# Patient Record
Sex: MALE | Race: OTHER | HISPANIC OR LATINO | Employment: FULL TIME | URBAN - METROPOLITAN AREA
[De-identification: names, ages, dates, MRNs, and addresses within clinical notes are randomized per-mention and may not be internally consistent; named-entity substitution may affect disease eponyms.]

---

## 2017-04-12 ENCOUNTER — OFFICE VISIT (OUTPATIENT)
Dept: URGENT CARE | Age: 52
End: 2017-04-12
Payer: COMMERCIAL

## 2017-04-12 PROCEDURE — G0382 LEV 3 HOSP TYPE B ED VISIT: HCPCS | Performed by: FAMILY MEDICINE

## 2017-11-05 ENCOUNTER — OFFICE VISIT (OUTPATIENT)
Dept: URGENT CARE | Age: 52
End: 2017-11-05
Payer: COMMERCIAL

## 2017-11-05 PROCEDURE — 94640 AIRWAY INHALATION TREATMENT: CPT | Performed by: FAMILY MEDICINE

## 2017-11-05 PROCEDURE — G0383 LEV 4 HOSP TYPE B ED VISIT: HCPCS | Performed by: FAMILY MEDICINE

## 2017-11-07 NOTE — PROGRESS NOTES
Assessment  1  History of acute bronchitis (V12 69) (Z87 09)   2  Acute bronchitis (466 0) (J20 9)    Plan  Acute bronchitis    · Amoxicillin-Pot Clavulanate 875-125 MG Oral Tablet; TAKE 1 TABLET EVERY 12  HOURS DAILY   · PredniSONE 10 MG Oral Tablet; 3 tabs BID X 2 days, 2 Tabs BID X 2 days, 1 Tab  BID X 2 Days, then 1 Tab daily X 2 days   · Ventolin  (90 Base) MCG/ACT Inhalation Aerosol Solution; INHALE 1 TO 2  PUFFS EVERY 4 TO 6 HOURS AS NEEDED    Discussion/Summary  Discussion Summary:   Patient given DuoNeb today in the office  Symptoms improved after treatment  Start Augmentin and take as directed  Recommend taking prednisone taper as directed  Use Ventolin inhaler at least 3 times a day for the next 3-4 days  Can use every 4-6 hours as needed  If symptoms are not improving over the next 3-5 days, follow-up with PCP  Medication Side Effects Reviewed: Possible side effects of new medications were reviewed with the patient/guardian today  Understands and agrees with treatment plan: The treatment plan was reviewed with the patient/guardian  The patient/guardian understands and agrees with the treatment plan   Follow Up Instructions: Follow Up with your Primary Care Provider in 3-5 days  If your symptoms worsen, go to the nearest [facility] Emergency Department  Chief Complaint  1  Cold Symptoms  Chief Complaint Free Text Note Form: pt reports cold S/S with cough and congestion for 2 weeks   pt states history of previous bronchitis  Itzel Blandon is a smoker and states Ventolin inhaler has helped in the past      History of Present Illness  HPI: 79-year-old male here with cough and congestion for the last 2 weeks  Patient is a smoker and has a history of previous bronchitis  Cough is productive with thick sputum  Denies any fever or chills  Hospital Based Practices Required Assessment:   Pain Assessment   the patient states they do not have pain   (on a scale of 0 to 10, the patient rates the pain at 0 ) Abuse And Domestic Violence Screen    Yes, the patient is safe at home  -- The patient states no one is hurting them  Depression And Suicide Screen  No, the patient has not had thoughts of hurting themself  No, the patient has not felt depressed in the past 7 days  Prefered Language is  [place]  Primary Language is  English  Cold Symptoms: Thai Stone presents with complaints of cold symptoms  Associated symptoms include nasal congestion,-- runny nose,-- post nasal drainage-- and-- productive cough, but-- no sneezing,-- no scratchy throat,-- no sore throat,-- no hoarseness,-- no dry cough,-- no facial pressure,-- no facial pain,-- no headache,-- no plugged ear(s),-- no ear pain,-- no nausea,-- no vomiting,-- no diarrhea,-- no fever-- and-- no chills  Review of Systems  Focused-Male:   Constitutional: no fever or chills, feels well, no tiredness, no recent weight loss or weight gain  ENT: no complaints of earache, no loss of hearing, no nosebleeds or nasal discharge, no sore throat or hoarseness  Cardiovascular: no complaints of slow or fast heart rate, no chest pain, no palpitations, no leg claudication or lower extremity edema  Respiratory: shortness of breath,-- cough-- and-- wheezing, but-- as noted in HPI  Gastrointestinal: no complaints of abdominal pain, no constipation, no nausea or vomiting, no diarrhea or bloody stools  ROS Reviewed:   ROS reviewed  Active Problems  1  Abnormal Liver Function Test (790 6)   2  Acne (706 1) (L70 9)   3  Acute lymphadenitis (683) (L04 9)   4  Chest congestion (786 9) (R09 89)   5  Chest discomfort (786 59) (R07 89)   6  Cigarette smoker (305 1) (F17 210)   7  COPD (chronic obstructive pulmonary disease) (496) (J44 9)   8  Costochondritis (733 6) (M94 0)   9  Current Every Day Smoker (305 1)   10  Depression with anxiety (300 4) (F41 8)   11   Dermatitis (692 9) (L30 9)   12  Eczema (692 9) (L30 9)   13  Elevated ALT measurement (790 4) (R74 0)   14  Erectile dysfunction of non-organic origin (302 72) (F52 21)   15  Hyperlipidemia (272 4) (E78 5)   16  Hypertension (401 9) (I10)   17  Impaired fasting glucose (790 21) (R73 01)   18  Insomnia (780 52) (G47 00)   19  Irritable bowel syndrome (564 1) (K58 9)   20  Nicotine dependence (305 1) (F17 200)   21  Obesity (278 00) (E66 9)   22  Otitis media (382 9) (H66 90)   23  Psoriasis (696 1) (L40 9)   24  Recurrent otitis media of left ear (382 9) (H66 92)   25  Sinusitis (473 9) (J32 9)   26  Tooth infection (522 4) (K04 7)   27  Upper respiratory infection (465 9) (J06 9)    Past Medical History  1  History of Anxiety (300 00) (F41 9)   2  History of Bronchospasm (519 11) (J98 01)   3  History of Denial Of Any Significant Medical History   4  History of Grief reaction (309 0) (F43 20)   5  History of acute bronchitis (V12 69) (Z87 09)   6  History of acute bronchitis (V12 69) (Z87 09)   7  History of acute bronchitis (V12 69) (Z87 09)   8  History of acute sinusitis (V12 69) (Z87 09)   9  History of athlete's foot (V12 09) (Z86 19)   10  History of impetigo (V13 3) (Z87 2)   11  History of low back pain (V13 59) (Z87 39)   12  History of upper respiratory infection (V12 09) (Z87 09)  Active Problems And Past Medical History Reviewed: The active problems and past medical history were reviewed and updated today  Family History  Mother    1  Family history of Lung Cancer (V16 1)  Father    2  Family history of Diabetes Mellitus (V18 0)  Sister    3  Family history of Diabetes Mellitus (V18 0)  Family History Reviewed: The family history was reviewed and updated today  Social History   · Being A Social Drinker   · Cigarette smoker (305 1) (F17 210)   · Current Every Day Smoker (305 1)   · Denied: History of Drug Use  Social History Reviewed: The social history was reviewed and updated today  The social history was reviewed and is unchanged  Surgical History  1   History of Colonoscopy (Fiberoptic)   2  History of Knee Surgery  Surgical History Reviewed: The surgical history was reviewed and updated today  Current Meds   1  Atorvastatin Calcium 10 MG Oral Tablet; Take 1 tablet daily; Therapy: 19EJT3631 to (Evaluate:07Oct2017)  Requested for: 20Caw2951; Last   Rx:[id number] Ordered   2  Fenofibrate 160 MG Oral Tablet; Take 1 tablet daily; Therapy: 36TUI1823 to (Evaluate:07Oct2017)  Requested for: 29Jca3400; Last   Rx:[id number] Ordered   3  PredniSONE 10 MG Oral Tablet; day 1: take 6; day 2: take 5; day 3: take 4; day 4: take   3; day 5: take 2; day 6: take 1 with food; Therapy: 77Udv1786 to (Last Rx:19Leo9785)  Requested for: 12Apr2017 Ordered   4  ProAir  (90 Base) MCG/ACT Inhalation Aerosol Solution; INHALE 1 PUFF EVERY   4 HOURS AS NEEDED; Therapy: 19Mzv4914 to (Last Rx:14Bsp6356)  Requested for: 12Apr2017 Ordered  Medication List Reviewed: The medication list was reviewed and updated today  Allergies  1  No Known Drug Allergies    Physical Exam    Constitutional   General appearance: No acute distress, well appearing and well nourished  Ears, Nose, Mouth, and Throat   External inspection of ears and nose: Normal     Otoscopic examination: Tympanic membrance translucent with normal light reflex  Canals patent without erythema  Nasal mucosa, septum, and turbinates: Abnormal   There was a mucoid discharge from both nares  The bilateral nasal mucosa was edematous-- and-- red  Oropharynx: Abnormal   The posterior pharynx was erythematous, but-- did not have an exudate  Pulmonary   Respiratory effort: No increased work of breathing or signs of respiratory distress  Auscultation of lungs: Abnormal   Auscultation of the lungs revealed diffuse wheezing  Cardiovascular   Auscultation of heart: Normal rate and rhythm, normal S1 and S2, without murmurs         Signatures   Electronically signed by : Samara Garcia, [facility]; Nov 5 2017  9:20AM EST (Author)    Electronically signed by : GUS Boo ; Nov 6 2017 10:25AM EST                       (Co-author)

## 2017-11-18 ENCOUNTER — APPOINTMENT (EMERGENCY)
Dept: RADIOLOGY | Facility: HOSPITAL | Age: 52
End: 2017-11-18
Payer: COMMERCIAL

## 2017-11-18 ENCOUNTER — HOSPITAL ENCOUNTER (EMERGENCY)
Facility: HOSPITAL | Age: 52
Discharge: HOME | End: 2017-11-18
Attending: EMERGENCY MEDICINE
Payer: COMMERCIAL

## 2017-11-18 VITALS
BODY MASS INDEX: 32.51 KG/M2 | HEART RATE: 83 BPM | TEMPERATURE: 97.4 F | DIASTOLIC BLOOD PRESSURE: 74 MMHG | RESPIRATION RATE: 18 BRPM | OXYGEN SATURATION: 96 % | SYSTOLIC BLOOD PRESSURE: 135 MMHG | WEIGHT: 240 LBS | HEIGHT: 72 IN

## 2017-11-18 DIAGNOSIS — K76.0 FATTY LIVER DISEASE, NONALCOHOLIC: ICD-10-CM

## 2017-11-18 DIAGNOSIS — S29.012A MUSCLE STRAIN OF RIGHT UPPER BACK, INITIAL ENCOUNTER: Primary | ICD-10-CM

## 2017-11-18 PROCEDURE — 74176 CT ABD & PELVIS W/O CONTRAST: CPT

## 2017-11-18 PROCEDURE — 99283 EMERGENCY DEPT VISIT LOW MDM: CPT

## 2017-11-18 NOTE — ED PROVIDER NOTES
History  Chief Complaint   Patient presents with    Back Pain     low right sided back pain for a few weeks worse with movement  denies dysuria  46 y/o male in nad with no associated sx of cp, sob, son, n, v, diarrhea, constipation came to ed c/o worsening pain r mid back, that radiate to ruq region  He denies systemic sx and uti sx  Pain is worse with upperbody rom rotation or twisting motion  With physical exam pt appears tender over ruq with radiating pain to right mid back region  Ribs with no crepition or step off  No sign of trauma such as echymosis or swelling  Pt works in construction for years does not seem this may have come from that  He drinks 6packs 3-4x day  No sign of ascites or jaundice or scleraictrus or pedal edema  Abdomen appears distended with no guarding with gross palpation  None       Past Medical History:   Diagnosis Date    Hypertension        History reviewed  No pertinent surgical history  History reviewed  No pertinent family history  I have reviewed and agree with the history as documented  Social History   Substance Use Topics    Smoking status: Current Every Day Smoker    Smokeless tobacco: Never Used    Alcohol use Yes        Review of Systems   Constitutional: Negative  HENT: Negative  Eyes: Negative  Respiratory: Negative for cough, chest tightness, shortness of breath, wheezing and stridor  Gastrointestinal: Positive for abdominal pain  Negative for constipation, diarrhea, nausea and vomiting  Genitourinary: Negative for difficulty urinating, discharge, dysuria, enuresis, frequency, hematuria and penile pain  Musculoskeletal: Positive for back pain  Neurological: Negative          Physical Exam  ED Triage Vitals   Temperature Pulse Respirations Blood Pressure SpO2   11/18/17 0925 11/18/17 0925 11/18/17 0925 11/18/17 0925 11/18/17 0925   (!) 97 4 °F (36 3 °C) (!) 108 16 (!) 187/82 93 %      Temp Source Heart Rate Source Patient Position - Orthostatic VS BP Location FiO2 (%)   11/18/17 0925 11/18/17 0925 11/18/17 1335 11/18/17 1335 --   Tympanic Monitor Lying Right arm       Pain Score       11/18/17 0925       5           Orthostatic Vital Signs  Vitals:    11/18/17 0925 11/18/17 1335   BP: (!) 187/82 135/74   Pulse: (!) 108 83   Patient Position - Orthostatic VS:  Lying       Physical Exam   Constitutional: He is oriented to person, place, and time  No distress  HENT:   Head: Normocephalic  Neck: Normal range of motion  Pulmonary/Chest: Effort normal  No respiratory distress  He has no wheezes  Abdominal: Soft  He exhibits distension  There is guarding (with deep palpation of ruq)  Musculoskeletal: Normal range of motion  Neurological: He is alert and oriented to person, place, and time  Skin: He is not diaphoretic  ED Medications  Medications - No data to display    Diagnostic Studies  Results Reviewed     None                 CT abdomen pelvis wo contrast   Final Result by Pro Cee DO (11/18 1317)   1  Hepatomegaly with fatty infiltration  2   Hiatal hernia  3   Colonic diverticulosis  Workstation performed: [id number]                    Procedures  Procedures       Phone Contacts  ED Phone Contact    ED Course  ED Course                                MDM  Number of Diagnoses or Management Options  Fatty liver disease, nonalcoholic:   Muscle strain of right upper back, initial encounter:   Diagnosis management comments: Ct abd/pelvis - fatty liver disease, diverticulosis, and hernia  None of these does not seem to be the cause of his pain  Will refer to gi follow up for the above and urge otc pain control to recommend  Repeat vitals stable 135/75 taken by the nurse prior to dc          Amount and/or Complexity of Data Reviewed  Tests in the radiology section of CPT®: ordered and reviewed      CritCare Time    Disposition  Final diagnoses:   Muscle strain of right upper back, initial encounter Fatty liver disease, nonalcoholic     Time reflects when diagnosis was documented in both MDM as applicable and the Disposition within this note     Time User Action Codes Description Comment    11/18/2017  1:27 PM Herve Santoyo Add [S29 012A] Muscle strain of right upper back, initial encounter     11/18/2017  1:27 PM Herve Santoyo Add [K76 0] Fatty liver disease, nonalcoholic       ED Disposition     ED Disposition Condition Comment    Discharge  [address] discharge to home/self care  Condition at discharge: Stable        Follow-up Information     Follow up With Specialties Details Why Contact Info    Ashkan Washington MD Family Medicine In 1 week  [address] [facility] [address]      Isa Patel MD Gastroenterology  for abnomal ct result [address] [facility]  [address] [facility] [address] [phone]          There are no discharge medications for this patient  No discharge procedures on file      ED Provider  Electronically Signed by           Sia Clarke PA-C  11/18/17 9044

## 2017-11-18 NOTE — DISCHARGE INSTRUCTIONS
Muscle Strain   WHAT YOU NEED TO KNOW:   A muscle strain is a twist, pull, or tear of a muscle or tendon  A tendon is a strong elastic tissue that connects a muscle to a bone  Signs of a strained muscle include bruising and swelling over the area, pain with movement, and loss of strength  DISCHARGE INSTRUCTIONS:   Return to the emergency department if:   · You suddenly cannot feel or move your injured muscle  Contact your healthcare provider if:   · Your pain and swelling worsen or do not go away  · You have questions or concerns about your condition or care  Medicines:   · NSAIDs  help decrease swelling and pain or fever  This medicine is available with or without a doctor's order  NSAIDs can cause stomach bleeding or kidney problems in certain people  If you take blood thinner medicine, always ask your healthcare provider if NSAIDs are safe for you  Always read the medicine label and follow directions  · Muscle relaxers  help decrease pain and muscle spasms  · Take your medicine as directed  Contact your healthcare provider if you think your medicine is not helping or if you have side effects  Tell him of her if you are allergic to any medicine  Keep a list of the medicines, vitamins, and herbs you take  Include the amounts, and when and why you take them  Bring the list or the pill bottles to follow-up visits  Carry your medicine list with you in case of an emergency  Follow up with your healthcare provider as directed: Your healthcare provider may suggest that you have a follow-up visit before you go back to your usual activity  Write down your questions so you remember to ask them during your visits  Self-care:   · 3 to 7 days after the injury:  Use Rest, Ice, Compression, and Elevation (RICE) to help stop bruising and decrease pain and swelling  ¨ Rest:  Rest your muscle to allow your injury to heal  When the pain decreases, begin normal, slow movements   For mild and moderate muscle strains, you should rest your muscles for about 2 days  However, if you have a severe muscle strain, you should rest for 10 to 14 days  You may need to use crutches to walk if your muscle strain is in your legs or lower body  ¨ Ice:  Put an ice pack on the injured area  Put a towel between the ice pack and your skin  Do not put the ice pack directly on your skin  You can use a package of frozen peas instead of an ice pack  ¨ Compression:  You may need to wrap an elastic bandage around the area to decrease swelling  It should be tight enough for you to feel support  Do not wrap it too tightly  ¨ Elevation:  Keep the injured muscle raised above your heart if possible  For example if you have a strain of your lower leg muscle, lie down and prop your leg up on pillows  This helps decrease pain and swelling  · 3 to 21 days after the injury:  Start to slowly and regularly exercise your muscle  This will help it heal  If you feel pain, decrease how hard you are exercising  · 1 to 6 weeks after the injury:  Stretch the injured muscle  Hold the stretch for about 30 seconds  Do this 4 times a day  You may stretch the muscle until you feel a slight pull  Stop stretching if you feel pain  · 2 weeks to 6 months after the injury:  The goal of this phase is to return to the activity you were doing before the injury happened, without hurting the muscle again  · 3 weeks to 6 months after the injury:  Keep stretching and strengthening your muscles to avoid injury  Slowly increase the time and distance that you exercise  You may have signs and symptoms of muscle strain 6 months after the injury, even if you do things to help it heal  In this case, you may need surgery on the muscle  © 2017 [employer] Information is for End User's use only and may not be sold, redistributed or otherwise used for commercial purposes   All illustrations and images included in CareNotes® are the copyrighted property of [employer]  or Alexis Perez  The above information is an  only  It is not intended as medical advice for individual conditions or treatments  Talk to your doctor, nurse or pharmacist before following any medical regimen to see if it is safe and effective for you  Non-Alcoholic Fatty Liver Disease   WHAT YOU NEED TO KNOW:   Non-alcoholic fatty liver disease (NAFLD) is a buildup of fat in your liver from a condition other than alcoholism  DISCHARGE INSTRUCTIONS:   Medicines:   · Medicines  may be given to manage blood sugar or cholesterol levels  · Take your medicine as directed  Contact your healthcare provider if you think your medicine is not helping or if you have side effects  Tell him or her if you are allergic to any medicine  Keep a list of the medicines, vitamins, and herbs you take  Include the amounts, and when and why you take them  Bring the list or the pill bottles to follow-up visits  Carry your medicine list with you in case of an emergency  Follow up with your healthcare provider as directed: You may need to return for more tests  You may also be referred to a specialist  Write down your questions so you remember to ask them during your visits  Manage NAFLD:   · Maintain a healthy weight  Ask your healthcare provider how much you should weigh  Ask him to help you create a weight loss plan if you are overweight  · Exercise  Aerobic exercise 3 times a week for 20 to 45 minutes can help decrease fat buildup in your liver  Examples are cycling, brisk walking, and jogging  Ask your healthcare provider about the best exercise plan for you  · Eat healthy foods  Examples are vegetables, fruit, whole-grain breads, low-fat dairy products, beans, lean meats, and fish  Foods low in simple carbohydrates, high fructose corn syrup, and trans fat may help decrease fat buildup in your liver  · Do not drink alcohol    Alcohol may make NAFLD worse and harm your liver  Contact your healthcare provider if:   · You have increased pain or swelling in your abdomen  · You feel more tired than usual     · You bruise or bleed easily  · Your skin or the whites of your eyes look yellow  · You have questions or concerns about your condition or care  Return to the emergency department if:   · You have shortness of breath  · You have trouble thinking clearly or are confused  · You feel lightheaded or faint  · You have shaking, chills, and a fever  © 2017 [employer]  Information is for End User's use only and may not be sold, redistributed or otherwise used for commercial purposes  All illustrations and images included in CareNotes® are the copyrighted property of A D A M [employer]  or Alexis Perez  The above information is an  only  It is not intended as medical advice for individual conditions or treatments  Talk to your doctor, nurse or pharmacist before following any medical regimen to see if it is safe and effective for you

## 2017-12-06 ENCOUNTER — GENERIC CONVERSION - ENCOUNTER (OUTPATIENT)
Dept: FAMILY MEDICINE CLINIC | Facility: CLINIC | Age: 52
End: 2017-12-06

## 2018-01-15 NOTE — RESULT NOTES
Message   triglycerides 953 very high   Please ask pt to make an appt to discuss results ASAP! Pt needs an appt to discuss results ASAP  Verified Results  (Q) LIPID PANEL WITH REFLEX TO DIRECT LDL [id number] 12:00AM Tristan Cowden     Test Name Result Flag Reference   CHOLESTEROL, TOTAL 296 mg/dL H 125-200   HDL CHOLESTEROL 37 mg/dL L > OR = 40   TRIGLICERIDES 167 mg/dL H <150   LDL-CHOLESTEROL  mg/dL (calc)  <130   LDL cholesterol not calculated  Triglyceride levels  greater than 400 mg/dL invalidate calculated LDL results  Desirable range <100 mg/dL for patients with CHD or  diabetes and <70 mg/dL for diabetic patients with  known heart disease  CHOL/HDLC RATIO 8 0 (calc) H < OR = 5 0   NON HDL CHOLESTEROL 259 mg/dL (calc) H    Target for non-HDL cholesterol is 30 mg/dL higher than   LDL cholesterol target  DIRECT  mg/dL  <130   Desirable range <100 mg/dL for patients with CHD or  diabetes and <70 mg/dL for diabetic patients with  known heart disease       (Q) CBC (INCLUDES DIFF/PLT) (REFL) [id number] 12:00AM Tristan Cowden     Test Name Result Flag Reference   WHITE BLOOD CELL COUNT 9 8 Thousand/uL  3 8-10 8   RED BLOOD CELL COUNT 5 12 Million/uL  4 20-5 80   HEMOGLOBIN 16 0 g/dL  13 2-17 1   HEMATOCRIT 47 8 %  38 5-50 0   MCV 93 3 fL  80 0-100 0   MCH 31 2 pg  27 0-33 0   EOSINOPHILS 5 2 %     BASOPHILS 0 1 %     ABSOLUTE MONOCYTES 637 cells/uL  200-950   ABSOLUTE EOSINOPHILS 510 cells/uL H    ABSOLUTE BASOPHILS 10 cells/uL  0-200   NEUTROPHILS 60 9 %     LYMPHOCYTES 27 3 %     MONOCYTES 6 5 %     MCHC 33 4 g/dL  32 0-36 0   RDW 13 5 %  11 0-15 0   PLATELET COUNT 346 Thousand/uL  140-400   MPV 8 8 fL  7 5-11 5   ABSOLUTE NEUTROPHILS 5968 cells/uL  6098-2233   ABSOLUTE LYMPHOCYTES 2675 cells/uL  850-3900     (Q) COMPREHENSIVE METABOLIC PNL W/ADJUSTED CALCIUM [id number] 12:00AM Tristan Cowden     Test Name Result Flag Reference   GLUCOSE 110 mg/dL H 65-99   Fasting reference interval   UREA NITROGEN (BUN) 18 mg/dL  7-25   CREATININE 0 84 mg/dL  0 70-1 33   For patients >52years of age, the reference limit  for Creatinine is approximately 13% higher for people  identified as -American  eGFR NON-AFR  AMERICAN 101 mL/min/1 73m2  > OR = 60   eGFR AFRICAN AMERICAN 117 mL/min/1 73m2  > OR = 60   BUN/CREATININE RATIO   5-67   NOT APPLICABLE (calc)   AST 31 U/L  10-35   ALT 62 U/L H 9-46   PROTEIN, TOTAL 6 7 g/dL  6 1-8 1   ALBUMIN 4 5 g/dL  3 6-5 1   GLOBULIN 2 2 g/dL (calc)  1 9-3 7   ALBUMIN/GLOBULIN RATIO 2 0 (calc)  1 0-2 5   BILIRUBIN, TOTAL 0 3 mg/dL  0 2-1 2   ALKALINE PHOSPHATASE 78 U/L     SODIUM 137 mmol/L  135-146   POTASSIUM 4 7 mmol/L  3 5-5 3   CHLORIDE 101 mmol/L     CARBON DIOXIDE 26 mmol/L  20-31   CALCIUM 9 8 mg/dL  8 6-10 3   CALCIUM (ADJUSTED FOR$ALBUMIN) 9 7 mg/dL (calc)  8 6-10 2     (Q) HEMOGLOBIN A1c WITH eAG [id number] 12:00AM Monogram   REPORT COMMENT:  FASTING:YES     Test Name Result Flag Reference   HEMOGLOBIN A1c 6 3 % of total Hgb H <5 7   According to ADA guidelines, hemoglobin A1c <7 0%  represents optimal control in non-pregnant diabetic  patients  Different metrics may apply to specific  patient populations  Standards of Medical Care in    Diabetes Care  2013;36:s11-s66     For the purpose of screening for the presence of  diabetes  <5 7%       Consistent with the absence of diabetes  5 7-6 4%    Consistent with increased risk for diabetes              (prediabetes)  >or=6 5%    Consistent with diabetes     This assay result is consistent with a higher risk  of diabetes  Currently, no consensus exists for use of hemoglobin  A1c for diagnosis of diabetes for children     eAG (mg/dL) 134 (calc)     eAG (mmol/L) 7 4 (calc)       (Q) TSH, 3RD GENERATION W/REFLEX TO FT4 [id number] 12:00AM Monogram     Test Name Result Flag Reference   TSH W/REFLEX TO FT4 0 97 mIU/L  0 40-4 50

## 2018-01-30 ENCOUNTER — OFFICE VISIT (OUTPATIENT)
Dept: URGENT CARE | Age: 53
End: 2018-01-30
Payer: COMMERCIAL

## 2018-01-30 VITALS
BODY MASS INDEX: 35 KG/M2 | HEIGHT: 71 IN | HEART RATE: 95 BPM | WEIGHT: 250 LBS | TEMPERATURE: 98.2 F | SYSTOLIC BLOOD PRESSURE: 160 MMHG | RESPIRATION RATE: 20 BRPM | DIASTOLIC BLOOD PRESSURE: 89 MMHG

## 2018-01-30 DIAGNOSIS — R06.2 WHEEZING: ICD-10-CM

## 2018-01-30 DIAGNOSIS — S60.211A CONTUSION OF RIGHT WRIST, INITIAL ENCOUNTER: Primary | ICD-10-CM

## 2018-01-30 DIAGNOSIS — J20.9 ACUTE BRONCHITIS, UNSPECIFIED ORGANISM: ICD-10-CM

## 2018-01-30 PROCEDURE — G0383 LEV 4 HOSP TYPE B ED VISIT: HCPCS | Performed by: FAMILY MEDICINE

## 2018-01-30 PROCEDURE — 73110 X-RAY EXAM OF WRIST: CPT

## 2018-01-30 RX ORDER — AZITHROMYCIN 250 MG/1
TABLET, FILM COATED ORAL
Qty: 6 TABLET | Refills: 0 | Status: SHIPPED | OUTPATIENT
Start: 2018-01-30 | End: 2018-02-04

## 2018-01-30 RX ORDER — ALBUTEROL SULFATE 2.5 MG/3ML
2.5 SOLUTION RESPIRATORY (INHALATION) ONCE
Status: COMPLETED | OUTPATIENT
Start: 2018-01-30 | End: 2018-01-30

## 2018-01-30 RX ORDER — ALBUTEROL SULFATE 90 UG/1
2 AEROSOL, METERED RESPIRATORY (INHALATION) EVERY 6 HOURS PRN
Qty: 1 INHALER | Refills: 0 | Status: SHIPPED | OUTPATIENT
Start: 2018-01-30 | End: 2018-11-25 | Stop reason: ALTCHOICE

## 2018-01-30 RX ADMIN — ALBUTEROL SULFATE 2.5 MG: 2.5 SOLUTION RESPIRATORY (INHALATION) at 18:10

## 2018-01-30 NOTE — PATIENT INSTRUCTIONS
Use a humidifier bedtime    Take probiotics as directed    Continue over-the-counter medications as needed for symptomatic care    Follow-up with the primary care physician in 3-4 days if symptoms persist    If you have continued pain in the right wrist follow-up with orthopedics      May use soft wrist brace as needed

## 2018-01-30 NOTE — PROGRESS NOTES
Assessment/Plan:      There are no diagnoses linked to this encounter  Subjective:     Patient ID: Tor Fothergill is a 47 y o  male  Patient is here for evaluation of cough and chest congestion and wheezing for the past week  Patient's wife has bronchitis now  The patient is coughing up thick yellow sputum and has been getting intermittent wheezing  Patient also fell in the shower about 2 weeks ago landing on his right arm and wrist   Patient has persistent pain in the right ulnar wrist   He denies any significant swelling, numbness, tingling, weakness        Review of Systems   Constitutional: Negative  HENT: Positive for congestion and postnasal drip  Negative for ear discharge, ear pain, sinus pressure and sore throat  Eyes: Negative  Respiratory: Positive for cough and wheezing  Negative for shortness of breath and stridor  Cardiovascular: Negative  Musculoskeletal:        Pain in tenderness of the right ulnar wrist         Objective:     Physical Exam   Constitutional: He appears well-developed and well-nourished  HENT:   Head: Normocephalic and atraumatic  Right Ear: No tenderness  No mastoid tenderness  Tympanic membrane is not injected, not perforated, not erythematous, not retracted and not bulging  A middle ear effusion is present  Left Ear: No mastoid tenderness  Tympanic membrane is retracted  Tympanic membrane is not injected, not perforated, not erythematous and not bulging  A middle ear effusion is present  Nose: Mucosal edema and rhinorrhea present  No sinus tenderness or nasal deformity  No foreign bodies  Right sinus exhibits no maxillary sinus tenderness and no frontal sinus tenderness  Left sinus exhibits no maxillary sinus tenderness and no frontal sinus tenderness  Mouth/Throat: Uvula is midline and mucous membranes are normal  Posterior oropharyngeal erythema present  No oropharyngeal exudate, posterior oropharyngeal edema or tonsillar abscesses  Musculoskeletal:        Right wrist: He exhibits tenderness and swelling  He exhibits no effusion, no crepitus and no deformity  Arms:  Nursing note and vitals reviewed  Right wrist x-ray -no acute findings  Await official radiology report      Procedures

## 2018-03-09 VITALS
WEIGHT: 260 LBS | HEART RATE: 74 BPM | DIASTOLIC BLOOD PRESSURE: 74 MMHG | BODY MASS INDEX: 35.21 KG/M2 | SYSTOLIC BLOOD PRESSURE: 122 MMHG | HEIGHT: 72 IN

## 2018-03-09 DIAGNOSIS — M77.8 TENDONITIS OF WRIST, RIGHT: Primary | ICD-10-CM

## 2018-03-09 PROCEDURE — 99204 OFFICE O/P NEW MOD 45 MIN: CPT | Performed by: ORTHOPAEDIC SURGERY

## 2018-03-09 NOTE — PROGRESS NOTES
Assessment:  1  Tendonitis of wrist, right  diclofenac sodium (VOLTAREN) 1 %     Patient Active Problem List   Diagnosis    Contusion of right wrist    Wheezing    Acute bronchitis    Tendonitis of wrist, right           Plan        Topical anti-inflammatory cream universal wrist splint follow up with us in 4 weeks at that point I hope to have him at least 50% better if not more  If he is not we may need to look at steroids  Unfortunately he cannot take oral anti-inflammatories for his gastroenterologist tells him not to do so because of his diverticulitis  Hopefully his insurance understands this and that is why we are ordering the topical cream             Subjective:     Patient ID:    Chief Complaint:Callum Arredondo 47 y o  male      HPI    Patient comes in today with regards to Right wrist   The patient reports that the pain is in the  Ulnar side of the right wrist and has been going on for  Month and a half  The pain is rated at5 at its best and7 at its worst   The pain is described as  stabbing  It sometimes pops  It is worsened with   Supination and pronation wrist flexion, and is  Uncertain if anything has helped the pain  Occasionally has tried ibuprofen but is not able to take a because he has diverticulitis     The patient has taken   A brace from the pharmacy but no improvement for treatment  No previous injuries to this wrist       The following portions of the patient's history were reviewed and updated as appropriate: allergies, current medications, past family history, past social history, past surgical history and problem list         Social History     Social History    Marital status: Single     Spouse name: N/A    Number of children: N/A    Years of education: N/A     Occupational History    Not on file       Social History Main Topics    Smoking status: Current Every Day Smoker    Smokeless tobacco: Never Used    Alcohol use Yes    Drug use: No    Sexual activity: Not on file Other Topics Concern    Not on file     Social History Narrative    No narrative on file     Past Medical History:   Diagnosis Date    Diverticulitis 2016    Hypertension      History reviewed  No pertinent surgical history  No Known Allergies  Current Outpatient Prescriptions on File Prior to Visit   Medication Sig Dispense Refill    albuterol (PROVENTIL HFA,VENTOLIN HFA) 90 mcg/act inhaler Inhale 2 puffs every 6 (six) hours as needed for wheezing 1 Inhaler 0     No current facility-administered medications on file prior to visit  Objective:    Review of Systems   Constitutional: Negative  HENT: Negative  Eyes: Negative  Respiratory: Negative  Cardiovascular: Negative  Gastrointestinal: Negative  Negative for vomiting  Genitourinary: Negative  Musculoskeletal:        Please refer to ortho exam   Skin: Negative  Neurological: Negative  Hematological: Negative  Psychiatric/Behavioral: Negative  All other systems reviewed and are negative  Right Hand Exam   Right hand exam is normal     Range of Motion   The patient has normal right wrist ROM  Muscle Strength   The patient has normal right wrist strength  Other   Sensation: normal  Pulse: present    Comments:    Pinpoint tender at the flexor carpi ulnaris insertion  No tenderness of the ECU no tenderness over the TFCC  Left Hand Exam   Left hand exam is normal     Range of Motion   The patient has normal left wrist ROM  Muscle Strength   The patient has normal left wrist strength  Other   Sensation: normal  Pulse: present            Physical Exam   Constitutional: He is oriented to person, place, and time  He appears well-developed  HENT:   Head: Normocephalic  Eyes: Pupils are equal, round, and reactive to light  Neck: Neck supple  Cardiovascular: Intact distal pulses  Pulmonary/Chest: Effort normal    Abdominal: He exhibits no distension     Neurological: He is alert and oriented to person, place, and time  Skin: Skin is warm  Psychiatric: He has a normal mood and affect  Nursing note and vitals reviewed  Procedures  No Procedures performed today    I have personally reviewed pertinent films in PACS and my interpretation is No osseous deformity  Portions of the record may have been created with voice recognition software   Occasional wrong word or "sound a like" substitutions may have occurred due to the inherent limitations of voice recognition software   Read the chart carefully and recognize, using context, where substitutions have occurred

## 2018-05-22 ENCOUNTER — OFFICE VISIT (OUTPATIENT)
Dept: URGENT CARE | Age: 53
End: 2018-05-22
Payer: COMMERCIAL

## 2018-05-22 VITALS
OXYGEN SATURATION: 96 % | DIASTOLIC BLOOD PRESSURE: 84 MMHG | WEIGHT: 246.8 LBS | TEMPERATURE: 98 F | HEIGHT: 71 IN | RESPIRATION RATE: 14 BRPM | HEART RATE: 114 BPM | BODY MASS INDEX: 34.55 KG/M2 | SYSTOLIC BLOOD PRESSURE: 143 MMHG

## 2018-05-22 DIAGNOSIS — J06.9 ACUTE UPPER RESPIRATORY INFECTION: Primary | ICD-10-CM

## 2018-05-22 DIAGNOSIS — J20.9 ACUTE BRONCHITIS, UNSPECIFIED ORGANISM: ICD-10-CM

## 2018-05-22 DIAGNOSIS — J01.90 ACUTE NON-RECURRENT SINUSITIS, UNSPECIFIED LOCATION: ICD-10-CM

## 2018-05-22 PROCEDURE — G0382 LEV 3 HOSP TYPE B ED VISIT: HCPCS | Performed by: FAMILY MEDICINE

## 2018-05-22 RX ORDER — AMOXICILLIN AND CLAVULANATE POTASSIUM 875; 125 MG/1; MG/1
1 TABLET, FILM COATED ORAL EVERY 12 HOURS SCHEDULED
Qty: 20 TABLET | Refills: 0 | Status: SHIPPED | OUTPATIENT
Start: 2018-05-22 | End: 2018-06-01

## 2018-05-22 RX ORDER — ALBUTEROL SULFATE 90 UG/1
2 AEROSOL, METERED RESPIRATORY (INHALATION) EVERY 4 HOURS PRN
Qty: 1 INHALER | Refills: 1 | Status: SHIPPED | OUTPATIENT
Start: 2018-05-22 | End: 2018-11-25 | Stop reason: ALTCHOICE

## 2018-05-22 RX ORDER — ALBUTEROL SULFATE 2.5 MG/3ML
2.5 SOLUTION RESPIRATORY (INHALATION) ONCE
Status: COMPLETED | OUTPATIENT
Start: 2018-05-22 | End: 2018-05-22

## 2018-05-22 RX ORDER — METHYLPREDNISOLONE 4 MG/1
TABLET ORAL
Qty: 21 TABLET | Refills: 0 | Status: SHIPPED | OUTPATIENT
Start: 2018-05-22 | End: 2018-11-25 | Stop reason: ALTCHOICE

## 2018-05-22 RX ADMIN — ALBUTEROL SULFATE 2.5 MG: 2.5 SOLUTION RESPIRATORY (INHALATION) at 08:17

## 2018-05-22 RX ADMIN — Medication 0.5 MG: at 08:17

## 2018-05-22 NOTE — PROGRESS NOTES
[facility]        NAME: Madison Sin is a 46 y o  male  : 1965    MRN: [id number]  DATE: May 22, 2018  TIME: 8:11 AM    Assessment and Plan   Acute upper respiratory infection [J06 9]  1  Acute upper respiratory infection     2  Acute non-recurrent sinusitis, unspecified location  amoxicillin-clavulanate (AUGMENTIN) 875-125 mg per tablet    Methylprednisolone 4 MG TBPK   3  Acute bronchitis, unspecified organism  Methylprednisolone 4 MG TBPK    albuterol (PROVENTIL HFA,VENTOLIN HFA) 90 mcg/act inhaler    Mini neb    albuterol inhalation solution 2 5 mg    ipratropium (ATROVENT) 0 02 % inhalation solution 0 5 mg         Patient Instructions     Patient Instructions   Rest, limit activity  Augmentin twice a day until finished (please take probiotics)  Medrol Dosepak as directed (please take with food)  Two puffs albuterol inhaler every 4 hours as directed as needed  Cold/cough/sinus medication as needed  Recheck/follow-up with family physician  Please go to the hospital emergency department if needed  Follow up with PCP in 2-3 days  Proceed to  ER if symptoms worsen  Chief Complaint     Chief Complaint   Patient presents with    Nasal Congestion     productive cough- full sinuses x 1 week         History of Present Illness       Patient with congestion, sinus discomfort, cough, slight shortness of breath; patient is a smoker        Review of Systems   Review of Systems   Constitutional: Positive for fatigue  HENT: Positive for congestion and sinus pressure  Respiratory: Positive for cough and shortness of breath  Cardiovascular: Negative  Musculoskeletal: Negative  Skin: Negative  Neurological: Negative            Current Medications       Current Outpatient Prescriptions:     albuterol (PROVENTIL HFA,VENTOLIN HFA) 90 mcg/act inhaler, Inhale 2 puffs every 6 (six) hours as needed for wheezing, Disp: 1 Inhaler, Rfl: 0    albuterol (PROVENTIL HFA,VENTOLIN HFA) 90 mcg/act inhaler, Inhale 2 puffs every 4 (four) hours as needed for wheezing, Disp: 1 Inhaler, Rfl: 1    amoxicillin-clavulanate (AUGMENTIN) 875-125 mg per tablet, Take 1 tablet by mouth every 12 (twelve) hours for 20 doses, Disp: 20 tablet, Rfl: 0    diclofenac sodium (VOLTAREN) 1 %, Apply 2 g topically 4 (four) times a day, Disp: 1 Tube, Rfl: 0    Methylprednisolone 4 MG TBPK, Use as directed on package, Disp: 21 tablet, Rfl: 0    Current Facility-Administered Medications:     albuterol inhalation solution 2 5 mg, 2 5 mg, Nebulization, Once, [employer], DO    ipratropium (ATROVENT) 0 02 % inhalation solution 0 5 mg, 0 5 mg, Nebulization, Once, [employer], DO    Current Allergies     Allergies as of 05/22/2018    (No Known Allergies)            The following portions of the patient's history were reviewed and updated as appropriate: allergies, current medications, past family history, past medical history, past social history, past surgical history and problem list      Past Medical History:   Diagnosis Date    Diverticulitis 2016    Hypertension        History reviewed  No pertinent surgical history  Family History   Problem Relation Age of Onset    Cancer Mother     Cancer Father     Diabetes Father          Medications have been verified  Objective   /84   Pulse (!) 114   Temp 98 °F (36 7 °C) (Temporal)   Resp 14   Ht 5' 11" (1 803 m)   Wt 112 kg (246 lb 12 8 oz)   SpO2 96%   BMI 34 42 kg/m²        Physical Exam     Physical Exam   Constitutional: He is oriented to person, place, and time  He appears well-developed and well-nourished  No distress  HENT:   Right Ear: External ear normal    Left Ear: External ear normal    Nasal congestion; slight injection of the oropharynx   Neck: Normal range of motion  Neck supple  Cardiovascular: Normal rate, regular rhythm and normal heart sounds  Pulmonary/Chest: Effort normal  No respiratory distress  He has no wheezes  Coarse breath sounds with scattered rhonchi   Neurological: He is alert and oriented to person, place, and time  No nuchal rigidity   Skin: Skin is warm  Good color and turgor   Psychiatric: He has a normal mood and affect  His behavior is normal    Nursing note and vitals reviewed

## 2018-05-22 NOTE — PATIENT INSTRUCTIONS
Rest, limit activity  Augmentin twice a day until finished (please take probiotics)  Medrol Dosepak as directed (please take with food)  Two puffs albuterol inhaler every 4 hours as directed as needed  Cold/cough/sinus medication as needed  Recheck/follow-up with family physician  Please go to the hospital emergency department if needed

## 2018-08-16 ENCOUNTER — TELEPHONE (OUTPATIENT)
Dept: FAMILY MEDICINE CLINIC | Facility: CLINIC | Age: 53
End: 2018-08-16

## 2018-08-16 DIAGNOSIS — L30.9 DERMATITIS: ICD-10-CM

## 2018-08-16 DIAGNOSIS — L30.9 DERMATITIS: Primary | ICD-10-CM

## 2018-08-16 RX ORDER — PERMETHRIN 50 MG/G
CREAM TOPICAL ONCE
Qty: 60 G | Refills: 0 | Status: SHIPPED | OUTPATIENT
Start: 2018-08-16 | End: 2018-08-16 | Stop reason: SDUPTHER

## 2018-08-16 RX ORDER — PERMETHRIN 50 MG/G
CREAM TOPICAL ONCE
Qty: 120 G | Refills: 0 | Status: SHIPPED | OUTPATIENT
Start: 2018-08-16 | End: 2018-08-16

## 2018-08-16 NOTE — TELEPHONE ENCOUNTER
I called patient to let him know he doesn't have to come in tonight to be seen for scabies, Dr Raghav Meléndez ordered cream for him  He thanks us for saving him the trip  Karoline Wilks is curious if he will need 2 tubes, or more cream, as he is 6ft and 250lbs  I read the sig and it is to be used in 1 application, so please advise and I can call him back?

## 2018-08-16 NOTE — PROGRESS NOTES
Pt's son was seen here on Monday for scabies, got cream  Pt would like to get same cream also  Sent to pharmacy

## 2018-08-16 NOTE — TELEPHONE ENCOUNTER
Called pharmacy and patient to confirm, he did end up getting the 120g and will call if there is no improvement in symptoms

## 2018-11-25 ENCOUNTER — OFFICE VISIT (OUTPATIENT)
Dept: URGENT CARE | Age: 53
End: 2018-11-25
Payer: COMMERCIAL

## 2018-11-25 VITALS
OXYGEN SATURATION: 95 % | BODY MASS INDEX: 35 KG/M2 | HEIGHT: 71 IN | SYSTOLIC BLOOD PRESSURE: 142 MMHG | TEMPERATURE: 98.2 F | WEIGHT: 250 LBS | HEART RATE: 98 BPM | RESPIRATION RATE: 20 BRPM | DIASTOLIC BLOOD PRESSURE: 92 MMHG

## 2018-11-25 DIAGNOSIS — J20.8 ACUTE BRONCHITIS DUE TO OTHER SPECIFIED ORGANISMS: Primary | ICD-10-CM

## 2018-11-25 PROCEDURE — G0381 LEV 2 HOSP TYPE B ED VISIT: HCPCS | Performed by: FAMILY MEDICINE

## 2018-11-25 RX ORDER — ALBUTEROL SULFATE 90 UG/1
1-2 AEROSOL, METERED RESPIRATORY (INHALATION) EVERY 6 HOURS PRN
Qty: 18 G | Refills: 0 | Status: SHIPPED | OUTPATIENT
Start: 2018-11-25 | End: 2019-02-27 | Stop reason: SDUPTHER

## 2018-11-25 RX ORDER — ALBUTEROL SULFATE 2.5 MG/3ML
2.5 SOLUTION RESPIRATORY (INHALATION) ONCE
Status: COMPLETED | OUTPATIENT
Start: 2018-11-25 | End: 2018-11-25

## 2018-11-25 RX ORDER — AZITHROMYCIN 250 MG/1
TABLET, FILM COATED ORAL
Qty: 6 TABLET | Refills: 0 | Status: SHIPPED | OUTPATIENT
Start: 2018-11-25 | End: 2018-11-29

## 2018-11-25 RX ORDER — PREDNISONE 10 MG/1
TABLET ORAL
Qty: 1 EACH | Refills: 0 | Status: SHIPPED | OUTPATIENT
Start: 2018-11-25 | End: 2019-02-27 | Stop reason: ALTCHOICE

## 2018-11-25 RX ADMIN — ALBUTEROL SULFATE 2.5 MG: 2.5 SOLUTION RESPIRATORY (INHALATION) at 09:04

## 2018-11-25 RX ADMIN — Medication 0.5 MG: at 09:04

## 2018-11-25 NOTE — PROGRESS NOTES
[facility]        NAME: Sylvie Young is a 46 y o  male  : 1965    MRN: [id number]  DATE: 2018  TIME: 9:15 AM    Assessment and Plan   Acute bronchitis due to other specified organisms [J20 8]  1  Acute bronchitis due to other specified organisms  ipratropium (ATROVENT) 0 02 % inhalation solution 0 5 mg    albuterol inhalation solution 2 5 mg    albuterol (VENTOLIN HFA) 90 mcg/act inhaler    azithromycin (ZITHROMAX) 250 mg tablet    PredniSONE 10 MG (21) TBPK     Patient feeling much better after the DuoNeb  No further wheezes  Stable for outpatient follow-up with PCP tomorrow  Patient verbalized good understanding on when to go to the emergency department    Patient Instructions     Take antibiotics as prescribed  Use Ventolin if needed    Stay hydrated with lots of water/fluids  Follow-up with PCP in the next few days for reexamination and to ensure resolution of symptoms  Go to the ED if any intractable fevers, unable to stay hydrated, abdominal pain, chest pain, shortness of breath, new or worsening symptoms or other concerning symptoms  Chief Complaint     Chief Complaint   Patient presents with    Cough     Been sick for the last 1-2 weeks with a cold and cough- not getting better    Cold Like Symptoms         History of Present Illness       Cough   This is a new problem  The current episode started 1 to 4 weeks ago  The problem has been unchanged  The problem occurs hourly  The cough is productive of sputum  Associated symptoms include nasal congestion and rhinorrhea  Pertinent negatives include no chest pain, chills, ear pain, fever, headaches, heartburn, hemoptysis, rash, sore throat, shortness of breath or wheezing  Nothing aggravates the symptoms  Risk factors for lung disease include smoking/tobacco exposure (Denies any history of COPD but states this feels like his prior bronchitis where he needs a Ventolin and antibiotics    Patient states he is going to quit smoking today)  He has tried OTC cough suppressant (States he ran out of his Ventolin inhaler and will need a refill) for the symptoms  The treatment provided no relief  His past medical history is significant for bronchitis  There is no history of asthma or COPD  Patient states it feels like the last time he had bronchitis  He denies any history of high blood pressure or heart problems  Discussed blood pressure here in the office today, patient denies any chest pain, shortness of breath, headaches or vision changes  States he got worked up because they were going to accept his insurance but states the got figured out and he is much calmer now  Repeat blood pressure is 142/92  Patient does agree to call his PCP tomorrow for BP re-evaluation  Review of Systems   Review of Systems   Constitutional: Negative for chills, fatigue and fever  HENT: Positive for congestion and rhinorrhea  Negative for ear pain, sore throat and trouble swallowing  Eyes: Negative for itching and visual disturbance  Respiratory: Positive for cough  Negative for hemoptysis, shortness of breath and wheezing  Cardiovascular: Negative for chest pain and leg swelling  Gastrointestinal: Negative for abdominal pain, diarrhea, heartburn, nausea and vomiting  Musculoskeletal: Negative for back pain, joint swelling, neck pain and neck stiffness  Skin: Negative for rash  Neurological: Negative for dizziness, seizures, weakness, numbness and headaches  All other systems reviewed and are negative          Current Medications       Current Outpatient Prescriptions:     albuterol (VENTOLIN HFA) 90 mcg/act inhaler, Inhale 1-2 puffs every 6 (six) hours as needed for wheezing, Disp: 18 g, Rfl: 0    azithromycin (ZITHROMAX) 250 mg tablet, Take 2 tablets today then 1 tablet daily x 4 days, Disp: 6 tablet, Rfl: 0    PredniSONE 10 MG (21) TBPK, Take 6tabs(60mg) on Day 1, 5tabs(50mg) on Day 2, 4tabs(40mg) on day 3, 3tabs(30mg) on Day 4, 2tabs(20mg) on Day 5, 1tab(10mg) on Day 6, Disp: 1 each, Rfl: 0  No current facility-administered medications for this visit  Current Allergies     Allergies as of 11/25/2018    (No Known Allergies)            The following portions of the patient's history were reviewed and updated as appropriate: allergies, current medications, past family history, past medical history, past social history, past surgical history and problem list      Past Medical History:   Diagnosis Date    Diverticulitis 2016    Hypertension        No past surgical history on file  Family History   Problem Relation Age of Onset    Cancer Mother     Cancer Father     Diabetes Father          Medications have been verified  Objective   /92   Pulse 98   Temp 98 2 °F (36 8 °C) (Temporal)   Resp 20   Ht 5' 11" (1 803 m)   Wt 113 kg (250 lb)   SpO2 95%   BMI 34 87 kg/m²        Physical Exam     Physical Exam   Constitutional: He is oriented to person, place, and time  He appears well-developed and well-nourished  No distress  HENT:   Head: Normocephalic and atraumatic  Right Ear: Hearing, tympanic membrane, external ear and ear canal normal    Left Ear: Hearing, tympanic membrane, external ear and ear canal normal    Nose: Rhinorrhea present  Right sinus exhibits no maxillary sinus tenderness and no frontal sinus tenderness  Left sinus exhibits no maxillary sinus tenderness and no frontal sinus tenderness  Mouth/Throat: Uvula is midline and mucous membranes are normal  Posterior oropharyngeal erythema present  No oropharyngeal exudate, posterior oropharyngeal edema or tonsillar abscesses  Eyes: Pupils are equal, round, and reactive to light  Conjunctivae and EOM are normal    Neck: Normal range of motion  Neck supple  Cardiovascular: Normal rate, regular rhythm and normal heart sounds  Pulmonary/Chest: Effort normal  No respiratory distress   He has wheezes (Mild diffuse expiratory wheezes)  He exhibits no tenderness  Musculoskeletal: Normal range of motion  He exhibits no tenderness  Neurological: He is alert and oriented to person, place, and time  Skin: Skin is warm and dry  No rash noted  Psychiatric: He has a normal mood and affect  His behavior is normal    Nursing note and vitals reviewed

## 2018-11-25 NOTE — PATIENT INSTRUCTIONS
Take antibiotics as prescribed  Use Ventolin if needed    Stay hydrated with lots of water/fluids  Follow-up with PCP in the next few days for reexamination and to ensure resolution of symptoms  Go to the ED if any intractable fevers, unable to stay hydrated, abdominal pain, chest pain, shortness of breath, new or worsening symptoms or other concerning symptoms

## 2019-02-27 ENCOUNTER — OFFICE VISIT (OUTPATIENT)
Dept: FAMILY MEDICINE CLINIC | Facility: CLINIC | Age: 54
End: 2019-02-27
Payer: COMMERCIAL

## 2019-02-27 VITALS
HEIGHT: 71 IN | TEMPERATURE: 98.1 F | BODY MASS INDEX: 36.01 KG/M2 | DIASTOLIC BLOOD PRESSURE: 84 MMHG | SYSTOLIC BLOOD PRESSURE: 130 MMHG | WEIGHT: 257.2 LBS | HEART RATE: 100 BPM | RESPIRATION RATE: 16 BRPM | OXYGEN SATURATION: 95 %

## 2019-02-27 DIAGNOSIS — E78.5 HYPERLIPIDEMIA, UNSPECIFIED HYPERLIPIDEMIA TYPE: ICD-10-CM

## 2019-02-27 DIAGNOSIS — E66.9 OBESITY (BMI 30-39.9): ICD-10-CM

## 2019-02-27 DIAGNOSIS — L05.91 PILONIDAL CYST: Primary | ICD-10-CM

## 2019-02-27 DIAGNOSIS — I10 HYPERTENSION, UNSPECIFIED TYPE: ICD-10-CM

## 2019-02-27 DIAGNOSIS — F17.219 CIGARETTE NICOTINE DEPENDENCE WITH NICOTINE-INDUCED DISORDER: ICD-10-CM

## 2019-02-27 DIAGNOSIS — J44.9 CHRONIC OBSTRUCTIVE PULMONARY DISEASE, UNSPECIFIED COPD TYPE (HCC): ICD-10-CM

## 2019-02-27 PROBLEM — J20.9 ACUTE BRONCHITIS: Status: RESOLVED | Noted: 2018-01-30 | Resolved: 2019-02-27

## 2019-02-27 PROCEDURE — 3075F SYST BP GE 130 - 139MM HG: CPT | Performed by: FAMILY MEDICINE

## 2019-02-27 PROCEDURE — 99214 OFFICE O/P EST MOD 30 MIN: CPT | Performed by: FAMILY MEDICINE

## 2019-02-27 PROCEDURE — 3079F DIAST BP 80-89 MM HG: CPT | Performed by: FAMILY MEDICINE

## 2019-02-27 PROCEDURE — 3008F BODY MASS INDEX DOCD: CPT | Performed by: FAMILY MEDICINE

## 2019-02-27 RX ORDER — ALBUTEROL SULFATE 90 UG/1
1-2 AEROSOL, METERED RESPIRATORY (INHALATION) EVERY 6 HOURS PRN
Qty: 18 G | Refills: 0 | Status: SHIPPED | OUTPATIENT
Start: 2019-02-27 | End: 2019-03-27 | Stop reason: SDUPTHER

## 2019-02-27 RX ORDER — LISINOPRIL 5 MG/1
5 TABLET ORAL DAILY
Qty: 30 TABLET | Refills: 1 | Status: SHIPPED | OUTPATIENT
Start: 2019-02-27 | End: 2019-08-26 | Stop reason: SDUPTHER

## 2019-02-27 RX ORDER — CEPHALEXIN 500 MG/1
500 CAPSULE ORAL EVERY 8 HOURS SCHEDULED
Qty: 30 CAPSULE | Refills: 0 | Status: SHIPPED | OUTPATIENT
Start: 2019-02-27 | End: 2019-03-09

## 2019-02-27 RX ORDER — ALBUTEROL SULFATE 90 UG/1
1-2 AEROSOL, METERED RESPIRATORY (INHALATION) EVERY 6 HOURS PRN
Qty: 18 G | Refills: 0 | Status: CANCELLED | OUTPATIENT
Start: 2019-02-27

## 2019-02-27 NOTE — PROGRESS NOTES
Chief Complaint   Patient presents with    Cyst     Cyst on left buttock for 1 week  Assessment/Plan:    Pilonidal cyst---give keflex  If no improving, follow up with surgeon  Give referral today  COPD---give refill of albuterol  Advised pt to do spirometry  Smoker---strongly advised pt to quit smoking! Pt states he will try  Obesity---lose weight  Advised pt to eat smaller amount of food  Advised pt to exercise regularly 150min/week  HTN---DASH diet  Start with lisinopril 5mg QD  SE educated pt  Check BP 2/day at home  Call office if BP always >140/90 or <100/60  Hyperlipidemia---low fat diet  Will check labs  RTO in 1 month with labs  Diagnoses and all orders for this visit:    Pilonidal cyst  -     cephalexin (KEFLEX) 500 mg capsule; Take 1 capsule (500 mg total) by mouth every 8 (eight) hours for 10 days  -     Ambulatory referral to General Surgery; Future    Chronic obstructive pulmonary disease, unspecified COPD type (HCC)  -     albuterol (VENTOLIN HFA) 90 mcg/act inhaler; Inhale 1-2 puffs every 6 (six) hours as needed for wheezing    Cigarette nicotine dependence with nicotine-induced disorder    Obesity (BMI 30-39 9)  -     CBC and differential; Future  -     Comprehensive metabolic panel; Future  -     TSH, 3rd generation with Free T4 reflex; Future    Hypertension, unspecified type  -     lisinopril (ZESTRIL) 5 mg tablet; Take 1 tablet (5 mg total) by mouth daily  -     Comprehensive metabolic panel; Future    Hyperlipidemia, unspecified hyperlipidemia type  -     Lipid Panel with Direct LDL reflex; Future    Other orders  -     Cancel: Ambulatory referral to Gastroenterology; Future  -     Cancel: TDAP VACCINE GREATER THAN OR EQUAL TO 8YO IM; Future  -     Cancel: PREFERRED: influenza vaccine, [id number], quadrivalent, recombinant, PF, 0 5 mL, for patients 18 yr+ (FLUBLOK); Future  -     Cancel: albuterol (VENTOLIN HFA) 90 mcg/act inhaler;  Inhale 1-2 puffs every 6 (six) hours as needed for wheezing          Subjective:      Patient ID: Abraham Andrew is a 46 y o  male  HPI    Pt is here by himself  C/o cyst in buttock for 1 week  Painful No discharge  Denies fever, Sob, cP, n/v/abd pain  Hx of pilonidal cyst and had surgery when he was young  COPD---use albuterol 3/week  Need refills  Smoking for years  Will try to quit per pt  Obesity---BMI 35 87 today  No exercise  Pt states he works in [employer] and he had no time to exercise  HTN---Pt states he was on lisinopril 10mg QD before  After he lose weight, his BP was better, so he was off lisinopril  Recently he gained weight and his BP elevated  Denies headache, SOB or chest pain  Hyperlipidemia---Tried to eat healthy  No recent labs  The following portions of the patient's history were reviewed and updated as appropriate: allergies, current medications, past family history, past medical history, past social history, past surgical history and problem list     Review of Systems   Constitutional: Negative for appetite change, chills and fever  HENT: Negative for congestion, ear pain, sinus pain and sore throat  Eyes: Negative for discharge and itching  Respiratory: Negative for apnea, cough, chest tightness, shortness of breath and wheezing  Cardiovascular: Negative for chest pain, palpitations and leg swelling  Gastrointestinal: Negative for abdominal pain, anal bleeding, constipation, diarrhea, nausea and vomiting  Endocrine: Negative for cold intolerance, heat intolerance and polyuria  Genitourinary: Negative for difficulty urinating and dysuria  Musculoskeletal: Negative for arthralgias, back pain and myalgias  Skin: Negative for rash  Neurological: Negative for dizziness and headaches  Psychiatric/Behavioral: Negative for agitation           Objective:      /84 (BP Location: Left arm, Patient Position: Sitting, Cuff Size: Standard)   Pulse 100   Temp 98 1 °F (36 7 °C) (Tympanic)   Resp 16   Ht 5' 11" (1 803 m)   Wt 117 kg (257 lb 3 2 oz)   SpO2 95%   BMI 35 87 kg/m²          Physical Exam   Constitutional: He appears well-developed  HENT:   Head: Normocephalic and atraumatic  Eyes: Pupils are equal, round, and reactive to light  Conjunctivae are normal    Neck: Normal range of motion  Neck supple  Cardiovascular: Normal rate, regular rhythm, normal heart sounds and intact distal pulses  Pulmonary/Chest: Effort normal and breath sounds normal    Abdominal: Soft  Bowel sounds are normal    Musculoskeletal: Normal range of motion  Neurological: He is alert  BMI Counseling: Body mass index is 35 87 kg/m²  Discussed the patient's BMI with him  The BMI is above average  BMI counseling and education was provided to the patient  Nutrition recommendations include reducing portion sizes, decreasing overall calorie intake and 3-5 servings of fruits/vegetables daily  Exercise recommendations include moderate aerobic physical activity for 150 minutes/week

## 2019-03-04 ENCOUNTER — TELEPHONE (OUTPATIENT)
Dept: SURGERY | Facility: CLINIC | Age: 54
End: 2019-03-04

## 2019-03-04 NOTE — TELEPHONE ENCOUNTER
Pt called; has an appt  Tomorrow with Dr Cee Edwards for pilondial cyst consult  Called bc his pain is worse and stated he has fever, but is on antibiotic  Temp is 99 6  Asked if he could be seen today, but he is in [place] and needs some time to drive up to us  Per Dr Porfirio Teresa, appt tomorrow should be fine; if in too much pain, go to local ER  Advised pt; he will keep appt   With Dr Brian Soriano     mb

## 2019-03-05 ENCOUNTER — CONSULT (OUTPATIENT)
Dept: SURGERY | Facility: CLINIC | Age: 54
End: 2019-03-05
Payer: COMMERCIAL

## 2019-03-05 VITALS
BODY MASS INDEX: 35.73 KG/M2 | SYSTOLIC BLOOD PRESSURE: 152 MMHG | DIASTOLIC BLOOD PRESSURE: 90 MMHG | TEMPERATURE: 97.6 F | HEIGHT: 71 IN | WEIGHT: 255.2 LBS

## 2019-03-05 DIAGNOSIS — L05.91 PILONIDAL CYST: ICD-10-CM

## 2019-03-05 DIAGNOSIS — K61.0 PERIANAL ABSCESS: Primary | ICD-10-CM

## 2019-03-05 PROCEDURE — 10060 I&D ABSCESS SIMPLE/SINGLE: CPT | Performed by: SURGERY

## 2019-03-05 PROCEDURE — 99202 OFFICE O/P NEW SF 15 MIN: CPT | Performed by: SURGERY

## 2019-03-05 NOTE — ASSESSMENT & PLAN NOTE
I think based on the location this may be a perianal abscess  It was drained without difficulty in the office  He will follow up with us as needed  He was given instructions on care and activity

## 2019-03-05 NOTE — LETTER
March 5, 2019     [place], [address]    Patient: Marizol Dejeuss   YOB: 1965   Date of Visit: 3/5/2019       Dear Dr Leanna Fuchs: Thank you for referring Zahraa Berkowitz to me for evaluation  Below are my notes for this consultation  If you have questions, please do not hesitate to call me  I look forward to following your patient along with you  Sincerely,        Tunde Rivera MD        CC: No Recipients  Tunde Rivera MD  3/5/2019 10:02 AM  Sign at close encounter  Office Visit - General Surgery  Marizol Dejesus MRN: [id number]  Encounter: [id number]    Assessment and Plan    Problem List Items Addressed This Visit        Other    Perianal abscess - Primary     I think based on the location this may be a perianal abscess  It was drained without difficulty in the office  He will follow up with us as needed  He was given instructions on care and activity  Chief Complaint:  Marizol Dejesus is a 46 y o  male who presents for Abscess (Consult pilonidal cyst )    Subjective  50-year-old male a previous history of a pilonidal when he was a child now has pain and swelling towards the lower end of this    Fever to 99 6, no chills, no other symptoms    Past Medical History  Past Medical History:   Diagnosis Date    Anxiety     Last assessed 12/20/2013    COPD (chronic obstructive pulmonary disease) ([facility] [address] ) 8/16/2016    Diverticulitis 2016    Eczema 2/24/2016    Hypertension     Obesity 9/25/2012       Past Surgical History  Past Surgical History:   Procedure Laterality Date    COLONOSCOPY      KNEE SURGERY         Family History  Family History   Problem Relation Age of Onset    Cancer Mother         Lung Cancer    Cancer Father     Diabetes Father     Diabetes Sister        Social History  Social History     Socioeconomic History    Marital status: Single     Spouse name: None    Number of children: None    Years of education: None  Highest education level: None   Occupational History    None   Social Needs    Financial resource strain: None    Food insecurity:     Worry: None     Inability: None    Transportation needs:     Medical: None     Non-medical: None   Tobacco Use    Smoking status: Current Every Day Smoker    Smokeless tobacco: Never Used   Substance and Sexual Activity    Alcohol use: Yes     Alcohol/week: 1 2 - 1 8 oz     Types: 2 - 3 Cans of beer per week     Frequency: 4 or more times a week    Drug use: No    Sexual activity: None   Lifestyle    Physical activity:     Days per week: None     Minutes per session: None    Stress: None   Relationships    Social connections:     Talks on phone: None     Gets together: None     Attends Holiness service: None     Active member of club or organization: None     Attends meetings of clubs or organizations: None     Relationship status: None    Intimate partner violence:     Fear of current or ex partner: None     Emotionally abused: None     Physically abused: None     Forced sexual activity: None   Other Topics Concern    None   Social History Narrative    None        Medications  Current Outpatient Medications on File Prior to Visit   Medication Sig Dispense Refill    albuterol (VENTOLIN HFA) 90 mcg/act inhaler Inhale 1-2 puffs every 6 (six) hours as needed for wheezing 18 g 0    cephalexin (KEFLEX) 500 mg capsule Take 1 capsule (500 mg total) by mouth every 8 (eight) hours for 10 days 30 capsule 0    lisinopril (ZESTRIL) 5 mg tablet Take 1 tablet (5 mg total) by mouth daily 30 tablet 1     No current facility-administered medications on file prior to visit          Allergies  No Known Allergies    Review of Systems    Objective  Vitals:    03/05/19 0905   BP: 152/90   Temp: 97 6 °F (36 4 °C)       Physical Exam   Back:  Intergluteal cleft has the old well-healed scar and just below this and to the left of midline above the anus is of red tender fluctuant area    Incision and Drainage  Date/Time: 3/5/2019 9:58 AM  Performed by: Baldomero Vines MD  Authorized by: Baldomero Vines MD        After permission, the area on his left buttock and intergluteal cleft and perianal region was prepped and draped in usual fashion  Time-out taken  Local anesthesia 1% lidocaine with epi was infiltrated into the skin and subcutaneous tissue  Total of 4 mL was used  Knife was used incision made in large amount of purulent material was extruded  A piece of fat pad half-inch packing was placed  Gauze dressing applied  Tolerated procedure well

## 2019-03-05 NOTE — PROGRESS NOTES
Office Visit - General Surgery  Jen Love MRN: [id number]  Encounter: [id number]    Assessment and Plan    Problem List Items Addressed This Visit        Other    Perianal abscess - Primary     I think based on the location this may be a perianal abscess  It was drained without difficulty in the office  He will follow up with us as needed  He was given instructions on care and activity  Chief Complaint:  Jen Love is a 46 y o  male who presents for Abscess (Consult pilonidal cyst )    Subjective  71-year-old male a previous history of a pilonidal when he was a child now has pain and swelling towards the lower end of this  Fever to 99 6, no chills, no other symptoms    Past Medical History  Past Medical History:   Diagnosis Date    Anxiety     Last assessed 12/20/2013    COPD (chronic obstructive pulmonary disease) ([facility] ) 8/16/2016    Diverticulitis 2016    Eczema 2/24/2016    Hypertension     Obesity 9/25/2012       Past Surgical History  Past Surgical History:   Procedure Laterality Date    COLONOSCOPY      KNEE SURGERY         Family History  Family History   Problem Relation Age of Onset    Cancer Mother         Lung Cancer    Cancer Father     Diabetes Father     Diabetes Sister        Social History  Social History     Socioeconomic History    Marital status: Single     Spouse name: None    Number of children: None    Years of education: None    Highest education level: None   Occupational History    None   Social Needs    Financial resource strain: None    Food insecurity:     Worry: None     Inability: None    Transportation needs:     Medical: None     Non-medical: None   Tobacco Use    Smoking status: Current Every Day Smoker    Smokeless tobacco: Never Used   Substance and Sexual Activity    Alcohol use:  Yes     Alcohol/week: 1 2 - 1 8 oz     Types: 2 - 3 Cans of beer per week     Frequency: 4 or more times a week    Drug use: No    Sexual activity: None   Lifestyle    Physical activity:     Days per week: None     Minutes per session: None    Stress: None   Relationships    Social connections:     Talks on phone: None     Gets together: None     Attends Buddhist service: None     Active member of club or organization: None     Attends meetings of clubs or organizations: None     Relationship status: None    Intimate partner violence:     Fear of current or ex partner: None     Emotionally abused: None     Physically abused: None     Forced sexual activity: None   Other Topics Concern    None   Social History Narrative    None        Medications  Current Outpatient Medications on File Prior to Visit   Medication Sig Dispense Refill    albuterol (VENTOLIN HFA) 90 mcg/act inhaler Inhale 1-2 puffs every 6 (six) hours as needed for wheezing 18 g 0    cephalexin (KEFLEX) 500 mg capsule Take 1 capsule (500 mg total) by mouth every 8 (eight) hours for 10 days 30 capsule 0    lisinopril (ZESTRIL) 5 mg tablet Take 1 tablet (5 mg total) by mouth daily 30 tablet 1     No current facility-administered medications on file prior to visit  Allergies  No Known Allergies    Review of Systems    Objective  Vitals:    03/05/19 0905   BP: 152/90   Temp: 97 6 °F (36 4 °C)       Physical Exam   Back:  Intergluteal cleft has the old well-healed scar and just below this and to the left of midline above the anus is of red tender fluctuant area    Incision and Drainage  Date/Time: 3/5/2019 9:58 AM  Performed by: Lon Bates MD  Authorized by: Lon Bates MD        After permission, the area on his left buttock and intergluteal cleft and perianal region was prepped and draped in usual fashion  Time-out taken  Local anesthesia 1% lidocaine with epi was infiltrated into the skin and subcutaneous tissue  Total of 4 mL was used  Knife was used incision made in large amount of purulent material was extruded    A piece of fat pad half-inch packing was placed  Gauze dressing applied  Tolerated procedure well

## 2019-03-27 DIAGNOSIS — J44.9 CHRONIC OBSTRUCTIVE PULMONARY DISEASE, UNSPECIFIED COPD TYPE (HCC): ICD-10-CM

## 2019-03-27 RX ORDER — ALBUTEROL SULFATE 90 UG/1
1-2 AEROSOL, METERED RESPIRATORY (INHALATION) EVERY 6 HOURS PRN
Qty: 18 G | Refills: 3 | Status: SHIPPED | OUTPATIENT
Start: 2019-03-27 | End: 2019-07-31

## 2019-07-31 ENCOUNTER — OFFICE VISIT (OUTPATIENT)
Dept: URGENT CARE | Age: 54
End: 2019-07-31
Payer: COMMERCIAL

## 2019-07-31 VITALS
HEIGHT: 71 IN | TEMPERATURE: 97.5 F | DIASTOLIC BLOOD PRESSURE: 87 MMHG | OXYGEN SATURATION: 95 % | HEART RATE: 94 BPM | BODY MASS INDEX: 35.28 KG/M2 | RESPIRATION RATE: 16 BRPM | SYSTOLIC BLOOD PRESSURE: 140 MMHG | WEIGHT: 252 LBS

## 2019-07-31 DIAGNOSIS — R06.2 WHEEZING: ICD-10-CM

## 2019-07-31 DIAGNOSIS — J01.10 ACUTE FRONTAL SINUSITIS, RECURRENCE NOT SPECIFIED: Primary | ICD-10-CM

## 2019-07-31 PROCEDURE — G0382 LEV 3 HOSP TYPE B ED VISIT: HCPCS | Performed by: FAMILY MEDICINE

## 2019-07-31 PROCEDURE — 94640 AIRWAY INHALATION TREATMENT: CPT | Performed by: FAMILY MEDICINE

## 2019-07-31 RX ORDER — IBUPROFEN 200 MG
800 TABLET ORAL EVERY 6 HOURS PRN
COMMUNITY
End: 2020-05-05

## 2019-07-31 RX ORDER — AMOXICILLIN AND CLAVULANATE POTASSIUM 875; 125 MG/1; MG/1
1 TABLET, FILM COATED ORAL EVERY 12 HOURS SCHEDULED
Qty: 14 TABLET | Refills: 0 | Status: SHIPPED | OUTPATIENT
Start: 2019-07-31 | End: 2019-08-07

## 2019-07-31 RX ORDER — PREDNISONE 20 MG/1
20 TABLET ORAL 2 TIMES DAILY WITH MEALS
Qty: 10 TABLET | Refills: 0 | Status: SHIPPED | OUTPATIENT
Start: 2019-07-31 | End: 2019-08-05

## 2019-07-31 RX ORDER — ALBUTEROL SULFATE 2.5 MG/3ML
2.5 SOLUTION RESPIRATORY (INHALATION) ONCE
Status: COMPLETED | OUTPATIENT
Start: 2019-07-31 | End: 2019-07-31

## 2019-07-31 RX ADMIN — ALBUTEROL SULFATE 2.5 MG: 2.5 SOLUTION RESPIRATORY (INHALATION) at 17:48

## 2019-07-31 NOTE — PATIENT INSTRUCTIONS
Rhinosinusitis   WHAT YOU NEED TO KNOW:   Rhinosinusitis (RS) is inflammation of your nose and sinuses  It commonly begins as a virus, often as a common cold  Viruses usually last 7 to 10 days and do not need treatment  When the virus does not get better on its own, you may have bacterial RS  This means that bacteria have begun to grow inside your sinuses  Acute RS lasts less than 4 weeks  Chronic RS lasts 12 weeks or more  Recurrent RS is when you have 4 or more episodes of RS in one year  DISCHARGE INSTRUCTIONS:   Return to the emergency department if:   · Your eye and eyelid are red, swollen, and painful  · You cannot open your eye  · You have double vision or you cannot see  · Your eyeball bulges out or you cannot move your eye  · You are more sleepy than normal or you notice changes in your ability to think, move, or talk  · You have a stiff neck, a fever, or a bad headache  · You have swelling of your forehead or scalp  Contact your healthcare provider if:   · Your symptoms are worse or do not improve after 3 to 5 days of treatment  · You have questions or concerns about your condition or care  Medicines: You may need any of the following:  · Acetaminophen  decreases pain and fever  It is available without a doctor's order  Ask how much to take and how often to take it  Follow directions  Acetaminophen can cause liver damage if not taken correctly  · NSAIDs , such as ibuprofen, help decrease swelling, pain, and fever  This medicine is available with or without a doctor's order  NSAIDs can cause stomach bleeding or kidney problems in certain people  If you take blood thinner medicine, always ask your healthcare provider if NSAIDs are safe for you  Always read the medicine label and follow directions  · Nasal steroid sprays  decrease inflammation in your nose and sinuses  · Decongestants  reduce swelling and drain mucus in the nose and sinuses   They may help you breathe easier  · Antihistamines  dry mucus in the nose and relieve sneezing  · Antibiotics  treat a bacterial infection and may be needed if your symptoms do not improve or they get worse  · Take your medicine as directed  Contact your healthcare provider if you think your medicine is not helping or if you have side effects  Tell him or her if you are allergic to any medicine  Keep a list of the medicines, vitamins, and herbs you take  Include the amounts, and when and why you take them  Bring the list or the pill bottles to follow-up visits  Carry your medicine list with you in case of an emergency  Self-care:   · Rinse your sinuses  Use a sinus rinse device to rinse your nasal passages with a saline (salt water) solution  This will help thin the mucus in your nose and rinse away pollen and dirt  It will also help reduce swelling so you can breathe normally  Ask your healthcare provider how often to do this  · Breathe in steam   Heat a bowl of water until you see steam  Lean over the bowl and make a tent over your head with a large towel  Breathe deeply for about 20 minutes  Be careful not to get too close to the steam or burn yourself  Do this 3 times a day  You can also breathe deeply when you take a hot shower  · Sleep with your head elevated  Place an extra pillow under your head before you go to sleep to help your sinuses drain  · Drink liquids as directed  Ask your healthcare provider how much liquid to drink each day and which liquids are best for you  Liquids will thin the mucus in your nose and help it drain  Avoid drinks that contain alcohol or caffeine  · Do not smoke, and avoid secondhand smoke  Nicotine and other chemicals in cigarettes and cigars can make your symptoms worse  Ask your healthcare provider for information if you currently smoke and need help to quit  E-cigarettes or smokeless tobacco still contain nicotine   Talk to your healthcare provider before you use these products  Follow up with your healthcare provider as directed: Follow up if your symptoms are worse or not better after 3 to 5 days of treatment  Write down your questions so you remember to ask them during your visits  © 2017 [address] [employer] Information is for End User's use only and may not be sold, redistributed or otherwise used for commercial purposes  All illustrations and images included in CareNotes® are the copyrighted property of [employer]  or [employer]  The above information is an  only  It is not intended as medical advice for individual conditions or treatments  Talk to your doctor, nurse or pharmacist before following any medical regimen to see if it is safe and effective for you

## 2019-07-31 NOTE — PROGRESS NOTES
[facility]        NAME: Deepali Pickard is a 53 y o  male  : 1965    MRN: [id number]  DATE: 2019  TIME: 5:46 PM    Assessment and Plan   Acute frontal sinusitis, recurrence not specified [J01 10]  1  Acute frontal sinusitis, recurrence not specified  amoxicillin-clavulanate (AUGMENTIN) 875-125 mg per tablet    predniSONE 20 mg tablet   2  Wheezing  albuterol inhalation solution 2 5 mg         Patient Instructions     Frontal sinusitis and wheezing  Take meds as directed  Follow up with PCP in 5-7 days  Proceed to  ER if symptoms worsen  Chief Complaint     Chief Complaint   Patient presents with    Headache    Cough     chest congestion    Nasal Congestion         History of Present Illness       HPI   Patient reports symptoms have been ongoing for more than a week  Includes congestion in his forehead, pain on the forehead when bending over, cough with congestion, headaches, and nasal congestion  Chronic smoker of more than 25 years  Intermittent shortness of breath  Denies history of asthma  States he has never been tested for COPD    Review of Systems   Review of Systems   Constitutional: Negative for chills and fever  HENT: Positive for congestion, rhinorrhea and sinus pressure  Negative for ear pain, hearing loss, sore throat and trouble swallowing  Respiratory: Positive for cough, shortness of breath and wheezing  Gastrointestinal: Negative for nausea and vomiting  Neurological: Positive for headaches (Generalized, achy  No sensitivity to light or noise)  Negative for dizziness           Current Medications       Current Outpatient Medications:     ibuprofen (MOTRIN) 200 mg tablet, Take 800 mg by mouth every 6 (six) hours as needed for mild pain, Disp: , Rfl:     amoxicillin-clavulanate (AUGMENTIN) 875-125 mg per tablet, Take 1 tablet by mouth every 12 (twelve) hours for 7 days, Disp: 14 tablet, Rfl: 0    lisinopril (ZESTRIL) 5 mg tablet, Take 1 tablet (5 mg total) by mouth daily (Patient not taking: Reported on 7/31/2019), Disp: 30 tablet, Rfl: 1    predniSONE 20 mg tablet, Take 1 tablet (20 mg total) by mouth 2 (two) times a day with meals for 5 days, Disp: 10 tablet, Rfl: 0    Current Facility-Administered Medications:     albuterol inhalation solution 2 5 mg, 2 5 mg, Nebulization, Once, LARA Howell    Current Allergies     Allergies as of 07/31/2019    (No Known Allergies)            The following portions of the patient's history were reviewed and updated as appropriate: allergies, current medications, past family history, past medical history, past social history, past surgical history and problem list      Past Medical History:   Diagnosis Date    Anxiety     Last assessed 12/20/2013    [facility] 8[facility]16/2016    Diverticulitis 2016    Eczema 2/24/2016    Hypertension     Obesity 9/25/2012       Past Surgical History:   Procedure Laterality Date    COLONOSCOPY      KNEE SURGERY         Family History   Problem Relation Age of Onset    Cancer Mother         Lung Cancer    Cancer Father     Diabetes Father     Diabetes Sister          Medications have been verified  Objective   /87   Pulse 94   Temp 97 5 °F (36 4 °C) (Temporal)   Resp 16   Ht 5' 11" (1 803 m)   Wt 114 kg (252 lb)   SpO2 95%   BMI 35 15 kg/m²        Physical Exam     Physical Exam   HENT:   Right Ear: External ear normal    Left Ear: External ear normal    Nasal discharge  Turbinates 1+  Cardiovascular: Normal rate and regular rhythm  Pulmonary/Chest: Effort normal  No respiratory distress  He has wheezes  Wheezing on the right side of the lung fields  Smoker's cough  Lymphadenopathy:     He has no cervical adenopathy

## 2019-08-26 DIAGNOSIS — I10 HYPERTENSION, UNSPECIFIED TYPE: ICD-10-CM

## 2019-08-26 RX ORDER — LISINOPRIL 5 MG/1
TABLET ORAL
Qty: 30 TABLET | Refills: 0 | Status: SHIPPED | OUTPATIENT
Start: 2019-08-26 | End: 2020-03-26 | Stop reason: SDUPTHER

## 2019-08-26 NOTE — TELEPHONE ENCOUNTER
Patient's voicemail full - mailed postcard to patient to schedule a follow-up appointment prior to next medication refill

## 2020-03-25 ENCOUNTER — OFFICE VISIT (OUTPATIENT)
Dept: URGENT CARE | Facility: CLINIC | Age: 55
End: 2020-03-25
Payer: COMMERCIAL

## 2020-03-25 VITALS
DIASTOLIC BLOOD PRESSURE: 79 MMHG | WEIGHT: 255 LBS | TEMPERATURE: 97.1 F | OXYGEN SATURATION: 93 % | HEART RATE: 97 BPM | RESPIRATION RATE: 16 BRPM | HEIGHT: 71 IN | SYSTOLIC BLOOD PRESSURE: 162 MMHG | BODY MASS INDEX: 35.7 KG/M2

## 2020-03-25 DIAGNOSIS — J20.9 ACUTE BRONCHITIS, UNSPECIFIED ORGANISM: Primary | ICD-10-CM

## 2020-03-25 PROCEDURE — G0382 LEV 3 HOSP TYPE B ED VISIT: HCPCS | Performed by: PHYSICIAN ASSISTANT

## 2020-03-25 RX ORDER — ASPIRIN 325 MG
325 TABLET ORAL DAILY
COMMUNITY
End: 2020-12-07 | Stop reason: ALTCHOICE

## 2020-03-25 RX ORDER — AZITHROMYCIN 250 MG/1
TABLET, FILM COATED ORAL
Qty: 6 TABLET | Refills: 0 | Status: SHIPPED | OUTPATIENT
Start: 2020-03-25 | End: 2020-03-29

## 2020-03-25 RX ORDER — ALBUTEROL SULFATE 90 UG/1
2 AEROSOL, METERED RESPIRATORY (INHALATION) EVERY 4 HOURS PRN
Qty: 18 G | Refills: 0 | Status: SHIPPED | OUTPATIENT
Start: 2020-03-25 | End: 2021-07-06 | Stop reason: SDUPTHER

## 2020-03-25 NOTE — PROGRESS NOTES
[facility]        NAME: Aubrey Velarde is a 53 y o  male  : 1965    MRN: [id number]  DATE: 2020  TIME: 4:54 PM    Assessment and Plan   Acute bronchitis, unspecified organism [J20 [id number]]  1  Acute bronchitis, unspecified organism  albuterol (Ventolin HFA) 90 mcg/act inhaler    azithromycin (ZITHROMAX) 250 mg tablet     Scattered wheezing  No distress  Do not suspect COVID-19 but will not be treated with steroids due to recent recommendations  Pt should respond well to albuterol MDI  Will cover for possible bacterial causes given smoking history  Pt states he bought Nicorette and is quitting today  Patient Instructions   Patient Instructions     Acute Bronchitis   WHAT YOU NEED TO KNOW:   Acute bronchitis is swelling and irritation in the air passages of your lungs  This irritation may cause you to cough or have other breathing problems  Acute bronchitis often starts because of another illness, such as a cold or the flu  The illness spreads from your nose and throat to your windpipe and airways  Bronchitis is often called a chest cold  Acute bronchitis lasts about 3 to 6 weeks and is usually not a serious illness  Your cough can last for several weeks  DISCHARGE INSTRUCTIONS:   Return to the emergency department if:   · You cough up blood  · Your lips or fingernails turn blue  · You feel like you are not getting enough air when you breathe  Contact your healthcare provider if:   · You have a fever  · Your breathing problems do not go away or get worse  · Your cough does not get better within 4 weeks  · You have questions or concerns about your condition or care  Self-care:   · Get more rest   Rest helps your body to heal  Slowly start to do more each day  Rest when you feel it is needed  · Avoid irritants in the air  Avoid chemicals, fumes, and dust  Wear a face mask if you must work around dust or fumes   Stay inside on days when air pollution levels are high  If you have allergies, stay inside when pollen counts are high  Do not use aerosol products, such as spray-on deodorant, bug spray, and hair spray  · Do not smoke or be around others who smoke  Nicotine and other chemicals in cigarettes and cigars damages the cilia that move mucus out of your lungs  Ask your healthcare provider for information if you currently smoke and need help to quit  E-cigarettes or smokeless tobacco still contain nicotine  Talk to your healthcare provider before you use these products  · Drink liquids as directed  Liquids help keep your air passages moist and help you cough up mucus  You may need to drink more liquids when you have acute bronchitis  Ask how much liquid to drink each day and which liquids are best for you  · Use a humidifier or vaporizer  Use a cool mist humidifier or a vaporizer to increase air moisture in your home  This may make it easier for you to breathe and help decrease your cough  Decrease risk for acute bronchitis:   · Get the vaccinations you need  Ask your healthcare provider if you should get vaccinated against the flu or pneumonia  · Prevent the spread of germs  You can decrease your risk of acute bronchitis and other illnesses by doing the following:     [facility] your hands often with soap and water  Carry germ-killing hand lotion or gel with you  You can use the lotion or gel to clean your hands when soap and water are not available  ¨ Do not touch your eyes, nose, or mouth unless you have washed your hands first     ¨ Always cover your mouth when you cough to prevent the spread of germs  It is best to cough into a tissue or your shirt sleeve instead of into your hand  Ask those around you cover their mouths when they cough  ¨ Try to avoid people who have a cold or the flu  If you are sick, stay away from others as much as possible  Medicines:   Your healthcare provider may  give you any of the following:  · Ibuprofen or acetaminophen  are medicines that help lower your fever  They are available without a doctor's order  Ask your healthcare provider which medicine is right for you  Ask how much to take and how often to take it  Follow directions  These medicines can cause stomach bleeding if not taken correctly  Ibuprofen can cause kidney damage  Do not take ibuprofen if you have kidney disease, an ulcer, or allergies to aspirin  Acetaminophen can cause liver damage  Do not take more than 4,000 milligrams in 24 hours  · Decongestants  help loosen mucus in your lungs and make it easier to cough up  This can help you breathe easier  · Cough suppressants  decrease your urge to cough  If your cough produces mucus, do not take a cough suppressant unless your healthcare provider tells you to  Your healthcare provider may suggest that you take a cough suppressant at night so you can rest     · Inhalers  may be given  Your healthcare provider may give you one or more inhalers to help you breathe easier and cough less  An inhaler gives your medicine to open your airways  Ask your healthcare provider to show you how to use your inhaler correctly  · Take your medicine as directed  Contact your healthcare provider if you think your medicine is not helping or if you have side effects  Tell him of her if you are allergic to any medicine  Keep a list of the medicines, vitamins, and herbs you take  Include the amounts, and when and why you take them  Bring the list or the pill bottles to follow-up visits  Carry your medicine list with you in case of an emergency  Follow up with your healthcare provider as directed:  Write down questions you have so you will remember to ask them during your follow-up visits  © 2017 [address] Information is for End User's use only and may not be sold, redistributed or otherwise used for commercial purposes   All illustrations and images included in CareNotes® are the copyrighted property of [employer]  or Alexis Perez  The above information is an  only  It is not intended as medical advice for individual conditions or treatments  Talk to your doctor, nurse or pharmacist before following any medical regimen to see if it is safe and effective for you  Proceed to  ER if symptoms worsen  Chief Complaint     Chief Complaint   Patient presents with    Nasal Congestion    Cough     Symptoms began 2 weeks ago - Pt does not have history of asthma, but states he does get bronchitis every year  History of Present Illness       Pt with hx of cigarette smoking, possible COPD, presents for evaluation of productive cough for the past 2 weeks  He reports history of bronchitis in the past and feels that is what he has now  He denies any sob or chest tightness but admits to wheezing  He has not had fever  No exposure to anyone positive for COVID-19  Review of Systems   Review of Systems   Constitutional: Negative for chills and fever  HENT: Positive for congestion  Eyes: Negative  Respiratory: Positive for cough and wheezing  Negative for chest tightness and shortness of breath  Cardiovascular: Negative  Musculoskeletal: Negative  Skin: Negative  Neurological: Negative            Current Medications       Current Outpatient Medications:     aspirin 325 mg tablet, Take 325 mg by mouth daily, Disp: , Rfl:     ibuprofen (MOTRIN) 200 mg tablet, Take 800 mg by mouth every 6 (six) hours as needed for mild pain, Disp: , Rfl:     albuterol (Ventolin HFA) 90 mcg/act inhaler, Inhale 2 puffs every 4 (four) hours as needed for wheezing, Disp: 18 g, Rfl: 0    azithromycin (ZITHROMAX) 250 mg tablet, Take 2 tablets today then 1 tablet daily x 4 days, Disp: 6 tablet, Rfl: 0    lisinopril (ZESTRIL) 5 mg tablet, TAKE 1 TABLET BY MOUTH EVERY DAY (Patient not taking: Reported on 3/25/2020), Disp: 30 tablet, Rfl: 0    Current Allergies Allergies as of 03/25/2020    (No Known Allergies)            The following portions of the patient's history were reviewed and updated as appropriate: allergies, current medications, past family history, past medical history, past social history, past surgical history and problem list      Past Medical History:   Diagnosis Date    Anxiety     Last assessed 12/20/2013    COPD (chronic obstructive pulmonary disease) ([facility] [address] ) 8/16/2016    Diverticulitis 2016    Eczema 2/24/2016    Hypertension     Obesity 9/25/2012       Past Surgical History:   Procedure Laterality Date    COLONOSCOPY      KNEE SURGERY         Family History   Problem Relation Age of Onset    Cancer Mother         Lung Cancer    Cancer Father     Diabetes Father     Diabetes Sister          Medications have been verified  Objective   /79   Pulse 97   Temp (!) 97 1 °F (36 2 °C) (Tympanic)   Resp 16   Ht 5' 11" (1 803 m)   Wt 116 kg (255 lb)   SpO2 93%   BMI 35 57 kg/m²        Physical Exam     Physical Exam   Constitutional: He is oriented to person, place, and time  He appears well-developed  No distress  HENT:   Mouth/Throat: Oropharynx is clear and moist    Cardiovascular: Normal rate and regular rhythm  Pulmonary/Chest: Effort normal  No respiratory distress  He has wheezes  Neurological: He is alert and oriented to person, place, and time  Vitals reviewed

## 2020-03-25 NOTE — PATIENT INSTRUCTIONS
Acute Bronchitis   WHAT YOU NEED TO KNOW:   Acute bronchitis is swelling and irritation in the air passages of your lungs  This irritation may cause you to cough or have other breathing problems  Acute bronchitis often starts because of another illness, such as a cold or the flu  The illness spreads from your nose and throat to your windpipe and airways  Bronchitis is often called a chest cold  Acute bronchitis lasts about 3 to 6 weeks and is usually not a serious illness  Your cough can last for several weeks  DISCHARGE INSTRUCTIONS:   Return to the emergency department if:   · You cough up blood  · Your lips or fingernails turn blue  · You feel like you are not getting enough air when you breathe  Contact your healthcare provider if:   · You have a fever  · Your breathing problems do not go away or get worse  · Your cough does not get better within 4 weeks  · You have questions or concerns about your condition or care  Self-care:   · Get more rest   Rest helps your body to heal  Slowly start to do more each day  Rest when you feel it is needed  · Avoid irritants in the air  Avoid chemicals, fumes, and dust  Wear a face mask if you must work around dust or fumes  Stay inside on days when air pollution levels are high  If you have allergies, stay inside when pollen counts are high  Do not use aerosol products, such as spray-on deodorant, bug spray, and hair spray  · Do not smoke or be around others who smoke  Nicotine and other chemicals in cigarettes and cigars damages the cilia that move mucus out of your lungs  Ask your healthcare provider for information if you currently smoke and need help to quit  E-cigarettes or smokeless tobacco still contain nicotine  Talk to your healthcare provider before you use these products  · Drink liquids as directed  Liquids help keep your air passages moist and help you cough up mucus   You may need to drink more liquids when you have acute bronchitis  Ask how much liquid to drink each day and which liquids are best for you  · Use a humidifier or vaporizer  Use a cool mist humidifier or a vaporizer to increase air moisture in your home  This may make it easier for you to breathe and help decrease your cough  Decrease risk for acute bronchitis:   · Get the vaccinations you need  Ask your healthcare provider if you should get vaccinated against the flu or pneumonia  · Prevent the spread of germs  You can decrease your risk of acute bronchitis and other illnesses by doing the following:     [facility] your hands often with soap and water  Carry germ-killing hand lotion or gel with you  You can use the lotion or gel to clean your hands when soap and water are not available  ¨ Do not touch your eyes, nose, or mouth unless you have washed your hands first     ¨ Always cover your mouth when you cough to prevent the spread of germs  It is best to cough into a tissue or your shirt sleeve instead of into your hand  Ask those around you cover their mouths when they cough  ¨ Try to avoid people who have a cold or the flu  If you are sick, stay away from others as much as possible  Medicines: Your healthcare provider may  give you any of the following:  · Ibuprofen or acetaminophen  are medicines that help lower your fever  They are available without a doctor's order  Ask your healthcare provider which medicine is right for you  Ask how much to take and how often to take it  Follow directions  These medicines can cause stomach bleeding if not taken correctly  Ibuprofen can cause kidney damage  Do not take ibuprofen if you have kidney disease, an ulcer, or allergies to aspirin  Acetaminophen can cause liver damage  Do not take more than 4,000 milligrams in 24 hours  · Decongestants  help loosen mucus in your lungs and make it easier to cough up  This can help you breathe easier  · Cough suppressants  decrease your urge to cough   If your cough produces mucus, do not take a cough suppressant unless your healthcare provider tells you to  Your healthcare provider may suggest that you take a cough suppressant at night so you can rest     · Inhalers  may be given  Your healthcare provider may give you one or more inhalers to help you breathe easier and cough less  An inhaler gives your medicine to open your airways  Ask your healthcare provider to show you how to use your inhaler correctly  · Take your medicine as directed  Contact your healthcare provider if you think your medicine is not helping or if you have side effects  Tell him of her if you are allergic to any medicine  Keep a list of the medicines, vitamins, and herbs you take  Include the amounts, and when and why you take them  Bring the list or the pill bottles to follow-up visits  Carry your medicine list with you in case of an emergency  Follow up with your healthcare provider as directed:  Write down questions you have so you will remember to ask them during your follow-up visits  © 2017 [address] Information is for End User's use only and may not be sold, redistributed or otherwise used for commercial purposes  All illustrations and images included in CareNotes® are the copyrighted property of [employer]  or Alexis Perez  The above information is an  only  It is not intended as medical advice for individual conditions or treatments  Talk to your doctor, nurse or pharmacist before following any medical regimen to see if it is safe and effective for you

## 2020-03-26 ENCOUNTER — TELEPHONE (OUTPATIENT)
Dept: FAMILY MEDICINE CLINIC | Facility: CLINIC | Age: 55
End: 2020-03-26

## 2020-03-26 DIAGNOSIS — I10 HYPERTENSION, UNSPECIFIED TYPE: ICD-10-CM

## 2020-03-26 RX ORDER — LISINOPRIL 5 MG/1
5 TABLET ORAL DAILY
Qty: 30 TABLET | Refills: 5 | Status: SHIPPED | OUTPATIENT
Start: 2020-03-26 | End: 2020-11-07

## 2020-05-05 ENCOUNTER — TELEMEDICINE (OUTPATIENT)
Dept: FAMILY MEDICINE CLINIC | Facility: CLINIC | Age: 55
End: 2020-05-05
Payer: COMMERCIAL

## 2020-05-05 VITALS — BODY MASS INDEX: 34.3 KG/M2 | HEIGHT: 71 IN | TEMPERATURE: 97.8 F | WEIGHT: 245 LBS

## 2020-05-05 DIAGNOSIS — M25.551 RIGHT HIP PAIN: Primary | ICD-10-CM

## 2020-05-05 PROBLEM — K61.0 PERIANAL ABSCESS: Status: RESOLVED | Noted: 2019-03-05 | Resolved: 2020-05-05

## 2020-05-05 PROBLEM — S60.211A CONTUSION OF RIGHT WRIST: Status: RESOLVED | Noted: 2018-01-30 | Resolved: 2020-05-05

## 2020-05-05 PROCEDURE — 99214 OFFICE O/P EST MOD 30 MIN: CPT | Performed by: FAMILY MEDICINE

## 2020-05-05 RX ORDER — IBUPROFEN 800 MG/1
800 TABLET ORAL EVERY 12 HOURS PRN
Qty: 30 TABLET | Refills: 0 | Status: SHIPPED | OUTPATIENT
Start: 2020-05-05 | End: 2020-11-07 | Stop reason: ALTCHOICE

## 2020-05-20 ENCOUNTER — TRANSCRIBE ORDERS (OUTPATIENT)
Dept: RADIOLOGY | Facility: HOSPITAL | Age: 55
End: 2020-05-20

## 2020-05-20 ENCOUNTER — HOSPITAL ENCOUNTER (OUTPATIENT)
Dept: RADIOLOGY | Facility: HOSPITAL | Age: 55
Discharge: HOME | End: 2020-05-20
Payer: COMMERCIAL

## 2020-05-20 DIAGNOSIS — M25.551 RIGHT HIP PAIN: ICD-10-CM

## 2020-05-20 PROCEDURE — 73502 X-RAY EXAM HIP UNI 2-3 VIEWS: CPT

## 2020-05-27 ENCOUNTER — TELEMEDICINE (OUTPATIENT)
Dept: FAMILY MEDICINE CLINIC | Facility: CLINIC | Age: 55
End: 2020-05-27
Payer: COMMERCIAL

## 2020-05-27 VITALS — HEIGHT: 71 IN | BODY MASS INDEX: 34.3 KG/M2 | WEIGHT: 245 LBS

## 2020-05-27 DIAGNOSIS — M25.552 LEFT HIP PAIN: Primary | ICD-10-CM

## 2020-05-27 PROCEDURE — 99214 OFFICE O/P EST MOD 30 MIN: CPT | Performed by: FAMILY MEDICINE

## 2020-06-04 ENCOUNTER — HOSPITAL ENCOUNTER (OUTPATIENT)
Dept: RADIOLOGY | Facility: HOSPITAL | Age: 55
Discharge: HOME | End: 2020-06-04
Payer: COMMERCIAL

## 2020-06-04 DIAGNOSIS — M25.552 LEFT HIP PAIN: ICD-10-CM

## 2020-06-04 PROCEDURE — 73502 X-RAY EXAM HIP UNI 2-3 VIEWS: CPT

## 2020-06-09 ENCOUNTER — OFFICE VISIT (OUTPATIENT)
Dept: OBGYN CLINIC | Facility: HOSPITAL | Age: 55
End: 2020-06-09
Payer: COMMERCIAL

## 2020-06-09 VITALS
DIASTOLIC BLOOD PRESSURE: 92 MMHG | SYSTOLIC BLOOD PRESSURE: 155 MMHG | WEIGHT: 255 LBS | HEART RATE: 103 BPM | HEIGHT: 71 IN | BODY MASS INDEX: 35.7 KG/M2

## 2020-06-09 DIAGNOSIS — M25.551 RIGHT HIP PAIN: ICD-10-CM

## 2020-06-09 PROCEDURE — 3008F BODY MASS INDEX DOCD: CPT | Performed by: ORTHOPAEDIC SURGERY

## 2020-06-09 PROCEDURE — 3080F DIAST BP >= 90 MM HG: CPT | Performed by: ORTHOPAEDIC SURGERY

## 2020-06-09 PROCEDURE — 99213 OFFICE O/P EST LOW 20 MIN: CPT | Performed by: ORTHOPAEDIC SURGERY

## 2020-06-09 PROCEDURE — 3077F SYST BP >= 140 MM HG: CPT | Performed by: ORTHOPAEDIC SURGERY

## 2020-11-07 ENCOUNTER — OFFICE VISIT (OUTPATIENT)
Dept: FAMILY MEDICINE CLINIC | Facility: CLINIC | Age: 55
End: 2020-11-07
Payer: COMMERCIAL

## 2020-11-07 VITALS
OXYGEN SATURATION: 94 % | RESPIRATION RATE: 14 BRPM | BODY MASS INDEX: 36.26 KG/M2 | SYSTOLIC BLOOD PRESSURE: 144 MMHG | HEIGHT: 71 IN | HEART RATE: 100 BPM | TEMPERATURE: 98.4 F | WEIGHT: 259 LBS | DIASTOLIC BLOOD PRESSURE: 86 MMHG

## 2020-11-07 DIAGNOSIS — L02.01 ABSCESS OF FACE: Primary | ICD-10-CM

## 2020-11-07 DIAGNOSIS — I10 ESSENTIAL HYPERTENSION: ICD-10-CM

## 2020-11-07 PROCEDURE — 99214 OFFICE O/P EST MOD 30 MIN: CPT | Performed by: NURSE PRACTITIONER

## 2020-11-07 PROCEDURE — 3079F DIAST BP 80-89 MM HG: CPT | Performed by: NURSE PRACTITIONER

## 2020-11-07 PROCEDURE — 3077F SYST BP >= 140 MM HG: CPT | Performed by: NURSE PRACTITIONER

## 2020-11-07 RX ORDER — DIPHENOXYLATE HYDROCHLORIDE AND ATROPINE SULFATE 2.5; .025 MG/1; MG/1
1 TABLET ORAL DAILY
COMMUNITY

## 2020-11-07 RX ORDER — LOSARTAN POTASSIUM 25 MG/1
25 TABLET ORAL DAILY
Qty: 30 TABLET | Refills: 1 | Status: SHIPPED | OUTPATIENT
Start: 2020-11-07 | End: 2020-12-07 | Stop reason: SDUPTHER

## 2020-11-07 RX ORDER — CEPHALEXIN 500 MG/1
500 CAPSULE ORAL EVERY 6 HOURS SCHEDULED
Qty: 40 CAPSULE | Refills: 0 | Status: SHIPPED | OUTPATIENT
Start: 2020-11-07 | End: 2020-11-17

## 2020-11-09 ENCOUNTER — TELEPHONE (OUTPATIENT)
Dept: FAMILY MEDICINE CLINIC | Facility: CLINIC | Age: 55
End: 2020-11-09

## 2020-11-09 ENCOUNTER — OFFICE VISIT (OUTPATIENT)
Dept: PODIATRY | Facility: CLINIC | Age: 55
End: 2020-11-09
Payer: COMMERCIAL

## 2020-11-09 VITALS — WEIGHT: 253 LBS | TEMPERATURE: 97.8 F | BODY MASS INDEX: 35.42 KG/M2 | HEIGHT: 71 IN

## 2020-11-09 DIAGNOSIS — M72.2 PLANTAR FASCIITIS: Primary | ICD-10-CM

## 2020-11-09 DIAGNOSIS — M79.671 RIGHT FOOT PAIN: ICD-10-CM

## 2020-11-09 DIAGNOSIS — L02.01 ABSCESS OF FACE: Primary | ICD-10-CM

## 2020-11-09 PROCEDURE — 20550 NJX 1 TENDON SHEATH/LIGAMENT: CPT | Performed by: PODIATRIST

## 2020-11-09 PROCEDURE — 99214 OFFICE O/P EST MOD 30 MIN: CPT | Performed by: PODIATRIST

## 2020-11-09 PROCEDURE — 3008F BODY MASS INDEX DOCD: CPT | Performed by: PODIATRIST

## 2020-11-09 RX ORDER — BUPIVACAINE HYDROCHLORIDE 5 MG/ML
1 INJECTION, SOLUTION EPIDURAL; INTRACAUDAL ONCE
Status: COMPLETED | OUTPATIENT
Start: 2020-11-09 | End: 2020-11-09

## 2020-11-09 RX ORDER — TRIAMCINOLONE ACETONIDE 40 MG/ML
20 INJECTION, SUSPENSION INTRA-ARTICULAR; INTRAMUSCULAR ONCE
Status: COMPLETED | OUTPATIENT
Start: 2020-11-09 | End: 2020-11-09

## 2020-11-09 RX ORDER — MELOXICAM 15 MG/1
15 TABLET ORAL DAILY
Qty: 30 TABLET | Refills: 0 | Status: SHIPPED | OUTPATIENT
Start: 2020-11-09 | End: 2021-03-26

## 2020-11-09 RX ORDER — LIDOCAINE HYDROCHLORIDE 10 MG/ML
1 INJECTION, SOLUTION EPIDURAL; INFILTRATION; INTRACAUDAL; PERINEURAL ONCE
Status: COMPLETED | OUTPATIENT
Start: 2020-11-09 | End: 2020-11-09

## 2020-11-09 RX ADMIN — BUPIVACAINE HYDROCHLORIDE 1 ML: 5 INJECTION, SOLUTION EPIDURAL; INTRACAUDAL at 13:28

## 2020-11-09 RX ADMIN — TRIAMCINOLONE ACETONIDE 20 MG: 40 INJECTION, SUSPENSION INTRA-ARTICULAR; INTRAMUSCULAR at 13:28

## 2020-11-09 RX ADMIN — LIDOCAINE HYDROCHLORIDE 1 ML: 10 INJECTION, SOLUTION EPIDURAL; INFILTRATION; INTRACAUDAL; PERINEURAL at 13:28

## 2020-12-01 ENCOUNTER — OFFICE VISIT (OUTPATIENT)
Dept: PODIATRY | Facility: CLINIC | Age: 55
End: 2020-12-01
Payer: COMMERCIAL

## 2020-12-01 VITALS
DIASTOLIC BLOOD PRESSURE: 82 MMHG | WEIGHT: 261 LBS | HEIGHT: 71 IN | BODY MASS INDEX: 36.54 KG/M2 | HEART RATE: 96 BPM | SYSTOLIC BLOOD PRESSURE: 136 MMHG

## 2020-12-01 DIAGNOSIS — M79.675 PAIN IN TOE OF LEFT FOOT: ICD-10-CM

## 2020-12-01 DIAGNOSIS — L60.0 INGROWN TOENAIL: Primary | ICD-10-CM

## 2020-12-01 DIAGNOSIS — M72.2 PLANTAR FASCIITIS: ICD-10-CM

## 2020-12-01 PROCEDURE — 3079F DIAST BP 80-89 MM HG: CPT | Performed by: PODIATRIST

## 2020-12-01 PROCEDURE — 11730 AVULSION NAIL PLATE SIMPLE 1: CPT | Performed by: PODIATRIST

## 2020-12-01 PROCEDURE — 99212 OFFICE O/P EST SF 10 MIN: CPT | Performed by: PODIATRIST

## 2020-12-01 PROCEDURE — 3075F SYST BP GE 130 - 139MM HG: CPT | Performed by: PODIATRIST

## 2020-12-01 RX ORDER — LIDOCAINE HYDROCHLORIDE AND EPINEPHRINE 10; 10 MG/ML; UG/ML
1 INJECTION, SOLUTION INFILTRATION; PERINEURAL ONCE
Status: COMPLETED | OUTPATIENT
Start: 2020-12-01 | End: 2020-12-01

## 2020-12-01 RX ORDER — LIDOCAINE HYDROCHLORIDE 10 MG/ML
1 INJECTION, SOLUTION EPIDURAL; INFILTRATION; INTRACAUDAL; PERINEURAL ONCE
Status: COMPLETED | OUTPATIENT
Start: 2020-12-01 | End: 2020-12-01

## 2020-12-01 RX ADMIN — LIDOCAINE HYDROCHLORIDE AND EPINEPHRINE 1 ML: 10; 10 INJECTION, SOLUTION INFILTRATION; PERINEURAL at 16:05

## 2020-12-01 RX ADMIN — LIDOCAINE HYDROCHLORIDE 1 ML: 10 INJECTION, SOLUTION EPIDURAL; INFILTRATION; INTRACAUDAL; PERINEURAL at 16:05

## 2020-12-07 ENCOUNTER — OFFICE VISIT (OUTPATIENT)
Dept: FAMILY MEDICINE CLINIC | Facility: CLINIC | Age: 55
End: 2020-12-07
Payer: COMMERCIAL

## 2020-12-07 VITALS
BODY MASS INDEX: 35.9 KG/M2 | OXYGEN SATURATION: 93 % | HEART RATE: 100 BPM | RESPIRATION RATE: 16 BRPM | DIASTOLIC BLOOD PRESSURE: 90 MMHG | SYSTOLIC BLOOD PRESSURE: 154 MMHG | TEMPERATURE: 99.2 F | WEIGHT: 256.4 LBS | HEIGHT: 71 IN

## 2020-12-07 DIAGNOSIS — E78.5 HYPERLIPIDEMIA, UNSPECIFIED HYPERLIPIDEMIA TYPE: ICD-10-CM

## 2020-12-07 DIAGNOSIS — E66.01 SEVERE OBESITY (BMI 35.0-39.9) WITH COMORBIDITY (HCC): ICD-10-CM

## 2020-12-07 DIAGNOSIS — Z23 NEED FOR INFLUENZA VACCINATION: ICD-10-CM

## 2020-12-07 DIAGNOSIS — I10 ESSENTIAL HYPERTENSION: Primary | ICD-10-CM

## 2020-12-07 DIAGNOSIS — R73.01 IMPAIRED FASTING GLUCOSE: ICD-10-CM

## 2020-12-07 DIAGNOSIS — Z12.11 SCREEN FOR COLON CANCER: ICD-10-CM

## 2020-12-07 DIAGNOSIS — Z11.59 NEED FOR HEPATITIS C SCREENING TEST: ICD-10-CM

## 2020-12-07 DIAGNOSIS — Z72.89 ALCOHOL USE: ICD-10-CM

## 2020-12-07 DIAGNOSIS — J44.9 CHRONIC OBSTRUCTIVE PULMONARY DISEASE, UNSPECIFIED COPD TYPE (HCC): ICD-10-CM

## 2020-12-07 DIAGNOSIS — Z23 ENCOUNTER FOR IMMUNIZATION: ICD-10-CM

## 2020-12-07 DIAGNOSIS — Z11.4 SCREENING FOR HIV (HUMAN IMMUNODEFICIENCY VIRUS): ICD-10-CM

## 2020-12-07 DIAGNOSIS — F17.219 CIGARETTE NICOTINE DEPENDENCE WITH NICOTINE-INDUCED DISORDER: ICD-10-CM

## 2020-12-07 DIAGNOSIS — Z12.5 SCREENING FOR PROSTATE CANCER: ICD-10-CM

## 2020-12-07 PROBLEM — F10.90 ALCOHOL USE: Status: ACTIVE | Noted: 2020-12-07

## 2020-12-07 PROBLEM — Z78.9 ALCOHOL USE: Status: ACTIVE | Noted: 2020-12-07

## 2020-12-07 PROCEDURE — 90732 PPSV23 VACC 2 YRS+ SUBQ/IM: CPT

## 2020-12-07 PROCEDURE — 90472 IMMUNIZATION ADMIN EACH ADD: CPT

## 2020-12-07 PROCEDURE — 3725F SCREEN DEPRESSION PERFORMED: CPT | Performed by: FAMILY MEDICINE

## 2020-12-07 PROCEDURE — 99214 OFFICE O/P EST MOD 30 MIN: CPT | Performed by: FAMILY MEDICINE

## 2020-12-07 PROCEDURE — 3008F BODY MASS INDEX DOCD: CPT | Performed by: FAMILY MEDICINE

## 2020-12-07 PROCEDURE — 90471 IMMUNIZATION ADMIN: CPT

## 2020-12-07 PROCEDURE — 90682 RIV4 VACC RECOMBINANT DNA IM: CPT

## 2020-12-07 PROCEDURE — 4004F PT TOBACCO SCREEN RCVD TLK: CPT | Performed by: FAMILY MEDICINE

## 2020-12-07 RX ORDER — LOSARTAN POTASSIUM 50 MG/1
50 TABLET ORAL DAILY
Qty: 30 TABLET | Refills: 5 | Status: SHIPPED | OUTPATIENT
Start: 2020-12-07 | End: 2021-07-06 | Stop reason: SDUPTHER

## 2020-12-08 DIAGNOSIS — M72.2 PLANTAR FASCIITIS: ICD-10-CM

## 2020-12-22 DIAGNOSIS — M72.2 PLANTAR FASCIITIS: Primary | ICD-10-CM

## 2020-12-22 RX ORDER — MELOXICAM 15 MG/1
15 TABLET ORAL DAILY
Qty: 90 TABLET | Refills: 0 | Status: SHIPPED | OUTPATIENT
Start: 2020-12-22 | End: 2021-03-26

## 2021-01-07 ENCOUNTER — OFFICE VISIT (OUTPATIENT)
Dept: PODIATRY | Facility: CLINIC | Age: 56
End: 2021-01-07
Payer: COMMERCIAL

## 2021-01-07 VITALS — BODY MASS INDEX: 35.84 KG/M2 | HEIGHT: 71 IN | WEIGHT: 256 LBS

## 2021-01-07 DIAGNOSIS — M79.671 RIGHT FOOT PAIN: ICD-10-CM

## 2021-01-07 DIAGNOSIS — M72.2 PLANTAR FASCIITIS: Primary | ICD-10-CM

## 2021-01-07 PROCEDURE — 20550 NJX 1 TENDON SHEATH/LIGAMENT: CPT | Performed by: PODIATRIST

## 2021-01-07 PROCEDURE — 3008F BODY MASS INDEX DOCD: CPT | Performed by: FAMILY MEDICINE

## 2021-01-07 RX ORDER — TRIAMCINOLONE ACETONIDE 40 MG/ML
20 INJECTION, SUSPENSION INTRA-ARTICULAR; INTRAMUSCULAR ONCE
Status: COMPLETED | OUTPATIENT
Start: 2021-01-07 | End: 2021-01-07

## 2021-01-07 RX ORDER — LIDOCAINE HYDROCHLORIDE 10 MG/ML
1 INJECTION, SOLUTION EPIDURAL; INFILTRATION; INTRACAUDAL; PERINEURAL ONCE
Status: COMPLETED | OUTPATIENT
Start: 2021-01-07 | End: 2021-01-07

## 2021-01-07 RX ORDER — BUPIVACAINE HYDROCHLORIDE 5 MG/ML
1 INJECTION, SOLUTION EPIDURAL; INTRACAUDAL ONCE
Status: COMPLETED | OUTPATIENT
Start: 2021-01-07 | End: 2021-01-07

## 2021-01-07 RX ORDER — MELOXICAM 15 MG/1
TABLET ORAL
Qty: 30 TABLET | Refills: 0 | OUTPATIENT
Start: 2021-01-07

## 2021-01-07 RX ADMIN — TRIAMCINOLONE ACETONIDE 20 MG: 40 INJECTION, SUSPENSION INTRA-ARTICULAR; INTRAMUSCULAR at 16:55

## 2021-01-07 RX ADMIN — LIDOCAINE HYDROCHLORIDE 1 ML: 10 INJECTION, SOLUTION EPIDURAL; INFILTRATION; INTRACAUDAL; PERINEURAL at 16:56

## 2021-01-07 RX ADMIN — BUPIVACAINE HYDROCHLORIDE 1 ML: 5 INJECTION, SOLUTION EPIDURAL; INTRACAUDAL at 16:54

## 2021-01-07 NOTE — PROGRESS NOTES
Patient presents with a flare-up of plantar fasciitis of the right arch  Patient has no heel pain only arch pain  Patient had partial avulsion performed left great toe at last visit and this has healed uneventfully  On exam, pain with palpation central aspect right arch slightly distal to the fascial band insertion  Treatment:  Injected right foot with 0 5 cc Kenalog 40 along with 1 cc 1% xylocaine and 1 cc 0 5% Marcaine  Patient advised to discontinue the meloxicam as it has not been helping  Foot injection     Date/Time 1/7/2021 4:59 PM     Performed by  Jeromy Serna DPM     Authorized by Jeromy Serna DPM      [place] Protocol   Consent: Verbal consent obtained  Consent given by: patient  Patient identity confirmed: verbally with patient        Local anesthesia used: yes     Anesthesia   Local anesthesia used: yes  Local Anesthetic: lidocaine 1% without epinephrine and bupivacaine 0 5% without epinephrine     Procedure Details   Procedure Notes: Injected right foot with 0 5 cc Kenalog 40 along with 1 cc 1% xylocaine and 1 cc 0 5% Marcaine

## 2021-02-16 ENCOUNTER — HOSPITAL ENCOUNTER (EMERGENCY)
Facility: HOSPITAL | Age: 56
Discharge: HOME | End: 2021-02-16
Attending: EMERGENCY MEDICINE
Payer: COMMERCIAL

## 2021-02-16 ENCOUNTER — APPOINTMENT (EMERGENCY)
Dept: RADIOLOGY | Facility: HOSPITAL | Age: 56
End: 2021-02-16
Payer: COMMERCIAL

## 2021-02-16 VITALS
HEIGHT: 71 IN | BODY MASS INDEX: 35 KG/M2 | RESPIRATION RATE: 18 BRPM | HEART RATE: 97 BPM | OXYGEN SATURATION: 94 % | DIASTOLIC BLOOD PRESSURE: 71 MMHG | WEIGHT: 250 LBS | SYSTOLIC BLOOD PRESSURE: 150 MMHG | TEMPERATURE: 98.4 F

## 2021-02-16 DIAGNOSIS — R10.9 ABDOMINAL PAIN: Primary | ICD-10-CM

## 2021-02-16 DIAGNOSIS — I10 ESSENTIAL HYPERTENSION: ICD-10-CM

## 2021-02-16 LAB
ALBUMIN SERPL BCP-MCNC: 3.9 G/DL (ref 3.5–5)
ALP SERPL-CCNC: 85 U/L (ref 46–116)
ALT SERPL W P-5'-P-CCNC: 49 U/L (ref 12–78)
ANION GAP SERPL CALCULATED.3IONS-SCNC: 4 MMOL/L (ref 4–13)
AST SERPL W P-5'-P-CCNC: 22 U/L (ref 5–45)
BASOPHILS # BLD AUTO: 0.09 THOUSANDS/ΜL (ref 0–0.1)
BASOPHILS NFR BLD AUTO: 1 % (ref 0–1)
BILIRUB SERPL-MCNC: 0.79 MG/DL (ref 0.2–1)
BILIRUB UR QL STRIP: NEGATIVE
BUN SERPL-MCNC: 15 MG/DL (ref 5–25)
CALCIUM SERPL-MCNC: 9.8 MG/DL (ref 8.3–10.1)
CHLORIDE SERPL-SCNC: 103 MMOL/L (ref 100–108)
CLARITY UR: CLEAR
CO2 SERPL-SCNC: 29 MMOL/L (ref 21–32)
COLOR UR: YELLOW
CREAT SERPL-MCNC: 0.92 MG/DL (ref 0.6–1.3)
EOSINOPHIL # BLD AUTO: 0.21 THOUSAND/ΜL (ref 0–0.61)
EOSINOPHIL NFR BLD AUTO: 2 % (ref 0–6)
ERYTHROCYTE [DISTWIDTH] IN BLOOD BY AUTOMATED COUNT: 11.9 % (ref 11.6–15.1)
GFR SERPL CREATININE-BSD FRML MDRD: 93 ML/MIN/1.73SQ M
GLUCOSE SERPL-MCNC: 195 MG/DL (ref 65–140)
GLUCOSE UR STRIP-MCNC: NEGATIVE MG/DL
HCT VFR BLD AUTO: 48.9 % (ref 36.5–49.3)
HGB BLD-MCNC: 16.5 G/DL (ref 12–17)
HGB UR QL STRIP.AUTO: NEGATIVE
IMM GRANULOCYTES # BLD AUTO: 0.06 THOUSAND/UL (ref 0–0.2)
IMM GRANULOCYTES NFR BLD AUTO: 1 % (ref 0–2)
KETONES UR STRIP-MCNC: NEGATIVE MG/DL
LEUKOCYTE ESTERASE UR QL STRIP: NEGATIVE
LYMPHOCYTES # BLD AUTO: 2.63 THOUSANDS/ΜL (ref 0.6–4.47)
LYMPHOCYTES NFR BLD AUTO: 22 % (ref 14–44)
MCH RBC QN AUTO: 31.4 PG (ref 26.8–34.3)
MCHC RBC AUTO-ENTMCNC: 33.7 G/DL (ref 31.4–37.4)
MCV RBC AUTO: 93 FL (ref 82–98)
MONOCYTES # BLD AUTO: 1 THOUSAND/ΜL (ref 0.17–1.22)
MONOCYTES NFR BLD AUTO: 8 % (ref 4–12)
NEUTROPHILS # BLD AUTO: 7.9 THOUSANDS/ΜL (ref 1.85–7.62)
NEUTS SEG NFR BLD AUTO: 66 % (ref 43–75)
NITRITE UR QL STRIP: NEGATIVE
NRBC BLD AUTO-RTO: 0 /100 WBCS
PH UR STRIP.AUTO: 6.5 [PH]
PLATELET # BLD AUTO: 301 THOUSANDS/UL (ref 149–390)
PMV BLD AUTO: 9.5 FL (ref 8.9–12.7)
POTASSIUM SERPL-SCNC: 4.2 MMOL/L (ref 3.5–5.3)
PROT SERPL-MCNC: 7.5 G/DL (ref 6.4–8.2)
PROT UR STRIP-MCNC: NEGATIVE MG/DL
RBC # BLD AUTO: 5.25 MILLION/UL (ref 3.88–5.62)
SODIUM SERPL-SCNC: 136 MMOL/L (ref 136–145)
SP GR UR STRIP.AUTO: 1.02 (ref 1–1.03)
UROBILINOGEN UR QL STRIP.AUTO: 0.2 E.U./DL
WBC # BLD AUTO: 11.89 THOUSAND/UL (ref 4.31–10.16)

## 2021-02-16 PROCEDURE — 85025 COMPLETE CBC W/AUTO DIFF WBC: CPT | Performed by: EMERGENCY MEDICINE

## 2021-02-16 PROCEDURE — 96374 THER/PROPH/DIAG INJ IV PUSH: CPT

## 2021-02-16 PROCEDURE — 81003 URINALYSIS AUTO W/O SCOPE: CPT | Performed by: EMERGENCY MEDICINE

## 2021-02-16 PROCEDURE — 74177 CT ABD & PELVIS W/CONTRAST: CPT

## 2021-02-16 PROCEDURE — 80053 COMPREHEN METABOLIC PANEL: CPT | Performed by: EMERGENCY MEDICINE

## 2021-02-16 PROCEDURE — 99284 EMERGENCY DEPT VISIT MOD MDM: CPT

## 2021-02-16 PROCEDURE — G1004 CDSM NDSC: HCPCS

## 2021-02-16 PROCEDURE — 99285 EMERGENCY DEPT VISIT HI MDM: CPT | Performed by: EMERGENCY MEDICINE

## 2021-02-16 PROCEDURE — 36415 COLL VENOUS BLD VENIPUNCTURE: CPT | Performed by: EMERGENCY MEDICINE

## 2021-02-16 RX ORDER — LOSARTAN POTASSIUM 50 MG/1
50 TABLET ORAL DAILY
Qty: 30 TABLET | Refills: 5 | OUTPATIENT
Start: 2021-02-16

## 2021-02-16 RX ORDER — DICYCLOMINE HCL 20 MG
20 TABLET ORAL EVERY 6 HOURS
Qty: 12 TABLET | Refills: 0 | Status: SHIPPED | OUTPATIENT
Start: 2021-02-16 | End: 2021-03-26 | Stop reason: ALTCHOICE

## 2021-02-16 RX ADMIN — MORPHINE SULFATE 2 MG: 2 INJECTION, SOLUTION INTRAMUSCULAR; INTRAVENOUS at 10:22

## 2021-02-16 RX ADMIN — IOHEXOL 100 ML: 350 INJECTION, SOLUTION INTRAVENOUS at 11:07

## 2021-02-16 NOTE — ED PROVIDER NOTES
History  Chief Complaint   Patient presents with    Abdominal Pain     Patient reports lower abdominal pain that starting around midnight; more on the left side; denies n/v/d     41-year-old male presents with left lower quadrant abdominal pain  Patient says the pain woke him up around midnight  It is sharp in nature  The intensity comes and goes  The pain does not radiate anywhere else such as into the testicles  He is not having groin pain  Patient reports no heavy lifting or straining yesterday  He has no fever, chills, nausea, vomiting, or diarrhea  No changes in bowels in the past few days  No blood in his stool  Patient last had a colonoscopy 5 years ago and is due for 1 this year  He had a CT scan 3 years ago that showed diverticulosis  He has never had diverticulitis  No sick contacts  Prior to Admission Medications   Prescriptions Last Dose Informant Patient Reported? Taking?    albuterol (Ventolin HFA) 90 mcg/act inhaler  Self No No   Sig: Inhale 2 puffs every 4 (four) hours as needed for wheezing   Patient not taking: Reported on 11/7/2020   losartan (COZAAR) 50 mg tablet   No No   Sig: Take 1 tablet (50 mg total) by mouth daily   meloxicam (MOBIC) 15 mg tablet  Self No No   Sig: Take 1 tablet (15 mg total) by mouth daily   meloxicam (MOBIC) 15 mg tablet   No No   Sig: Take 1 tablet (15 mg total) by mouth daily   multivitamin (THERAGRAN) TABS  Self Yes No   Sig: Take 1 tablet by mouth daily      Facility-Administered Medications: None       Past Medical History:   Diagnosis Date    Anxiety     Last assessed 12/20/2013    Contusion of right wrist 1/30/2018    COPD (chronic obstructive pulmonary disease) ([facility] [address] ) 8/16/2016    Depression with anxiety     Diverticulitis 2016    Eczema 2/24/2016    Hypertension     Obesity 9/25/2012    Perianal abscess 3/5/2019       Past Surgical History:   Procedure Laterality Date    COLONOSCOPY      KNEE SURGERY         Family History Problem Relation Age of Onset    Cancer Mother         Lung Cancer    Cancer Father     Diabetes Father     Diabetes Sister      I have reviewed and agree with the history as documented  E-Cigarette/Vaping    E-Cigarette Use Never User      E-Cigarette/Vaping Substances    Nicotine No     THC No     CBD No     Flavoring No     Other No     Unknown No      Social History     Tobacco Use    Smoking status: Current Every Day Smoker     Packs/day: 1 00    Smokeless tobacco: Never Used   Substance Use Topics    Alcohol use: Yes     Alcohol/week: 2 0 - 3 0 standard drinks     Types: 2 - 3 Cans of beer per week     Frequency: 4 or more times a week    Drug use: No        Review of Systems   Constitutional: Negative for appetite change, chills, fatigue and fever  HENT: Negative  Eyes: Negative  Respiratory: Negative for cough, chest tightness and shortness of breath  Cardiovascular: Negative for chest pain and palpitations  Gastrointestinal: Positive for abdominal pain  Negative for blood in stool, constipation, diarrhea, nausea and vomiting  Endocrine: Negative  Genitourinary: Negative for difficulty urinating, hematuria and testicular pain  Musculoskeletal: Negative for arthralgias, back pain, gait problem and myalgias  Skin: Negative for pallor and rash  Allergic/Immunologic: Negative  Neurological: Negative for dizziness, weakness, light-headedness and headaches  Hematological: Negative          Physical Exam  ED Triage Vitals [02/16/21 1003]   Temperature Pulse Respirations Blood Pressure SpO2   98 4 °F (36 9 °C) (!) 110 18 (!) 171/83 95 %      Temp Source Heart Rate Source Patient Position - Orthostatic VS BP Location FiO2 (%)   Oral Monitor Lying Right arm --      Pain Score       3             Orthostatic Vital Signs  Vitals:    02/16/21 1003 02/16/21 1030 02/16/21 1143   BP: (!) 171/83 121/58 150/71   Pulse: (!) 110 104 97   Patient Position - Orthostatic VS: Lying Sitting Sitting       Physical Exam  Vitals signs and nursing note reviewed  Constitutional:       General: He is not in acute distress  Appearance: Normal appearance  He is well-developed  He is not ill-appearing  HENT:      Head: Normocephalic and atraumatic  Eyes:      Conjunctiva/sclera: Conjunctivae normal    Neck:      Musculoskeletal: Normal range of motion and neck supple  Cardiovascular:      Rate and Rhythm: Normal rate and regular rhythm  Heart sounds: No murmur  Pulmonary:      Effort: Pulmonary effort is normal  No respiratory distress  Breath sounds: Normal breath sounds  No wheezing, rhonchi or rales  Abdominal:      General: Abdomen is flat  Palpations: Abdomen is soft  Tenderness: There is no abdominal tenderness  There is no guarding or rebound  Negative signs include Rovsing's sign and psoas sign  Musculoskeletal: Normal range of motion  Skin:     General: Skin is warm and dry  Neurological:      General: No focal deficit present  Mental Status: He is alert and oriented to person, place, and time           ED Medications  Medications   morphine injection 2 mg (2 mg Intravenous Given 2/16/21 1022)   iohexol (OMNIPAQUE) 350 MG/ML injection (MULTI-DOSE) 100 mL (100 mL Intravenous Given 2/16/21 1107)       Diagnostic Studies  Results Reviewed     Procedure Component Value Units Date/Time    UA w Reflex to Microscopic w Reflex to Culture [[id number]] Collected: 02/16/21 1057    Lab Status: Final result Specimen: Urine, Other Updated: 02/16/21 1115     Color, UA Yellow     Clarity, UA Clear     Specific Gravity, UA 1 017     pH, UA 6 5     Leukocytes, UA Negative     Nitrite, UA Negative     Protein, UA Negative mg/dl      Glucose, UA Negative mg/dl      Ketones, UA Negative mg/dl      Urobilinogen, UA 0 2 E U /dl      Bilirubin, UA Negative     Blood, UA Negative    Comprehensive metabolic panel [[id number]]  (Abnormal) Collected: 02/16/21 1021    Lab Status: Final result Specimen: Blood from Arm, Right Updated: 02/16/21 1049     Sodium 136 mmol/L      Potassium 4 2 mmol/L      Chloride 103 mmol/L      CO2 29 mmol/L      ANION GAP 4 mmol/L      BUN 15 mg/dL      Creatinine 0 92 mg/dL      Glucose 195 mg/dL      Calcium 9 8 mg/dL      AST 22 U/L      ALT 49 U/L      Alkaline Phosphatase 85 U/L      Total Protein 7 5 g/dL      Albumin 3 9 g/dL      Total Bilirubin 0 79 mg/dL      eGFR 93 ml/min/1 73sq m     Narrative:      National Kidney Disease Foundation guidelines for Chronic Kidney Disease (CKD):     Stage 1 with normal or high GFR (GFR > 90 mL/min/1 73 square meters)    Stage 2 Mild CKD (GFR = 60-89 mL/min/1 73 square meters)    Stage 3A Moderate CKD (GFR = 45-59 mL/min/1 73 square meters)    Stage 3B Moderate CKD (GFR = 30-44 mL/min/1 73 square meters)    Stage 4 Severe CKD (GFR = 15-29 mL/min/1 73 square meters)    Stage 5 End Stage CKD (GFR <15 mL/min/1 73 square meters)  Note: GFR calculation is accurate only with a steady state creatinine    CBC and differential [[id number]]  (Abnormal) Collected: 02/16/21 1021    Lab Status: Final result Specimen: Blood from Arm, Right Updated: 02/16/21 1031     WBC 11 89 Thousand/uL      RBC 5 25 Million/uL      Hemoglobin 16 5 g/dL      Hematocrit 48 9 %      MCV 93 fL      MCH 31 4 pg      MCHC 33 7 g/dL      RDW 11 9 %      MPV 9 5 fL      Platelets 179 Thousands/uL      nRBC 0 /100 WBCs      Neutrophils Relative 66 %      Immat GRANS % 1 %      Lymphocytes Relative 22 %      Monocytes Relative 8 %      Eosinophils Relative 2 %      Basophils Relative 1 %      Neutrophils Absolute 7 90 Thousands/µL      Immature Grans Absolute 0 06 Thousand/uL      Lymphocytes Absolute 2 63 Thousands/µL      Monocytes Absolute 1 00 Thousand/µL      Eosinophils Absolute 0 21 Thousand/µL      Basophils Absolute 0 09 Thousands/µL                  CT abdomen pelvis with contrast   Final Result by Jeremi De Leon MD (02/16 1122)      1  Colonic diverticulosis but no evidence of acute diverticulitis or bowel obstruction   2  Hepatic steatosis                  Workstation performed: [id number]               Procedures  Procedures      ED Course                             SBIRT 22yo+      Most Recent Value   SBIRT (22 yo +)   In order to provide better care to our patients, we are screening all of our patients for alcohol and drug use  Would it be okay to ask you these screening questions? No Filed at: 02/16/2021 1008                MDM  Number of Diagnoses or Management Options  Abdominal pain: established and improving  Diagnosis management comments: 58-year-old male with a history of diverticulosis presents with left lower quadrant abdominal pain  Will give morphine for pain relief  Will get labs, urine, and CT abdomen  Amount and/or Complexity of Data Reviewed  Clinical lab tests: ordered and reviewed  Tests in the radiology section of CPT®: ordered and reviewed  Review and summarize past medical records: yes  Discuss the patient with other providers: yes    Risk of Complications, Morbidity, and/or Mortality  Presenting problems: moderate  Diagnostic procedures: moderate  Management options: moderate    Patient Progress  Patient progress: improved    Patient's workup here showed no acute pathology  Explained results to patient  Patient was given Bentyl for the next few days for his abdominal pain  Patient was given return precautions and told to come back if his symptoms change  I told patient to stay away from fatty, greasy, and spicy foods for the next few days  I explained that he could still have some early inflammation that was not picked up from scan  Told patient to follow-up with his primary care physician      Disposition  Final diagnoses:   Abdominal pain     Time reflects when diagnosis was documented in both MDM as applicable and the Disposition within this note     Time User Action Codes Description Comment 2/16/2021 11:44 AM Jethro Blackwell Add [R10 9] Abdominal pain       ED Disposition     ED Disposition Condition Date/Time Comment    Discharge Good Tue Feb 16, 2021 11:44 AM Julisa Gustafson discharge to home/self care  Follow-up Information     Follow up With Specialties Details Why Miguel Fields MD Family Medicine  If symptoms worsen [address] [facility]  [phone]            Patient's Medications   Discharge Prescriptions    DICYCLOMINE (BENTYL) 20 MG TABLET    Take 1 tablet (20 mg total) by mouth every 6 (six) hours for 3 days       Start Date: 2/16/2021 End Date: 2/19/2021       Order Dose: 20 mg       Quantity: 12 tablet    Refills: 0     No discharge procedures on file  PDMP Review     None           ED Provider  Attending physically available and evaluated Julisa Gustafson I managed the patient along with the ED Attending      Electronically Signed by         [facility], DO  02/16/21 1148

## 2021-02-16 NOTE — ED ATTENDING ATTESTATION
2/16/2021  IMelina DO, saw and evaluated the patient  I have discussed the patient with the resident/non-physician practitioner and agree with the resident's/non-physician practitioner's findings, Plan of Care, and MDM as documented in the resident's/non-physician practitioner's note, except where noted  All available labs and Radiology studies were reviewed  I was present for key portions of any procedure(s) performed by the resident/non-physician practitioner and I was immediately available to provide assistance  At this point I agree with the current assessment done in the Emergency Department  I have conducted an independent evaluation of this patient a history and physical is as follows:    ED Course       62 y/o male with c/o llq abd pain since last night  Patient has history of diverticulosis but has not had any history of diverticulitis  No history of kidney stones  No change in stool habits  No diarrhea constipation  No urinary complaints  Patient did not start his back but did arise in his left lower quadrant began last night  Pain is worsened throughout the morning and localized and described as aching and cramping at times in the left lower quadrant  Afebrile  No zoster rash  Mild tenderness but no rebound rigidity or guarding and tenderness looking the left lower quadrant  Worse with deep palpation  Will check labs, administer analgesia, CT scan abdomen pelvis and reassess  Previous records reviewed  Had a previous colonoscopy approximately 5 years ago which she states did reveal diverticulosis but no other acute findings  CT labs reviewed  Recommend bowel rest, Bentyl, follow up with PCP, return if worsens      Critical Care Time  Procedures

## 2021-02-16 NOTE — DISCHARGE INSTRUCTIONS
You have been seen for abdominal pain  You should return to the ED if you develop fever, vomiting, diarrhea, blood in your stool, worsening pain, or other worsening symptoms  Follow up with your primary care physician  Take Bentyl as needed for abdominal pain  Stay away from fatty, greasy, and spicy food for the next few days

## 2021-03-05 NOTE — TELEPHONE ENCOUNTER
I sent lisinopril 5mg to his pharmacy again  Please ask pt to follow low salt diet and check BP at home twice per day  Call office if BP always >140/90 
Left message for patient to contact office 
Patient seen at Urgent Care yesterday for bronchitis  States BP was elevated at 160/80  Has no headache, no lightheadedness  States he was prescribed Lisinopril 5  Mg previously, but never had med filled 
Please advise 
Spoke with patient notified and given instructions 
ace bandage applied   rest, ice, otc pain meds and f/u w orthopedic in 1 week

## 2021-03-26 ENCOUNTER — ANESTHESIA EVENT (OUTPATIENT)
Dept: GASTROENTEROLOGY | Facility: HOSPITAL | Age: 56
End: 2021-03-26

## 2021-03-26 ENCOUNTER — TELEPHONE (OUTPATIENT)
Dept: OBGYN CLINIC | Facility: HOSPITAL | Age: 56
End: 2021-03-26

## 2021-03-26 ENCOUNTER — ANESTHESIA (OUTPATIENT)
Dept: GASTROENTEROLOGY | Facility: HOSPITAL | Age: 56
End: 2021-03-26

## 2021-03-26 ENCOUNTER — HOSPITAL ENCOUNTER (OUTPATIENT)
Dept: GASTROENTEROLOGY | Facility: HOSPITAL | Age: 56
Setting detail: OUTPATIENT SURGERY
Discharge: HOME | End: 2021-03-26
Attending: COLON & RECTAL SURGERY | Admitting: COLON & RECTAL SURGERY
Payer: COMMERCIAL

## 2021-03-26 VITALS
SYSTOLIC BLOOD PRESSURE: 116 MMHG | OXYGEN SATURATION: 96 % | HEIGHT: 71 IN | BODY MASS INDEX: 35 KG/M2 | TEMPERATURE: 98.7 F | DIASTOLIC BLOOD PRESSURE: 69 MMHG | RESPIRATION RATE: 18 BRPM | WEIGHT: 250 LBS | HEART RATE: 98 BPM

## 2021-03-26 DIAGNOSIS — Z12.11 SCREEN FOR COLON CANCER: ICD-10-CM

## 2021-03-26 PROCEDURE — 88305 TISSUE EXAM BY PATHOLOGIST: CPT | Performed by: PATHOLOGY

## 2021-03-26 PROCEDURE — 99213 OFFICE O/P EST LOW 20 MIN: CPT | Performed by: COLON & RECTAL SURGERY

## 2021-03-26 PROCEDURE — 45380 COLONOSCOPY AND BIOPSY: CPT | Performed by: COLON & RECTAL SURGERY

## 2021-03-26 RX ORDER — METOCLOPRAMIDE HYDROCHLORIDE 5 MG/ML
10 INJECTION INTRAMUSCULAR; INTRAVENOUS ONCE AS NEEDED
Status: CANCELLED | OUTPATIENT
Start: 2021-03-26

## 2021-03-26 RX ORDER — DIPHENHYDRAMINE HYDROCHLORIDE 50 MG/ML
12.5 INJECTION INTRAMUSCULAR; INTRAVENOUS ONCE AS NEEDED
Status: CANCELLED | OUTPATIENT
Start: 2021-03-26

## 2021-03-26 RX ORDER — PROPOFOL 10 MG/ML
INJECTION, EMULSION INTRAVENOUS AS NEEDED
Status: DISCONTINUED | OUTPATIENT
Start: 2021-03-26 | End: 2021-06-29 | Stop reason: ALTCHOICE

## 2021-03-26 RX ORDER — SODIUM CHLORIDE 9 MG/ML
INJECTION, SOLUTION INTRAVENOUS CONTINUOUS PRN
Status: DISCONTINUED | OUTPATIENT
Start: 2021-03-26 | End: 2021-06-29 | Stop reason: ALTCHOICE

## 2021-03-26 RX ORDER — SODIUM CHLORIDE, SODIUM LACTATE, POTASSIUM CHLORIDE, CALCIUM CHLORIDE 600; 310; 30; 20 MG/100ML; MG/100ML; MG/100ML; MG/100ML
125 INJECTION, SOLUTION INTRAVENOUS CONTINUOUS
Status: CANCELLED | OUTPATIENT
Start: 2021-03-26

## 2021-03-26 RX ORDER — PROPOFOL 10 MG/ML
INJECTION, EMULSION INTRAVENOUS CONTINUOUS PRN
Status: DISCONTINUED | OUTPATIENT
Start: 2021-03-26 | End: 2021-06-29 | Stop reason: ALTCHOICE

## 2021-03-26 RX ORDER — LIDOCAINE HYDROCHLORIDE 10 MG/ML
0.5 INJECTION, SOLUTION EPIDURAL; INFILTRATION; INTRACAUDAL; PERINEURAL ONCE AS NEEDED
Status: CANCELLED | OUTPATIENT
Start: 2021-03-26

## 2021-03-26 RX ORDER — ONDANSETRON 2 MG/ML
4 INJECTION INTRAMUSCULAR; INTRAVENOUS ONCE AS NEEDED
Status: CANCELLED | OUTPATIENT
Start: 2021-03-26

## 2021-03-26 RX ADMIN — PROPOFOL 70 MCG/KG/MIN: 10 INJECTION, EMULSION INTRAVENOUS at 11:18

## 2021-03-26 RX ADMIN — PROPOFOL 50 MG: 10 INJECTION, EMULSION INTRAVENOUS at 11:22

## 2021-03-26 RX ADMIN — SODIUM CHLORIDE: 0.9 INJECTION, SOLUTION INTRAVENOUS at 11:08

## 2021-03-26 RX ADMIN — PROPOFOL 100 MG: 10 INJECTION, EMULSION INTRAVENOUS at 11:18

## 2021-03-26 NOTE — ANESTHESIA PREPROCEDURE EVALUATION
Procedure:  COLONOSCOPY    Relevant Problems   ANESTHESIA (within normal limits)      CARDIO   (+) Hyperlipidemia   (+) Hypertension      ENDO (within normal limits)      GI/HEPATIC (within normal limits)      /RENAL (within normal limits)      HEMATOLOGY (within normal limits)      MUSCULOSKELETAL (within normal limits)      NEURO/PSYCH   (+) Depression with anxiety      PULMONARY   (+) COPD (chronic obstructive pulmonary disease) (HCC)      Other   (+) Irritable bowel syndrome   (+) Nicotine dependence   (+) Screen for colon cancer   (+) Severe obesity (BMI 35.0-39.9) with comorbidity (HCC)        Physical Exam    Airway    Mallampati score: II  TM Distance: >3 FB  Neck ROM: full     Dental   No notable dental hx     Cardiovascular  Rhythm: regular, Rate: normal, Cardiovascular exam normal    Pulmonary  Pulmonary exam normal Breath sounds clear to auscultation,     Other Findings        Anesthesia Plan  ASA Score- 2     Anesthesia Type- IV sedation with anesthesia with ASA Monitors.         Additional Monitors:   Airway Plan:           Plan Factors-    Chart reviewed. Existing labs reviewed. Patient summary reviewed.            Induction- intravenous.    Postoperative Plan-     Informed Consent- Anesthetic plan and risks discussed with patient.  I personally reviewed this patient with the CRNA. Discussed and agreed on the Anesthesia Plan with the CRNA..          Recent labs personally reviewed:  Lab Results   Component Value Date    WBC 11.89 (H) 02/16/2021    HGB 16.5 02/16/2021     02/16/2021     Lab Results   Component Value Date     08/12/2016    K 4.2 02/16/2021    BUN 15 02/16/2021    CREATININE 0.92 02/16/2021     Lab Results   Component Value Date    HGBA1C 6.3 (H) 08/12/2016       I, Cassius Sher MD, have personally seen and evaluated the patient prior to anesthetic care.  I have reviewed the pre-anesthetic record, and other medical records if appropriate to the anesthetic care.  If  a CRNA is involved in the case, I have reviewed the CRNA assessment, if present, and agree. Risks/benefits and alternatives discussed with patient including possible PONV, sore throat, and possibility of rare anesthetic and surgical emergencies.

## 2021-03-26 NOTE — TELEPHONE ENCOUNTER
Patient called to schedule an appointment with Dr Batsheva Chatman  Sent e-mail to [facility] and manager since he wants to be seen today or sometime next week

## 2021-03-26 NOTE — H&P
History and Physical   Colon and Rectal Surgery   Brisa Verde 63 y o  male MRN: [id number]  Unit/Bed#:  Encounter: [id number]  03/26/21   11:13 AM      No chief complaint on file  ASSESSMENT:  Brisa Verde is a 63 y o  male who presents for screening colonoscopy, due from 2014 for history of polyps  Screening colonoscopy,discussed in a face-to-face, personal, informed consent process, the benefits, alternatives, risks including not limited to bleeding, missed lesion, perforation requiring emergent surgery discussed/understood  PLAN:  Colonoscopy    History of Present Illness   HPI:  Brisa Verde is a 63 y o  male who presents for screening colonoscopy, personal history of polyps  Denies nausea/vomiting/abdominal pain, change in bowel habits, rectal bleeding, or other constitutional symptoms       Historical Information   Past Medical History:   Diagnosis Date    Anxiety     Last assessed 12/20/2013    Contusion of right wrist 1/30/2018    COPD (chronic obstructive pulmonary disease) ([facility] [address] ) 8/16/2016    Depression with anxiety     Diverticulitis 2016    Eczema 2/24/2016    Hypertension     Obesity 9/25/2012    Perianal abscess 3/5/2019     Past Surgical History:   Procedure Laterality Date    COLONOSCOPY      KNEE SURGERY         Meds/Allergies     (Not in a hospital admission)        Current Outpatient Medications:     losartan (COZAAR) 50 mg tablet, Take 1 tablet (50 mg total) by mouth daily, Disp: 30 tablet, Rfl: 5    multivitamin (THERAGRAN) TABS, Take 1 tablet by mouth daily, Disp: , Rfl:     albuterol (Ventolin HFA) 90 mcg/act inhaler, Inhale 2 puffs every 4 (four) hours as needed for wheezing (Patient not taking: Reported on 11/7/2020), Disp: 18 g, Rfl: 0    Allergies   Allergen Reactions    Lisinopril Cough         Social History   Social History     Substance and Sexual Activity   Alcohol Use Yes    Alcohol/week: 2 0 - 3 0 standard drinks    Types: 2 - 3 Cans of beer per week    Frequency: 4 or more times a week     Social History     Substance and Sexual Activity   Drug Use No     Social History     Tobacco Use   Smoking Status Current Every Day Smoker    Packs/day: 1 00   Smokeless Tobacco Never Used         Family History:   Family History   Problem Relation Age of Onset    Cancer Mother         Lung Cancer    Cancer Father     Diabetes Father     Diabetes Sister          Objective     Current Vitals:   Blood Pressure: 146/86 (03/26/21 1019)  Pulse: 100 (03/26/21 1019)  Temperature: 99 2 °F (37 3 °C) (03/26/21 1019)  Temp Source: Tympanic (03/26/21 1019)  Respirations: 16 (03/26/21 1019)  Height: 5' 11" (180 3 cm) (03/26/21 1019)  Weight - Scale: 113 kg (250 lb) (03/26/21 1019)  SpO2: 95 % (03/26/21 1019)  No intake or output data in the 24 hours ending 03/26/21 1113    Physical Exam:  General:no distress  Eyes:perrla/eomi  ENT:moist mucus membranes  Neck:supple  Pulm:no increased work of breathing  CV:sinus  Abdomen:soft,nontender

## 2021-03-26 NOTE — ANESTHESIA POSTPROCEDURE EVALUATION
Post-Op Assessment Note    CV Status:  Stable  Pain Score: 0    Pain management: adequate     Mental Status:  Alert and awake   Hydration Status:  Euvolemic   PONV Controlled:  Controlled   Airway Patency:  Patent      Post Op Vitals Reviewed: Yes      Staff: CRNA         No complications documented.    BP   105/62   Temp      Pulse  75   Resp      SpO2   95 ROOM AIR

## 2021-04-12 ENCOUNTER — RA CDI HCC (OUTPATIENT)
Dept: OTHER | Facility: HOSPITAL | Age: 56
End: 2021-04-12

## 2021-04-12 NOTE — PROGRESS NOTES
[facility]             Chart reviewed, (number of) suggestions sent to provider: 1           Patients insurance company: [employer] (Medicare Advantage and Commercial)     Visit status: Patient canceled the appointment     Provider never responded to [facility] [facility]  coding request     [facility]  coding oppertunities             Chart reviewed, (number of) suggestions sent to provider: 1                 [address] 9 COPD ([place] [id number] )     If this is correct, please document and assess at your next visit 4/19/21

## 2021-04-15 RX ORDER — LOSARTAN POTASSIUM 25 MG/1
50 TABLET ORAL DAILY
COMMUNITY
Start: 2021-01-07 | End: 2021-06-27 | Stop reason: CLARIF

## 2021-06-27 ENCOUNTER — APPOINTMENT (EMERGENCY)
Dept: RADIOLOGY | Facility: HOSPITAL | Age: 56
DRG: 309 | End: 2021-06-27
Payer: COMMERCIAL

## 2021-06-27 ENCOUNTER — OFFICE VISIT (OUTPATIENT)
Dept: URGENT CARE | Age: 56
End: 2021-06-27
Payer: COMMERCIAL

## 2021-06-27 ENCOUNTER — HOSPITAL ENCOUNTER (INPATIENT)
Facility: HOSPITAL | Age: 56
LOS: 2 days | Discharge: HOME | DRG: 309 | End: 2021-06-29
Attending: EMERGENCY MEDICINE | Admitting: INTERNAL MEDICINE
Payer: COMMERCIAL

## 2021-06-27 VITALS
HEART RATE: 161 BPM | WEIGHT: 245 LBS | RESPIRATION RATE: 16 BRPM | OXYGEN SATURATION: 91 % | TEMPERATURE: 97.2 F | BODY MASS INDEX: 34.17 KG/M2

## 2021-06-27 DIAGNOSIS — R05.9 COUGH: ICD-10-CM

## 2021-06-27 DIAGNOSIS — I47.1 NARROW COMPLEX TACHYCARDIA (HCC): ICD-10-CM

## 2021-06-27 DIAGNOSIS — J06.9 URTI (ACUTE UPPER RESPIRATORY INFECTION): ICD-10-CM

## 2021-06-27 DIAGNOSIS — I48.92 ATRIAL FLUTTER WITH RAPID VENTRICULAR RESPONSE (HCC): Primary | ICD-10-CM

## 2021-06-27 DIAGNOSIS — R06.02 SOB (SHORTNESS OF BREATH): ICD-10-CM

## 2021-06-27 DIAGNOSIS — R00.0 TACHYCARDIA: Primary | ICD-10-CM

## 2021-06-27 PROBLEM — J20.9 ACUTE BRONCHITIS: Status: ACTIVE | Noted: 2021-06-27

## 2021-06-27 PROBLEM — Z72.0 TOBACCO USE: Status: ACTIVE | Noted: 2021-06-27

## 2021-06-27 PROBLEM — Z78.9 ALCOHOL CONSUMPTION HEAVY: Status: ACTIVE | Noted: 2021-06-27

## 2021-06-27 LAB
ANION GAP SERPL CALCULATED.3IONS-SCNC: 7 MMOL/L (ref 4–13)
APTT PPP: 41 SECONDS (ref 23–37)
BASOPHILS # BLD AUTO: 0.08 THOUSANDS/ΜL (ref 0–0.1)
BASOPHILS NFR BLD AUTO: 1 % (ref 0–1)
BUN SERPL-MCNC: 13 MG/DL (ref 5–25)
CALCIUM SERPL-MCNC: 9 MG/DL (ref 8.3–10.1)
CHLORIDE SERPL-SCNC: 107 MMOL/L (ref 100–108)
CO2 SERPL-SCNC: 22 MMOL/L (ref 21–32)
CREAT SERPL-MCNC: 0.83 MG/DL (ref 0.6–1.3)
EOSINOPHIL # BLD AUTO: 0.31 THOUSAND/ΜL (ref 0–0.61)
EOSINOPHIL NFR BLD AUTO: 3 % (ref 0–6)
ERYTHROCYTE [DISTWIDTH] IN BLOOD BY AUTOMATED COUNT: 12.2 % (ref 11.6–15.1)
GFR SERPL CREATININE-BSD FRML MDRD: 98 ML/MIN/1.73SQ M
GLUCOSE SERPL-MCNC: 145 MG/DL (ref 65–140)
HCT VFR BLD AUTO: 46.1 % (ref 36.5–49.3)
HGB BLD-MCNC: 15.7 G/DL (ref 12–17)
IMM GRANULOCYTES # BLD AUTO: 0.04 THOUSAND/UL (ref 0–0.2)
IMM GRANULOCYTES NFR BLD AUTO: 0 % (ref 0–2)
LYMPHOCYTES # BLD AUTO: 2.93 THOUSANDS/ΜL (ref 0.6–4.47)
LYMPHOCYTES NFR BLD AUTO: 25 % (ref 14–44)
MAGNESIUM SERPL-MCNC: 2.4 MG/DL (ref 1.6–2.6)
MCH RBC QN AUTO: 31.7 PG (ref 26.8–34.3)
MCHC RBC AUTO-ENTMCNC: 34.1 G/DL (ref 31.4–37.4)
MCV RBC AUTO: 93 FL (ref 82–98)
MONOCYTES # BLD AUTO: 1.12 THOUSAND/ΜL (ref 0.17–1.22)
MONOCYTES NFR BLD AUTO: 9 % (ref 4–12)
NEUTROPHILS # BLD AUTO: 7.4 THOUSANDS/ΜL (ref 1.85–7.62)
NEUTS SEG NFR BLD AUTO: 62 % (ref 43–75)
NRBC BLD AUTO-RTO: 0 /100 WBCS
PLATELET # BLD AUTO: 281 THOUSANDS/UL (ref 149–390)
PMV BLD AUTO: 9.4 FL (ref 8.9–12.7)
POTASSIUM SERPL-SCNC: 4.3 MMOL/L (ref 3.5–5.3)
RBC # BLD AUTO: 4.96 MILLION/UL (ref 3.88–5.62)
SODIUM SERPL-SCNC: 136 MMOL/L (ref 136–145)
TROPONIN I SERPL-MCNC: <0.02 NG/ML
TSH SERPL DL<=0.05 MIU/L-ACNC: 0.5 UIU/ML (ref 0.36–3.74)
WBC # BLD AUTO: 11.88 THOUSAND/UL (ref 4.31–10.16)

## 2021-06-27 PROCEDURE — G0382 LEV 3 HOSP TYPE B ED VISIT: HCPCS | Performed by: PHYSICIAN ASSISTANT

## 2021-06-27 PROCEDURE — 96372 THER/PROPH/DIAG INJ SC/IM: CPT

## 2021-06-27 PROCEDURE — 93005 ELECTROCARDIOGRAM TRACING: CPT

## 2021-06-27 PROCEDURE — 85025 COMPLETE CBC W/AUTO DIFF WBC: CPT | Performed by: EMERGENCY MEDICINE

## 2021-06-27 PROCEDURE — 96374 THER/PROPH/DIAG INJ IV PUSH: CPT

## 2021-06-27 PROCEDURE — 85730 THROMBOPLASTIN TIME PARTIAL: CPT | Performed by: INTERNAL MEDICINE

## 2021-06-27 PROCEDURE — 99254 IP/OBS CNSLTJ NEW/EST MOD 60: CPT | Performed by: INTERNAL MEDICINE

## 2021-06-27 PROCEDURE — 84484 ASSAY OF TROPONIN QUANT: CPT | Performed by: EMERGENCY MEDICINE

## 2021-06-27 PROCEDURE — 96365 THER/PROPH/DIAG IV INF INIT: CPT

## 2021-06-27 PROCEDURE — 84443 ASSAY THYROID STIM HORMONE: CPT | Performed by: INTERNAL MEDICINE

## 2021-06-27 PROCEDURE — 94640 AIRWAY INHALATION TREATMENT: CPT

## 2021-06-27 PROCEDURE — 71045 X-RAY EXAM CHEST 1 VIEW: CPT

## 2021-06-27 PROCEDURE — 96375 TX/PRO/DX INJ NEW DRUG ADDON: CPT

## 2021-06-27 PROCEDURE — 36415 COLL VENOUS BLD VENIPUNCTURE: CPT | Performed by: EMERGENCY MEDICINE

## 2021-06-27 PROCEDURE — 94760 N-INVAS EAR/PLS OXIMETRY 1: CPT

## 2021-06-27 PROCEDURE — 99222 1ST HOSP IP/OBS MODERATE 55: CPT | Performed by: INTERNAL MEDICINE

## 2021-06-27 PROCEDURE — 80048 BASIC METABOLIC PNL TOTAL CA: CPT | Performed by: EMERGENCY MEDICINE

## 2021-06-27 PROCEDURE — 83735 ASSAY OF MAGNESIUM: CPT | Performed by: EMERGENCY MEDICINE

## 2021-06-27 PROCEDURE — 99285 EMERGENCY DEPT VISIT HI MDM: CPT | Performed by: EMERGENCY MEDICINE

## 2021-06-27 PROCEDURE — 99291 CRITICAL CARE FIRST HOUR: CPT

## 2021-06-27 RX ORDER — MAGNESIUM SULFATE HEPTAHYDRATE 40 MG/ML
2 INJECTION, SOLUTION INTRAVENOUS ONCE
Status: COMPLETED | OUTPATIENT
Start: 2021-06-27 | End: 2021-06-27

## 2021-06-27 RX ORDER — HEPARIN SODIUM 1000 [USP'U]/ML
4000 INJECTION, SOLUTION INTRAVENOUS; SUBCUTANEOUS ONCE
Status: COMPLETED | OUTPATIENT
Start: 2021-06-27 | End: 2021-06-27

## 2021-06-27 RX ORDER — DIGOXIN 0.25 MG/ML
500 INJECTION INTRAMUSCULAR; INTRAVENOUS EVERY 12 HOURS
Status: COMPLETED | OUTPATIENT
Start: 2021-06-27 | End: 2021-06-28

## 2021-06-27 RX ORDER — ONDANSETRON 2 MG/ML
4 INJECTION INTRAMUSCULAR; INTRAVENOUS EVERY 6 HOURS PRN
Status: DISCONTINUED | OUTPATIENT
Start: 2021-06-27 | End: 2021-06-29 | Stop reason: HOSPADM

## 2021-06-27 RX ORDER — HEPARIN SODIUM 10000 [USP'U]/100ML
3-20 INJECTION, SOLUTION INTRAVENOUS
Status: DISCONTINUED | OUTPATIENT
Start: 2021-06-27 | End: 2021-06-29 | Stop reason: ALTCHOICE

## 2021-06-27 RX ORDER — ADENOSINE 3 MG/ML
INJECTION, SOLUTION INTRAVENOUS
Status: COMPLETED
Start: 2021-06-27 | End: 2021-06-27

## 2021-06-27 RX ORDER — HEPARIN SODIUM 1000 [USP'U]/ML
2000 INJECTION, SOLUTION INTRAVENOUS; SUBCUTANEOUS
Status: DISCONTINUED | OUTPATIENT
Start: 2021-06-27 | End: 2021-06-29 | Stop reason: ALTCHOICE

## 2021-06-27 RX ORDER — ACETAMINOPHEN 325 MG/1
650 TABLET ORAL EVERY 6 HOURS PRN
Status: DISCONTINUED | OUTPATIENT
Start: 2021-06-27 | End: 2021-06-29 | Stop reason: HOSPADM

## 2021-06-27 RX ORDER — METOPROLOL TARTRATE 5 MG/5ML
5 INJECTION INTRAVENOUS ONCE
Status: COMPLETED | OUTPATIENT
Start: 2021-06-27 | End: 2021-06-27

## 2021-06-27 RX ORDER — ADENOSINE 3 MG/ML
6 INJECTION INTRAVENOUS ONCE
Status: COMPLETED | OUTPATIENT
Start: 2021-06-27 | End: 2021-06-27

## 2021-06-27 RX ORDER — DIGOXIN 0.25 MG/ML
500 INJECTION INTRAMUSCULAR; INTRAVENOUS
Status: DISCONTINUED | OUTPATIENT
Start: 2021-06-27 | End: 2021-06-27

## 2021-06-27 RX ORDER — DIGOXIN 0.25 MG/ML
500 INJECTION INTRAMUSCULAR; INTRAVENOUS EVERY 12 HOURS
Status: DISCONTINUED | OUTPATIENT
Start: 2021-06-27 | End: 2021-06-27

## 2021-06-27 RX ORDER — PREDNISONE 20 MG/1
40 TABLET ORAL DAILY
Status: COMPLETED | OUTPATIENT
Start: 2021-06-27 | End: 2021-06-28

## 2021-06-27 RX ORDER — HEPARIN SODIUM 1000 [USP'U]/ML
4000 INJECTION, SOLUTION INTRAVENOUS; SUBCUTANEOUS
Status: DISCONTINUED | OUTPATIENT
Start: 2021-06-27 | End: 2021-06-29 | Stop reason: ALTCHOICE

## 2021-06-27 RX ORDER — NICOTINE 21 MG/24HR
1 PATCH, TRANSDERMAL 24 HOURS TRANSDERMAL DAILY
Status: DISCONTINUED | OUTPATIENT
Start: 2021-06-27 | End: 2021-06-29 | Stop reason: HOSPADM

## 2021-06-27 RX ORDER — DILTIAZEM HYDROCHLORIDE 5 MG/ML
20 INJECTION INTRAVENOUS ONCE
Status: DISCONTINUED | OUTPATIENT
Start: 2021-06-27 | End: 2021-06-28

## 2021-06-27 RX ORDER — LEVALBUTEROL INHALATION SOLUTION 0.63 MG/3ML
0.63 SOLUTION RESPIRATORY (INHALATION)
Status: DISCONTINUED | OUTPATIENT
Start: 2021-06-27 | End: 2021-06-29 | Stop reason: HOSPADM

## 2021-06-27 RX ORDER — METOPROLOL TARTRATE 5 MG/5ML
5 INJECTION INTRAVENOUS EVERY 6 HOURS PRN
Status: DISCONTINUED | OUTPATIENT
Start: 2021-06-27 | End: 2021-06-29 | Stop reason: HOSPADM

## 2021-06-27 RX ORDER — DIGOXIN 0.25 MG/ML
125 INJECTION INTRAMUSCULAR; INTRAVENOUS DAILY
Status: DISCONTINUED | OUTPATIENT
Start: 2021-06-28 | End: 2021-06-27

## 2021-06-27 RX ORDER — LANOLIN ALCOHOL/MO/W.PET/CERES
3 CREAM (GRAM) TOPICAL
Status: DISCONTINUED | OUTPATIENT
Start: 2021-06-27 | End: 2021-06-29 | Stop reason: HOSPADM

## 2021-06-27 RX ORDER — ALPRAZOLAM 0.5 MG/1
0.5 TABLET ORAL
Status: COMPLETED | OUTPATIENT
Start: 2021-06-27 | End: 2021-06-27

## 2021-06-27 RX ORDER — ADENOSINE 3 MG/ML
12 INJECTION INTRAVENOUS ONCE
Status: COMPLETED | OUTPATIENT
Start: 2021-06-27 | End: 2021-06-27

## 2021-06-27 RX ORDER — DIGOXIN 0.25 MG/ML
125 INJECTION INTRAMUSCULAR; INTRAVENOUS DAILY
Status: DISCONTINUED | OUTPATIENT
Start: 2021-06-28 | End: 2021-06-28

## 2021-06-27 RX ADMIN — METOROPROLOL TARTRATE 5 MG: 5 INJECTION, SOLUTION INTRAVENOUS at 21:49

## 2021-06-27 RX ADMIN — HEPARIN SODIUM 4000 UNITS: 1000 INJECTION INTRAVENOUS; SUBCUTANEOUS at 23:15

## 2021-06-27 RX ADMIN — METOROPROLOL TARTRATE 5 MG: 5 INJECTION, SOLUTION INTRAVENOUS at 10:22

## 2021-06-27 RX ADMIN — LEVALBUTEROL HYDROCHLORIDE 0.63 MG: 0.63 SOLUTION RESPIRATORY (INHALATION) at 19:22

## 2021-06-27 RX ADMIN — METOPROLOL TARTRATE 25 MG: 25 TABLET, FILM COATED ORAL at 20:57

## 2021-06-27 RX ADMIN — ALPRAZOLAM 0.5 MG: 0.5 TABLET ORAL at 23:49

## 2021-06-27 RX ADMIN — IPRATROPIUM BROMIDE 0.5 MG: 0.5 SOLUTION RESPIRATORY (INHALATION) at 19:22

## 2021-06-27 RX ADMIN — ADENOSINE 6 MG: 3 INJECTION, SOLUTION INTRAVENOUS at 09:15

## 2021-06-27 RX ADMIN — HEPARIN SODIUM 11.1 UNITS/KG/HR: 10000 INJECTION, SOLUTION INTRAVENOUS at 14:18

## 2021-06-27 RX ADMIN — LEVALBUTEROL HYDROCHLORIDE 0.63 MG: 0.63 SOLUTION RESPIRATORY (INHALATION) at 13:53

## 2021-06-27 RX ADMIN — DILTIAZEM HYDROCHLORIDE 2.5 MG/HR: 5 INJECTION INTRAVENOUS at 13:08

## 2021-06-27 RX ADMIN — DIGOXIN 500 MCG: 0.25 INJECTION INTRAMUSCULAR; INTRAVENOUS at 19:09

## 2021-06-27 RX ADMIN — Medication 1 PATCH: at 13:46

## 2021-06-27 RX ADMIN — ADENOSINE 12 MG: 3 INJECTION, SOLUTION INTRAVENOUS at 09:20

## 2021-06-27 RX ADMIN — METOPROLOL TARTRATE 25 MG: 25 TABLET, FILM COATED ORAL at 13:45

## 2021-06-27 RX ADMIN — PREDNISONE 40 MG: 20 TABLET ORAL at 13:45

## 2021-06-27 RX ADMIN — MAGNESIUM SULFATE HEPTAHYDRATE 2 G: 40 INJECTION, SOLUTION INTRAVENOUS at 09:31

## 2021-06-27 RX ADMIN — Medication 3 MG: at 23:49

## 2021-06-27 RX ADMIN — ENOXAPARIN SODIUM 105 MG: 120 INJECTION SUBCUTANEOUS at 10:39

## 2021-06-27 RX ADMIN — METOPROLOL TARTRATE 25 MG: 25 TABLET, FILM COATED ORAL at 11:55

## 2021-06-27 RX ADMIN — ADENOSINE 6 MG: 3 INJECTION INTRAVENOUS at 09:15

## 2021-06-27 RX ADMIN — SODIUM CHLORIDE 1000 ML: 0.9 INJECTION, SOLUTION INTRAVENOUS at 10:26

## 2021-06-27 RX ADMIN — IPRATROPIUM BROMIDE 0.5 MG: 0.5 SOLUTION RESPIRATORY (INHALATION) at 13:46

## 2021-06-27 RX ADMIN — METOROPROLOL TARTRATE 5 MG: 5 INJECTION, SOLUTION INTRAVENOUS at 09:31

## 2021-06-27 RX ADMIN — HEPARIN SODIUM 4000 UNITS: 1000 INJECTION INTRAVENOUS; SUBCUTANEOUS at 14:17

## 2021-06-27 NOTE — ASSESSMENT & PLAN NOTE
New onset   Initially presenting with narrow complex tachy s/p adenosine x 2 in atrial flutter  Appreciate cardiology input  Planned for EP ablation tomorrow, NPO after midnight  Cont on cardizem gtt; d/w cardio fellow may be slow initially to respond given atypical aflutter  A/c with heparin gtt  Serial trop  Check echo, TSH  Cont telemetry monitoring

## 2021-06-27 NOTE — PROGRESS NOTES
[address] Now        NAME: Marguerite Bond is a 61 y o  male  : 1965    MRN: [id number]  DATE: 2021  TIME: 8:37 AM    Assessment and Plan   Tachycardia [R00 0]  1  Tachycardia  Transfer to other facility   2  SOB (shortness of breath)  Transfer to other facility         Patient Instructions       Patient is O2 sat was ranging between 91 to 95 in the clinic today  His heart rate was staying around 165 even at rest   Patient had some mild labor breathing and wheezes throughout his lungs  Due to his tachycardia I recommend he go to the emergency room for further evaluation  Patient was offered transport but declined  He would like to go by private vehicle  He will go to [facility]  If anything changes or worsens on his way he will pull over and called 911  Follow up with PCP in 3-5 days  Proceed to  ER if symptoms worsen  Chief Complaint     Chief Complaint   Patient presents with    Cold Like Symptoms     ptpresents with cough, chest congestion,          History of Present Illness         Patient presents with cold-like symptoms and shortness of breath  He does have COPD  He is out of his albuterol inhaler and has not been using it  He is fully vaccinated for COVID  He does feel like his heart is been racing the past day  He denies any chest pain or lightheadedness or dizziness  Review of Systems   Review of Systems   Constitutional: Negative  HENT: Negative  Respiratory: Positive for shortness of breath and wheezing  Cardiovascular: Negative  Gastrointestinal: Negative  Musculoskeletal: Negative  Neurological: Negative  Psychiatric/Behavioral: Negative            Current Medications       Current Outpatient Medications:     losartan (COZAAR) 50 mg tablet, Take 1 tablet (50 mg total) by mouth daily, Disp: 30 tablet, Rfl: 5    multivitamin (THERAGRAN) TABS, Take 1 tablet by mouth daily, Disp: , Rfl:     albuterol (Ventolin HFA) 90 mcg/act inhaler, Inhale 2 puffs every 4 (four) hours as needed for wheezing (Patient not taking: Reported on 11/7/2020), Disp: 18 g, Rfl: 0    losartan (COZAAR) 25 mg tablet, Take 25 mg by mouth daily (Patient not taking: Reported on 6/27/2021), Disp: , Rfl:   No current facility-administered medications for this visit  Facility-Administered Medications Ordered in Other Visits:     propofol (DIPRIVAN) 1000 mg in 100 mL infusion (premix), , Intravenous, Continuous PRN, Sendy Sheehans CRNA, Stopped at 03/26/21 1137    propofol (DIPRIVAN) 200 MG/20ML bolus injection, , Intravenous, PRN, Sendy Sheehans, CRNA, 50 mg at 03/26/21 1122    sodium chloride 0 9 % infusion, , , Continuous PRN, Nan Stalls, CRNA, New Bag at 03/26/21 1108    Current Allergies     Allergies as of 06/27/2021 - Reviewed 03/26/2021   Allergen Reaction Noted    Lisinopril Cough 12/07/2020            The following portions of the patient's history were reviewed and updated as appropriate: allergies, current medications, past family history, past medical history, past social history, past surgical history and problem list      Past Medical History:   Diagnosis Date    Anxiety     Last assessed 12/20/2013    Contusion of right wrist 1/30/2018    COPD (chronic obstructive pulmonary disease) ([facility] [address] ) 8/16/2016    Depression with anxiety     Diverticulitis 2016    Eczema 2/24/2016    Hypertension     Obesity 9/25/2012    Perianal abscess 3/5/2019       Past Surgical History:   Procedure Laterality Date    COLONOSCOPY      KNEE SURGERY         Family History   Problem Relation Age of Onset    Cancer Mother         Lung Cancer    Cancer Father     Diabetes Father     Diabetes Sister          Medications have been verified  Objective   Pulse (!) 161   Temp (!) 97 2 °F (36 2 °C)   Resp 16   Wt 111 kg (245 lb)   SpO2 91%   BMI 34 17 kg/m²        Physical Exam     Physical Exam  Vitals and nursing note reviewed     Constitutional: General: He is in acute distress  Appearance: Normal appearance  He is not ill-appearing, toxic-appearing or diaphoretic  Comments: Slight labored breathing   HENT:      Head: Normocephalic and atraumatic  Cardiovascular:      Rate and Rhythm: Tachycardia present  Pulses: Normal pulses  Heart sounds: Normal heart sounds  Pulmonary:      Breath sounds: Wheezing present  Comments:   Slight labored breathing with wheezes throughout  Skin:     General: Skin is warm and dry  Capillary Refill: Capillary refill takes less than 2 seconds  Neurological:      General: No focal deficit present  Mental Status: He is alert and oriented to person, place, and time     Psychiatric:         Mood and Affect: Mood normal          Behavior: Behavior normal

## 2021-06-27 NOTE — ASSESSMENT & PLAN NOTE
Mild exacerbation  Pulm toilet with xopenex (instead of albuterol given aflutter w/ rvr)/ipratropium  Steroid taper  Educated on smoking cessation

## 2021-06-27 NOTE — ED PROVIDER NOTES
History  Chief Complaint   Patient presents with    Rapid Heart Rate     Pt seen at [place] [address] this AM for a cough and night sweats that had been going on for a couple of weeks  Staff noticed pt's HR around 165 and told him to be come for further evaluation  Pt denies CP     Cough     24-year-old male past history of smoking approximately 1 pack today with URI symptoms for the past several days presenting with concern for SVT versus a flutter  Patient presented to outpatient office today and was found to have a heart rate in the 160s  Concern for sinus dysrhythmia  Stable with ambulation and normal blood pressure  Sent to ED for further evaluation  Patient reports feeling some occasional fluttering in his chest but unsure when it began  No prior history of known dysrhythmia  Also complaining of some mild shortness of breath and cough productive of clear sputum  Also complaining of some myalgias  Reports receiving both COVID vaccinations  Denies any known sick contacts  Denies any other complaints at this time  Denies any headache, vision changes, chest pain, abdominal pain, nausea/vomiting, fever/chills, any bladder or bowel changes  Prior to Admission Medications   Prescriptions Last Dose Informant Patient Reported? Taking?    albuterol (Ventolin HFA) 90 mcg/act inhaler  Self No No   Sig: Inhale 2 puffs every 4 (four) hours as needed for wheezing   Patient not taking: Reported on 11/7/2020   losartan (COZAAR) 50 mg tablet 6/27/2021 at Unknown time  No Yes   Sig: Take 1 tablet (50 mg total) by mouth daily   multivitamin (THERAGRAN) TABS 6/27/2021 at Unknown time Self Yes Yes   Sig: Take 1 tablet by mouth daily      Facility-Administered Medications: None       Past Medical History:   Diagnosis Date    Anxiety     Last assessed 12/20/2013    Contusion of right wrist 1/30/2018    COPD (chronic obstructive pulmonary disease) ([facility] [address] ) 8/16/2016    Depression with anxiety     Diverticulitis 2016    Eczema 2/24/2016    Hypertension     Obesity 9/25/2012    Perianal abscess 3/5/2019       Past Surgical History:   Procedure Laterality Date    COLONOSCOPY      KNEE SURGERY         Family History   Problem Relation Age of Onset    Cancer Mother         Lung Cancer    Cancer Father     Diabetes Father     Diabetes Sister      I have reviewed and agree with the history as documented  E-Cigarette/Vaping    E-Cigarette Use Never User      E-Cigarette/Vaping Substances    Nicotine No     THC No     CBD No     Flavoring No     Other No     Unknown No      Social History     Tobacco Use    Smoking status: Current Every Day Smoker     Packs/day: 1 00    Smokeless tobacco: Never Used   Vaping Use    Vaping Use: Never used   Substance Use Topics    Alcohol use: Yes     Alcohol/week: 2 0 - 3 0 standard drinks     Types: 2 - 3 Cans of beer per week    Drug use: No        Review of Systems   Constitutional: Negative for appetite change, chills, diaphoresis, fever and unexpected weight change  HENT: Negative for congestion and rhinorrhea  Eyes: Negative for photophobia and visual disturbance  Respiratory: Positive for cough and shortness of breath  Negative for chest tightness  Cardiovascular: Positive for palpitations  Negative for chest pain and leg swelling  Gastrointestinal: Negative for abdominal distention, abdominal pain, blood in stool, constipation, diarrhea, nausea and vomiting  Genitourinary: Negative for dysuria and hematuria  Musculoskeletal: Positive for myalgias  Negative for back pain, joint swelling, neck pain and neck stiffness  Skin: Negative for color change, pallor, rash and wound  Neurological: Negative for dizziness, syncope, weakness, light-headedness and headaches  Psychiatric/Behavioral: Negative for agitation  All other systems reviewed and are negative        Physical Exam  ED Triage Vitals   Temperature Pulse Respirations Blood Pressure SpO2 06/27/21 0950 06/27/21 0901 06/27/21 0901 06/27/21 0915 06/27/21 0947   97 8 °F (36 6 °C) (!) 160 18 160/90 95 %      Temp Source Heart Rate Source Patient Position - Orthostatic VS BP Location FiO2 (%)   06/27/21 0950 06/27/21 0901 06/27/21 0901 06/27/21 0901 --   Oral Monitor Lying Right arm       Pain Score       --                    Orthostatic Vital Signs  Vitals:    06/27/21 1457 06/27/21 1530 06/27/21 1545 06/27/21 1600   BP: 109/55 109/68 110/76 119/68   Pulse: (!) 120 (!) 142 93 (!) 127   Patient Position - Orthostatic VS:  Lying Lying Lying       Physical Exam  Vitals and nursing note reviewed  Constitutional:       General: He is not in acute distress  Appearance: Normal appearance  He is well-developed  He is not ill-appearing, toxic-appearing or diaphoretic  HENT:      Head: Normocephalic and atraumatic  Nose: Nose normal  No congestion or rhinorrhea  Mouth/Throat:      Mouth: Mucous membranes are moist       Pharynx: Oropharynx is clear  No oropharyngeal exudate or posterior oropharyngeal erythema  Eyes:      General: No scleral icterus  Right eye: No discharge  Left eye: No discharge  Extraocular Movements: Extraocular movements intact  Conjunctiva/sclera: Conjunctivae normal       Pupils: Pupils are equal, round, and reactive to light  Neck:      Vascular: No JVD  Trachea: No tracheal deviation  Cardiovascular:      Rate and Rhythm: Regular rhythm  Tachycardia present  Heart sounds: Normal heart sounds  No murmur heard  No friction rub  No gallop  Comments: Tachy to 160s in SVT verses other sinus arrhythmia  Pulmonary:      Effort: Pulmonary effort is normal  No respiratory distress  Breath sounds: No stridor  Wheezing present  No rales  Comments: Diffuse inspiratory-expiratory wheezing  No rales or rhonchi  Chest:      Chest wall: No tenderness  Abdominal:      General: Bowel sounds are normal  There is no distension  Palpations: Abdomen is soft  Tenderness: There is no abdominal tenderness  There is no right CVA tenderness, left CVA tenderness, guarding or rebound  Comments: Soft, nontender, nondistended  Normal bowel sounds throughout   Musculoskeletal:         General: No swelling, tenderness, deformity or signs of injury  Normal range of motion  Cervical back: Normal range of motion and neck supple  No rigidity  No muscular tenderness  Right lower leg: No edema  Left lower leg: No edema  Lymphadenopathy:      Cervical: No cervical adenopathy  Skin:     General: Skin is warm and dry  Coloration: Skin is not pale  Findings: No erythema or rash  Neurological:      General: No focal deficit present  Mental Status: He is alert and oriented to person, place, and time  Mental status is at baseline  Cranial Nerves: No cranial nerve deficit  Sensory: No sensory deficit  Motor: No weakness or abnormal muscle tone  Coordination: Coordination normal       Gait: Gait normal       Comments: A&Ox3 to person, place, and time  CN 2-12 intact  Strength 5/5 throughout  Sensation grossly intact  Cerebellar exam including gait intact  Psychiatric:         Behavior: Behavior normal          Thought Content:  Thought content normal          ED Medications  Medications   diltiazem (CARDIZEM) 125 mg in sodium chloride 0 9 % 125 mL infusion (0 mg/hr Intravenous Not Given 6/27/21 1309)   metoprolol (LOPRESSOR) injection 5 mg (has no administration in time range)   diltiazem (CARDIZEM) 125 mg in sodium chloride 0 9 % 125 mL infusion (12 5 mg/hr Intravenous Rate/Dose Change 6/27/21 1440)   acetaminophen (TYLENOL) tablet 650 mg (has no administration in time range)   ondansetron (ZOFRAN) injection 4 mg (has no administration in time range)   nicotine (NICODERM CQ) 14 mg/24hr TD 24 hr patch 1 patch (1 patch Transdermal Medication Applied 6/27/21 1346)   levalbuterol (Nika Garden) inhalation solution 0 63 mg (0 63 mg Nebulization Given 6/27/21 1353)   ipratropium (ATROVENT) 0 02 % inhalation solution 0 5 mg (0 5 mg Nebulization Given 6/27/21 1346)   predniSONE tablet 40 mg (40 mg Oral Given 6/27/21 1345)   predniSONE tablet 30 mg (has no administration in time range)   heparin (porcine) 25,000 units in 0 45% NaCl 250 mL infusion (premix) (11 1 Units/kg/hr × 90 kg (Order-Specific) Intravenous New Bag 6/27/21 1418)   heparin (porcine) injection 4,000 Units (4,000 Units Intravenous Not Given 6/27/21 1430)   heparin (porcine) injection 2,000 Units (has no administration in time range)   metoprolol tartrate (LOPRESSOR) tablet 25 mg (has no administration in time range)   diltiazem (CARDIZEM) injection 20 mg (0 mg Intravenous Hold 6/27/21 1550)   digoxin (LANOXIN) injection 125 mcg (has no administration in time range)   digoxin (LANOXIN) injection 500 mcg (0 mcg Intravenous Hold 6/27/21 1551)   adenosine (ADENOCARD) injection 6 mg (6 mg Intravenous Given 6/27/21 0915)   adenosine (ADENOCARD) injection 12 mg (12 mg Intravenous Given 6/27/21 0920)   enoxaparin (LOVENOX) subcutaneous injection 105 mg (105 mg Subcutaneous Given 6/27/21 1039)   metoprolol (LOPRESSOR) injection 5 mg (5 mg Intravenous Given 6/27/21 0931)   magnesium sulfate 2 g/50 mL IVPB (premix) 2 g (0 g Intravenous Stopped 6/27/21 0950)   metoprolol (LOPRESSOR) injection 5 mg (5 mg Intravenous Given 6/27/21 1022)   sodium chloride 0 9 % bolus 1,000 mL (0 mL Intravenous Stopped 6/27/21 1026)   metoprolol tartrate (LOPRESSOR) tablet 25 mg (25 mg Oral Given 6/27/21 1155)   heparin (porcine) injection 4,000 Units (4,000 Units Intravenous Given 6/27/21 1417)       Diagnostic Studies  Results Reviewed     Procedure Component Value Units Date/Time    TSH, 3rd generation with Free T4 reflex [[id number]]  (Normal) Collected: 06/27/21 0931    Lab Status: Final result Specimen: Blood from Arm, Right Updated: 06/27/21 1549     TSH 3RD Clarita Card 0 496 uIU/mL     Narrative:      Patients undergoing fluorescein dye angiography may retain small amounts of fluorescein in the body for 48-72 hours post procedure  Samples containing fluorescein can produce falsely depressed TSH values  If the patient had this procedure,a specimen should be resubmitted post fluorescein clearance        APTT six (6) hours after Heparin bolus/drip initiation or dosing change [[id number]]     Lab Status: No result Specimen: Blood     Basic metabolic panel [[id number]]  (Abnormal) Collected: 06/27/21 0931    Lab Status: Final result Specimen: Blood from Arm, Right Updated: 06/27/21 1142     Sodium 136 mmol/L      Potassium 4 3 mmol/L      Chloride 107 mmol/L      CO2 22 mmol/L      ANION GAP 7 mmol/L      BUN 13 mg/dL      Creatinine 0 83 mg/dL      Glucose 145 mg/dL      Calcium 9 0 mg/dL      eGFR 98 ml/min/1 73sq m     Narrative:      [place] guidelines for Chronic Kidney Disease (CKD):     Stage 1 with normal or high GFR (GFR > 90 mL/min/1 73 square meters)    Stage 2 Mild CKD (GFR = 60-89 mL/min/1 73 square meters)    Stage 3A Moderate CKD (GFR = 45-59 mL/min/1 73 square meters)    Stage 3B Moderate CKD (GFR = 30-44 mL/min/1 73 square meters)    Stage 4 Severe CKD (GFR = 15-29 mL/min/1 73 square meters)    Stage 5 End Stage CKD (GFR <15 mL/min/1 73 square meters)  Note: GFR calculation is accurate only with a steady state creatinine    Magnesium [[id number]]  (Normal) Collected: 06/27/21 0931    Lab Status: Final result Specimen: Blood from Arm, Right Updated: 06/27/21 1142     Magnesium 2 4 mg/dL     Troponin I [[id number]] Collected: 06/27/21 0931    Lab Status: Final result Specimen: Blood from Arm, Right Updated: 06/27/21 1003     Troponin I <0 02 ng/mL     CBC and differential [[id number]]  (Abnormal) Collected: 06/27/21 0931    Lab Status: Final result Specimen: Blood from Arm, Right Updated: 06/27/21 0947     WBC 11 88 Thousand/uL      RBC 4 96 Million/uL      Hemoglobin 15 7 g/dL      Hematocrit 46 1 %      MCV 93 fL      MCH 31 7 pg      MCHC 34 1 g/dL      RDW 12 2 %      MPV 9 4 fL      Platelets 940 Thousands/uL      nRBC 0 /100 WBCs      Neutrophils Relative 62 %      Immat GRANS % 0 %      Lymphocytes Relative 25 %      Monocytes Relative 9 %      Eosinophils Relative 3 %      Basophils Relative 1 %      Neutrophils Absolute 7 40 Thousands/µL      Immature Grans Absolute 0 04 Thousand/uL      Lymphocytes Absolute 2 93 Thousands/µL      Monocytes Absolute 1 12 Thousand/µL      Eosinophils Absolute 0 31 Thousand/µL      Basophils Absolute 0 08 Thousands/µL                  XR chest portable   Final Result by Dyllan Michelle MD (06/27 1413)      No acute cardiopulmonary disease  Workstation performed: [id number]               Procedures  ECG 12 Lead Documentation Only    Date/Time: 6/27/2021 9:05 AM  Performed by: Kayla Sharma MD  Authorized by: Kayla Sharma MD     Indications / Diagnosis:  Tachyarrhythmia  ECG reviewed by me, the ED Provider: yes    Patient location:  ED  Previous ECG:     Previous ECG:  Compared to current    Comparison ECG info:   Now with tachyarrhythmia    Similarity:  Changes noted    Comparison to cardiac monitor: Yes    Interpretation:     Interpretation: abnormal    Rate:     ECG rate:  163    ECG rate assessment: tachycardic    Rhythm:     Rhythm: atrial flutter    Ectopy:     Ectopy: none    QRS:     QRS axis:  Normal    QRS intervals:  Normal  Conduction:     Conduction: normal    ST segments:     ST segments:  Non-specific  T waves:     T waves: non-specific            ED Course                                       MDM  Number of Diagnoses or Management Options  Atrial flutter with rapid ventricular response (HCC)  Cough  Narrow complex tachycardia (HCC)  SOB (shortness of breath)  URTI (acute upper respiratory infection)  Diagnosis management comments: 79-year-old male past history of smoking approximately 1 pack today with URI symptoms for the past several days presenting with concern for SVT versus a flutter  Unclear when with sinus arrhythmia started as URI symptoms have been ongoing for several days  Not on anticoagulation  Attached to cardiac monitor and code heart  Normal blood pressure normal mentation  Currently stable sinus arrhythmia  Attempted vagal maneuver without improvement  No affect with 6 mg adenosine  12 mg adenosine briefly slow down the heart rate to reveal likely a flutter  Plan to manage heart rate with IV beta blocker with IV magnesium  Will anticoagulate  Will consider urgent cardioversion if patient becomes unstable  Frequent reassessment  Chest x-ray no acute process  Rate improved to 150s after multiple rounds of IV beta blockers  Blood pressure systolics 795U  Given the unknown time of onset with concern for possible atrial thrombus if prolonged tachy dysrhythmia and possible need for additional medications, plan for Cardiology consult  Cardiology recommended oral metoprolol and IV diltiazem infusion  Admitted step-down 2          Amount and/or Complexity of Data Reviewed  Clinical lab tests: reviewed and ordered  Tests in the radiology section of CPT®: ordered and reviewed  Tests in the medicine section of CPT®: reviewed and ordered  Review and summarize past medical records: yes  Discuss the patient with other providers: yes  Independent visualization of images, tracings, or specimens: yes    Risk of Complications, Morbidity, and/or Mortality  Presenting problems: high  Diagnostic procedures: high  Management options: high    Patient Progress  Patient progress: improved      Disposition  Final diagnoses:   Narrow complex tachycardia (HCC)   Atrial flutter with rapid ventricular response (HCC)   URTI (acute upper respiratory infection)   Cough   SOB (shortness of breath)     Time reflects when diagnosis was documented in both MDM as applicable and the Disposition within this note     Time User Action Codes Description Comment    6/27/2021 10:11 AM Anise Brittany Add [I47 1] Supraventricular tachycardia ([address] )     6/27/2021 10:12 AM Dipti Longs, Neymar Remove [I47 1] Supraventricular tachycardia ([address] )     6/27/2021 10:12 AM Dipti Longs, Neymar Add [I47 1] Narrow complex tachycardia ([address] )     6/27/2021 10:12 AM Anise Brittany Add [I48 92] Atrial flutter with rapid ventricular response ([address] )     6/27/2021 10:12 AM Dipti Longs, Neymar Modify [I47 1] Narrow complex tachycardia ([address] )     6/27/2021 10:12 AM Anise Brittany Modify [I48 92] Atrial flutter with rapid ventricular response ([address] )     6/27/2021 10:12 AM Dipti Longs, Neymar Add [J06 9] URTI (acute upper respiratory infection)     6/27/2021 12:49 PM Fernanda [place] Add [R05] Cough     6/27/2021 12:50 PM Fernanda [place] Add [R06 02] SOB (shortness of breath)       ED Disposition     ED Disposition Condition Date/Time Comment    Admit Stable Sun Jun 27, 2021 12:49 PM Case was discussed with [facility] and the patient's admission status was agreed to be Admission Status: inpatient status to the service of Dr Tonja Gross   Follow-up Information    None         Patient's Medications   Discharge Prescriptions    No medications on file     No discharge procedures on file  PDMP Review     None           ED Provider  Attending physically available and evaluated Kelly Mcconnell I managed the patient along with the ED Attending      Electronically Signed by         Lia Hartmann MD  06/27/21 0662

## 2021-06-27 NOTE — RESPIRATORY THERAPY NOTE
RT Protocol Note  Bijan Valenzuela 61 y o  male MRN: [id number]  Unit/Bed#: [facility] 528-01 Encounter: [id number]    Assessment    Principal Problem:    Atrial flutter with rapid ventricular response ([place] [address] )  Active Problems:    COPD (chronic obstructive pulmonary disease) ([place] [address] )    Essential hypertension    Morbid obesity ([place] [address] )    Tobacco use    Alcohol consumption heavy      Home Pulmonary Medications:  2 puffs albuterol MDI prn  Past Medical History:   Diagnosis Date    Anxiety     Last assessed 12/20/2013    Contusion of right wrist 1/30/2018    COPD (chronic obstructive pulmonary disease) ([place] [address] ) 8/16/2016    Depression with anxiety     Diverticulitis 2016    Eczema 2/24/2016    Hypertension     Obesity 9/25/2012    Perianal abscess 3/5/2019     Social History     Socioeconomic History    Marital status: /Civil Union     Spouse name: None    Number of children: None    Years of education: None    Highest education level: None   Occupational History    None   Tobacco Use    Smoking status: Current Every Day Smoker     Packs/day: 1 00    Smokeless tobacco: Never Used   Vaping Use    Vaping Use: Never used   Substance and Sexual Activity    Alcohol use: Yes     Alcohol/week: 2 0 - 3 0 standard drinks     Types: 2 - 3 Cans of beer per week    Drug use: No    Sexual activity: None   Other Topics Concern    None   Social History Narrative    None     Social Determinants of Health     Financial Resource Strain:     Difficulty of Paying Living Expenses:    Food Insecurity:     Worried About Running Out of Food in the Last Year:     Ran Out of Food in the Last Year:    Transportation Needs:     Lack of Transportation (Medical):      Lack of Transportation (Non-Medical):    Physical Activity:     Days of Exercise per Week:     Minutes of Exercise per Session:    Stress:     Feeling of Stress :    Social Connections:     Frequency of Communication with Friends and Family:     Frequency of Social Gatherings with Friends and Family:     Attends Hindu Services:     Active Member of Clubs or Organizations:     Attends Club or Organization Meetings:     Marital Status:    Intimate Partner Violence:     Fear of Current or Ex-Partner:     Emotionally Abused:     Physically Abused:     Sexually Abused:        Subjective         Objective    Physical Exam:   Assessment Type: During-treatment  General Appearance: Alert, Awake  Respiratory Pattern: Normal  Chest Assessment: Chest expansion symmetrical  Bilateral Breath Sounds: Inspiratory wheezes, Expiratory wheezes    Vitals:  Blood pressure 115/77, pulse 89, temperature 97 9 °F (36 6 °C), temperature source Oral, resp  rate 18, height 5' 11" (1 803 m), weight 114 kg (251 lb 5 2 oz), SpO2 95 %  Imaging and other studies: I have personally reviewed pertinent reports  Plan    Respiratory Plan: Mild Distress pathway        Resp Comments: (P) Pt admitted today with SVT vs flutter  Pt started on UDN therapy in ED for I/E wheezing  Pt has a hx of COPD and is a current smoker  He has an albuterol MDI listed on his MR, however he states he has not needed to use one in years  Pt will cont  on 0 63mg xopenex due to is HR  Will change to Q6 due to cont  wheezing

## 2021-06-27 NOTE — H&P
[address] [facility]  H&P- Leida Zimmerman 1965, 61 y o  male MRN: [id number]  Unit/Bed#: ED 18 Encounter: [id number]  Primary Care Provider: Noemi Tijerina MD   Date and time admitted to hospital: 6/27/2021  8:58 AM    * Atrial flutter with rapid ventricular response [facility])  Assessment & Plan  New onset  Initially presenting with narrow complex tachy s/p adenosine x 2 in atrial flutter  Appreciate cardiology input  Planned for EP ablation tomorrow, NPO after midnight  Cont on cardizem gtt; d/w cardio fellow may be slow initially to respond given atypical aflutter  A/c with heparin gtt  Serial trop  Check echo, TSH  Cont telemetry monitoring        COPD (chronic obstructive pulmonary disease) ([facility] )  Assessment & Plan  Mild exacerbation  Pulm toilet with xopenex (instead of albuterol given aflutter w/ rvr)/ipratropium  Steroid taper  Educated on smoking cessation    Tobacco use  Assessment & Plan  Smoking cessation  Nicotine patch    Morbid obesity ([facility] )  Assessment & Plan  Lifestyle/dietary modification    Essential hypertension  Assessment & Plan  Stable, hold losartan while on ccb gtt    VTE Prophylaxis: Enoxaparin (Lovenox)  / sequential compression device   Code Status: Full code  POLST: There is no POLST form on file for this patient (pre-hospital)  Discussion with family: Plan of care discussed with patient     Anticipated Length of Stay:  Patient will be admitted on an Inpatient basis with an anticipated length of stay of  > 2 midnights  Justification for Hospital Stay: Dominique Rangel with rvr    Total Time for Visit, including Counseling / Coordination of Care: 60 minutes  Greater than 50% of this total time spent on direct patient counseling and coordination of care  Chief Complaint:   tachycardia    History of Present Illness:    Leida Zimmerman is a 61 y o  male medical hx of significant for copd, htn was advised to present here from urgent care center due to tachycardia    Pt reports of a few days hx of mild URI including cough, rhinorrhea, he denies fever, chills, sob  He notes that he felt at time diaphoretic  He denies any covid exposure  Upon presentation to the urgent care center was found to be tachycardia HR in the 150's  He was advised then to present here  Upon presentation to the ER, was found w/ narrow complex tachycardia, was given multiple doses of adenosine subsequently thereafter revealing aflutter  Cardiology has been consulted  Pt is now on cardizem gtt  He denies cp, dyspnea, palpitation, dizziness  Review of Systems:    Review of Systems   Constitutional: Negative  HENT: Positive for rhinorrhea  Eyes: Negative  Respiratory: Positive for cough  Cardiovascular: Negative  Gastrointestinal: Negative  Endocrine: Negative  Genitourinary: Negative  Musculoskeletal: Negative  Allergic/Immunologic: Negative  Neurological: Negative  Hematological: Negative  Psychiatric/Behavioral: Negative  Past Medical and Surgical History:     Past Medical History:   Diagnosis Date    Anxiety     Last assessed 12/20/2013    Contusion of right wrist 1/30/2018    [facility] ) 8/16/2016    Depression with anxiety     Diverticulitis 2016    Eczema 2/24/2016    Hypertension     Obesity 9/25/2012    Perianal abscess 3/5/2019       Past Surgical History:   Procedure Laterality Date    COLONOSCOPY      KNEE SURGERY         Meds/Allergies:    Prior to Admission medications    Medication Sig Start Date End Date Taking?  Authorizing Provider   losartan (COZAAR) 50 mg tablet Take 1 tablet (50 mg total) by mouth daily 12/7/20  Yes Rickey Home, MD   multivitamin [facility]) TABS Take 1 tablet by mouth daily   Yes Historical Provider, MD   albuterol (Ventolin HFA) 90 mcg/act inhaler Inhale 2 puffs every 4 (four) hours as needed for wheezing  Patient not taking: Reported on 11/7/2020 3/25/20   Piero Graham PA-C losartan (COZAAR) 25 mg tablet Take 50 mg by mouth daily  1/7/21 6/27/21  Historical Provider, MD     I have reviewed home medications with patient personally  Allergies: Allergies   Allergen Reactions    Lisinopril Cough       Social History:     Marital Status: /Civil Union   Occupation: [employer]  Patient Pre-hospital Living Situation: Resides at home  Patient Pre-hospital Level of Mobility: Independent  Patient Pre-hospital Diet Restrictions: none  Substance Use History:   Social History     Substance and Sexual Activity   Alcohol Use Yes    Alcohol/week: 2 0 - 3 0 standard drinks    Types: 2 - 3 Cans of beer per week     Social History     Tobacco Use   Smoking Status Current Every Day Smoker    Packs/day: 1 00   Smokeless Tobacco Never Used     Social History     Substance and Sexual Activity   Drug Use No       Family History:    Family History   Problem Relation Age of Onset    Cancer Mother         Lung Cancer    Cancer Father     Diabetes Father     Diabetes Sister        Physical Exam:     Vitals:   Blood Pressure: 115/74 (06/27/21 1330)  Pulse: (!) 156 (06/27/21 1330)  Temperature: 97 8 °F (36 6 °C) (06/27/21 0950)  Temp Source: Oral (06/27/21 0950)  Respirations: 18 (06/27/21 1018)  Weight - Scale: 111 kg (245 lb) (06/27/21 0901)  SpO2: 95 % (06/27/21 1330)    Physical Exam  Constitutional:       Appearance: He is obese  Cardiovascular:      Rate and Rhythm: Tachycardia present  Rhythm irregular  Pulses: Normal pulses  Heart sounds: Normal heart sounds  No murmur heard  Pulmonary:      Effort: Pulmonary effort is normal  No respiratory distress  Breath sounds: Wheezing present  No rales  Comments: Moderate air entry  Exp wheezing b/l  Abdominal:      General: Abdomen is flat  Bowel sounds are normal  There is no distension  Palpations: Abdomen is soft  Tenderness: There is no abdominal tenderness  There is no guarding     Musculoskeletal: General: Normal range of motion  Cervical back: Normal range of motion and neck supple  Right lower leg: No edema  Left lower leg: No edema  Skin:     General: Skin is warm and dry  Neurological:      General: No focal deficit present  Mental Status: He is alert and oriented to person, place, and time  Mental status is at baseline  Cranial Nerves: No cranial nerve deficit  Motor: No weakness  Additional Data:     Lab Results: I have personally reviewed pertinent reports  Results from last 7 days   Lab Units 06/27/21  0931   WBC Thousand/uL 11 88*   HEMOGLOBIN g/dL 15 7   HEMATOCRIT % 46 1   PLATELETS Thousands/uL 281   NEUTROS PCT % 62   LYMPHS PCT % 25   MONOS PCT % 9   EOS PCT % 3     Results from last 7 days   Lab Units 06/27/21  0931   SODIUM mmol/L 136   POTASSIUM mmol/L 4 3   CHLORIDE mmol/L 107   CO2 mmol/L 22   BUN mg/dL 13   CREATININE mg/dL 0 83   ANION GAP mmol/L 7   CALCIUM mg/dL 9 0   GLUCOSE RANDOM mg/dL 145*                       Imaging: official results pending    XR chest portable    (Results Pending)       EKG, Pathology, and Other Studies Reviewed on Admission:   · EKG: Aflutter @ 150bpm    Allscripts / Epic Records Reviewed: Yes     ** Please Note: This note has been constructed using a voice recognition system   **

## 2021-06-27 NOTE — CONSULTS
Consultation - Cardiology  Sully Sims 61 y o  male MRN: [id number]  Unit/Bed#: ED 18 Encounter: [id number]  Inpatient consult to Cardiology  Consult performed by: Maru Lopez DO  Consult ordered by: Jina Hernandez MD        History of Present Illness   Physician Requesting Consult: Dre Hernandez MD  Reason for Consult / Principal Problem:  Atrial flutter    Assessment/Plan   Typical Atrial flutter with RVR, new onset  -patient is asymptomatic  -rate control:  Status post metoprolol tartrate 5 mg IV x2  -will start on metoprolol tartrate 25 mg b i d     Will start on diltiazem drip with plan to up titrate p o  Medication and come off diltiazem drip  -rhythm control: Will keep NPO after midnight for EP evaluation for ablation  -anticoagulation:  Will start on heparin drip while awaiting the evaluation for ablation  Will plan to transition to [address]  -await electrolyte derangements:  Keep potassium>4, magnesium>2  -Monitor on telemetry    Primary hypertension  -will hold home losartan while in atrial flutter with RVR  Will restart as heart rate and blood pressure improved  HPI: Sully Sims is a 61y o  year old male with past medical history of essential hypertension presented from PCP office where he went for URI like symptoms and was found to have tachycardia with heart rate in 150s  While in [place] ER he was found to be in tachycardia which resembled SVR and he was given adenosine 12 and 6 mg with slowing down of heart rate and manifestation of atrial flutter  He was also was given magnesium 2 mg, Toprol tartrate 5 mg IV x2 and normal saline about 1 L with persistent atrial flutter with RVR  He denies chest pain, dyspnea, orthopnea, paroxysmal nocturnal dyspnea  Admits to smoking      EKG:  Narrow complex tachycardia with heart rate in 140s    TELE:  Narrow complex tachycardia with heart rate in 140-150s    Review of Systems  ROS as noted above, otherwise 12 point review of systems was performed and is negative  Historical Information   Past Medical History:   Diagnosis Date    Anxiety     Last assessed 12/20/2013    Contusion of right wrist 1/30/2018    COPD (chronic obstructive [facility] disease) ([facility] ) 8/16/2016    Depression with anxiety     Diverticulitis 2016    Eczema 2/24/2016    Hypertension     Obesity 9/25/2012    Perianal abscess 3/5/2019     Past Surgical History:   Procedure Laterality Date    COLONOSCOPY      KNEE SURGERY       Social History     Substance and Sexual Activity   Alcohol Use Yes    Alcohol/week: 2 0 - 3 0 standard drinks    Types: 2 - 3 Cans of beer per week     Social History     Substance and Sexual Activity   Drug Use No     Social History     Tobacco Use   Smoking Status Current Every Day Smoker    Packs/day: 1 00   Smokeless Tobacco Never Used     Meds/Allergies   Hospital Medications:   No current facility-administered medications for this encounter  Facility-Administered Medications Ordered in Other Encounters   Medication Dose Route Frequency    propofol (DIPRIVAN) 1000 mg in 100 mL infusion (premix)   Intravenous Continuous PRN    propofol (DIPRIVAN) 200 MG/20ML bolus injection   Intravenous PRN    sodium chloride 0 9 % infusion    Continuous PRN     Home Medications: (Not in a hospital admission)    Allergies   Allergen Reactions    Lisinopril Cough     Objective   Vitals: Blood pressure 107/79, pulse (!) 156, temperature 97 8 °F (36 6 °C), temperature source Oral, resp  rate 18, weight 111 kg (245 lb), SpO2 95 %    Orthostatic Blood Pressures      Most Recent Value   Blood Pressure  107/79 filed at 06/27/2021 1115   Patient Position - Orthostatic VS  Lying filed at 06/27/2021 1115          Intake/Output Summary (Last 24 hours) at 6/27/2021 1145  Last data filed at 6/27/2021 1026  Gross per 24 hour   Intake 1000 ml   Output --   Net 1000 ml     Invasive Devices     Peripheral Intravenous Line            Peripheral IV 06/27/21 Left Antecubital <1 day    Peripheral IV 06/27/21 Right Antecubital <1 day              Physical Exam   GEN: NAD, alert and oriented, well appearing  SKIN: dry without significant lesions or rashes  HEENT: NCAT, PERRL, EOMs intact  NECK: No JVD or carotid bruits appreciated  CARDIOVASCULAR: RRR, normal S1, S2 without murmurs, rubs, or gallops appreciated  LUNGS: Clear to auscultation bilaterally without wheezes, rhonchi, or rales  ABDOMEN: Soft, nontender, nondistended  EXTREMITIES/VASCULAR: perfused without clubbing, cyanosis, or edema b/l  PSYCH: Normal mood and affect  NEURO: CN ll-Xll grossly intact    Lab Results: I have personally reviewed pertinent lab results  Results from last 7 days   Lab Units 06/27/21  0931   WBC Thousand/uL 11 88*   HEMOGLOBIN g/dL 15 7   HEMATOCRIT % 46 1   PLATELETS Thousands/uL 281     Results from last 7 days   Lab Units 06/27/21  0931   POTASSIUM mmol/L 4 3   CHLORIDE mmol/L 107   CO2 mmol/L 22   BUN mg/dL 13   CREATININE mg/dL 0 83   CALCIUM mg/dL 9 0         Results from last 7 days   Lab Units 06/27/21  0931   MAGNESIUM mg/dL 2 4     Imaging: I have personally reviewed pertinent reports

## 2021-06-27 NOTE — ED ATTENDING ATTESTATION
Final Diagnosis:  1  Atrial flutter with rapid ventricular response ([address] )    2  Narrow complex tachycardia ([address] )    3  URTI (acute upper respiratory infection)    4  Cough    5  SOB (shortness of breath)      ED Course as of Jun 27 1304   Sun Jun 27, 2021   0927 After administration of adenosine, it looks like there were some saw tooth P waves present  Pictures in the media tab  Will do metoprolol to slow heart rate, magnesium as per [facility] trial    Admission  1151 Discussed with cardiology fellow:  - suggested oral metoprolol; will order this  - suggested heparin drip; will defer this as patient already received therapeutic Lovenox  Adenosine at the start of strip  Corinna Perry MD, saw and evaluated the patient  All available labs and X-rays were ordered by me or the resident and have been reviewed by myself  I discussed the patient with the resident / non-physician and agree with the resident's / non-physician practitioner's findings and plan as documented in the resident's / non-physician practicitioner's note, except where noted  At this point, I agree with the current assessment done in the ED  I was present during key portions of all procedures performed unless otherwise stated  Chief Complaint   Patient presents with    Rapid Heart Rate     Pt seen at [facility] this AM for a cough and night sweats that had been going on for a couple of weeks  Staff noticed pt's HR around 165 and told him to be come for further evaluation  Pt denies CP     Cough     This is a 61 y o  male presenting for evaluation of a flutter versus SVT  The patient states that for last 2 days or so he has been having cough congestion and not feeling well  He has had palpitations but does not know how long  He went to urgent care today who sent him in for further evaluation  He is not on anticoagulation  No dizziness lightheadedness  No falls or injuries    He currently lives alone, his wife and son are in [place]  They have been there for past 1 month  No nasal congestion medications used, he drinks coffee every now and then  PMH:   has a past medical history of Anxiety, Contusion of right wrist (1/30/2018), COPD (chronic obstructive pulmonary disease) ([facility] [address] ) (8/16/2016), Depression with anxiety, Diverticulitis (2016), Eczema (2/24/2016), Hypertension, Obesity (9/25/2012), and Perianal abscess (3/5/2019)  PSH:   has a past surgical history that includes Colonoscopy and Knee surgery  Social:  Social History     Substance and Sexual Activity   Alcohol Use Yes    Alcohol/week: 2 0 - 3 0 standard drinks    Types: 2 - 3 Cans of beer per week     Social History     Tobacco Use   Smoking Status Current Every Day Smoker    Packs/day: 1 00   Smokeless Tobacco Never Used     Social History     Substance and Sexual Activity   Drug Use No     PE:  Vitals:    06/27/21 1018 06/27/21 1115 06/27/21 1155 06/27/21 1215   BP: 122/79 107/79 120/74 128/69   BP Location: Right arm Right arm  Right arm   Pulse: (!) 156 (!) 156 (!) 158 (!) 160   Resp: 18      Temp:       TempSrc:       SpO2: 94% 95%  95%   Weight:       General: VSS, NAD, awake, alert  Well-nourished, well-developed  Appears stated age  Speaking normally in full sentences  Head: Normocephalic, atraumatic, nontender  Eyes: PERRL, EOM-I  No diplopia  No hyphema  No subconjunctival hemorrhages  Symmetrical lids  ENT: Atraumatic external nose and ears  MMM  No malocclusion  No stridor  Normal phonation  No drooling  Normal swallowing  Neck: Symmetric, trachea midline  No JVD  CV: tachycardic -162  +S1/S2  No murmurs or gallops  Peripheral pulses +2 throughout  No chest wall tenderness  Lungs:   Unlabored No retractions  CTAB, lungs sounds equal bilateral    No tachypnea  Abd: +BS, soft, NT/ND    MSK:   FROM   Back:   No rashes  Skin: Dry, intact  Neuro: AAOx3, GCS 15, CN II-XII grossly intact     Motor grossly intact  Psychiatric/Behavioral: Appropriate mood and affect   Exam: deferred  A:  - sVT  P:  - Adenosine 6 ? 12    - 13 point ROS was performed and all are normal unless stated in the history above  - Nursing note reviewed  Vitals reviewed  - Orders placed by myself and/or advanced practitioner / resident     - Previous chart was reviewed  - No language barrier    - History obtained from patient  - There are no limitations to the history obtained  - Critical care time: 38 minutes  - Critical care time was exclusive of seperately bilable procedures and treating other patients as well as teaching time     - Critical care was necessary to treat or prevent imminent or life-threatening deterioration of the following condition: SVT?  - Critical care time was spent personally by me on the following activities as well as the above as per the ED course and rest of chart: blood draw for specimens, obtaining history from patient / surrogate, developement of a treatment plan, discussions with consultants, evaluation of patient's response to the treatment, examination of the patient, ordering/performing treatements and interventions, re-evaluation of the patient's condition, review of old charts, ordering/reviewing laboratory studies    Code Status: No Order  Advance Directive and Living Will:      Power of :    POLST:      Medications   diltiazem (CARDIZEM) 125 mg in sodium chloride 0 9 % 125 mL infusion (has no administration in time range)   metoprolol tartrate (LOPRESSOR) tablet 25 mg (has no administration in time range)   metoprolol (LOPRESSOR) injection 5 mg (has no administration in time range)   enoxaparin (LOVENOX) subcutaneous injection 105 mg (has no administration in time range)   diltiazem (CARDIZEM) 125 mg in sodium chloride 0 9 % 125 mL infusion (has no administration in time range)   adenosine (ADENOCARD) injection 6 mg (6 mg Intravenous Given 6/27/21 0915)   adenosine (ADENOCARD) injection 12 mg (12 mg Intravenous Given 6/27/21 0920)   enoxaparin (LOVENOX) subcutaneous injection 105 mg (105 mg Subcutaneous Given 6/27/21 1039)   metoprolol (LOPRESSOR) injection 5 mg (5 mg Intravenous Given 6/27/21 0931)   magnesium sulfate 2 g/50 mL IVPB (premix) 2 g (0 g Intravenous Stopped 6/27/21 0950)   metoprolol (LOPRESSOR) injection 5 mg (5 mg Intravenous Given 6/27/21 1022)   sodium chloride 0 9 % bolus 1,000 mL (0 mL Intravenous Stopped 6/27/21 1026)   metoprolol tartrate (LOPRESSOR) tablet 25 mg (25 mg Oral Given 6/27/21 1155)     XR chest portable    (Results Pending)     Orders Placed This Encounter   Procedures    XR chest portable    CBC and differential    Basic metabolic panel    Troponin I    Magnesium    Consult to cardiology    ECG 12 lead    Echo complete with contrast if indicated    Inpatient Admission    Update level of care     Labs Reviewed   CBC AND DIFFERENTIAL - Abnormal       Result Value Ref Range Status    WBC 11 88 (*) 4 31 - 10 16 Thousand/uL Final    RBC 4 96  3 88 - 5 62 Million/uL Final    Hemoglobin 15 7  12 0 - 17 0 g/dL Final    Hematocrit 46 1  36 5 - 49 3 % Final    MCV 93  82 - 98 fL Final    MCH 31 7  26 8 - 34 3 pg Final    MCHC 34 1  31 4 - 37 4 g/dL Final    RDW 12 2  11 6 - 15 1 % Final    MPV 9 4  8 9 - 12 7 fL Final    Platelets 166  636 - 390 Thousands/uL Final    nRBC 0  /100 WBCs Final    Neutrophils Relative 62  43 - 75 % Final    Immat GRANS % 0  0 - 2 % Final    Lymphocytes Relative 25  14 - 44 % Final    Monocytes Relative 9  4 - 12 % Final    Eosinophils Relative 3  0 - 6 % Final    Basophils Relative 1  0 - 1 % Final    Neutrophils Absolute 7 40  1 85 - 7 62 Thousands/µL Final    Immature Grans Absolute 0 04  0 00 - 0 20 Thousand/uL Final    Lymphocytes Absolute 2 93  0 60 - 4 47 Thousands/µL Final    Monocytes Absolute 1 12  0 17 - 1 22 Thousand/µL Final    Eosinophils Absolute 0 31  0 00 - 0 61 Thousand/µL Final    Basophils Absolute 0 08  0 00 - 0 10 Thousands/µL Final   BASIC METABOLIC PANEL - Abnormal    Sodium 136  136 - 145 mmol/L Final    Potassium 4 3  3 5 - 5 3 mmol/L Final    Chloride 107  100 - 108 mmol/L Final    CO2 22  21 - 32 mmol/L Final    ANION GAP 7  4 - 13 mmol/L Final    BUN 13  5 - 25 mg/dL Final    Creatinine 0 83  0 60 - 1 30 mg/dL Final    Comment: Standardized to IDMS reference method    Glucose 145 (*) 65 - 140 mg/dL Final    Comment: If the patient is fasting, the ADA then defines impaired fasting glucose as > 100 mg/dL and diabetes as > or equal to 123 mg/dL  Specimen collection should occur prior to Sulfasalazine administration due to the potential for falsely depressed results  Specimen collection should occur prior to Sulfapyridine administration due to the potential for falsely elevated results  Calcium 9 0  8 3 - 10 1 mg/dL Final    eGFR 98  ml/min/1 73sq m Final    Narrative:     [place] guidelines for Chronic Kidney Disease (CKD):     Stage 1 with normal or high GFR (GFR > 90 mL/min/1 73 square meters)    Stage 2 Mild CKD (GFR = 60-89 mL/min/1 73 square meters)    Stage 3A Moderate CKD (GFR = 45-59 mL/min/1 73 square meters)    Stage 3B Moderate CKD (GFR = 30-44 mL/min/1 73 square meters)    Stage 4 Severe CKD (GFR = 15-29 mL/min/1 73 square meters)    Stage 5 End Stage CKD (GFR <15 mL/min/1 73 square meters)  Note: GFR calculation is accurate only with a steady state creatinine   MAGNESIUM - Normal    Magnesium 2 4  1 6 - 2 6 mg/dL Final   TROPONIN I    Troponin I <0 02  ng/mL Final    Comment: Siemens Chemistry analyzer 99% cutoff is > 0 04 ng/mL in network labs     o cTnI 99% cutoff is useful only when applied to patients in the clinical setting of myocardial ischemia   o cTnI 99% cutoff should be interpreted in the context of clinical history, ECG findings and possibly cardiac imaging to establish correct diagnosis     o cTnI 99% cutoff may be suggestive but clearly not indicative of a coronary event without the clinical setting of myocardial ischemia  Time reflects when diagnosis was documented in both MDM as applicable and the Disposition within this note     Time User Action Codes Description Comment    6/27/2021 10:11 AM Emirati Bridges Add [I47 1] Supraventricular tachycardia ([address] )     6/27/2021 10:12 AM Neymar Valenzuela Remove [I47 1] Supraventricular tachycardia ([address] )     6/27/2021 10:12 AM Neymar Valenzuela Add [I47 1] Narrow complex tachycardia ([address] )     6/27/2021 10:12 AM Emirati Bridges Add [I48 92] Atrial flutter with rapid ventricular response ([address] )     6/27/2021 10:12 AM Emirati Bridges Modify [I47 1] Narrow complex tachycardia ([address] )     6/27/2021 10:12 AM Emirati Bridges Modify [I48 92] Atrial flutter with rapid ventricular response ([address] )     6/27/2021 10:12 AM Neymar Valenzuela Add [J06 9] URTI (acute upper respiratory infection)     6/27/2021 12:49 PM Ebb Liner Add [R05] Cough     6/27/2021 12:50 PM Ebb Liner Add [R06 02] SOB (shortness of breath)       ED Disposition     ED Disposition Condition Date/Time Comment    Admit Stable Sun Jun 27, 2021 12:49 PM Case was discussed with ESDRAS and the patient's admission status was agreed to be Admission Status: inpatient status to the service of Dr Greg Davis   Follow-up Information    None       Patient's Medications   Discharge Prescriptions    No medications on file     No discharge procedures on file  Prior to Admission Medications   Prescriptions Last Dose Informant Patient Reported? Taking?    albuterol (Ventolin HFA) 90 mcg/act inhaler  Self No No   Sig: Inhale 2 puffs every 4 (four) hours as needed for wheezing   Patient not taking: Reported on 11/7/2020   losartan (COZAAR) 25 mg tablet   Yes No   Sig: Take 50 mg by mouth daily    losartan (COZAAR) 50 mg tablet 6/27/2021 at Unknown time  No Yes   Sig: Take 1 tablet (50 mg total) by mouth daily   multivitamin (THERAGRAN) TABS 6/27/2021 at Unknown time Self Yes Yes   Sig: Take 1 tablet by mouth daily      Facility-Administered Medications: None       Portions of the record may have been created with voice recognition software  Occasional wrong word or "sound a like" substitutions may have occurred due to the inherent limitations of voice recognition software  Read the chart carefully and recognize, using context, where substitutions have occurred      Electronically signed by:  Tammy Nguyen

## 2021-06-28 ENCOUNTER — APPOINTMENT (INPATIENT)
Dept: NON INVASIVE DIAGNOSTICS | Facility: HOSPITAL | Age: 56
DRG: 309 | End: 2021-06-28
Payer: COMMERCIAL

## 2021-06-28 LAB
ANION GAP SERPL CALCULATED.3IONS-SCNC: 4 MMOL/L (ref 4–13)
APTT PPP: 46 SECONDS (ref 23–37)
APTT PPP: 46 SECONDS (ref 23–37)
APTT PPP: 65 SECONDS (ref 23–37)
ATRIAL RATE: 357 BPM
ATRIAL RATE: 94 BPM
BUN SERPL-MCNC: 12 MG/DL (ref 5–25)
CALCIUM SERPL-MCNC: 8.8 MG/DL (ref 8.3–10.1)
CHLORIDE SERPL-SCNC: 107 MMOL/L (ref 100–108)
CO2 SERPL-SCNC: 28 MMOL/L (ref 21–32)
CREAT SERPL-MCNC: 0.78 MG/DL (ref 0.6–1.3)
ERYTHROCYTE [DISTWIDTH] IN BLOOD BY AUTOMATED COUNT: 12.3 % (ref 11.6–15.1)
GFR SERPL CREATININE-BSD FRML MDRD: 100 ML/MIN/1.73SQ M
GLUCOSE SERPL-MCNC: 167 MG/DL (ref 65–140)
HCT VFR BLD AUTO: 45.1 % (ref 36.5–49.3)
HCV AB SER QL: NORMAL
HGB BLD-MCNC: 15 G/DL (ref 12–17)
MCH RBC QN AUTO: 31.5 PG (ref 26.8–34.3)
MCHC RBC AUTO-ENTMCNC: 33.3 G/DL (ref 31.4–37.4)
MCV RBC AUTO: 95 FL (ref 82–98)
P AXIS: 268 DEGREES
P AXIS: 66 DEGREES
PLATELET # BLD AUTO: 286 THOUSANDS/UL (ref 149–390)
PMV BLD AUTO: 10.4 FL (ref 8.9–12.7)
POTASSIUM SERPL-SCNC: 4.1 MMOL/L (ref 3.5–5.3)
PR INTERVAL: 172 MS
QRS AXIS: -5 DEGREES
QRS AXIS: -79 DEGREES
QRSD INTERVAL: 70 MS
QRSD INTERVAL: 90 MS
QT INTERVAL: 332 MS
QT INTERVAL: 354 MS
QTC INTERVAL: 415 MS
QTC INTERVAL: 454 MS
RBC # BLD AUTO: 4.76 MILLION/UL (ref 3.88–5.62)
SODIUM SERPL-SCNC: 139 MMOL/L (ref 136–145)
T WAVE AXIS: 61 DEGREES
T WAVE AXIS: 78 DEGREES
VENTRICULAR RATE: 94 BPM
VENTRICULAR RATE: 99 BPM
WBC # BLD AUTO: 12.68 THOUSAND/UL (ref 4.31–10.16)

## 2021-06-28 PROCEDURE — 85730 THROMBOPLASTIN TIME PARTIAL: CPT | Performed by: INTERNAL MEDICINE

## 2021-06-28 PROCEDURE — 94760 N-INVAS EAR/PLS OXIMETRY 1: CPT

## 2021-06-28 PROCEDURE — NC001 PR NO CHARGE: Performed by: INTERNAL MEDICINE

## 2021-06-28 PROCEDURE — 86803 HEPATITIS C AB TEST: CPT | Performed by: INTERNAL MEDICINE

## 2021-06-28 PROCEDURE — 94640 AIRWAY INHALATION TREATMENT: CPT

## 2021-06-28 PROCEDURE — 93010 ELECTROCARDIOGRAM REPORT: CPT | Performed by: INTERNAL MEDICINE

## 2021-06-28 PROCEDURE — 99232 SBSQ HOSP IP/OBS MODERATE 35: CPT | Performed by: NURSE PRACTITIONER

## 2021-06-28 PROCEDURE — 93306 TTE W/DOPPLER COMPLETE: CPT

## 2021-06-28 PROCEDURE — 94664 DEMO&/EVAL PT USE INHALER: CPT

## 2021-06-28 PROCEDURE — 93306 TTE W/DOPPLER COMPLETE: CPT | Performed by: INTERNAL MEDICINE

## 2021-06-28 PROCEDURE — 85027 COMPLETE CBC AUTOMATED: CPT | Performed by: INTERNAL MEDICINE

## 2021-06-28 PROCEDURE — 87070 CULTURE OTHR SPECIMN AEROBIC: CPT | Performed by: NURSE PRACTITIONER

## 2021-06-28 PROCEDURE — 93005 ELECTROCARDIOGRAM TRACING: CPT

## 2021-06-28 PROCEDURE — 80048 BASIC METABOLIC PNL TOTAL CA: CPT | Performed by: INTERNAL MEDICINE

## 2021-06-28 PROCEDURE — 94668 MNPJ CHEST WALL SBSQ: CPT

## 2021-06-28 PROCEDURE — 87205 SMEAR GRAM STAIN: CPT | Performed by: NURSE PRACTITIONER

## 2021-06-28 RX ORDER — ALPRAZOLAM 0.5 MG/1
0.5 TABLET ORAL
Status: DISCONTINUED | OUTPATIENT
Start: 2021-06-28 | End: 2021-06-29 | Stop reason: HOSPADM

## 2021-06-28 RX ORDER — METHYLPREDNISOLONE SODIUM SUCCINATE 40 MG/ML
20 INJECTION, POWDER, LYOPHILIZED, FOR SOLUTION INTRAMUSCULAR; INTRAVENOUS EVERY 8 HOURS SCHEDULED
Status: DISCONTINUED | OUTPATIENT
Start: 2021-06-28 | End: 2021-06-29 | Stop reason: HOSPADM

## 2021-06-28 RX ORDER — METOPROLOL SUCCINATE 25 MG/1
25 TABLET, EXTENDED RELEASE ORAL 2 TIMES DAILY
Status: DISCONTINUED | OUTPATIENT
Start: 2021-06-28 | End: 2021-06-29 | Stop reason: HOSPADM

## 2021-06-28 RX ADMIN — HEPARIN SODIUM 2000 UNITS: 1000 INJECTION INTRAVENOUS; SUBCUTANEOUS at 15:22

## 2021-06-28 RX ADMIN — AZITHROMYCIN MONOHYDRATE 500 MG: 500 INJECTION, POWDER, LYOPHILIZED, FOR SOLUTION INTRAVENOUS at 17:32

## 2021-06-28 RX ADMIN — HEPARIN SODIUM 15.1 UNITS/KG/HR: 10000 INJECTION, SOLUTION INTRAVENOUS at 11:42

## 2021-06-28 RX ADMIN — LEVALBUTEROL HYDROCHLORIDE 0.63 MG: 0.63 SOLUTION RESPIRATORY (INHALATION) at 20:55

## 2021-06-28 RX ADMIN — METOPROLOL TARTRATE 25 MG: 25 TABLET, FILM COATED ORAL at 10:40

## 2021-06-28 RX ADMIN — ALPRAZOLAM 0.5 MG: 0.5 TABLET ORAL at 21:43

## 2021-06-28 RX ADMIN — LEVALBUTEROL HYDROCHLORIDE 0.63 MG: 0.63 SOLUTION RESPIRATORY (INHALATION) at 13:26

## 2021-06-28 RX ADMIN — LEVALBUTEROL HYDROCHLORIDE 0.63 MG: 0.63 SOLUTION RESPIRATORY (INHALATION) at 07:33

## 2021-06-28 RX ADMIN — DIGOXIN 500 MCG: 0.25 INJECTION INTRAMUSCULAR; INTRAVENOUS at 06:25

## 2021-06-28 RX ADMIN — METOPROLOL SUCCINATE 25 MG: 25 TABLET, FILM COATED, EXTENDED RELEASE ORAL at 21:13

## 2021-06-28 RX ADMIN — METHYLPREDNISOLONE SODIUM SUCCINATE 20 MG: 40 INJECTION, POWDER, FOR SOLUTION INTRAMUSCULAR; INTRAVENOUS at 15:18

## 2021-06-28 RX ADMIN — METHYLPREDNISOLONE SODIUM SUCCINATE 20 MG: 40 INJECTION, POWDER, FOR SOLUTION INTRAMUSCULAR; INTRAVENOUS at 21:13

## 2021-06-28 RX ADMIN — Medication 3 MG: at 21:13

## 2021-06-28 RX ADMIN — HEPARIN SODIUM 2000 UNITS: 1000 INJECTION INTRAVENOUS; SUBCUTANEOUS at 22:08

## 2021-06-28 RX ADMIN — PREDNISONE 40 MG: 20 TABLET ORAL at 10:39

## 2021-06-28 RX ADMIN — Medication 1 PATCH: at 10:50

## 2021-06-28 NOTE — PLAN OF CARE
Problem: Potential for Falls  Goal: Patient will remain free of falls  Description: INTERVENTIONS:  - Educate patient/family on patient safety including physical limitations  - Instruct patient to call for assistance with activity   - Consult OT/PT to assist with strengthening/mobility   - Keep Call bell within reach  - Keep bed low and locked with side rails adjusted as appropriate  - Keep care items and personal belongings within reach  - Initiate and maintain comfort rounds  - Make Fall Risk Sign visible to staff  - Offer Toileting every 2 Hours, in advance of need  - Apply yellow socks and bracelet for high fall risk patients  - Consider moving patient to room near nurses station  Outcome: Progressing     Problem: CARDIOVASCULAR - ADULT  Goal: Maintains optimal cardiac output and hemodynamic stability  Description: INTERVENTIONS:  - Monitor I/O, vital signs and rhythm  - Monitor for S/S and trends of decreased cardiac output  - Administer and titrate ordered vasoactive medications to optimize hemodynamic stability  - Assess quality of pulses, skin color and temperature  - Assess for signs of decreased coronary artery perfusion  - Instruct patient to report change in severity of symptoms  Outcome: Progressing  Goal: Absence of cardiac dysrhythmias or at baseline rhythm  Description: INTERVENTIONS:  - Continuous cardiac monitoring, vital signs, obtain 12 lead EKG if ordered  - Administer antiarrhythmic and heart rate control medications as ordered  - Monitor electrolytes and administer replacement therapy as ordered  Outcome: Progressing     Problem: RESPIRATORY - ADULT  Goal: Achieves optimal ventilation and oxygenation  Description: INTERVENTIONS:  - Assess for changes in respiratory status  - Assess for changes in mentation and behavior  - Position to facilitate oxygenation and minimize respiratory effort  - Oxygen administered by appropriate delivery if ordered  - Initiate smoking cessation education as indicated  - Encourage broncho-pulmonary hygiene including cough, deep breathe, Incentive Spirometry  - Assess the need for suctioning and aspirate as needed  - Assess and instruct to report SOB or any respiratory difficulty  - Respiratory Therapy support as indicated  Outcome: Progressing     Problem: METABOLIC, FLUID AND ELECTROLYTES - ADULT  Goal: Electrolytes maintained within normal limits  Description: INTERVENTIONS:  - Monitor labs and assess patient for signs and symptoms of electrolyte imbalances  - Administer electrolyte replacement as ordered  - Monitor response to electrolyte replacements, including repeat lab results as appropriate  - Instruct patient on fluid and nutrition as appropriate  Outcome: Progressing

## 2021-06-28 NOTE — ASSESSMENT & PLAN NOTE
Mild exacerbation  Pulm toilet with xopenex (instead of albuterol given aflutter w/ rvr)/ipratropium  Patient had been on oral prednisone; changed to IV as patient demonstrated significant wheezing in all lobes on exam this am    Patient reports he usually gets about two exacerbations per year     Discussed with attending; will give azithromycin if QTC interval is WNL; will check procalcitonin in am, and consider de-escalating if negative x 2  Discussed with patient, verbalized understanding of the plan   Educated on smoking cessation at length  Nicotine patch

## 2021-06-28 NOTE — ASSESSMENT & PLAN NOTE
Stable, consider resuming losartan in am  BP currently stable 120/72  Vital signs as per unit routine

## 2021-06-28 NOTE — PROGRESS NOTES
Nahomi Cardona visited Pt      06/28/21 1500   Clinical Encounter Type   Visited With Patient   Latter-day Encounters   Latter-day Needs Prayer

## 2021-06-28 NOTE — ASSESSMENT & PLAN NOTE
Smoking cessation strongly encouraged; discussed the physical impacts on respiratory and cardiovascular systems with the patient   He is agreeable to quitting  Continue nicotine patch at this time

## 2021-06-28 NOTE — PROGRESS NOTES
[facility]  Progress Note - Jonathan Hernandez 1965, 61 y o  male MRN: [id number]  Unit/Bed#: [facility] 528-01 Encounter: [id number]  Primary Care Provider: Enedina Pepe MD   Date and time admitted to hospital: 6/27/2021  8:58 AM    * Atrial flutter with rapid ventricular response [facility])  Assessment & Plan  New onset  Initially presenting with narrow complex tachy s/p adenosine x 2 in atrial flutter  Appreciate cardiology input  Patient converted prior to EP ablation  Cardizem discontinued   A/c with heparin gtt  Troponin negative   Check echo, results pending   TSH WNL  Cont telemetry monitoring  Discussed with patient        COPD (chronic obstructive pulmonary disease) ([facility] [address] )  Assessment & Plan  Mild exacerbation  Pulm toilet with xopenex (instead of albuterol given aflutter w/ rvr)/ipratropium  Patient had been on oral prednisone; changed to IV as patient demonstrated significant wheezing in all lobes on exam this am    Patient reports he usually gets about two exacerbations per year     Discussed with attending; will give azithromycin if QTC interval is WNL; will check procalcitonin in am, and consider de-escalating if negative x 2  Discussed with patient, verbalized understanding of the plan   Educated on smoking cessation at length  Nicotine patch     Essential hypertension  Assessment & Plan  Stable, consider resuming losartan in am  BP currently stable 120/72  Vital signs as per unit routine     Tobacco use  Assessment & Plan  Smoking cessation strongly encouraged; discussed the physical impacts on respiratory and cardiovascular systems with the patient   He is agreeable to quitting  Continue nicotine patch at this time     Morbid obesity ([facility] [address] )  Assessment & Plan  Lifestyle/dietary modification    Alcohol consumption heavy  Assessment & Plan  No sign of w/d  Place on CIWA  Last CIWA 0        VTE Pharmacologic Prophylaxis:   Pharmacologic: Heparin Drip  Mechanical VTE Prophylaxis in Place: Yes    Patient Centered Rounds: I have performed bedside rounds with nursing staff today  Discussions with Specialists or Other Care Team Provider: Attending, EP cardiology, case management     Education and Discussions with Family / Patient: I spoke with the patient at the bedside; I have answered all questions to the best of my ability    Time Spent for Care: 30 minutes  More than 50% of total time spent on counseling and coordination of care as described above  Current Length of Stay: 1 day(s)    Current Patient Status: Inpatient   Certification Statement: The patient will continue to require additional inpatient hospital stay due to IV steroid for wheezing, telemetry monitoring, repeat labs in am    Discharge Plan: Most likely in the next 24 to 48 hours pending patient's progress  Code Status: Level 1 - Full Code      Subjective:   Patient seen laying comfortably in bed  He states he is hungry, has been NPO since midnight  He has cardioverted on his own, so diet is resumed  He reports no nausea or vomiting  Denies chest pain, does have some diffuse wheezing  Reports cough, occasionally productive  Objective:     Vitals:   Temp (24hrs), Av 1 °F (36 7 °C), Min:97 5 °F (36 4 °C), Max:98 8 °F (37 1 °C)    Temp:  [97 5 °F (36 4 °C)-98 8 °F (37 1 °C)] 98 8 °F (37 1 °C)  HR:  [] 100  Resp:  [16-18] 18  BP: (109-145)/(55-78) 120/72  SpO2:  [93 %-99 %] 99 %  Body mass index is 35 05 kg/m²  Input and Output Summary (last 24 hours): Intake/Output Summary (Last 24 hours) at 2021 1455  Last data filed at 2021 1423  Gross per 24 hour   Intake 3660 07 ml   Output 2550 ml   Net 1110 07 ml       Physical Exam:     Physical Exam  Vitals and nursing note reviewed  Constitutional:       General: He is not in acute distress  HENT:      Head: Normocephalic        Nose: Nose normal       Mouth/Throat:      Mouth: Mucous membranes are moist    Eyes:      Extraocular Movements: Extraocular movements intact  Pupils: Pupils are equal, round, and reactive to light  Cardiovascular:      Rate and Rhythm: Normal rate and regular rhythm  Pulses: Normal pulses  Heart sounds: Normal heart sounds  Pulmonary:      Effort: Pulmonary effort is normal       Breath sounds: Wheezing present  Comments: Diffuse wheezing throughout all lung fields Patient reports occasional loose, productive cough  Abdominal:      General: Bowel sounds are normal  There is no distension  Palpations: Abdomen is soft  Tenderness: There is no abdominal tenderness  Genitourinary:     Comments: Voiding spontaneously  Musculoskeletal:         General: Normal range of motion  Cervical back: Normal range of motion  Right lower leg: No edema  Left lower leg: No edema  Skin:     General: Skin is warm and dry  Capillary Refill: Capillary refill takes less than 2 seconds  Neurological:      Mental Status: He is alert and oriented to person, place, and time  Psychiatric:         Mood and Affect: Mood normal          Behavior: Behavior normal          Thought Content: Thought content normal          Judgment: Judgment normal          Additional Data:     Labs:    Results from last 7 days   Lab Units 06/28/21  0514 06/27/21  0931   WBC Thousand/uL 12 68* 11 88*   HEMOGLOBIN g/dL 15 0 15 7   HEMATOCRIT % 45 1 46 1   PLATELETS Thousands/uL 286 281   NEUTROS PCT %  --  62   LYMPHS PCT %  --  25   MONOS PCT %  --  9   EOS PCT %  --  3     Results from last 7 days   Lab Units 06/28/21  0514   POTASSIUM mmol/L 4 1   CHLORIDE mmol/L 107   CO2 mmol/L 28   BUN mg/dL 12   CREATININE mg/dL 0 78   CALCIUM mg/dL 8 8           * I Have Reviewed All Lab Data Listed Above  * Additional Pertinent Lab Tests Reviewed:  All Labs Within Last 24 Hours Reviewed    Imaging:    Imaging Reports Reviewed Today Include:  Chest xray  Imaging Personally Reviewed by Myself Includes:  None Recent Cultures (last 7 days):           Last 24 Hours Medication List:   Current Facility-Administered Medications   Medication Dose Route Frequency Provider Last Rate    acetaminophen  650 mg Oral Q6H PRN Trav Freedman,       heparin (porcine)  3-20 Units/kg/hr (Order-Specific) Intravenous Titrated Meena Gasimli-Maco, DO 15 1 Units/kg/hr (06/28/21 1142)    heparin (porcine)  2,000 Units Intravenous Q1H PRN Meena Gasimli-Maco, DO      heparin (porcine)  4,000 Units Intravenous Q1H PRN Meena Gasimli-Maco, DO      levalbuterol  0 63 mg Nebulization TID Trav Freedman DO      melatonin  3 mg Oral HS PRN Whitney Martinez PA-C      methylPREDNISolone sodium succinate  20 mg Intravenous [facility] LARA Churchill      metoprolol  5 mg Intravenous Q6H PRN Meena Gasimli-Maco, DO      metoprolol succinate  25 mg Oral BID Erasto Clifton PA-C      nicotine  1 patch Transdermal Daily Trav Freedman DO      ondansetron  4 mg Intravenous Q6H PRN Trav Freedman DO       Facility-Administered Medications Ordered in Other Encounters   Medication Dose Route Frequency Provider Last Rate    propofol   Intravenous Continuous PRN Beryle London, CRNA Stopped (03/26/21 1137)    propofol   Intravenous PRN Beryle London, CRNA      sodium chloride    Continuous PRN Beryle London, CRNA          Today, Patient Was Seen By: LARA Churchill    ** Please Note: Dictation voice to text software may have been used in the creation of this document   **

## 2021-06-28 NOTE — CASE MANAGEMENT
LOS:  1day  Bundle: no  Readmission: no    Explained role of CM to pt  CM obtained the following information from pt  Home: lives in a house with 2 levels and a flight of stairs in between  3 [address]  Lives with: wife  DME: denies  Ambulation: independent  Drives: yes  Pharmacy: [facility]  PCP: FARIBA Anderson History: denies and denies inpatient psychiatric stay ever  Substance Use/Abuse History: Alcohol - drinks 2 beers on weekdays and a few beers and shots on weekends x 30 years  Declined HOST  [facility] History: denies  Work/Retired: employed  Rehab History: denies  POA/LW: no and declined information on POA and LW  Transport at [facility]:  self, CM directed pt to ask his MD if it is ok for pt to drive self home  CM asked pt if they were unable to make decisions for themselves who would they want to make decisions for them, he said  His wife Moustapha Allen is HCR and his son Monica Lozano and his brother Charlie Haskins alternate HCR      CM reviewed d/c planning process including the following: identifying help at home, patient preference for d/c planning needs,  [facility] to Bed program      CM asked pt if they would like CM to call anyone for them, they declined  CM team will continue to follow through discharge

## 2021-06-28 NOTE — DISCHARGE INSTRUCTIONS
Please reach out to our schedulers at [phone] if you have not heard from them within the next couple days to set up REID/atrial flutter ablation and loop recorder implantation

## 2021-06-28 NOTE — ASSESSMENT & PLAN NOTE
New onset   Initially presenting with narrow complex tachy s/p adenosine x 2 in atrial flutter  Appreciate cardiology input  Patient converted prior to EP ablation  Cardizem discontinued   A/c with heparin gtt  Troponin negative   Check echo, results pending   TSH WNL  Cont telemetry monitoring  Discussed with patient

## 2021-06-28 NOTE — UTILIZATION REVIEW
Initial Clinical Review    Admission: Date/Time/Statement:   Admission Orders (From admission, onward)     Ordered        06/27/21 1250  Inpatient Admission  Once                Orders Placed This Encounter   Procedures    Inpatient Admission     Standing Status:   Standing     Number of Occurrences:   1     Order Specific Question:   Level of Care     Answer:   Med Surg with Telemetry     Order Specific Question:   Estimated length of stay     Answer:   More than 2 Midnights     Order Specific Question:   Certification     Answer:   I certify that inpatient services are medically necessary for this patient for a duration of greater than two midnights  See H&P and MD Progress Notes for additional information about the patient's course of treatment  ED Arrival Information     Expected Arrival Acuity    6/27/2021 6/27/2021 08:53 Emergent    Service Admission type    Hospitalist Emergency    Arrival complaint    Tachycardia     Chief Complaint   Patient presents with    Rapid Heart Rate     Pt seen at [facility] this AM for a cough and night sweats that had been going on for a couple of weeks  Staff noticed pt's HR around 165 and told him to be come for further evaluation  Pt denies CP     Cough     Initial Presentation:   62 y/o male with PMHx HTN, COPD,  recent URI with cough, rhinorrhea and diaphoresis  Presented emergently to [facility] ED from [facility] after presenting 2nd URI symptoms + found tachycardia > 150  In ED -  + /90, mild WHITNEY  Exam +  Tachycardic, Diffuse inspiratory-expiratory wheezing  Per MD - tachycardia resembled SVR and he was given IV adenosine 12 and 6 mg with slowing down of heart rate and manifestation of atrial flutter  Also was given magnesium 2 mg, Toprol tartrate 5 mg IV x 2, IVF NSS x 1 L with persistent atrial flutter with RVR  Cardiology consulted and started on IV Cardizem and Heparin gtt    Admitted Inpatient 6/27/21 at 1250 2nd [facility] onset Atrial Flutter with RVT - on IV Cardizem and Heparin gtts, Cardiology and Electrophysiology consulted - NPO after midnite for possible ablation    6/27/21 CARDIOLOGY:  Assessment/Plan   Typical Atrial flutter with RVR, new onset  -rate control:  Status post metoprolol tartrate 5 mg IV x 2  -will start on metoprolol tartrate 25 mg bid  Will start on diltiazem drip with plan to up titrate p o  Medication and come off diltiazem drip  -rhythm control: Will keep NPO after midnight for EP evaluation for ablation  -anticoagulation:  Will start on heparin drip while awaiting the evaluation for ablation  Will plan to transition to [address]  -await electrolyte derangements:  Keep potassium > 4, magnesium > 2  -Monitor on telemetry     Primary hypertension  -will hold home losartan while in atrial flutter with RVR  Will restart as heart rate and blood pressure improved  6/28/21 ELECTROHPHYSIOLOGY:  Presented in atrial flutter but has since converted back to sinus without a significant conversion pause   Therefore, will set patient up for REID/ atrial flutter ablation along with loop recorder implantation in the outpatient setting within the next couple weeks    Request case management to help figure out insurance given attempt to price check Xarelto and Eliquis could not be performed without this    Recommend metoprolol succinate 25 mg twice daily in place of his losartan for both attempted rate control and blood pressure regimen    COPD exacerbation management per primary team      ED Triage Vitals   Temperature Pulse Respirations Blood Pressure SpO2   06/27/21 0950 06/27/21 0901 06/27/21 0901 06/27/21 0915 06/27/21 0947   97 8 °F (36 6 °C) (!) 160 18 160/90 95 %      Temp Source Heart Rate Source Patient Position - Orthostatic VS BP Location FiO2 (%)   06/27/21 0950 06/27/21 0901 06/27/21 0901 06/27/21 0901 --   Oral Monitor Lying Right arm       Wt Readings from Last 1 Encounters:   06/27/21 114 kg (251 lb 5 2 oz) Additional Vital Signs:   6/28/21 07:52:57  97 5 °F (36 4 °C)  --  17  --  --  --  --  --   06/28/21 0734  --  --  --  --  --  96 %  --  --   06/28/21 0310  97 8 °F (36 6 °C)  84  16  122/68  --  96 %  None (Room air)  --   06/27/21 2210  98 7 °F (37 1 °C)  125Abnormal   16  109/72  84  95 %  None (Room air)  Lying   06/27/21 2057  --  103  --  128/70  --  --  --  --   06/27/21 1911  97 9 °F (36 6 °C)  89  18  115/77  --  94 %  None (Room air)  Sitting   06/27/21 18:11:56  98 1 °F (36 7 °C)  116Abnormal   18  134/78  --  94 %  --  --   06/27/21 1646  --  129Abnormal   --  145/77  --  --  --  --   06/27/21 1600  --  127Abnormal   17  119/68  91  95 %  None (Room air)  Lying   06/27/21 1545  --  93  17  110/76  --  94 %  None (Room air)  Lying   06/27/21 1530  --  142Abnormal   --  109/68  81  94 %  None (Room air)  Lying   06/27/21 1457  --  120Abnormal   18  109/55  --  93 %  None (Room air)  --   06/27/21 1445  --  158Abnormal   18  111/62  80  93 %  None (Room air)  Lying   06/27/21 1415  --  158Abnormal   --  126/74  89  94 %  None (Room air)  Lying   06/27/21 1330  --  156Abnormal   --  115/74  89  95 %  None (Room air)  Lying   06/27/21 1315  --  158Abnormal   --  132/60  87  95 %  None (Room air)  Lying   06/27/21 1215  --  160Abnormal   --  128/69  90  95 %  None (Room air)  Lying   06/27/21 1155  --  158Abnormal   --  120/74  --  --  --  --   06/27/21 1115  --  156Abnormal   --  107/79  90  95 %  None (Room air)  Lying   06/27/21 1018  --  156Abnormal   18  122/79  --  94 %  None (Room air)  --   06/27/21 0950  97 8 °F (36 6 °C)  --  --  --  --  --  --  --   06/27/21 0947  --  156Abnormal   18  116/75  91  95 %  None (Room air)         I/O 06/27 0701   06/28 0700   P  O  2720   I V  (mL/kg) 280 1 (2 5)   IV Piggyback 1000   Total Intake(mL/kg) 4000 1 (35 1)   Urine (mL/kg/hr) 2550   Total Output 2550   Net +1450 1     Pertinent Labs/Diagnostic Test Results:     Results from last 7 days   Lab Units 06/28/21  0514 06/27/21  0931   WBC Thousand/uL 12 68* 11 88*   HEMOGLOBIN g/dL 15 0 15 7   HEMATOCRIT % 45 1 46 1   PLATELETS Thousands/uL 286 281   NEUTROS ABS Thousands/µL  --  7 40     Results from last 7 days   Lab Units 06/28/21  0514 06/27/21  0931   SODIUM mmol/L 139 136   POTASSIUM mmol/L 4 1 4 3   CHLORIDE mmol/L 107 107   CO2 mmol/L 28 22   ANION GAP mmol/L 4 7   BUN mg/dL 12 13   CREATININE mg/dL 0 78 0 83   EGFR ml/min/1 73sq m 100 98   CALCIUM mg/dL 8 8 9 0   MAGNESIUM mg/dL  --  2 4     Results from last 7 days   Lab Units 06/28/21  0514 06/27/21  0931   GLUCOSE RANDOM mg/dL 167* 145*     Results from last 7 days   Lab Units 06/27/21  0931   TROPONIN I ng/mL <0 02       Results from last 7 days   Lab Units 06/28/21  0514 06/27/21  2220   PTT seconds 65* 41*     Results from last 7 days   Lab Units 06/27/21  0931   TSH 3RD GENERATON uIU/mL 0 496          12 LEAD EKG (per Cardiology):  Narrow complex tachycardia with heart rate in 140's - 150's     CHEST X RAY  No acute cardiopulmonary disease       ED Treatment:   Medication Administration from 06/27/2021 0838 to 06/27/2021 1644       Date/Time Order Dose Route Action     06/27/2021 0915 adenosine (ADENOCARD) injection 6 mg 6 mg Intravenous Given     06/27/2021 0920 adenosine (ADENOCARD) injection 12 mg 12 mg Intravenous Given     06/27/2021 1039 enoxaparin (LOVENOX) subcutaneous injection 105 mg 105 mg Subcutaneous Given     06/27/2021 0931 metoprolol (LOPRESSOR) injection 5 mg 5 mg Intravenous Given     06/27/2021 0950 magnesium sulfate 2 g/50 mL IVPB (premix) 2 g 0 g Intravenous Stopped     06/27/2021 0931 magnesium sulfate 2 g/50 mL IVPB (premix) 2 g 2 g Intravenous New Bag     06/27/2021 1022 metoprolol (LOPRESSOR) injection 5 mg 5 mg Intravenous Given     06/27/2021 1026 sodium chloride 0 9 % bolus 1,000 mL 1,000 mL Intravenous New Bag     06/27/2021 1155 metoprolol tartrate (LOPRESSOR) tablet 25 mg 25 mg Oral Given     06/27/2021 1345 metoprolol tartrate (LOPRESSOR) tablet 25 mg 25 mg Oral Given     06/27/2021 1440 diltiazem (CARDIZEM) 125 mg in sodium chloride 0 9 % 125 mL infusion 12 5 mg/hr Intravenous Rate/Dose Change     06/27/2021 1415 diltiazem (CARDIZEM) 125 mg in sodium chloride 0 9 % 125 mL infusion 10 mg/hr Intravenous Rate/Dose Change     06/27/2021 1355 diltiazem (CARDIZEM) 125 mg in sodium chloride 0 9 % 125 mL infusion 7 5 mg/hr Intravenous Rate/Dose Change     06/27/2021 1340 diltiazem (CARDIZEM) 125 mg in sodium chloride 0 9 % 125 mL infusion 5 mg/hr Intravenous Rate/Dose Change     06/27/2021 1308 diltiazem (CARDIZEM) 125 mg in sodium chloride 0 9 % 125 mL infusion 2 5 mg/hr Intravenous New Bag     06/27/2021 1346 nicotine (NICODERM CQ) 14 mg/24hr TD 24 hr patch 1 patch 1 patch Transdermal Medication Applied     06/27/2021 1353 levalbuterol (XOPENEX) inhalation solution 0 63 mg 0 63 mg Nebulization Given     06/27/2021 1346 ipratropium (ATROVENT) 0 02 % inhalation solution 0 5 mg 0 5 mg Nebulization Given     06/27/2021 1345 predniSONE tablet 40 mg 40 mg Oral Given     06/27/2021 1417 heparin (porcine) injection 4,000 Units 4,000 Units Intravenous Given     06/27/2021 1418 heparin (porcine) 25,000 units in 0 45% NaCl 250 mL infusion (premix) 11 1 Units/kg/hr Intravenous New Bag     Past Medical History:   Diagnosis Date    Anxiety     Last assessed 12/20/2013    Contusion of right wrist 1/30/2018    [facility] 8/16/2016    Depression with anxiety     Diverticulitis 2016    Eczema 2/24/2016    Hypertension     Obesity 9/25/2012    Perianal abscess 3/5/2019     Present on Admission:   COPD [facility]chronic obstructive pulmonary disease) ([facility] )    Admitting Diagnosis: Cough [R05]  SOB (shortness of breath) [R06 02]  Tachycardia [R00 0]  URI (acute upper respiratory infection) [J06 9]  Atrial flutter with rapid ventricular response (HCC) [I48 92]  Narrow complex tachycardia ([address] ) [I47 1]    Age/Sex: 61 y o  male    Admission Orders:  Telemetry  VS q4hrs  CIWA Ar Score q1 hr x 4 - q4hrs  Continuous Pulse Oximetry   I+O q shift  Diet Regular: House - NPO AFTER MIDNITE FOR POSSIBLE ABLATION 6/28/21  Up + OOB as tolerated  ECHO    Scheduled Medications:  digoxin, 125 mcg, Intravenous, Daily  diltiazem, 20 mg, Intravenous, Once  levalbuterol, 0 63 mg, Nebulization, TID  metoprolol tartrate, 25 mg, Oral, Q12H YESI  nicotine, 1 patch, Transdermal, Daily  [START ON 6/29/2021] predniSONE, 30 mg, Oral, Daily  predniSONE, 40 mg, Oral, Daily    Continuous IV Infusions:  IVF diltiazem, 1-15 mg/hr, Intravenous, Titrated  IVF heparin (porcine), 3-20 Units/kg/hr (Order-Specific), Intravenous, Titrated    PRN Meds:  acetaminophen, 650 mg, Oral, Q6H PRN  heparin (porcine), 2,000 Units, Intravenous, Q1H PRN  heparin (porcine), 4,000 Units, Intravenous, Q1H PRN  melatonin, 3 mg, Oral, HS PRN GIVEN X 1  metoprolol, 5 mg, Intravenous, Q6H PRN HR > 130 GIVEN X 1  ondansetron, 4 mg, Intravenous, Q6H PRN    IP CONSULT TO CARDIOLOGY  IP CONSULT TO ELECTROPHYSIOLOGY  IP CONSULT TO CASE MANAGEMENT    Network Utilization Review Department  ATTENTION: Please call with any questions or concerns to [phone] and carefully listen to the prompts so that you are directed to the right person  All voicemails are confidential   Eloye Havers all requests for admission clinical reviews, approved or denied determinations and any other requests to dedicated fax number below belonging to the campus where the patient is receiving treatment   List of dedicated fax numbers for the Facilities:  [address] DENIALS (Administrative/Medical Necessity) [phone]   [address] (Maternity/NICU/Pediatrics) [phone] [address] [place] [phone]     Franki Simpson [address] [place] [address] [phone] [place] [address] [phone]

## 2021-06-29 LAB
ANION GAP SERPL CALCULATED.3IONS-SCNC: 3 MMOL/L (ref 4–13)
APTT PPP: 46 SECONDS (ref 23–37)
APTT PPP: 60 SECONDS (ref 23–37)
ATRIAL RATE: 163 BPM
ATRIAL RATE: 340 BPM
ATRIAL RATE: 98 BPM
BASOPHILS # BLD AUTO: 0.02 THOUSANDS/ΜL (ref 0–0.1)
BASOPHILS NFR BLD AUTO: 0 % (ref 0–1)
BUN SERPL-MCNC: 13 MG/DL (ref 5–25)
CALCIUM SERPL-MCNC: 9.1 MG/DL (ref 8.3–10.1)
CHLORIDE SERPL-SCNC: 103 MMOL/L (ref 100–108)
CO2 SERPL-SCNC: 29 MMOL/L (ref 21–32)
CREAT SERPL-MCNC: 0.8 MG/DL (ref 0.6–1.3)
EOSINOPHIL # BLD AUTO: 0 THOUSAND/ΜL (ref 0–0.61)
EOSINOPHIL NFR BLD AUTO: 0 % (ref 0–6)
ERYTHROCYTE [DISTWIDTH] IN BLOOD BY AUTOMATED COUNT: 12.1 % (ref 11.6–15.1)
GFR SERPL CREATININE-BSD FRML MDRD: 99 ML/MIN/1.73SQ M
GLUCOSE SERPL-MCNC: 227 MG/DL (ref 65–140)
HCT VFR BLD AUTO: 45 % (ref 36.5–49.3)
HGB BLD-MCNC: 14.7 G/DL (ref 12–17)
IMM GRANULOCYTES # BLD AUTO: 0.1 THOUSAND/UL (ref 0–0.2)
IMM GRANULOCYTES NFR BLD AUTO: 1 % (ref 0–2)
LYMPHOCYTES # BLD AUTO: 1.24 THOUSANDS/ΜL (ref 0.6–4.47)
LYMPHOCYTES NFR BLD AUTO: 9 % (ref 14–44)
MAGNESIUM SERPL-MCNC: 2.6 MG/DL (ref 1.6–2.6)
MCH RBC QN AUTO: 31.4 PG (ref 26.8–34.3)
MCHC RBC AUTO-ENTMCNC: 32.7 G/DL (ref 31.4–37.4)
MCV RBC AUTO: 96 FL (ref 82–98)
MONOCYTES # BLD AUTO: 0.47 THOUSAND/ΜL (ref 0.17–1.22)
MONOCYTES NFR BLD AUTO: 3 % (ref 4–12)
NEUTROPHILS # BLD AUTO: 12.33 THOUSANDS/ΜL (ref 1.85–7.62)
NEUTS SEG NFR BLD AUTO: 87 % (ref 43–75)
NRBC BLD AUTO-RTO: 0 /100 WBCS
P AXIS: 267 DEGREES
P AXIS: 269 DEGREES
P AXIS: 71 DEGREES
PLATELET # BLD AUTO: 299 THOUSANDS/UL (ref 149–390)
PMV BLD AUTO: 10.1 FL (ref 8.9–12.7)
POTASSIUM SERPL-SCNC: 4.6 MMOL/L (ref 3.5–5.3)
PR INTERVAL: 160 MS
PR INTERVAL: 164 MS
PROCALCITONIN SERPL-MCNC: <0.05 NG/ML
QRS AXIS: -84 DEGREES
QRS AXIS: 18 DEGREES
QRS AXIS: 268 DEGREES
QRSD INTERVAL: 110 MS
QRSD INTERVAL: 88 MS
QRSD INTERVAL: 92 MS
QT INTERVAL: 254 MS
QT INTERVAL: 322 MS
QT INTERVAL: 342 MS
QTC INTERVAL: 411 MS
QTC INTERVAL: 418 MS
QTC INTERVAL: 441 MS
RBC # BLD AUTO: 4.68 MILLION/UL (ref 3.88–5.62)
SODIUM SERPL-SCNC: 135 MMOL/L (ref 136–145)
T WAVE AXIS: 48 DEGREES
T WAVE AXIS: 55 DEGREES
T WAVE AXIS: 62 DEGREES
VENTRICULAR RATE: 100 BPM
VENTRICULAR RATE: 163 BPM
VENTRICULAR RATE: 98 BPM
WBC # BLD AUTO: 14.16 THOUSAND/UL (ref 4.31–10.16)

## 2021-06-29 PROCEDURE — 93010 ELECTROCARDIOGRAM REPORT: CPT | Performed by: INTERNAL MEDICINE

## 2021-06-29 PROCEDURE — 94760 N-INVAS EAR/PLS OXIMETRY 1: CPT

## 2021-06-29 PROCEDURE — 84145 PROCALCITONIN (PCT): CPT | Performed by: INTERNAL MEDICINE

## 2021-06-29 PROCEDURE — 85730 THROMBOPLASTIN TIME PARTIAL: CPT | Performed by: INTERNAL MEDICINE

## 2021-06-29 PROCEDURE — 94640 AIRWAY INHALATION TREATMENT: CPT

## 2021-06-29 PROCEDURE — 83735 ASSAY OF MAGNESIUM: CPT | Performed by: INTERNAL MEDICINE

## 2021-06-29 PROCEDURE — 94668 MNPJ CHEST WALL SBSQ: CPT

## 2021-06-29 PROCEDURE — 99238 HOSP IP/OBS DSCHRG MGMT 30/<: CPT | Performed by: INTERNAL MEDICINE

## 2021-06-29 PROCEDURE — 85025 COMPLETE CBC W/AUTO DIFF WBC: CPT | Performed by: INTERNAL MEDICINE

## 2021-06-29 PROCEDURE — 80048 BASIC METABOLIC PNL TOTAL CA: CPT | Performed by: INTERNAL MEDICINE

## 2021-06-29 PROCEDURE — 93005 ELECTROCARDIOGRAM TRACING: CPT

## 2021-06-29 PROCEDURE — NC001 PR NO CHARGE: Performed by: PHYSICIAN ASSISTANT

## 2021-06-29 RX ORDER — METOPROLOL SUCCINATE 25 MG/1
25 TABLET, EXTENDED RELEASE ORAL 2 TIMES DAILY
Qty: 60 TABLET | Refills: 0 | Status: SHIPPED | OUTPATIENT
Start: 2021-06-29 | End: 2021-08-19 | Stop reason: SDUPTHER

## 2021-06-29 RX ORDER — ACETAMINOPHEN 325 MG/1
650 TABLET ORAL EVERY 6 HOURS PRN
Qty: 30 TABLET | Refills: 0 | Status: SHIPPED | OUTPATIENT
Start: 2021-06-29 | End: 2022-08-02 | Stop reason: ALTCHOICE

## 2021-06-29 RX ORDER — PREDNISONE 10 MG/1
TABLET ORAL
Qty: 6 TABLET | Refills: 0 | Status: SHIPPED | OUTPATIENT
Start: 2021-06-29 | End: 2021-07-03

## 2021-06-29 RX ORDER — AZITHROMYCIN 250 MG/1
500 TABLET, FILM COATED ORAL EVERY 24 HOURS
Status: DISCONTINUED | OUTPATIENT
Start: 2021-06-29 | End: 2021-06-29 | Stop reason: HOSPADM

## 2021-06-29 RX ORDER — NICOTINE 21 MG/24HR
1 PATCH, TRANSDERMAL 24 HOURS TRANSDERMAL DAILY
Qty: 28 PATCH | Refills: 0 | Status: SHIPPED | OUTPATIENT
Start: 2021-06-30 | End: 2022-08-02 | Stop reason: ALTCHOICE

## 2021-06-29 RX ADMIN — LEVALBUTEROL HYDROCHLORIDE 0.63 MG: 0.63 SOLUTION RESPIRATORY (INHALATION) at 14:06

## 2021-06-29 RX ADMIN — METHYLPREDNISOLONE SODIUM SUCCINATE 20 MG: 40 INJECTION, POWDER, FOR SOLUTION INTRAMUSCULAR; INTRAVENOUS at 05:32

## 2021-06-29 RX ADMIN — Medication 1 PATCH: at 09:21

## 2021-06-29 RX ADMIN — METOPROLOL SUCCINATE 25 MG: 25 TABLET, FILM COATED, EXTENDED RELEASE ORAL at 09:20

## 2021-06-29 RX ADMIN — APIXABAN 5 MG: 5 TABLET, FILM COATED ORAL at 14:12

## 2021-06-29 RX ADMIN — LEVALBUTEROL HYDROCHLORIDE 0.63 MG: 0.63 SOLUTION RESPIRATORY (INHALATION) at 08:21

## 2021-06-29 RX ADMIN — METHYLPREDNISOLONE SODIUM SUCCINATE 20 MG: 40 INJECTION, POWDER, FOR SOLUTION INTRAMUSCULAR; INTRAVENOUS at 14:12

## 2021-06-29 RX ADMIN — HEPARIN SODIUM 19.1 UNITS/KG/HR: 10000 INJECTION, SOLUTION INTRAVENOUS at 03:11

## 2021-06-29 NOTE — PLAN OF CARE
Problem: Potential for Falls  Goal: Patient will remain free of falls  Description: INTERVENTIONS:  - Educate patient/family on patient safety including physical limitations  - Instruct patient to call for assistance with activity   - Consult OT/PT to assist with strengthening/mobility   - Keep Call bell within reach  - Keep bed low and locked with side rails adjusted as appropriate  - Keep care items and personal belongings within reach  - Initiate and maintain comfort rounds  - Make Fall Risk Sign visible to staff  - Offer Toileting every Hours, in advance of need  - Initiate/Maintain alarm  - Obtain necessary fall risk management equipment:   - Apply yellow socks and bracelet for high fall risk patients  - Consider moving patient to room near nurses station  Outcome: Adequate for Discharge     Problem: CARDIOVASCULAR - ADULT  Goal: Maintains optimal cardiac output and hemodynamic stability  Description: INTERVENTIONS:  - Monitor I/O, vital signs and rhythm  - Monitor for S/S and trends of decreased cardiac output  - Administer and titrate ordered vasoactive medications to optimize hemodynamic stability  - Assess quality of pulses, skin color and temperature  - Assess for signs of decreased coronary artery perfusion  - Instruct patient to report change in severity of symptoms  Outcome: Adequate for Discharge  Goal: Absence of cardiac dysrhythmias or at baseline rhythm  Description: INTERVENTIONS:  - Continuous cardiac monitoring, vital signs, obtain 12 lead EKG if ordered  - Administer antiarrhythmic and heart rate control medications as ordered  - Monitor electrolytes and administer replacement therapy as ordered  Outcome: Adequate for Discharge     Problem: RESPIRATORY - ADULT  Goal: Achieves optimal ventilation and oxygenation  Description: INTERVENTIONS:  - Assess for changes in respiratory status  - Assess for changes in mentation and behavior  - Position to facilitate oxygenation and minimize respiratory effort  - Oxygen administered by appropriate delivery if ordered  - Initiate smoking cessation education as indicated  - Encourage broncho-pulmonary hygiene including cough, deep breathe, Incentive Spirometry  - Assess the need for suctioning and aspirate as needed  - Assess and instruct to report SOB or any respiratory difficulty  - Respiratory Therapy support as indicated  Outcome: Adequate for Discharge     Problem: METABOLIC, FLUID AND ELECTROLYTES - ADULT  Goal: Electrolytes maintained within normal limits  Description: INTERVENTIONS:  - Monitor labs and assess patient for signs and symptoms of electrolyte imbalances  - Administer electrolyte replacement as ordered  - Monitor response to electrolyte replacements, including repeat lab results as appropriate  - Instruct patient on fluid and nutrition as appropriate  Outcome: Adequate for Discharge

## 2021-06-29 NOTE — ASSESSMENT & PLAN NOTE
Mild exacerbation  Pulm toilet with xopenex (instead of albuterol given aflutter w/ rvr)/ipratropium  Patient was started on Solu-Medrol for COPD exacerbation    Will taper to prednisone

## 2021-06-29 NOTE — PROGRESS NOTES
eliquis priced at 10/mo with assistance card, confirmed with his pharmacy, this was communicated with the primary team  OK for discharge from our end  Will have office reach out to schedule REID/AFLUTTER/ILR procedure with dr Chilango Garcia in the next several weeks  We will sign off, please contact with questions

## 2021-06-29 NOTE — ASSESSMENT & PLAN NOTE
New onset  Initially presenting with narrow complex tachy s/p adenosine x 2 in atrial flutter  Appreciate cardiology input  Patient converted prior to EP ablation  Cardizem discontinued   A/c with heparin gtt-transition to oral Eliquis  Troponin negative   Follow-up with Cardiology as outpatient regarding echo  Discussed with Cardiology okay for discharge  Eliquis is not cost prohibitive    TSH WNL  Cont telemetry monitoring

## 2021-06-29 NOTE — CASE MANAGEMENT
CM price checked Eliquis at [facility]  CM faxed them a coupon card and pt activated card  Cost with Coupon 10 $ a month  Pt made aware and is able to afford  Irineo Salazar friend made aware

## 2021-06-29 NOTE — PLAN OF CARE
Problem: Potential for Falls  Goal: Patient will remain free of falls  Description: INTERVENTIONS:  - Educate patient/family on patient safety including physical limitations  - Instruct patient to call for assistance with activity   - Consult OT/PT to assist with strengthening/mobility   - Keep Call bell within reach  - Keep bed low and locked with side rails adjusted as appropriate  - Keep care items and personal belongings within reach  - Initiate and maintain comfort rounds  - Make Fall Risk Sign visible to staff  - Offer Toileting every 4 Hours, in advance of need  - Obtain necessary fall risk management equipment:   - Apply yellow socks and bracelet for high fall risk patients  - Consider moving patient to room near nurses station  Outcome: Progressing     Problem: CARDIOVASCULAR - ADULT  Goal: Maintains optimal cardiac output and hemodynamic stability  Description: INTERVENTIONS:  - Monitor I/O, vital signs and rhythm  - Monitor for S/S and trends of decreased cardiac output  - Administer and titrate ordered vasoactive medications to optimize hemodynamic stability  - Assess quality of pulses, skin color and temperature  - Assess for signs of decreased coronary artery perfusion  - Instruct patient to report change in severity of symptoms  Outcome: Progressing  Goal: Absence of cardiac dysrhythmias or at baseline rhythm  Description: INTERVENTIONS:  - Continuous cardiac monitoring, vital signs, obtain 12 lead EKG if ordered  - Administer antiarrhythmic and heart rate control medications as ordered  - Monitor electrolytes and administer replacement therapy as ordered  Outcome: Progressing     Problem: METABOLIC, FLUID AND ELECTROLYTES - ADULT  Goal: Electrolytes maintained within normal limits  Description: INTERVENTIONS:  - Monitor labs and assess patient for signs and symptoms of electrolyte imbalances  - Administer electrolyte replacement as ordered  - Monitor response to electrolyte replacements, including repeat lab results as appropriate  - Instruct patient on fluid and nutrition as appropriate  Outcome: Progressing     Problem: RESPIRATORY - ADULT  Goal: Achieves optimal ventilation and oxygenation  Description: INTERVENTIONS:  - Assess for changes in respiratory status  - Assess for changes in mentation and behavior  - Position to facilitate oxygenation and minimize respiratory effort  - Oxygen administered by appropriate delivery if ordered  - Initiate smoking cessation education as indicated  - Encourage broncho-pulmonary hygiene including cough, deep breathe, Incentive Spirometry  - Assess the need for suctioning and aspirate as needed  - Assess and instruct to report SOB or any respiratory difficulty  - Respiratory Therapy support as indicated  Outcome: Not Progressing     Requires 2L NC while asleep

## 2021-06-29 NOTE — DISCHARGE SUMMARY
[address] [facility]  Discharge- Leida Zimmerman 1965, 61 y o  male MRN: [id number]  Unit/Bed#: [facility] 528-01 Encounter: [id number]  Primary Care Provider: Noemi Tijerina MD   Date and time admitted to hospital: 6/27/2021  8:58 AM    * Atrial flutter with rapid ventricular response [facility])  Assessment & Plan  New onset  Initially presenting with narrow complex tachy s/p adenosine x 2 in atrial flutter  Appreciate cardiology input  Patient converted prior to EP ablation  Cardizem discontinued   A/c with heparin gtt-transition to oral Eliquis  Troponin negative   Follow-up with Cardiology as outpatient regarding echo  Discussed with Cardiology okay for discharge  Eliquis is not cost prohibitive  TSH WNL  Cont telemetry monitoring          Alcohol consumption heavy  Assessment & Plan  No sign of w/d  Place on CIWA  Last CIWA 0    Tobacco use  Assessment & Plan  Smoking cessation strongly encouraged; discussed the physical impacts on respiratory and cardiovascular systems with the patient   He is agreeable to quitting  Continue nicotine patch at this time     Morbid obesity [facility])  Assessment & Plan  Lifestyle/dietary modification    Essential hypertension  Assessment & Plan  Stable, consider resuming losartan in am  BP currently stable 120/72  Vital signs as per unit routine     COPD (chronic obstructive pulmonary disease) (HCC)  Assessment & Plan  Mild exacerbation  Pulm toilet with xopenex (instead of albuterol given aflutter w/ rvr)/ipratropium  Patient was started on Solu-Medrol for COPD exacerbation    Will taper to prednisone      Discharging Physician / Practitioner: Ibrahima Juarez DO  PCP: Noemi Tijerina MD  Admission Date:   Admission Orders (From admission, onward)     Ordered        06/27/21 1250  Inpatient Admission  Once                   Discharge Date: 06/29/21    Medical Problems     Resolved Problems  Date Reviewed: 6/28/2021    None                Consultations During [facility] [place]:  · Cardiology/EP    Procedures Performed:   · Echocardiogram-EF 60%  No wall regional motion abnormality noted  · Chest x-ray no acute disease    Significant Findings / Test Results:   · As above    Incidental Findings:   · As above    Test Results Pending at Discharge (will require follow up):   · As above      Reason for Admission:  Atrial flutter with rapid ventricular response  Hospital Course:     Dorita Mustafa is a 61 y o  male patient who originally presented to the hospital on 6/27/2021 with a past medical history of COPD hypertension who presents the hospital from the urgent care center for assessment and tachycardia  Patient was noted have mild URI symptoms including cough rhinorrhea but denied any fevers chills or shortness of breath  Patient presented at that time feeling diaphoretic and presented with a heart rate in the 150s  He had a narrow complex tachycardia  He was given multiple doses of adenosine and subsequently thereafter had underlying rhythm of atrial flutter  He was followed by EP and placed initially on a Cardizem drip  Fortunately, the patient had converted to sinus rhythm without any intervention  By way further discussion with the Cardiology Service, plan will be to fully anticoagulate with Eliquis and follow up outpatient for REID ablation assessment with loop recorder placement  Condition at Discharge: fair     Discharge Day Visit / Exam:     * Please refer to separate progress note for these details *      Discharge instructions/Information to patient and family:   See after visit summary for information provided to patient and family  Provisions for Follow-Up Care:  See after visit summary for information related to follow-up care and any pertinent home health orders        Disposition:     Home    For Discharges to [facility] SNF:   · Not Applicable to this Patient - Not Applicable to this Patient    Planned Readmission:  No     Discharge Statement:  I spent 35 minutes discharging the patient  This time was spent on the day of discharge  I had direct contact with the patient on the day of discharge  Greater than 50% of the total time was spent examining patient, answering all patient questions, arranging and discussing plan of care with patient as well as directly providing post-discharge instructions  Additional time then spent on discharge activities  Discharge Medications:  See after visit summary for reconciled discharge medications provided to patient and family        ** Please Note: This note has been constructed using a voice recognition system **

## 2021-06-29 NOTE — PROGRESS NOTES
The azithromycin has / have been converted to Oral per [facility] IV-to-PO Auto-Conversion Protocol for Adults as approved by the Pharmacy and Therapeutics Committee  The patient met all eligible criteria:  3 Age = 25years old   2) Received at least one dose of the IV form   3) Receiving at least one other scheduled oral/enteral medication   4) Tolerating an oral/enteral diet   and did not have any exclusions:   1) Critical care patient   2) Active GI bleed (IF assessing H2RAs or PPIs)   3) Continuous tube feeding (IF assessing cipro, doxycycline, levofloxacin, minocycline, rifampin, or voriconazole)   4) Receiving PO vancomycin (IF assessing metronidazole)   5) Persistent nausea and/or vomiting   6) Ileus or gastrointestinal obstruction   7) Ria/nasogastric tube set for continuous suction   8) Specific order not to automatically convert to PO (in the order's comments or if discussed in the most recent Infectious Disease or primary team's progress notes)       Kimberly Mcmahan, KaseyD

## 2021-06-30 ENCOUNTER — TRANSITIONAL CARE MANAGEMENT (OUTPATIENT)
Dept: FAMILY MEDICINE CLINIC | Facility: CLINIC | Age: 56
End: 2021-06-30

## 2021-06-30 ENCOUNTER — TELEPHONE (OUTPATIENT)
Dept: CARDIOLOGY CLINIC | Facility: CLINIC | Age: 56
End: 2021-06-30

## 2021-06-30 VITALS
HEART RATE: 94 BPM | OXYGEN SATURATION: 95 % | WEIGHT: 251.32 LBS | RESPIRATION RATE: 19 BRPM | BODY MASS INDEX: 35.19 KG/M2 | HEIGHT: 71 IN | SYSTOLIC BLOOD PRESSURE: 130 MMHG | DIASTOLIC BLOOD PRESSURE: 72 MMHG | TEMPERATURE: 99.5 F

## 2021-06-30 DIAGNOSIS — I48.92 ATRIAL FLUTTER WITH RAPID VENTRICULAR RESPONSE (HCC): Primary | ICD-10-CM

## 2021-06-30 NOTE — TELEPHONE ENCOUNTER
He does not need auth for his REID [id number], Ablation [id number] and Loop Implant [id number], [id number] on 7/15/21 at [place] [facility] at [address] [place] [address] Dr   Ref# [id number]

## 2021-06-30 NOTE — TELEPHONE ENCOUNTER
Patient scheduled for REID/Aflutter ablation/Loop implant on 7/15/21 in \[facility]\"" with Dr Nancy Lara  Mailing patient instructions  Medication hold Eliquis morning of  Can I have auth? Dr Nancy Lara, what company?

## 2021-07-01 LAB
BACTERIA SPT RESP CULT: ABNORMAL
GRAM STN SPEC: ABNORMAL

## 2021-07-01 NOTE — UTILIZATION REVIEW
Notification of Discharge   This is a Notification of Discharge from our facility [address]  Please be advised that this patient has been discharge from our facility  Below you will find the admission and discharge date and time including the patients disposition  UTILIZATION REVIEW CONTACT:  Lele Doe  Utilization   Network Utilization Review Department  Phone: [phone] x carefully listen to the prompts  All voicemails are confidential   Email: [email or web address]     PHYSICIAN ADVISORY SERVICES:  FOR VVMI-YV-GTAX REVIEW - MEDICAL NECESSITY DENIAL  Phone: [phone]  Fax: [phone]  Email: [email or web address]     PRESENTATION DATE: 6/27/2021  8:58 AM  OBERVATION ADMISSION DATE:   INPATIENT ADMISSION DATE: 6/27/21 12:50 PM   DISCHARGE DATE: 6/29/2021  3:42 PM  DISPOSITION: PRA - Home PRA - Home      IMPORTANT INFORMATION:  Send all requests for admission clinical reviews, approved or denied determinations and any other requests to dedicated fax number below belonging to the campus where the patient is receiving treatment   List of dedicated fax numbers:  [address] DENIALS (Administrative/Medical Necessity) [phone]   [address] (Maternity/NICU/Pediatrics) [phone]   Lei Fuller [phone]   Davi Mar [phone]   Shena Salgado [phone]   [address] [phone]   [facility]  [phone]   [address] [facility] [address]  [address] [facility] [address] [facility] [phone]

## 2021-07-06 ENCOUNTER — OFFICE VISIT (OUTPATIENT)
Dept: FAMILY MEDICINE CLINIC | Facility: CLINIC | Age: 56
End: 2021-07-06
Payer: COMMERCIAL

## 2021-07-06 VITALS
HEART RATE: 96 BPM | OXYGEN SATURATION: 97 % | RESPIRATION RATE: 20 BRPM | SYSTOLIC BLOOD PRESSURE: 122 MMHG | WEIGHT: 247.8 LBS | DIASTOLIC BLOOD PRESSURE: 76 MMHG | BODY MASS INDEX: 34.69 KG/M2 | HEIGHT: 71 IN | TEMPERATURE: 97.5 F

## 2021-07-06 DIAGNOSIS — E66.9 OBESITY (BMI 30-39.9): ICD-10-CM

## 2021-07-06 DIAGNOSIS — Z12.5 SCREENING FOR PROSTATE CANCER: ICD-10-CM

## 2021-07-06 DIAGNOSIS — F17.219 CIGARETTE NICOTINE DEPENDENCE WITH NICOTINE-INDUCED DISORDER: ICD-10-CM

## 2021-07-06 DIAGNOSIS — Z09 HOSPITAL DISCHARGE FOLLOW-UP: Primary | ICD-10-CM

## 2021-07-06 DIAGNOSIS — E78.5 HYPERLIPIDEMIA, UNSPECIFIED HYPERLIPIDEMIA TYPE: ICD-10-CM

## 2021-07-06 DIAGNOSIS — F41.8 DEPRESSION WITH ANXIETY: ICD-10-CM

## 2021-07-06 DIAGNOSIS — E11.65 TYPE 2 DIABETES MELLITUS WITH HYPERGLYCEMIA, WITHOUT LONG-TERM CURRENT USE OF INSULIN (HCC): ICD-10-CM

## 2021-07-06 DIAGNOSIS — J20.9 ACUTE BRONCHITIS, UNSPECIFIED ORGANISM: ICD-10-CM

## 2021-07-06 DIAGNOSIS — Z72.89 ALCOHOL USE: ICD-10-CM

## 2021-07-06 DIAGNOSIS — R06.83 SNORES: ICD-10-CM

## 2021-07-06 DIAGNOSIS — I48.92 ATRIAL FLUTTER WITH RAPID VENTRICULAR RESPONSE (HCC): ICD-10-CM

## 2021-07-06 DIAGNOSIS — I10 ESSENTIAL HYPERTENSION: ICD-10-CM

## 2021-07-06 LAB — SL AMB POCT HEMOGLOBIN AIC: 7.5 (ref ?–6.5)

## 2021-07-06 PROCEDURE — 99496 TRANSJ CARE MGMT HIGH F2F 7D: CPT | Performed by: FAMILY MEDICINE

## 2021-07-06 PROCEDURE — 83036 HEMOGLOBIN GLYCOSYLATED A1C: CPT | Performed by: FAMILY MEDICINE

## 2021-07-06 PROCEDURE — 1111F DSCHRG MED/CURRENT MED MERGE: CPT | Performed by: FAMILY MEDICINE

## 2021-07-06 RX ORDER — ALBUTEROL SULFATE 90 UG/1
2 AEROSOL, METERED RESPIRATORY (INHALATION) EVERY 4 HOURS PRN
Qty: 18 G | Refills: 0 | Status: ON HOLD | OUTPATIENT
Start: 2021-07-06 | End: 2022-05-19 | Stop reason: SDUPTHER

## 2021-07-06 RX ORDER — ALPRAZOLAM 0.25 MG/1
0.25 TABLET ORAL
Qty: 30 TABLET | Refills: 0 | Status: SHIPPED | OUTPATIENT
Start: 2021-07-06 | End: 2022-05-19

## 2021-07-06 RX ORDER — LOSARTAN POTASSIUM 50 MG/1
50 TABLET ORAL DAILY
Qty: 30 TABLET | Refills: 5 | Status: SHIPPED | OUTPATIENT
Start: 2021-07-06 | End: 2022-01-03

## 2021-07-06 NOTE — ASSESSMENT & PLAN NOTE
Lab Results   Component Value Date    HGBA1C 7 5 (A) 07/06/2021     Low carb diet  Give metformin 1000mg bid  SE educated pt  Refer to diabetic education class

## 2021-07-06 NOTE — PROGRESS NOTES
Chief Complaint   Patient presents with    Transition of Care Management     Discharged 06/29/21  BMI Counseling: Body mass index is 34 56 kg/m²  The BMI is above normal  Nutrition recommendations include decreasing portion sizes, encouraging healthy choices of fruits and vegetables, decreasing fast food intake, consuming healthier snacks and limiting drinks that contain sugar  Exercise recommendations include moderate physical activity 150 minutes/week  No pharmacotherapy was ordered  Assessment/Plan:     Type 2 diabetes mellitus with hyperglycemia, without long-term current use of insulin (HCC)    Lab Results   Component Value Date    HGBA1C 7 5 (A) 07/06/2021     Low carb diet  Give metformin 1000mg bid  SE educated pt  Refer to diabetic education class  Essential hypertension  Continue losartan and metoprolol  FU cardiology  Atrial flutter with rapid ventricular response [facility])  FU cardiology as scheduled  Depression with anxiety  Give xanax 0 25mg qhs prn  SE educated pt  Do not use with alcohol  Hyperlipidemia  Low fat diet  Consider statins  Nicotine dependence  Continue nicotine patch  Alcohol use  Strongly advised pt to quit  Got flu shot yearly  Got Covid19 vaccine  Denies SE  Got pneumovax in 12/2020  Will check insurance for coverage of Tdap and shingrix  Check PSA yearly  Pros and cons educated pt  Colonoscopy 3/2021, repeat in 5 years  RTO in 3 months or sooner as needed  Diagnoses and all orders for this visit:    Hospital discharge follow-up  Comments:  Reviewed hospital records  Atrial flutter with rapid ventricular response ([facility] [address] )  -     Home Study; Future  -     Comprehensive metabolic panel; Future  -     Hemoglobin A1C; Future  -     Lipid Panel with Direct LDL reflex; Future  -     Microalbumin / creatinine urine ratio; Future  -     PSA, Total Screen;  Future  -     Comprehensive metabolic panel  -     Lipid Panel with Direct LDL reflex  -     PSA, Total Screen    Type 2 diabetes mellitus with hyperglycemia, without long-term current use of insulin (HCC)  -     POCT hemoglobin A1c  -     metFORMIN (GLUCOPHAGE) 1000 MG tablet; Take 1 tablet (1,000 mg total) by mouth 2 (two) times a day with meals  -     Ambulatory referral to Diabetic Education; Future  -     Comprehensive metabolic panel; Future  -     Hemoglobin A1C; Future  -     Lipid Panel with Direct LDL reflex; Future  -     Microalbumin / creatinine urine ratio; Future  -     PSA, Total Screen; Future  -     Comprehensive metabolic panel  -     Lipid Panel with Direct LDL reflex  -     PSA, Total Screen    Snores  -     Home Study; Future    Essential hypertension  -     losartan (COZAAR) 50 mg tablet; Take 1 tablet (50 mg total) by mouth daily  -     Comprehensive metabolic panel; Future  -     Hemoglobin A1C; Future  -     Lipid Panel with Direct LDL reflex; Future  -     Microalbumin / creatinine urine ratio; Future  -     PSA, Total Screen; Future  -     Comprehensive metabolic panel  -     Lipid Panel with Direct LDL reflex  -     PSA, Total Screen    Acute bronchitis, unspecified organism  -     albuterol (Ventolin HFA) 90 mcg/act inhaler; Inhale 2 puffs every 4 (four) hours as needed for wheezing    Depression with anxiety  -     ALPRAZolam (XANAX) 0 25 mg tablet; Take 1 tablet (0 25 mg total) by mouth daily at bedtime as needed for anxiety or sleep  -     Comprehensive metabolic panel; Future  -     Hemoglobin A1C; Future  -     Lipid Panel with Direct LDL reflex; Future  -     Microalbumin / creatinine urine ratio; Future  -     PSA, Total Screen; Future  -     Comprehensive metabolic panel  -     Lipid Panel with Direct LDL reflex  -     PSA, Total Screen    Screening for prostate cancer  -     Comprehensive metabolic panel; Future  -     Hemoglobin A1C; Future  -     Lipid Panel with Direct LDL reflex; Future  -     Microalbumin / creatinine urine ratio;  Future  - PSA, Total Screen; Future  -     Comprehensive metabolic panel  -     Lipid Panel with Direct LDL reflex  -     PSA, Total Screen    Hyperlipidemia, unspecified hyperlipidemia type    Cigarette nicotine dependence with nicotine-induced disorder    Obesity (BMI 30-39  9)    Alcohol use         Subjective:     Patient ID: Eloy Sellers is a 61 y o  male  HPI    Pt is here by himself  Was in hospital 6/27-6/29/2021 for Aflutter with RVR  Cardiology consulted  Pt converted prior to EP ablation  Got metoprolol 25mg bid and eliquis 5mg bid  Denies side effects  Discharged home  Will see cardiology OP next week  Pt states he feels fine  Denies palpitation, SOB, CP, n/v/abd pain  Pt states he snore at night  Never do sleep study  HTN---Pt states lisinopril made him cough, so he stopped  He is on losartan 50mg QD and metoprolol 25mg bid now  Denies headache, SOB or chest pain  BP is good today  DM----Today HgA1C 7 5 in office  Mother and brother has DM  Newly diagnosed  Never took meds for it before       Hyperlipidemia---Tried to eat healthy  No recent labs         COPD---use albuterol as needed  Smoking for years  Quit now since hospital staying  Anxiety/depression-----Feels anxious for his medical issues  Cannot sleep at night  Family are in [place] now  Denies HI/SI  Pt states he got xanax in the hospital which helped him  Would like to have xanxax prn for sleep        Obesity---BMI 34 56 today  No exercise       Smoke 1ppd for 25 years  He is on nicotine patch now  Alcohol daily  Advised pt to quit  No drugs  Review of Systems   Constitutional: Negative for appetite change, chills and fever  HENT: Negative for congestion, ear pain, sinus pain and sore throat  Eyes: Negative for discharge and itching  Respiratory: Negative for apnea, cough, chest tightness, shortness of breath and wheezing      Cardiovascular: Negative for chest pain, palpitations and leg swelling  Gastrointestinal: Negative for abdominal pain, anal bleeding, constipation, diarrhea, nausea and vomiting  Endocrine: Negative for cold intolerance, heat intolerance and polyuria  Genitourinary: Negative for difficulty urinating and dysuria  Musculoskeletal: Negative for arthralgias, back pain and myalgias  Skin: Negative for rash  Neurological: Negative for dizziness and headaches  Psychiatric/Behavioral: Positive for sleep disturbance  Negative for agitation, self-injury and suicidal ideas  The patient is nervous/anxious  Objective:     Physical Exam  Constitutional:       Appearance: He is well-developed  HENT:      Head: Normocephalic and atraumatic  Eyes:      General:         Right eye: No discharge  Left eye: No discharge  Conjunctiva/sclera: Conjunctivae normal    Cardiovascular:      Rate and Rhythm: Normal rate and regular rhythm  Heart sounds: Normal heart sounds  No murmur heard  No friction rub  No gallop  Pulmonary:      Effort: Pulmonary effort is normal  No respiratory distress  Breath sounds: Normal breath sounds  No wheezing or rales  Abdominal:      General: Bowel sounds are normal  There is no distension  Palpations: Abdomen is soft  Tenderness: There is no abdominal tenderness  There is no guarding  Musculoskeletal:         General: Normal range of motion  Cervical back: Normal range of motion and neck supple  No tenderness  Right lower leg: No edema  Left lower leg: No edema  Lymphadenopathy:      Cervical: No cervical adenopathy  Neurological:      Mental Status: He is alert             Vitals:    07/06/21 1125   BP: 122/76   BP Location: Left arm   Patient Position: Sitting   Cuff Size: Large   Pulse: 96   Resp: 20   Temp: 97 5 °F (36 4 °C)   TempSrc: Temporal   SpO2: 97%   Weight: 112 kg (247 lb 12 8 oz)   Height: 5' 11" (1 803 m)       Transitional Care Management Review:  Deepali Pickard is a 61 y o  male here for TCM follow up  During the TCM phone call patient stated:    TCM Call (since 6/5/2021)     Date and time call was made  6/30/2021  4:30 PM    Hospital care reviewed  Records reviewed    Patient was hospitialized at  [facility]        Date of Admission  06/27/21    Date of discharge  06/29/21    Diagnosis  Atrial flutter with rapid ventricular response ([address] )    Disposition  Home    Were the patients medications reviewed and updated  Yes    Current Symptoms  Cough    Cough Severity  Mild      TCM Call (since 6/5/2021)     Post hospital issues  None    Should patient be enrolled in anticoag monitoring? No (Comment)  Taking Eliquis    Scheduled for follow up?   Yes    Patients specialists  Cardiologist    Did you obtain your prescribed medications  Yes    Do you need help managing your prescriptions or medications  No    Is transportation to your appointment needed  No    I have advised the patient to call PCP with any new or worsening symptoms  Monica Gale, Clem Lyonst Hardeep or Significiant other    Support System  Spouse    The type of support provided  Emotional; Physical    Do you have social support  Yes, as much as I need    Are you recieving any outpatient services  No    Are you recieving home care services  No    Are you using any community resources  No    Current waiver services  No    Have you fallen in the last 12 months  No    Interperter language line needed  No    Counseling  Patient    Counseling topics  instructions for management          Elzbieta Martínez MD

## 2021-07-14 ENCOUNTER — TELEPHONE (OUTPATIENT)
Dept: CARDIOLOGY CLINIC | Facility: CLINIC | Age: 56
End: 2021-07-14

## 2021-07-14 NOTE — TELEPHONE ENCOUNTER
Patient was to have an appoint with me at 10:00 a m  today in the office for post hospital follow-up  He was recently hospitalized at [facility] where he was evaluated by the electrophysiology service due to atrial flutter felt to be CTI dependent  During his stay he converted spontaneously to normal sinus rhythm prior to a flutter ablation being performed with an inpatient ablation being deferred due to bilateral wheezing  Patient was initiated anticoagulation and discharged home  Since returning home he feels pretty cleared up as he does not have any further wheezing or any palpitations, lightheadedness, dizziness or shortness of breath  His procedure for REID atrial flutter and loop recorder is arranged for tomorrow linda Quintero Due to this is procedure in the office today was canceled  We did discuss being NPO after midnight tonight and holding his Xanax and Eliquis in the linda Quintero Discussed typical duration length of the procedure as well as postop 4 hour bedrest and most likely ability to be discharged after bedrest as long as procedure goes well  Inform patient to have someone drive him to and from the hospital tomorrow  Discussed post procedure limitations including not lifting more than 10 lb for 1 week and not soaking his lower happen still water for 1 week but being able to shower normally  He had no further questions or concerns

## 2021-07-15 ENCOUNTER — ANESTHESIA (OUTPATIENT)
Dept: NON INVASIVE DIAGNOSTICS | Facility: HOSPITAL | Age: 56
End: 2021-07-15
Payer: COMMERCIAL

## 2021-07-15 ENCOUNTER — ANESTHESIA EVENT (OUTPATIENT)
Dept: NON INVASIVE DIAGNOSTICS | Facility: HOSPITAL | Age: 56
End: 2021-07-15
Payer: COMMERCIAL

## 2021-07-15 ENCOUNTER — ANESTHESIA EVENT (OUTPATIENT)
Dept: ANESTHESIOLOGY | Facility: HOSPITAL | Age: 56
End: 2021-07-15

## 2021-07-15 ENCOUNTER — APPOINTMENT (OUTPATIENT)
Dept: NON INVASIVE DIAGNOSTICS | Facility: HOSPITAL | Age: 56
End: 2021-07-15
Attending: INTERNAL MEDICINE
Payer: COMMERCIAL

## 2021-07-15 ENCOUNTER — ANESTHESIA (OUTPATIENT)
Dept: ANESTHESIOLOGY | Facility: HOSPITAL | Age: 56
End: 2021-07-15

## 2021-07-15 ENCOUNTER — HOSPITAL ENCOUNTER (OUTPATIENT)
Dept: NON INVASIVE DIAGNOSTICS | Facility: HOSPITAL | Age: 56
Discharge: HOME | End: 2021-07-15
Attending: INTERNAL MEDICINE | Admitting: INTERNAL MEDICINE
Payer: COMMERCIAL

## 2021-07-15 VITALS
TEMPERATURE: 98.5 F | RESPIRATION RATE: 18 BRPM | OXYGEN SATURATION: 93 % | SYSTOLIC BLOOD PRESSURE: 150 MMHG | HEART RATE: 104 BPM | DIASTOLIC BLOOD PRESSURE: 74 MMHG

## 2021-07-15 DIAGNOSIS — I48.92 ATRIAL FLUTTER WITH RAPID VENTRICULAR RESPONSE (HCC): ICD-10-CM

## 2021-07-15 LAB
ANION GAP SERPL CALCULATED.3IONS-SCNC: 4 MMOL/L (ref 4–13)
ATRIAL RATE: 75 BPM
ATRIAL RATE: 98 BPM
BASOPHILS # BLD AUTO: 0.08 THOUSANDS/ΜL (ref 0–0.1)
BASOPHILS NFR BLD AUTO: 1 % (ref 0–1)
BUN SERPL-MCNC: 17 MG/DL (ref 5–25)
CALCIUM SERPL-MCNC: 9.4 MG/DL (ref 8.3–10.1)
CHLORIDE SERPL-SCNC: 104 MMOL/L (ref 100–108)
CO2 SERPL-SCNC: 27 MMOL/L (ref 21–32)
CREAT SERPL-MCNC: 0.88 MG/DL (ref 0.6–1.3)
EOSINOPHIL # BLD AUTO: 0.36 THOUSAND/ΜL (ref 0–0.61)
EOSINOPHIL NFR BLD AUTO: 3 % (ref 0–6)
ERYTHROCYTE [DISTWIDTH] IN BLOOD BY AUTOMATED COUNT: 12.1 % (ref 11.6–15.1)
GFR SERPL CREATININE-BSD FRML MDRD: 95 ML/MIN/1.73SQ M
GLUCOSE P FAST SERPL-MCNC: 173 MG/DL (ref 65–99)
GLUCOSE SERPL-MCNC: 165 MG/DL (ref 65–140)
GLUCOSE SERPL-MCNC: 173 MG/DL (ref 65–140)
HCT VFR BLD AUTO: 46.3 % (ref 36.5–49.3)
HGB BLD-MCNC: 15.5 G/DL (ref 12–17)
IMM GRANULOCYTES # BLD AUTO: 0.06 THOUSAND/UL (ref 0–0.2)
IMM GRANULOCYTES NFR BLD AUTO: 1 % (ref 0–2)
LYMPHOCYTES # BLD AUTO: 2.97 THOUSANDS/ΜL (ref 0.6–4.47)
LYMPHOCYTES NFR BLD AUTO: 27 % (ref 14–44)
MAGNESIUM SERPL-MCNC: 2.1 MG/DL (ref 1.6–2.6)
MCH RBC QN AUTO: 31.6 PG (ref 26.8–34.3)
MCHC RBC AUTO-ENTMCNC: 33.5 G/DL (ref 31.4–37.4)
MCV RBC AUTO: 94 FL (ref 82–98)
MONOCYTES # BLD AUTO: 0.8 THOUSAND/ΜL (ref 0.17–1.22)
MONOCYTES NFR BLD AUTO: 7 % (ref 4–12)
NEUTROPHILS # BLD AUTO: 6.75 THOUSANDS/ΜL (ref 1.85–7.62)
NEUTS SEG NFR BLD AUTO: 61 % (ref 43–75)
NRBC BLD AUTO-RTO: 0 /100 WBCS
P AXIS: 64 DEGREES
P AXIS: 64 DEGREES
PLATELET # BLD AUTO: 292 THOUSANDS/UL (ref 149–390)
PMV BLD AUTO: 9.6 FL (ref 8.9–12.7)
POTASSIUM SERPL-SCNC: 5.1 MMOL/L (ref 3.5–5.3)
PR INTERVAL: 148 MS
PR INTERVAL: 152 MS
QRS AXIS: 10 DEGREES
QRS AXIS: 11 DEGREES
QRSD INTERVAL: 86 MS
QRSD INTERVAL: 88 MS
QT INTERVAL: 350 MS
QT INTERVAL: 360 MS
QTC INTERVAL: 402 MS
QTC INTERVAL: 446 MS
RBC # BLD AUTO: 4.91 MILLION/UL (ref 3.88–5.62)
SODIUM SERPL-SCNC: 135 MMOL/L (ref 136–145)
T WAVE AXIS: 45 DEGREES
T WAVE AXIS: 49 DEGREES
VENTRICULAR RATE: 75 BPM
VENTRICULAR RATE: 98 BPM
WBC # BLD AUTO: 11.02 THOUSAND/UL (ref 4.31–10.16)

## 2021-07-15 PROCEDURE — 93653 COMPRE EP EVAL TX SVT: CPT | Performed by: INTERNAL MEDICINE

## 2021-07-15 PROCEDURE — 83735 ASSAY OF MAGNESIUM: CPT | Performed by: PHYSICIAN ASSISTANT

## 2021-07-15 PROCEDURE — 80048 BASIC METABOLIC PNL TOTAL CA: CPT | Performed by: PHYSICIAN ASSISTANT

## 2021-07-15 PROCEDURE — C1733 CATH, EP, OTHR THAN COOL-TIP: HCPCS | Performed by: INTERNAL MEDICINE

## 2021-07-15 PROCEDURE — 93613 INTRACARDIAC EPHYS 3D MAPG: CPT

## 2021-07-15 PROCEDURE — 76937 US GUIDE VASCULAR ACCESS: CPT | Performed by: INTERNAL MEDICINE

## 2021-07-15 PROCEDURE — 93621 COMP EP EVL L PAC&REC C SINS: CPT

## 2021-07-15 PROCEDURE — 93613 INTRACARDIAC EPHYS 3D MAPG: CPT | Performed by: INTERNAL MEDICINE

## 2021-07-15 PROCEDURE — C1764 EVENT RECORDER, CARDIAC: HCPCS

## 2021-07-15 PROCEDURE — 33285 INSJ SUBQ CAR RHYTHM MNTR: CPT | Performed by: INTERNAL MEDICINE

## 2021-07-15 PROCEDURE — 82948 REAGENT STRIP/BLOOD GLUCOSE: CPT

## 2021-07-15 PROCEDURE — 93621 COMP EP EVL L PAC&REC C SINS: CPT | Performed by: INTERNAL MEDICINE

## 2021-07-15 PROCEDURE — C1893 INTRO/SHEATH, FIXED,NON-PEEL: HCPCS | Performed by: INTERNAL MEDICINE

## 2021-07-15 PROCEDURE — 93005 ELECTROCARDIOGRAM TRACING: CPT

## 2021-07-15 PROCEDURE — 85025 COMPLETE CBC W/AUTO DIFF WBC: CPT | Performed by: PHYSICIAN ASSISTANT

## 2021-07-15 PROCEDURE — C1730 CATH, EP, 19 OR FEW ELECT: HCPCS | Performed by: INTERNAL MEDICINE

## 2021-07-15 PROCEDURE — 93010 ELECTROCARDIOGRAM REPORT: CPT | Performed by: INTERNAL MEDICINE

## 2021-07-15 PROCEDURE — C1894 INTRO/SHEATH, NON-LASER: HCPCS | Performed by: INTERNAL MEDICINE

## 2021-07-15 PROCEDURE — NC001 PR NO CHARGE: Performed by: INTERNAL MEDICINE

## 2021-07-15 RX ORDER — OXYCODONE HYDROCHLORIDE 5 MG/1
5 TABLET ORAL EVERY 4 HOURS PRN
Status: DISCONTINUED | OUTPATIENT
Start: 2021-07-15 | End: 2021-07-15 | Stop reason: HOSPADM

## 2021-07-15 RX ORDER — SODIUM CHLORIDE 9 MG/ML
INJECTION, SOLUTION INTRAVENOUS CONTINUOUS PRN
Status: DISCONTINUED | OUTPATIENT
Start: 2021-07-15 | End: 2021-07-15

## 2021-07-15 RX ORDER — MIDAZOLAM HYDROCHLORIDE 2 MG/2ML
INJECTION, SOLUTION INTRAMUSCULAR; INTRAVENOUS AS NEEDED
Status: DISCONTINUED | OUTPATIENT
Start: 2021-07-15 | End: 2021-07-15

## 2021-07-15 RX ORDER — ALBUTEROL SULFATE 90 UG/1
2 AEROSOL, METERED RESPIRATORY (INHALATION) EVERY 4 HOURS PRN
Status: DISCONTINUED | OUTPATIENT
Start: 2021-07-15 | End: 2021-07-15 | Stop reason: HOSPADM

## 2021-07-15 RX ORDER — FENTANYL CITRATE/PF 50 MCG/ML
25 SYRINGE (ML) INJECTION
Status: CANCELLED | OUTPATIENT
Start: 2021-07-15

## 2021-07-15 RX ORDER — ONDANSETRON 2 MG/ML
4 INJECTION INTRAMUSCULAR; INTRAVENOUS ONCE AS NEEDED
Status: CANCELLED | OUTPATIENT
Start: 2021-07-15

## 2021-07-15 RX ORDER — ALBUTEROL SULFATE 90 UG/1
AEROSOL, METERED RESPIRATORY (INHALATION) AS NEEDED
Status: DISCONTINUED | OUTPATIENT
Start: 2021-07-15 | End: 2021-07-15

## 2021-07-15 RX ORDER — SUCCINYLCHOLINE/SOD CL,ISO/PF 100 MG/5ML
SYRINGE (ML) INTRAVENOUS AS NEEDED
Status: DISCONTINUED | OUTPATIENT
Start: 2021-07-15 | End: 2021-07-15

## 2021-07-15 RX ORDER — LIDOCAINE HYDROCHLORIDE 10 MG/ML
INJECTION, SOLUTION EPIDURAL; INFILTRATION; INTRACAUDAL; PERINEURAL CODE/TRAUMA/SEDATION MEDICATION
Status: COMPLETED | OUTPATIENT
Start: 2021-07-15 | End: 2021-07-15

## 2021-07-15 RX ORDER — FENTANYL CITRATE 50 UG/ML
INJECTION, SOLUTION INTRAMUSCULAR; INTRAVENOUS AS NEEDED
Status: DISCONTINUED | OUTPATIENT
Start: 2021-07-15 | End: 2021-07-15

## 2021-07-15 RX ORDER — ALBUTEROL SULFATE 2.5 MG/3ML
2.5 SOLUTION RESPIRATORY (INHALATION) ONCE AS NEEDED
Status: CANCELLED | OUTPATIENT
Start: 2021-07-15

## 2021-07-15 RX ORDER — DEXAMETHASONE SODIUM PHOSPHATE 10 MG/ML
INJECTION, SOLUTION INTRAMUSCULAR; INTRAVENOUS AS NEEDED
Status: DISCONTINUED | OUTPATIENT
Start: 2021-07-15 | End: 2021-07-15

## 2021-07-15 RX ORDER — ONDANSETRON 2 MG/ML
INJECTION INTRAMUSCULAR; INTRAVENOUS AS NEEDED
Status: DISCONTINUED | OUTPATIENT
Start: 2021-07-15 | End: 2021-07-15

## 2021-07-15 RX ORDER — ROCURONIUM BROMIDE 10 MG/ML
INJECTION, SOLUTION INTRAVENOUS AS NEEDED
Status: DISCONTINUED | OUTPATIENT
Start: 2021-07-15 | End: 2021-07-15

## 2021-07-15 RX ORDER — HYDROMORPHONE HCL/PF 1 MG/ML
0.5 SYRINGE (ML) INJECTION
Status: CANCELLED | OUTPATIENT
Start: 2021-07-15

## 2021-07-15 RX ORDER — CEFAZOLIN SODIUM 2 G/50ML
SOLUTION INTRAVENOUS AS NEEDED
Status: DISCONTINUED | OUTPATIENT
Start: 2021-07-15 | End: 2021-07-15

## 2021-07-15 RX ORDER — EPHEDRINE SULFATE 50 MG/ML
INJECTION INTRAVENOUS AS NEEDED
Status: DISCONTINUED | OUTPATIENT
Start: 2021-07-15 | End: 2021-07-15

## 2021-07-15 RX ORDER — ALBUTEROL SULFATE 90 UG/1
2 AEROSOL, METERED RESPIRATORY (INHALATION) EVERY 4 HOURS PRN
Status: CANCELLED | OUTPATIENT
Start: 2021-07-15

## 2021-07-15 RX ORDER — SODIUM CHLORIDE 9 MG/ML
100 INJECTION, SOLUTION INTRAVENOUS CONTINUOUS
Status: CANCELLED | OUTPATIENT
Start: 2021-07-15

## 2021-07-15 RX ORDER — ACETAMINOPHEN 325 MG/1
650 TABLET ORAL EVERY 4 HOURS PRN
Status: DISCONTINUED | OUTPATIENT
Start: 2021-07-15 | End: 2021-07-15 | Stop reason: HOSPADM

## 2021-07-15 RX ORDER — PROMETHAZINE HYDROCHLORIDE 25 MG/ML
12.5 INJECTION, SOLUTION INTRAMUSCULAR; INTRAVENOUS ONCE AS NEEDED
Status: CANCELLED | OUTPATIENT
Start: 2021-07-15

## 2021-07-15 RX ORDER — MEPERIDINE HYDROCHLORIDE 25 MG/ML
12.5 INJECTION INTRAMUSCULAR; INTRAVENOUS; SUBCUTANEOUS
Status: CANCELLED | OUTPATIENT
Start: 2021-07-15

## 2021-07-15 RX ORDER — PROPOFOL 10 MG/ML
INJECTION, EMULSION INTRAVENOUS AS NEEDED
Status: DISCONTINUED | OUTPATIENT
Start: 2021-07-15 | End: 2021-07-15

## 2021-07-15 RX ORDER — LIDOCAINE HYDROCHLORIDE 10 MG/ML
INJECTION, SOLUTION EPIDURAL; INFILTRATION; INTRACAUDAL; PERINEURAL AS NEEDED
Status: DISCONTINUED | OUTPATIENT
Start: 2021-07-15 | End: 2021-07-15

## 2021-07-15 RX ADMIN — EPHEDRINE SULFATE 5 MG: 50 INJECTION, SOLUTION INTRAVENOUS at 08:45

## 2021-07-15 RX ADMIN — DEXAMETHASONE SODIUM PHOSPHATE 10 MG: 10 INJECTION, SOLUTION INTRAMUSCULAR; INTRAVENOUS at 08:21

## 2021-07-15 RX ADMIN — NOREPINEPHRINE BITARTRATE 4 MCG/KG/MIN: 1 INJECTION, SOLUTION, CONCENTRATE INTRAVENOUS at 08:21

## 2021-07-15 RX ADMIN — MIDAZOLAM 2 MG: 1 INJECTION INTRAMUSCULAR; INTRAVENOUS at 07:51

## 2021-07-15 RX ADMIN — ALBUTEROL SULFATE 8 PUFF: 90 AEROSOL, METERED RESPIRATORY (INHALATION) at 08:07

## 2021-07-15 RX ADMIN — ROCURONIUM BROMIDE 20 MG: 50 INJECTION, SOLUTION INTRAVENOUS at 08:37

## 2021-07-15 RX ADMIN — FENTANYL CITRATE 100 MCG: 50 INJECTION INTRAMUSCULAR; INTRAVENOUS at 08:01

## 2021-07-15 RX ADMIN — LIDOCAINE HYDROCHLORIDE 80 MG: 10 INJECTION, SOLUTION EPIDURAL; INFILTRATION; INTRACAUDAL; PERINEURAL at 08:32

## 2021-07-15 RX ADMIN — CEFAZOLIN SODIUM 2000 MG: 2 SOLUTION INTRAVENOUS at 08:20

## 2021-07-15 RX ADMIN — SODIUM CHLORIDE: 0.9 INJECTION, SOLUTION INTRAVENOUS at 07:49

## 2021-07-15 RX ADMIN — ONDANSETRON 4 MG: 2 INJECTION INTRAMUSCULAR; INTRAVENOUS at 09:26

## 2021-07-15 RX ADMIN — PROPOFOL 100 MG: 10 INJECTION, EMULSION INTRAVENOUS at 08:21

## 2021-07-15 RX ADMIN — LIDOCAINE HYDROCHLORIDE 20 MG: 10 INJECTION, SOLUTION EPIDURAL; INFILTRATION; INTRACAUDAL; PERINEURAL at 08:01

## 2021-07-15 RX ADMIN — PROPOFOL 200 MG: 10 INJECTION, EMULSION INTRAVENOUS at 08:01

## 2021-07-15 RX ADMIN — ROCURONIUM BROMIDE 10 MG: 50 INJECTION, SOLUTION INTRAVENOUS at 08:54

## 2021-07-15 RX ADMIN — Medication 180 MG: at 08:01

## 2021-07-15 RX ADMIN — LIDOCAINE HYDROCHLORIDE 10 ML: 10 INJECTION, SOLUTION EPIDURAL; INFILTRATION; INTRACAUDAL; PERINEURAL at 09:36

## 2021-07-15 NOTE — H&P
H&P Exam - Cardiology   Isabel Abdullahi 61 y o  male MRN: [id number]  Unit/Bed#: [facility] OVR Encounter: [id number]    Assessment/Plan     Assessment:  1  Typical atrial flutter with rapid ventricular response in the setting of bronchitis, subsequently converted to normal sinus rhythm during prior hospitalization 06/2021   A ) maintained on Eliquis anticoagulation   B ) maintained on metoprolol for rate control  2  Normal LV systolic function with EF 60% per echo 06/2021  3  Hypertension  4  Diabetes mellitus type 2  5  Obesity with BMI 34      Plan:  Atrial flutter ablation with loop recorder implantation to monitor for atrial fibrillation moving forward  History of Present Illness   HPI:  Isabel Abdullahi is a 61y o  year old male with hypertension, diabetes mellitus type 2, and obesity who initially presented to the hospital 06/2021 after he was found to be tachycardic in his PCP's office  He was experiencing URI like symptoms at that time, and thus was being seen for further evaluation  He was advised to report to the emergency room, where he is found to be in narrow complex tachycardia  Adenosine was given, which ultimately showed that he was in what appeared to be typical atrial flutter  An echocardiogram was performed which showed preserved LV systolic function  He was started on diltiazem drip along with oral metoprolol, and ultimately he converted to normal sinus rhythm during that admission  He was seen by EP, and an atrial flutter ablation was recommended along with loop recorder implantation to monitor for subsequent atrial fibrillation moving forward  As he was still experiencing pulmonary issues and wheezing at that time, outpatient treatment was recommended  He was started on Eliquis anticoagulation and continued on metoprolol, and now presents today to undergo atrial flutter ablation/loop implantation    No significant changes over the recent past   He has felt well without significant cardiac symptoms noted  In the preop holding area, he experienced a vagal response when an IV was initially being placed  His systolic pressure dropped into the high 80s, he reported feeling dizzy however did not syncopize  His blood pressure quickly recovered, and ultimately we were able to place an IV without further issues  He denies having similar symptoms in the past       Review of Systems  ROS as noted above, otherwise 12 point review of systems was performed and is negative         Historical Information   Past Medical History:   Diagnosis Date    Anxiety     Last assessed 12/20/2013    Contusion of right wrist 1/30/2018    COPD (chronic obstructive pulmonary disease) ([facility] [address] ) 8/16/2016    Depression with anxiety     Diverticulitis 2016    Eczema 2/24/2016    Elevated ALT measurement 8/16/2016    Hypertension     Obesity 9/25/2012    Perianal abscess 3/5/2019    Type 2 diabetes mellitus with hyperglycemia, without long-term current use of insulin (HCC)      Past Surgical History:   Procedure Laterality Date    COLONOSCOPY      KNEE SURGERY       Family History:   Family History   Problem Relation Age of Onset    Cancer Mother         Lung Cancer    Cancer Father     Diabetes Father     Diabetes Sister        Social History   Social History     Substance and Sexual Activity   Alcohol Use Yes    Alcohol/week: 2 0 - 3 0 standard drinks    Types: 2 - 3 Cans of beer per week     Social History     Substance and Sexual Activity   Drug Use No     Social History     Tobacco Use   Smoking Status Current Every Day Smoker    Packs/day: 1 00   Smokeless Tobacco Never Used         Meds/Allergies   all medications and allergies reviewed  Home Medications:   Medications Prior to Admission   Medication    albuterol (Ventolin HFA) 90 mcg/act inhaler    ALPRAZolam (XANAX) 0 25 mg tablet    apixaban (ELIQUIS) 5 mg    losartan (COZAAR) 50 mg tablet    metFORMIN (GLUCOPHAGE) 1000 MG tablet    metoprolol succinate (TOPROL-XL) 25 mg 24 hr tablet    multivitamin (THERAGRAN) TABS    nicotine (NICODERM CQ) 14 mg/24hr TD 24 hr patch    acetaminophen (TYLENOL) 325 mg tablet       Allergies   Allergen Reactions    Lisinopril Cough       Objective   Vitals: Blood pressure 130/77, pulse 101, temperature 98 5 °F (36 9 °C), temperature source Temporal, resp  rate 20  Orthostatic Blood Pressures      Most Recent Value   Blood Pressure  130/77 filed at 07/15/2021 0654          No intake or output data in the 24 hours ending 07/15/21 0700    Invasive Devices     Peripheral Intravenous Line            Peripheral IV 21 Left Antecubital 17 days    Peripheral IV 21 Dorsal (posterior); Right Hand 16 days                Physical Exam  GEN: NAD, alert and oriented x 3, well appearing  SKIN: dry without significant lesions or rashes  HEENT: NCAT, PERRL, EOMs intact  NECK: No JVD appreciated  CARDIOVASCULAR: RRR, normal S1, S2 without murmurs, rubs, or gallops appreciated  LUNGS: Clear to auscultation bilaterally without wheezes, rhonchi, or rales  ABDOMEN: Soft, nontender, nondistended  EXTREMITIES/VASCULAR: perfused without clubbing, cyanosis, or LE edema b/l  PSYCH: Normal mood and affect  NEURO: CN ll-Xll grossly intact      Lab Results: I have personally reviewed pertinent lab results  Invalid input(s): LABGLOM              Imaging: I have personally reviewed pertinent reports      Results for orders placed during the hospital encounter of 21    Echo complete with contrast if indicated    Narrative  [address]  [address], [address] [facility]  [phone]    Transthoracic Echocardiogram  2D, M-mode, Doppler, and Color Doppler    Study date:  2021    Patient: Marcel Juarez  MR number: [id number]  Account number: [de-identified]  : 1965  Age: 61 years  Gender: Male  Status: Inpatient  Location: Bedside  Height: 71 in  Weight: 244 4 lb  BP: 110/ 76 mmHg    Indications: Aflutter    Diagnoses: I48 1 - Atrial flutter    Sonographer:  MARCIAL Caro  Primary Physician:  Ekta Negro MD  Referring Physician:  Kenda Libman, DO  Group:  [facility]  Cardiology Fellow:  Patrick Gonzalez MD  Interpreting Physician:  Carlos Rajan MD    SUMMARY    PROCEDURE INFORMATION:  This was a technically difficult study  LEFT VENTRICLE:  Systolic function was normal  Ejection fraction was estimated to be 60 %  There were no regional wall motion abnormalities  There was no evidence of concentric hypertrophy  MITRAL VALVE:  There was mild annular calcification  There was trace regurgitation  TRICUSPID VALVE:  There was mild regurgitation  PULMONIC VALVE:  There was trace regurgitation  HISTORY: PRIOR HISTORY: COPD, obesity, HTN, aflutter, tobacco use, HLD, anxiety    PROCEDURE: The procedure was performed at the bedside  This was a routine study  The transthoracic approach was used  The study included complete 2D imaging, M-mode, complete spectral Doppler, and color Doppler  The heart rate was 90 bpm,  at the start of the study  Images were obtained from the parasternal, apical, subcostal, and suprasternal notch acoustic windows  Echocardiographic views were limited due to decreased penetration and lung interference  This was a  technically difficult study  LEFT VENTRICLE: Size was normal  Systolic function was normal  Ejection fraction was estimated to be 60 %  There were no regional wall motion abnormalities  Wall thickness was normal  There was no evidence of concentric hypertrophy  DOPPLER: Left ventricular diastolic function parameters were normal for the patient's age  RIGHT VENTRICLE: The size was normal  Systolic function was normal  Wall thickness was normal     LEFT ATRIUM: Size was normal     RIGHT ATRIUM: Size was normal     MITRAL VALVE: There was mild annular calcification   Valve structure was normal  There was normal leaflet separation  DOPPLER: The transmitral velocity was within the normal range  There was no evidence for stenosis  There was trace  regurgitation  AORTIC VALVE: The valve was trileaflet  Leaflets exhibited normal thickness and normal cuspal separation  DOPPLER: Transaortic velocity was within the normal range  There was no evidence for stenosis  There was no significant  regurgitation  TRICUSPID VALVE: The valve structure was normal  There was normal leaflet separation  DOPPLER: The transtricuspid velocity was within the normal range  There was no evidence for stenosis  There was mild regurgitation  Estimated peak PA  pressure was 30 mmHg  PULMONIC VALVE: Not well visualized  DOPPLER: The transpulmonic velocity was within the normal range  There was trace regurgitation  PERICARDIUM: There was no pericardial effusion  The pericardium was normal in appearance  AORTA: The root exhibited upper limit of normal size  The ascending aorta was at the upper limit of the normal     SYSTEMIC VEINS: IVC: The inferior vena cava was not well visualized      SYSTEM MEASUREMENT TABLES    2D  %FS: 30 91 %  Ao Diam: 3 55 cm  Ao asc: 3 7 cm  EDV(Teich): 139 05 ml  EF(Teich): 58 06 %  ESV(Teich): 58 31 ml  IVSd: 0 98 cm  LA Area: 17 91 cm2  LA Diam: 3 77 cm  LVEDV MOD A4C: 104 49 ml  LVEF MOD A4C: 66 33 %  LVESV MOD A4C: 35 18 ml  LVIDd: 5 36 cm  LVIDs: 3 7 cm  LVLd A4C: 8 34 cm  LVLs A4C: 7 08 cm  LVPWd: 0 94 cm  RA Area: 15 47 cm2  RVIDd: 3 93 cm  SV MOD A4C: 69 3 ml  SV(Teich): 80 74 ml    CW  AV Vmax: 1 12 m/s  AV maxP mmHg  PV Vmax: 0 81 m/s  PV maxP 61 mmHg  TR Vmax: 2 09 m/s  TR maxP 41 mmHg    MM  TAPSE: 2 38 cm    PW  E' Sept: 0 09 m/s  E/E' Sept: 8 27  LVOT Vmax: 1 04 m/s  LVOT maxP 31 mmHg  MV A [place]: 0 56 m/s  MV Dec [place]: 3 56 m/s2  MV DecT: 220 19 ms  MV E Eriberto: 0 76 m/s  MV E/A Ratio: 1 38  MV PHT: 63 85 ms  MVA By PHT: 3 45 cm2  RVOT Vmax: 0 7 m/s  RVOT maxP 98 mmHg    [facility] Accredited Echocardiography Laboratory    Prepared and electronically signed by    Tc Esquivel MD  Signed 28-Jun-2021 16:02:25      EKG: pending      Code Status: Prior

## 2021-07-15 NOTE — ANESTHESIA PREPROCEDURE EVALUATION
Procedure:  REID  CARDIAC EPS/AFLUTTER ABLATION  CARDIAC LOOP RECORDER IMPLANT    Relevant Problems   CARDIO   (+) Atrial flutter with rapid ventricular response (HCC)   (+) Essential hypertension   (+) Hyperlipidemia      ENDO   (+) Type 2 diabetes mellitus with hyperglycemia, without long-term current use of insulin (HCC)      NEURO/PSYCH   (+) Depression with anxiety      PULMONARY   (+) COPD (chronic obstructive pulmonary disease) (HCC)        Physical Exam    Airway    Mallampati score: I  TM Distance: >3 FB  Neck ROM: full     Dental   Comment: Several missing, No notable dental hx     Cardiovascular  Cardiovascular exam normal    Pulmonary  Pulmonary exam normal     Other Findings        Anesthesia Plan  ASA Score- 3     Anesthesia Type- general with ASA Monitors  Additional Monitors:   Airway Plan: ETT  Plan Factors-Exercise tolerance (METS): >4 METS  Chart reviewed  EKG reviewed  Imaging results reviewed  Existing labs reviewed  Patient summary reviewed  Patient is not a current smoker  Patient did not smoke on day of surgery  Obstructive sleep apnea risk education given perioperatively  Induction- intravenous  Postoperative Plan- Plan for postoperative opioid use  Planned trial extubation    Informed Consent- Anesthetic plan and risks discussed with patient  I personally reviewed this patient with the CRNA  Discussed and agreed on the Anesthesia Plan with the MISTI Sharma

## 2021-07-15 NOTE — ANESTHESIA POSTPROCEDURE EVALUATION
Post-Op Assessment Note    CV Status:  Stable    Pain management: adequate     Mental Status:  Alert and awake   Hydration Status:  Stable   PONV Controlled:  None   Airway Patency:  Patent and adequate      Post Op Vitals Reviewed: Yes      Staff: Anesthesiologist, CRNA   Comments: pt monitored by CRNA on Room air after extubation, HR, BP, o2 saturation maintained  pt alert and oriented, meets criteria for phase 2  No complications documented      BP   146/77 (95)   Temp 97 6   Pulse 90   Resp 14   SpO2 95%

## 2021-07-15 NOTE — DISCHARGE INSTRUCTIONS
Please continue all medications, including Eliquis, as previously instructed  RESTRICTIONS:  No heavy lifting or strenuous activity for one week  No soaking in a bath tub/hot tub/swimming pool for one week or until groin heals  You may shower - please let soap and water run over the groins, no scrubbing, and pat the area dry  Please place band-aid on groins daily for up to five days, but you may remove sooner if no issues are noted  If you notice ongoing bleeding, swelling, or firm lumps in groin near ablation incision, please contact Dr Bernabe Closs office - [phone]  Keep loop recorder incision dry for one week  Do not use lotions/powders/creams on incision  Remove outer bandage 48 hours after procedure - if present, leave underlying steri-strips in place, they will either fall off on their own or will be removed at 2 week follow up appointment  Please call the office [phone] if you notice redness, swelling, bleeding, or drainage from incision or if you develop fevers  Cardiac Loop Recorder Insertion      WHAT YOU SHOULD KNOW:    A cardiac loop recorder is a device used to diagnose heart rhythm problems, such as a fast or irregular heartbeat  It is implanted in your left chest, just under the skin  The device records a pattern of your heart's rhythm, called an EKG  Your device records automatic EKGs, depending on how your caregiver programs it  You may also receive a handheld controller  You press a button on the controller when you have symptoms, such as dizziness, lightheadedness, or palpitations  The device will record an EKG at that moment  The recording can help your caregiver see if your symptoms may be caused by heart rhythm problems  Your caregiver will remove the device after it has collected enough data  You may need the device for up to 3 years   The procedure to remove the device is similar to the procedure used to implant it       AFTER YOU LEAVE:    Follow up with your cardiologist as directed: You will need to return in 1 to 2 weeks  Your cardiologist will check your incision  He may also program your device settings again  He will retrieve data from the device every 1 to 3 months with a monitor held over your skin  You may be able to transmit data from your device from home as well  You will do this by calling a number provided by your cardiologist, or as they have instructed you  Ask for information about this process  Write down your questions so you remember to ask them during your visits       Wound care: Keep loop recorder incision dry for one week  Do not use lotions/powders/creams on incision  Remove outer bandage 48 hours after procedure - leave underlying steri-strips in place, they will either fall off on their own or will be removed at 2 week follow up appointment  Please call the office if you notice redness, swelling, bleeding, or drainage from incision or if you develop fevers  After that first week, carefully wash your incision with soap and water  Keep the area clean and dry until it heals       Return to activity: If you received anesthesia, you will not be able to drive for 24 hours  Otherwise, most people can return to normal activities soon after the procedure  Your cardiologist may want to know if your work involves electrical current or high-voltage equipment  Ask about other electrical items that could interfere with your cardiac loop recorder       Contact your cardiologist if:   · You have a fever or chills  · Your wound is red, swollen, or draining pus  · You have questions or concerns about your condition or care  Seek care immediately or call 911 if:   · You feel weak, dizzy, or faint  · You lose consciousness  © 2014 [employer] is for End User's use only and may not be sold, redistributed or otherwise used for commercial purposes   All illustrations and images included in CareNotes® are the copyrighted property of [employer]  or Alexis Perez  The above information is an  only  It is not intended as medical advice for individual conditions or treatments  Talk to your doctor, nurse or pharmacist before following any medical regimen to see if it is safe and effective for you

## 2021-07-22 DIAGNOSIS — I48.92 ATRIAL FLUTTER WITH RAPID VENTRICULAR RESPONSE (HCC): ICD-10-CM

## 2021-07-22 RX ORDER — APIXABAN 5 MG/1
TABLET, FILM COATED ORAL
Qty: 60 TABLET | Refills: 0 | Status: SHIPPED | OUTPATIENT
Start: 2021-07-22 | End: 2022-05-20 | Stop reason: SDUPTHER

## 2021-07-29 ENCOUNTER — IN-CLINIC DEVICE VISIT (OUTPATIENT)
Dept: CARDIOLOGY CLINIC | Facility: CLINIC | Age: 56
End: 2021-07-29
Payer: COMMERCIAL

## 2021-07-29 DIAGNOSIS — Z95.818 PRESENCE OF CARDIAC DEVICE: Primary | ICD-10-CM

## 2021-07-29 PROCEDURE — 93291 INTERROG DEV EVAL SCRMS IP: CPT | Performed by: INTERNAL MEDICINE

## 2021-07-29 NOTE — PROGRESS NOTES
Results for orders placed or performed in visit on 07/29/21   Cardiac EP device report    Narrative    MDT Verenice Beltran 34: INCISION CLEAN AND DRY WITH EDGES APPROXIMATED; SUTURES REMOVED; WOUND CARE AND RESTRICTIONS REVIEWED WITH PATIENT  NO EPISODES  R-WAVE: 0 44mV  NO PROGRAMMING CHANGES MADE TO DEVICE PARAMETERS  NORMAL DEVICE FUNCTION   PL

## 2021-07-31 ENCOUNTER — HOSPITAL ENCOUNTER (EMERGENCY)
Facility: HOSPITAL | Age: 56
Discharge: HOME | End: 2021-07-31
Attending: EMERGENCY MEDICINE
Payer: COMMERCIAL

## 2021-07-31 VITALS
BODY MASS INDEX: 35 KG/M2 | HEIGHT: 71 IN | RESPIRATION RATE: 18 BRPM | WEIGHT: 250 LBS | SYSTOLIC BLOOD PRESSURE: 165 MMHG | TEMPERATURE: 98.6 F | DIASTOLIC BLOOD PRESSURE: 82 MMHG | HEART RATE: 93 BPM | OXYGEN SATURATION: 97 %

## 2021-07-31 DIAGNOSIS — T30.0 THERMAL BURN: ICD-10-CM

## 2021-07-31 DIAGNOSIS — T24.202A SECOND DEGREE BURN OF LEFT LEG, INITIAL ENCOUNTER: Primary | ICD-10-CM

## 2021-07-31 PROCEDURE — 99283 EMERGENCY DEPT VISIT LOW MDM: CPT

## 2021-07-31 PROCEDURE — 99282 EMERGENCY DEPT VISIT SF MDM: CPT | Performed by: EMERGENCY MEDICINE

## 2021-07-31 RX ORDER — GINSENG 100 MG
1 CAPSULE ORAL ONCE
Status: COMPLETED | OUTPATIENT
Start: 2021-07-31 | End: 2021-07-31

## 2021-07-31 RX ADMIN — BACITRACIN 1 SMALL APPLICATION: 500 OINTMENT TOPICAL at 11:04
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
## 2021-07-31 NOTE — DISCHARGE INSTRUCTIONS
Continue care for wound as you have been doing, gentle cleaning with soap and water, bandages with bacitracin or neosporin is fine  If you notice redness around the wound getting worse, please return for evaluation of infection
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
## 2021-08-03 ENCOUNTER — OFFICE VISIT (OUTPATIENT)
Dept: DIABETES SERVICES | Facility: CLINIC | Age: 56
End: 2021-08-03
Payer: COMMERCIAL

## 2021-08-03 DIAGNOSIS — E11.65 TYPE 2 DIABETES MELLITUS WITH HYPERGLYCEMIA, WITHOUT LONG-TERM CURRENT USE OF INSULIN (HCC): Primary | ICD-10-CM

## 2021-08-03 PROCEDURE — 97802 MEDICAL NUTRITION INDIV IN: CPT | Performed by: DIETITIAN, REGISTERED

## 2021-08-03 NOTE — PATIENT INSTRUCTIONS
1  Consume 3 meals a day about 4-5 hours apart  2  60 grams of carbohydrates per meal and no more than 15 grams per between meal snack

## 2021-08-03 NOTE — PROGRESS NOTES
Medical Nutrition Therapy        Assessment    Visit Type: Initial visit  Chief complaint/Medical Diagnosis/reason for visit E11 64 (T2DM with hyperglycemia, without insulin)    ASIA Loja was seen today for his initial MNT visit  He reports that he has already made significant changes to his diet  Problems identified in food recall include inconsistent carbohydrate intake at meals and poorly timed meals  Provided patient with guidelines for a 4895-8493 calorie meal plan to assist with consistency, balance and portion control  Encouraged the consumption of regular meals at regular times about 4-5 hours apart with between meal snacks as needed  Advised patient to keep carbohydrate intake to 60 grams per meal and 15 grams per snack to assist with glycemic control  Suggested keeping protein intake to 8-10 ounces a day and fat to 5 servings daily to assist with lipid management and calorie control  Portion booklet and food labels were used to teach basic carbohydrate counting  Patient agreed to keep daily food logs  Will have the office contact him to schedule follow-up in six weeks  RD will remain available for further dietary questions/concerns       Ht Readings from Last 1 Encounters:   07/31/21 5' 11" (1 803 m)     Wt Readings from Last 3 Encounters:   07/31/21 113 kg (250 lb)   07/06/21 112 kg (247 lb 12 8 oz)   06/27/21 114 kg (251 lb 5 2 oz)     Weight Change: No    Barriers to Learning: no barriers    Do you follow any special diet presently?: No  Who shops: patient  Who cooks: patient    Food Log: Completed via the method of food recall    Breakfast:3:30-4:00AM large banana and [place] yogurt OR a Boost protein drink OR every other day Jacque sausage egg and cheese on a bagel; coffee with milk and Splenda  Morning Snack:9:00-10:00AM sandwich made from home (lunch meat turkey or ham sometimes with cheese or lettuce and tomato sandwich with pillai on wheat) water or regular Gatorade or Powerade  Lunch:12:00PM apple or a banana or another sandwich packed from home; water  Afternoon Snack: 3:00-4:00PM 2 tangerine  Dinner:5:00-7:00PM fish, chicken or beef with broccoli or 1/2 cup baked beans, right now corn on the cob (usually no carb with dinner) OR 2 cups pasta with meat sauce, occasionally garlic bread; water, 16 oz whole milk or grapefruit juice and 1-2 beers either before or after dinner  Evening Snack: twice a week: 1-2 pecan charlotte cookies with 8 ounces of whole milk  Beverages: coffee, water power-leslie, beer milk  Eating out/Take out: 4 times a week gets take-out (Jacque, pizza, [employer] food)  Exercise nothing beyond daily activities     Calorie needs 1800-2000kcals/day Carbs: 60g/meal, 15g/snack     Fat: 5 servings/day    Protein:8-10 ounces/day    Nutrition Diagnosis:  Food and nutrition related knowledge deficit  related to Lack of prior exposure to accurate nutrition related information as evidenced by No prior knowledge of need for food and nutrition related recommendations    Intervention: label reading, behavior modification strategies, carbohydrate counting, meal timing, meal planning, monitoring portion control, exercise guidelines and food diary     Treatment Goals: Patient will consume 3 meals a day and Patient will count carbohydrates    Monitoring and evaluation:    Term code indicator  FH 1 3 2 Food Intake Criteria: Consume 3 meals a day about 4-5 hours apart  Term code indicator  FH 1 6 3 Carbohydrate Intake Criteria: 60 grams of carbohydrates per meal and no more than 15 grams per between meal snack  Materials Provided: Portion booklet and food logs    Patients Response to Instruction:  Comprehensiongood  Motivationgood  Expected Compliancegood    Start- Stop: 3:00-3:45  Total Minutes: 45 Minutes  Group or Individual Instruction: MNT-I  Other: Enedina Pepe MD    Thank you for coming to the [facility] for education today    Please feel free to call with any questions or concerns      Patricia Fly  [address] [facility] [id number]

## 2021-08-19 ENCOUNTER — OFFICE VISIT (OUTPATIENT)
Dept: CARDIOLOGY CLINIC | Facility: CLINIC | Age: 56
End: 2021-08-19
Payer: COMMERCIAL

## 2021-08-19 VITALS
BODY MASS INDEX: 35.34 KG/M2 | HEIGHT: 71 IN | DIASTOLIC BLOOD PRESSURE: 86 MMHG | SYSTOLIC BLOOD PRESSURE: 148 MMHG | WEIGHT: 252.4 LBS | HEART RATE: 95 BPM

## 2021-08-19 DIAGNOSIS — I48.92 ATRIAL FLUTTER WITH RAPID VENTRICULAR RESPONSE (HCC): Primary | ICD-10-CM

## 2021-08-19 PROCEDURE — 3079F DIAST BP 80-89 MM HG: CPT | Performed by: INTERNAL MEDICINE

## 2021-08-19 PROCEDURE — 93000 ELECTROCARDIOGRAM COMPLETE: CPT | Performed by: INTERNAL MEDICINE

## 2021-08-19 PROCEDURE — 3008F BODY MASS INDEX DOCD: CPT | Performed by: INTERNAL MEDICINE

## 2021-08-19 PROCEDURE — 99214 OFFICE O/P EST MOD 30 MIN: CPT | Performed by: INTERNAL MEDICINE

## 2021-08-19 PROCEDURE — 3077F SYST BP >= 140 MM HG: CPT | Performed by: INTERNAL MEDICINE

## 2021-08-19 RX ORDER — METOPROLOL SUCCINATE 25 MG/1
25 TABLET, EXTENDED RELEASE ORAL 2 TIMES DAILY
Qty: 180 TABLET | Refills: 3 | Status: SHIPPED | OUTPATIENT
Start: 2021-08-19 | End: 2022-05-19

## 2021-08-19 NOTE — PATIENT INSTRUCTIONS
1  Restart taking your metoprolol succinate 25mg twice daily     2  Record your blood pressure as we discussed, call me at [phone] or [phone] in 1 month with your results, take pressure 2-3x a week  Want your BP to be <130/80 average

## 2021-08-19 NOTE — PROGRESS NOTES
Electrophysiology Office Note    Greg Beasley  1965  [id number]  HEART & VASCULAR [facility]  [address]        Assessment/Plan     Primary diagnosis:   1   atrial flutter, symptomatic    * patient presents to [facility] EP office for post atrial flutter ablation follow up, he is new to the outpatient EP practice    * today ECG showing NSR HR 94-99bpm, he has stopped his BB recently as he did not have any refills left and was not sure if he needed to continue taking the BB  Refill sent to his pharmacy today  * loop interrogated during office visit by me today showing 0 atrial fibrillation or flutter since his ablation  His [id number] is 2 (HTN, T2DM)  We discussed his CVA risk vs bleeding risk at this time, CVA risk likely lower then bleeding risk  Discussed risks vs benefits of stopping vs continuing AC, patient deciding to discontinue eliquis at this time    * Today we discussed atrial flutters correlation with developing atrial fibrillation as well as what the difference between atrial flutter and atrial fibrillation is  We discussed non cardiac modifiable non cardiac risk factors to prevent AF substrate formation  1  HTN - today /88, likely lower at home  He also has stopped his BB as above  Instructed on proper method of obtaining his BP  He is going to measure 2-3x a week for 1 month and call me with the results  Will make further adjustments as necessary  2  T2DM - last A1C was  7 5 in July 2021, he was started on metformin 1000mg BID at this time  Has A1C upcoming in October  Discussed T2DM correlation with atrial fibrillation as well as heart and major organ health with uncontrolled blood glucose  He understands     3  NAYE - does have a diagnosis but has not followed up with sleep medicine  Discussed NAYE's correlation to BP regulation and AF substrate formation, he is going to follow up with sleep medicine        4  Alcohol intake- consumes 12 beers weekly, discussed current literature suggesting to consume 4 or less beers weekly to limit atrial fibrillation recurrence  He understood and is going to continue to cut back as he has done so already  Congratulated him on his efforts  5  Obesity - BMI is 35, discussed obesities correlation with atrial fibrillation  Discussed wanting BMI <30  He does know he needs to lose weight and is motivated to do so    6  Tobacco use - smokes a few cigarettes a week but has cut back drastically since his flutter ablation  Congratulated him on his efforts  He is using the nicotine gum to help him keep from smoking  His goal is to quit entirely  * overall patient has some work to do on the above points but he was thankful we discussed them today as he does want to improve his health and prevent issues with his heart as best as possible    * reading materials on atrial fibrillation were given to patient at discharge  Secondary diagnosis:   1  HTN   2  NAYE not yet on CPAP   3  T2DM   4  Obesity   5  Tobacco abuse               Rhythm History:   Atrial fibrillation:     Atrial flutter:   1  Diagnosed with atrial flutter - evaluated by EP at [facility] - REID/AFL abl recommended during admission - prior to procedure patient converted to NSR spontaneously - ablation scheduled as outpatient   2  S/p CTI RFA by Dr Libby Aj on 7-15  3  eliquis discontinued - 8/2021    SVT:     VT/VF:     Device history:   Pacemaker:    Defibrillator:    BIV PPM:    BIV ICD:    ILR:  1   S/p medtronic LINQ 2 at time of CTI RFA - July 2021      Cardiac Testing:     ECHO: Results for orders placed during the hospital encounter of 06/27/21    Echo complete with contrast if indicated    Narrative  [address]  [address], [address] [facility]  [phone]    Transthoracic Echocardiogram  2D, M-mode, Doppler, and Color Doppler    Study date:  28-Jun-2021    Patient: Bharat Rangel  MR number: [id number]  Account number: [de-identified]  : 1965  Age: 61 years  Gender: Male  Status: Inpatient  Location: Bedside  Height: 71 in  Weight: 244 4 lb  BP: 110/ 76 mmHg    Indications: Aflutter    Diagnoses: I48 1 - Atrial flutter    Sonographer:  MARCIAL Castillo  Primary Physician:  Ravin Lora MD  Referring Physician:  Earnestine Morrow DO  Group:  [id number]  Cardiology Fellow:  Ceci Blas MD  Interpreting Physician:  Salomón Viera MD    SUMMARY    PROCEDURE INFORMATION:  This was a technically difficult study  LEFT VENTRICLE:  Systolic function was normal  Ejection fraction was estimated to be 60 %  There were no regional wall motion abnormalities  There was no evidence of concentric hypertrophy  MITRAL VALVE:  There was mild annular calcification  There was trace regurgitation  TRICUSPID VALVE:  There was mild regurgitation  PULMONIC VALVE:  There was trace regurgitation  HISTORY: PRIOR HISTORY: COPD, obesity, HTN, aflutter, tobacco use, HLD, anxiety    PROCEDURE: The procedure was performed at the bedside  This was a routine study  The transthoracic approach was used  The study included complete 2D imaging, M-mode, complete spectral Doppler, and color Doppler  The heart rate was 90 bpm,  at the start of the study  Images were obtained from the parasternal, apical, subcostal, and suprasternal notch acoustic windows  Echocardiographic views were limited due to decreased penetration and lung interference  This was a  technically difficult study  LEFT VENTRICLE: Size was normal  Systolic function was normal  Ejection fraction was estimated to be 60 %  There were no regional wall motion abnormalities  Wall thickness was normal  There was no evidence of concentric hypertrophy  DOPPLER: Left ventricular diastolic function parameters were normal for the patient's age      RIGHT VENTRICLE: The size was normal  Systolic function was normal  Wall thickness was normal     LEFT ATRIUM: Size was normal     RIGHT ATRIUM: Size was normal     MITRAL VALVE: There was mild annular calcification  Valve structure was normal  There was normal leaflet separation  DOPPLER: The transmitral velocity was within the normal range  There was no evidence for stenosis  There was trace  regurgitation  AORTIC VALVE: The valve was trileaflet  Leaflets exhibited normal thickness and normal cuspal separation  DOPPLER: Transaortic velocity was within the normal range  There was no evidence for stenosis  There was no significant  regurgitation  TRICUSPID VALVE: The valve structure was normal  There was normal leaflet separation  DOPPLER: The transtricuspid velocity was within the normal range  There was no evidence for stenosis  There was mild regurgitation  Estimated peak PA  pressure was 30 mmHg  PULMONIC VALVE: Not well visualized  DOPPLER: The transpulmonic velocity was within the normal range  There was trace regurgitation  PERICARDIUM: There was no pericardial effusion  The pericardium was normal in appearance  AORTA: The root exhibited upper limit of normal size  The ascending aorta was at the upper limit of the normal     SYSTEMIC VEINS: IVC: The inferior vena cava was not well visualized      SYSTEM MEASUREMENT TABLES    2D  %FS: 30 91 %  Ao Diam: 3 55 cm  Ao asc: 3 7 cm  EDV(Teich): 139 05 ml  EF(Teich): 58 06 %  ESV(Teich): 58 31 ml  IVSd: 0 98 cm  LA Area: 17 91 cm2  LA Diam: 3 77 cm  LVEDV MOD A4C: 104 49 ml  LVEF MOD A4C: 66 33 %  LVESV MOD A4C: 35 18 ml  LVIDd: 5 36 cm  LVIDs: 3 7 cm  LVLd A4C: 8 34 cm  LVLs A4C: 7 08 cm  LVPWd: 0 94 cm  RA Area: 15 47 cm2  RVIDd: 3 93 cm  SV MOD A4C: 69 3 ml  SV(Teich): 80 74 ml    CW  AV Vmax: 1 12 m/s  AV maxP mmHg  PV Vmax: 0 81 m/s  PV maxP 61 mmHg  TR Vmax: 2 09 m/s  TR maxP 41 mmHg    MM  TAPSE: 2 38 cm    PW  E' Sept: 0 09 m/s  E/E' Sept: 8 27  LVOT Vmax: 1 04 m/s  LVOT maxP 31 mmHg  MV A [place]: 0 56 m/s  MV Dec [place]: 3 56 m/s2  MV DecT: 220 19 ms  MV E [place]: 0 76 m/s  MV E/A Ratio: 1 38  MV PHT: 63 85 ms  MVA By PHT: 3 45 cm2  RVOT Vmax: 0 7 m/s  RVOT maxP 98 mmHg    [employer] Accredited Echocardiography Laboratory    Prepared and electronically signed by    John Houston MD  Signed 2021 16:02:25      CATH/STRESS TEST:   none    EKG:   NSR        History of Present Illness     HPI/INTERVAL HISTORY: Radha Mar is a 61 y o  male with history as above who presents to [facility] EP office today under direction of Dr Jennifer Newell for post atrial flutter ablation follow up      21:    Since returning home from his atrial flutter ablation patient has no new concerns or complaints  He did run out of his metoprolol succinate 25 mg twice daily and did not know a refill on the bottle hence he discontinued the medication a few weeks ago  He has cut  Back down on his alcohol intake and is down to 12 beers weekly  He has also cut back on his cigarette smoking and is now only smoking a few cigarettes a week  He continues to work and has to travel to [place] from [facility] for his job but he does enjoy what he does for living  He is not having any side effects or any other cardiac medications he is currently taking  Review of Systems  ROS as noted above, otherwise 12 point review of systems was performed and is negative  Historical Information   Social History     Socioeconomic History    Marital status: /Civil Union     Spouse name: Not on file    Number of children: Not on file    Years of education: Not on file    Highest education level: Not on file   Occupational History    Not on file   Tobacco Use    Smoking status: Former Smoker     Packs/day: 1 00     Quit date: 2021     Years since quittin 0    Smokeless tobacco: Never Used   Vaping Use    Vaping Use: Never used   Substance and Sexual Activity    Alcohol use:  Yes     Alcohol/week: 2 0 - 3 0 standard drinks     Types: 2 - 3 Cans of beer per week     Comment: down to 1-2 can of beer daily    Drug use: No    Sexual activity: Not on file   Other Topics Concern    Not on file   Social History Narrative    Not on file     Social Determinants of Health     Financial Resource Strain:     Difficulty of Paying Living Expenses:    Food Insecurity:     Worried About Running Out of Food in the Last Year:     [address] in the Last Year:    Transportation Needs:     Lack of Transportation (Medical):      Lack of Transportation (Non-Medical):    Physical Activity:     Days of Exercise per Week:     Minutes of Exercise per Session:    Stress:     Feeling of Stress :    Social Connections:     Frequency of Communication with Friends and Family:     Frequency of Social Gatherings with Friends and Family:     Attends [employer] witness Services:     Active Member of Clubs or Organizations:     Attends Club or Organization Meetings:     Marital Status:    Intimate Partner Violence:     Fear of Current or Ex-Partner:     Emotionally Abused:     Physically Abused:     Sexually Abused:      Past Medical History:   Diagnosis Date    Anxiety     Last assessed 12/20/2013    Contusion of right wrist 1/30/2018    COPD [facility]ch[facility] obstructive pulmonary disease) ([facility] ) 8/16/2016    Depression with anxiety     Diverticulitis 2016    Eczema 2/24/2016    Elevated ALT measurement 8/16/2016    Hypertension     Obesity 9/25/2012    Perianal abscess 3/5/2019    Type 2 diabetes mellitus with hyperglycemia, without long-term current use of insulin ([facility] )      Past Surgical History:   Procedure Laterality Date    COLONOSCOPY      KNEE SURGERY       Social History     Substance and Sexual Activity   Alcohol Use Yes    Alcohol/week: 2 0 - 3 0 standard drinks    Types: 2 - 3 Cans of beer per week    Comment: down to 1-2 can of beer daily     Social History     Substance and Sexual Activity   Drug Use No     Social History Tobacco Use   Smoking Status Former Smoker    Packs/day:     Quit date: 2021    Years since quittin 0   Smokeless Tobacco Never Used     Family History   Problem Relation Age of Onset    Cancer Mother         Lung Cancer    Cancer Father     Diabetes Father     Diabetes Sister        Meds/Allergies       Current Outpatient Medications:     acetaminophen (TYLENOL) 325 mg tablet, Take 2 tablets (650 mg total) by mouth every 6 (six) hours as needed for mild pain, Disp: 30 tablet, Rfl: 0    albuterol (Ventolin HFA) 90 mcg/act inhaler, Inhale 2 puffs every 4 (four) hours as needed for wheezing, Disp: 18 g, Rfl: 0    ALPRAZolam (XANAX) 0 25 mg tablet, Take 1 tablet (0 25 mg total) by mouth daily at bedtime as needed for anxiety or sleep, Disp: 30 tablet, Rfl: 0    Eliquis 5 MG, TAKE 1 TABLET BY MOUTH TWICE A DAY, Disp: 60 tablet, Rfl: 0    losartan (COZAAR) 50 mg tablet, Take 1 tablet (50 mg total) by mouth daily, Disp: 30 tablet, Rfl: 5    metFORMIN (GLUCOPHAGE) 1000 MG tablet, Take 1 tablet (1,000 mg total) by mouth 2 (two) times a day with meals, Disp: 60 tablet, Rfl: 5    metoprolol succinate (TOPROL-XL) 25 mg 24 hr tablet, Take 1 tablet (25 mg total) by mouth 2 (two) times a day, Disp: 180 tablet, Rfl: 3    multivitamin (THERAGRAN) TABS, Take 1 tablet by mouth daily, Disp: , Rfl:     nicotine (NICODERM CQ) 14 mg/24hr TD 24 hr patch, Place 1 patch on the skin daily, Disp: 28 patch, Rfl: 0    Allergies   Allergen Reactions    Lisinopril Cough       Objective   Vitals: Blood pressure 148/86, pulse 95, height 5' 11" (1 803 m), weight 114 kg (252 lb 6 4 oz)  Physical Exam  Constitutional:       Appearance: He is well-developed  HENT:      Head: Normocephalic and atraumatic  Eyes:      Pupils: Pupils are equal, round, and reactive to light  Cardiovascular:      Rate and Rhythm: Normal rate and regular rhythm     Pulmonary:      Effort: Pulmonary effort is normal       Breath sounds: Normal breath sounds  Abdominal:      General: Bowel sounds are normal       Palpations: Abdomen is soft  Musculoskeletal:         General: Normal range of motion  Cervical back: Normal range of motion and neck supple  Skin:     General: Skin is warm and dry  Neurological:      Mental Status: He is alert and oriented to person, place, and time  Labs:not applicable    Imaging: I have personally reviewed pertinent reports

## 2021-10-01 ENCOUNTER — RA CDI HCC (OUTPATIENT)
Dept: OTHER | Facility: HOSPITAL | Age: 56
End: 2021-10-01

## 2021-10-29 ENCOUNTER — REMOTE DEVICE CLINIC VISIT (OUTPATIENT)
Dept: CARDIOLOGY CLINIC | Facility: CLINIC | Age: 56
End: 2021-10-29
Payer: COMMERCIAL

## 2021-10-29 DIAGNOSIS — Z95.818 PRESENCE OF OTHER CARDIAC IMPLANTS AND GRAFTS: Primary | ICD-10-CM

## 2021-10-29 PROCEDURE — 93298 REM INTERROG DEV EVAL SCRMS: CPT | Performed by: INTERNAL MEDICINE

## 2021-10-29 PROCEDURE — G2066 INTER DEVC REMOTE 30D: HCPCS | Performed by: INTERNAL MEDICINE

## 2021-12-20 DIAGNOSIS — E11.65 TYPE 2 DIABETES MELLITUS WITH HYPERGLYCEMIA, WITHOUT LONG-TERM CURRENT USE OF INSULIN (HCC): ICD-10-CM

## 2021-12-31 ENCOUNTER — OFFICE VISIT (OUTPATIENT)
Dept: URGENT CARE | Facility: CLINIC | Age: 56
End: 2021-12-31
Payer: COMMERCIAL

## 2021-12-31 VITALS
WEIGHT: 260 LBS | BODY MASS INDEX: 36.4 KG/M2 | TEMPERATURE: 96.6 F | HEIGHT: 71 IN | OXYGEN SATURATION: 95 % | HEART RATE: 94 BPM | RESPIRATION RATE: 18 BRPM

## 2021-12-31 DIAGNOSIS — M54.50 ACUTE BILATERAL LOW BACK PAIN WITHOUT SCIATICA: Primary | ICD-10-CM

## 2021-12-31 PROCEDURE — G0382 LEV 3 HOSP TYPE B ED VISIT: HCPCS | Performed by: PHYSICIAN ASSISTANT

## 2021-12-31 RX ORDER — PREDNISONE 50 MG/1
50 TABLET ORAL DAILY
Qty: 5 TABLET | Refills: 0 | Status: SHIPPED | OUTPATIENT
Start: 2021-12-31 | End: 2022-01-05

## 2021-12-31 RX ORDER — METHOCARBAMOL 750 MG/1
750 TABLET, FILM COATED ORAL EVERY 6 HOURS PRN
Qty: 15 TABLET | Refills: 0 | Status: SHIPPED | OUTPATIENT
Start: 2021-12-31 | End: 2022-08-02 | Stop reason: ALTCHOICE

## 2022-01-02 DIAGNOSIS — I10 ESSENTIAL HYPERTENSION: ICD-10-CM

## 2022-01-03 RX ORDER — LOSARTAN POTASSIUM 50 MG/1
TABLET ORAL
Qty: 30 TABLET | Refills: 5 | Status: SHIPPED | OUTPATIENT
Start: 2022-01-03 | End: 2022-07-12

## 2022-01-04 ENCOUNTER — TELEPHONE (OUTPATIENT)
Dept: PHYSICAL THERAPY | Facility: OTHER | Age: 57
End: 2022-01-04

## 2022-01-04 NOTE — TELEPHONE ENCOUNTER
Message left for patient regarding referral entered for [facility]  Nurse requested patient CB if needed and leave Full Name &  on VM  One of the nurses will return the call to discuss the program further      Referral closed per protocol

## 2022-01-28 ENCOUNTER — REMOTE DEVICE CLINIC VISIT (OUTPATIENT)
Dept: CARDIOLOGY CLINIC | Facility: CLINIC | Age: 57
End: 2022-01-28
Payer: COMMERCIAL

## 2022-01-28 DIAGNOSIS — Z95.818 PRESENCE OF OTHER CARDIAC IMPLANTS AND GRAFTS: Primary | ICD-10-CM

## 2022-01-28 PROCEDURE — G2066 INTER DEVC REMOTE 30D: HCPCS | Performed by: INTERNAL MEDICINE

## 2022-01-28 PROCEDURE — 93298 REM INTERROG DEV EVAL SCRMS: CPT | Performed by: INTERNAL MEDICINE

## 2022-03-31 ENCOUNTER — OFFICE VISIT (OUTPATIENT)
Dept: OBGYN CLINIC | Facility: CLINIC | Age: 57
End: 2022-03-31
Payer: COMMERCIAL

## 2022-03-31 VITALS
WEIGHT: 251 LBS | HEIGHT: 71 IN | SYSTOLIC BLOOD PRESSURE: 146 MMHG | DIASTOLIC BLOOD PRESSURE: 88 MMHG | BODY MASS INDEX: 35.14 KG/M2

## 2022-03-31 DIAGNOSIS — M75.21 BICEPS TENDINITIS OF RIGHT SHOULDER: Primary | ICD-10-CM

## 2022-03-31 PROCEDURE — 73030 X-RAY EXAM OF SHOULDER: CPT | Performed by: PHYSICIAN ASSISTANT

## 2022-03-31 PROCEDURE — 20610 DRAIN/INJ JOINT/BURSA W/O US: CPT | Performed by: PHYSICIAN ASSISTANT

## 2022-03-31 PROCEDURE — 99214 OFFICE O/P EST MOD 30 MIN: CPT | Performed by: PHYSICIAN ASSISTANT

## 2022-03-31 RX ORDER — MELOXICAM 15 MG/1
15 TABLET ORAL DAILY
Qty: 30 TABLET | Refills: 0 | Status: SHIPPED | OUTPATIENT
Start: 2022-03-31 | End: 2022-08-01

## 2022-03-31 RX ORDER — METHYLPREDNISOLONE ACETATE 40 MG/ML
1 INJECTION, SUSPENSION INTRA-ARTICULAR; INTRALESIONAL; INTRAMUSCULAR; SOFT TISSUE
Status: COMPLETED | OUTPATIENT
Start: 2022-03-31 | End: 2022-03-31

## 2022-03-31 RX ORDER — LIDOCAINE HYDROCHLORIDE 10 MG/ML
4 INJECTION, SOLUTION INFILTRATION; PERINEURAL
Status: COMPLETED | OUTPATIENT
Start: 2022-03-31 | End: 2022-03-31

## 2022-03-31 RX ADMIN — LIDOCAINE HYDROCHLORIDE 4 ML: 10 INJECTION, SOLUTION INFILTRATION; PERINEURAL at 15:32

## 2022-03-31 RX ADMIN — METHYLPREDNISOLONE ACETATE 1 ML: 40 INJECTION, SUSPENSION INTRA-ARTICULAR; INTRALESIONAL; INTRAMUSCULAR; SOFT TISSUE at 15:32

## 2022-03-31 NOTE — PROGRESS NOTES
Patient Name:  Adilene Chong  MRN:  [id number]    Assessment & Plan     Right shoulder long head biceps tendinitis  1  Corticosteroid injection performed today into the right shoulder glenohumeral joint  Patient does have history of diabetes and states his blood sugars are well controlled  Advised to monitor them closely for the next few days as the injection may increase his blood glucose levels  2  Prescription for meloxicam   3  Referral to physical therapy  4  Follow-up in six weeks with primary care sports medicine  Chief Complaint     Right shoulder pain    History of the Present Illness     Adilene Chong is a 61 y o  right-hand-dominant male who reports to the office today for initial evaluation of his right shoulder  He reports a two month history of pain  He denies any injury or trauma  Pain is localized primarily to the anterior aspect of the shoulder  Pain is described as deep within the shoulder  Pain is worse with reaching overhead and caring objects  He notes weakness secondary to pain  No instability  No numbness or tingling  He has been taking Aleve without improvement  No fevers or chills  Physical Exam     Ht 5' 11" (1 803 m)   BMI 36 26 kg/m²     Right shoulder:  No gross deformity  No tenderness to palpation AC joint, long head biceps tendon, and lateral shoulder  PROM is , ER-abd 80, IR-abd 50  Neer impingement sign is positive  Ch[place]l impingement test is negative  Negative empty can test   Positive speed's test   Negative cross-body adduction test   5/5 forward flexion strength  Eyes: Anicteric sclerae  ENT: Trachea midline  Lungs: Normal respiratory effort  CV: Capillary refill is less than 2 seconds  Skin: Intact without erythema  Lymph: No palpable lymphadenopathy  Neuro: Sensation is grossly intact to light touch  Psych: Mood and affect are appropriate      Data Review     I have personally reviewed pertinent films in PACS, and my interpretation follows:    X-rays right shoulder 3/31/22:  No acute osseous abnormalities  Minimal degenerative changes glenohumeral joint  No fracture or dislocation noted      Past Medical History:   Diagnosis Date    Anxiety     Last assessed 12/20/2013    Contusion of right wrist 1/30/2018    COPD [facility]chronic obstructive pulmonary disease) [facility] ) 8/16/2016    Depression with anxiety     Diverticulitis 2016    Eczema 2/24/2016    Elevated ALT measurement 8/16/2016    Hypertension     Obesity 9/25/2012    Perianal abscess 3/5/2019    Type 2 diabetes mellitus with hyperglycemia, without long-term current use of insulin ([facility])        Past Surgical History:   Procedure Laterality Date    COLONOSCOPY      KNEE SURGERY         Allergies   Allergen Reactions    Lisinopril Cough       Current Outpatient Medications on File Prior to Visit   Medication Sig Dispense Refill    losartan (COZAAR) 50 mg tablet TAKE 1 TABLET BY MOUTH EVERY DAY 30 tablet 5    metFORMIN (GLUCOPHAGE) 1000 MG tablet TAKE 1 TABLET BY MOUTH TWICE A DAY WITH MEALS 60 tablet 5    multivitamin (THERAGRAN) TABS Take 1 tablet by mouth daily      acetaminophen (TYLENOL) 325 mg tablet Take 2 tablets (650 mg total) by mouth every 6 (six) hours as needed for mild pain 30 tablet 0    albuterol (Ventolin HFA) 90 mcg/act inhaler Inhale 2 puffs every 4 (four) hours as needed for wheezing 18 g 0    ALPRAZolam (XANAX) 0 25 mg tablet Take 1 tablet (0 25 mg total) by mouth daily at bedtime as needed for anxiety or sleep 30 tablet 0    Eliquis 5 MG TAKE 1 TABLET BY MOUTH TWICE A DAY 60 tablet 0    methocarbamol (ROBAXIN) 750 mg tablet Take 1 tablet (750 mg total) by mouth every 6 (six) hours as needed for muscle spasms 15 tablet 0    metoprolol succinate (TOPROL-XL) 25 mg 24 hr tablet Take 1 tablet (25 mg total) by mouth 2 (two) times a day 180 tablet 3    nicotine (NICODERM CQ) 14 mg/24hr TD 24 hr patch Place 1 patch on the skin daily 28 patch 0     No current facility-administered medications on file prior to visit  Social History     Tobacco Use    Smoking status: Former Smoker     Packs/day: 1 00     Quit date: 2021     Years since quittin 6    Smokeless tobacco: Never Used   Vaping Use    Vaping Use: Never used   Substance Use Topics    Alcohol use: Yes     Alcohol/week: 2 0 - 3 0 standard drinks     Types: 2 - 3 Cans of beer per week     Comment: down to 1-2 can of beer daily    Drug use: No       Family History   Problem Relation Age of Onset    Cancer Mother         Lung Cancer    Cancer Father     Diabetes Father     Diabetes Sister        Review of Systems     As stated in the HPI  All other systems reviewed and are negative        Large joint arthrocentesis: R glenohumeral  Procedure Details  Location: shoulder - R glenohumeral  Needle size: 22 G  Approach: posterior  Medications administered: 4 mL lidocaine 1 %; 1 mL methylPREDNISolone acetate 40 mg/mL    Patient tolerance: patient tolerated the procedure well with no immediate complications  Dressing:  Sterile dressing applied

## 2022-04-29 ENCOUNTER — REMOTE DEVICE CLINIC VISIT (OUTPATIENT)
Dept: CARDIOLOGY CLINIC | Facility: CLINIC | Age: 57
End: 2022-04-29
Payer: COMMERCIAL

## 2022-04-29 DIAGNOSIS — Z95.818 PRESENCE OF OTHER CARDIAC IMPLANTS AND GRAFTS: Primary | ICD-10-CM

## 2022-04-29 PROCEDURE — G2066 INTER DEVC REMOTE 30D: HCPCS | Performed by: INTERNAL MEDICINE

## 2022-04-29 PROCEDURE — 93298 REM INTERROG DEV EVAL SCRMS: CPT | Performed by: INTERNAL MEDICINE

## 2022-04-29 NOTE — PROGRESS NOTES
MDT LNQ22/ ACTIVE SYSTEM IS MRI CONDITIONAL   CARELINK TRANSMISSION: LOOP RECORDER  PRESENTING RHYTHM NSR @ 84 BPM  BATTERY STATUS "OK " 1 DEVICE CLASSIFIED AF EPISODE PREVIOUSLY ADDRESSED IN ALERT  NO PATIENT OR NEW DEVICE ACTIVATED EPISODES  NORMAL DEVICE FUNCTION   DL

## 2022-05-17 ENCOUNTER — OFFICE VISIT (OUTPATIENT)
Dept: OBGYN CLINIC | Facility: OTHER | Age: 57
End: 2022-05-17
Payer: COMMERCIAL

## 2022-05-17 VITALS
BODY MASS INDEX: 35.03 KG/M2 | HEART RATE: 101 BPM | DIASTOLIC BLOOD PRESSURE: 95 MMHG | WEIGHT: 250.2 LBS | SYSTOLIC BLOOD PRESSURE: 160 MMHG | HEIGHT: 71 IN

## 2022-05-17 DIAGNOSIS — M75.01 ADHESIVE CAPSULITIS OF RIGHT SHOULDER: ICD-10-CM

## 2022-05-17 DIAGNOSIS — M75.51 SUBACROMIAL BURSITIS OF RIGHT SHOULDER JOINT: Primary | ICD-10-CM

## 2022-05-17 PROCEDURE — 99214 OFFICE O/P EST MOD 30 MIN: CPT | Performed by: ORTHOPAEDIC SURGERY

## 2022-05-17 PROCEDURE — 4004F PT TOBACCO SCREEN RCVD TLK: CPT | Performed by: ORTHOPAEDIC SURGERY

## 2022-05-17 PROCEDURE — 3008F BODY MASS INDEX DOCD: CPT | Performed by: ORTHOPAEDIC SURGERY

## 2022-05-17 PROCEDURE — 20610 DRAIN/INJ JOINT/BURSA W/O US: CPT | Performed by: ORTHOPAEDIC SURGERY

## 2022-05-17 RX ORDER — LIDOCAINE HYDROCHLORIDE 10 MG/ML
4 INJECTION, SOLUTION INFILTRATION; PERINEURAL
Status: COMPLETED | OUTPATIENT
Start: 2022-05-17 | End: 2022-05-17

## 2022-05-17 RX ORDER — BETAMETHASONE DIPROPIONATE 0.5 MG/G
OINTMENT TOPICAL
COMMUNITY
Start: 2022-02-16 | End: 2022-08-01 | Stop reason: ALTCHOICE

## 2022-05-17 RX ORDER — BETAMETHASONE SODIUM PHOSPHATE AND BETAMETHASONE ACETATE 3; 3 MG/ML; MG/ML
6 INJECTION, SUSPENSION INTRA-ARTICULAR; INTRALESIONAL; INTRAMUSCULAR; SOFT TISSUE
Status: COMPLETED | OUTPATIENT
Start: 2022-05-17 | End: 2022-05-17

## 2022-05-17 RX ADMIN — BETAMETHASONE SODIUM PHOSPHATE AND BETAMETHASONE ACETATE 6 MG: 3; 3 INJECTION, SUSPENSION INTRA-ARTICULAR; INTRALESIONAL; INTRAMUSCULAR; SOFT TISSUE at 08:55

## 2022-05-17 RX ADMIN — LIDOCAINE HYDROCHLORIDE 4 ML: 10 INJECTION, SOLUTION INFILTRATION; PERINEURAL at 08:55

## 2022-05-17 NOTE — PROGRESS NOTES
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  He does not recollect any specific injury to his right shoulder but had gradual onset of right shoulder pain  Pain is worse with nearly all movement of the shoulder but mostly with overhead activities and reaching behind his back  He does not have any known history of previous right shoulder problems or surgery  He was earlier seen in this regard by orthopedic PA Lei Dawson on 3/31/2022  He received a right glenohumeral cortisone injection for evidence of mild glenohumeral arthritis and suspected biceps tendinitis  Today, the patient reports that he has not had any improvement of symptoms subsequent to his initial injection  He has not yet started physical therapy in this regard  Pain is aching in nature noted in the anterior and sometimes the lateral aspect of the right shoulder  Not associated with any tingling numbness of the right upper extremity  Denies any new injury  I have personally reviewed pertinent films in PACS and my interpretation is Plain radiograph of the right shoulder from 3/31/2022 does not reveal any acute osseous abnormality  Mild glenohumeral osteoarthritis is noted       Patient Active Problem List   Diagnosis    Acne    COPD (chronic obstructive pulmonary disease) ([address] )    Depression with anxiety    Eczema    Erectile dysfunction of non-organic origin    Hyperlipidemia    Essential hypertension    Type 2 diabetes mellitus with hyperglycemia, without long-term current use of insulin (HCC)    Insomnia    Irritable bowel syndrome    Nicotine dependence    Obesity (BMI 30-39  9)    Psoriasis    Alcohol use    Screen for colon cancer    Screening for prostate cancer    Atrial flutter (HCC)    Acute bronchitis    Tobacco use    Alcohol consumption heavy        Current Outpatient Medications on File Prior to Visit   Medication Sig Dispense Refill    betamethasone, augmented, (DIPROLENE) 0 05 % ointment APPLY TWICE DAILY TO PSORIASIS AREAS X 2 WEEKS, THEN 2-3 X PER WEEK AS NEEDED  AVOID FACE/SKIN FOLDS      losartan (COZAAR) 50 mg tablet TAKE 1 TABLET BY MOUTH EVERY DAY 30 tablet 5    meloxicam (Mobic) 15 mg tablet Take 1 tablet (15 mg total) by mouth daily 30 tablet 0    metFORMIN (GLUCOPHAGE) 1000 MG tablet TAKE 1 TABLET BY MOUTH TWICE A DAY WITH MEALS 60 tablet 5    multivitamin (THERAGRAN) TABS Take 1 tablet by mouth daily      acetaminophen (TYLENOL) 325 mg tablet Take 2 tablets (650 mg total) by mouth every 6 (six) hours as needed for mild pain 30 tablet 0    albuterol (Ventolin HFA) 90 mcg/act inhaler Inhale 2 puffs every 4 (four) hours as needed for wheezing 18 g 0    ALPRAZolam (XANAX) 0 25 mg tablet Take 1 tablet (0 25 mg total) by mouth daily at bedtime as needed for anxiety or sleep 30 tablet 0    Eliquis 5 MG TAKE 1 TABLET BY MOUTH TWICE A DAY 60 tablet 0    methocarbamol (ROBAXIN) 750 mg tablet Take 1 tablet (750 mg total) by mouth every 6 (six) hours as needed for muscle spasms 15 tablet 0    metoprolol succinate (TOPROL-XL) 25 mg 24 hr tablet Take 1 tablet (25 mg total) by mouth 2 (two) times a day 180 tablet 3    nicotine (NICODERM CQ) 14 mg/24hr TD 24 hr patch Place 1 patch on the skin daily 28 patch 0     No current facility-administered medications on file prior to visit          Allergies   Allergen Reactions    Lisinopril Cough        Tobacco Use: Medium Risk    Smoking Tobacco Use: Former Smoker    Smokeless Tobacco Use: Never Used        Social [employer]     Tobacco Use: Medium Risk    Smoking Tobacco Use: Former Smoker    Smokeless Tobacco Use: Never Used Alcohol Use: Not on file   Financial Resource Strain: Not on file   Food Insecurity: Not on file   Transportation Needs: Not on file   Physical Activity: Not on file   Stress: Not on file   Social Connections: Not on file   Intimate Partner Violence: Not on file   Depression: Not at risk    PHQ-2 Score: 0   Housing Stability: Not on file               Review of Systems     Body mass index is 34 9 kg/m²  Physical Exam  Vitals and nursing note reviewed  HENT:      Head: Atraumatic  Eyes:      Conjunctiva/sclera: Conjunctivae normal    Cardiovascular:      Rate and Rhythm: Normal rate  Pulses: Normal pulses  Pulmonary:      Effort: Pulmonary effort is normal  No respiratory distress  Neurological:      Mental Status: He is alert and oriented to person, place, and time  Psychiatric:         Mood and Affect: Mood normal          Behavior: Behavior normal           Ortho Exam:    Body part: right shoulder    Inspection:  No visible deformity or atrophy    Palpation:  No significant bony tenderness  There is some mild discomfort to palpation over the subacromial bursa  There is no tenderness to palpation over the long head of the biceps tendon today  Range of motion:  Limited range of motion in all directions with forward flexion and abduction limited to 90° actively, internal rotation is at the level of iliac crest   Has some discomfort with cross adduction as well  Special Tests:  Positive Chilel  Positive Neer's  Only mild discomfort with empty can test   Rotator cuff strength is subscapularis 5/5, supraspinatus 4+/5, infraspinatus 5/5 and teres minor 5/5  Negative acromioclavicular Jobes compression test   Equivocal [place]air's  No apprehension  Distal Neurovascular Status: Intact, Yes    Large joint arthrocentesis: R subacromial bursa  Universal Protocol:  Consent: Verbal consent obtained    Risks and benefits: risks, benefits and alternatives were discussed  Consent given by: patient  Patient understanding: patient states understanding of the procedure being performed  Patient consent: the patient's understanding of the procedure matches consent given  Required items: required blood products, implants, devices, and special equipment available  Patient identity confirmed: verbally with patient    Supporting Documentation  Indications: pain   Procedure Details  Location: shoulder - R subacromial bursa  Preparation: Patient was prepped and draped in the usual sterile fashion  Needle size: 22 G  Ultrasound guidance: no  Approach: lateral  Medications administered: 4 mL lidocaine 1 %; 6 mg betamethasone acetate-betamethasone sodium phosphate 6 (3-3) mg/mL    Patient tolerance: patient tolerated the procedure well with no immediate complications  Dressing:  Sterile dressing applied             Assessment:     Diagnosis ICD-10-CM Associated Orders   1  Subacromial bursitis of right shoulder joint  M75 51    2  Adhesive capsulitis of right shoulder  M75 01         Plan:    I explained my current clinical findings and reviewed radiological findings with Mr Fany Mensah  I suspect that his right shoulder discomfort is likely secondary to subacromial bursitis and potential adhesive capsulitis  He did not have much benefit from landmark guided right glenohumeral cortisone injection  He was agreeable to and received a right subacromial cortisone injection on today's office visit  I have advised him to closely monitor his blood sugar levels over the next 7-10 days and contact his primary care physician if his sugar levels are high  I have also encouraged him to initiate physical therapy rehabilitation in this regard  We will see him back in about 4 weeks time for reassessment  If symptoms continue we will request an MRI of the right shoulder for further evaluation  Portions of the record may have been created with voice recognition software   Occasional wrong word or "sound alike" substitutions may have occurred due to the inherent limitations of voice recognition software  Please review the chart carefully and recognize, using context, where substitutions/typographical errors may have occurred

## 2022-05-19 ENCOUNTER — HOSPITAL ENCOUNTER (INPATIENT)
Facility: HOSPITAL | Age: 57
LOS: 1 days | Discharge: HOME | DRG: 309 | End: 2022-05-19
Attending: EMERGENCY MEDICINE | Admitting: INTERNAL MEDICINE
Payer: COMMERCIAL

## 2022-05-19 ENCOUNTER — APPOINTMENT (INPATIENT)
Dept: NON INVASIVE DIAGNOSTICS | Facility: HOSPITAL | Age: 57
DRG: 309 | End: 2022-05-19
Payer: COMMERCIAL

## 2022-05-19 ENCOUNTER — APPOINTMENT (EMERGENCY)
Dept: RADIOLOGY | Facility: HOSPITAL | Age: 57
DRG: 309 | End: 2022-05-19
Payer: COMMERCIAL

## 2022-05-19 VITALS
HEART RATE: 86 BPM | WEIGHT: 250 LBS | SYSTOLIC BLOOD PRESSURE: 148 MMHG | HEIGHT: 71 IN | TEMPERATURE: 98.5 F | BODY MASS INDEX: 35 KG/M2 | OXYGEN SATURATION: 90 % | RESPIRATION RATE: 16 BRPM | DIASTOLIC BLOOD PRESSURE: 79 MMHG

## 2022-05-19 DIAGNOSIS — I48.92 ATRIAL FLUTTER WITH RAPID VENTRICULAR RESPONSE (HCC): ICD-10-CM

## 2022-05-19 DIAGNOSIS — J20.9 ACUTE BRONCHITIS, UNSPECIFIED ORGANISM: ICD-10-CM

## 2022-05-19 DIAGNOSIS — I48.91 ATRIAL FIBRILLATION WITH RAPID VENTRICULAR RESPONSE (HCC): Primary | ICD-10-CM

## 2022-05-19 DIAGNOSIS — R00.2 PALPITATIONS: ICD-10-CM

## 2022-05-19 DIAGNOSIS — G47.33 OSA (OBSTRUCTIVE SLEEP APNEA): ICD-10-CM

## 2022-05-19 DIAGNOSIS — Z78.9 ALCOHOL CONSUMPTION HEAVY: ICD-10-CM

## 2022-05-19 DIAGNOSIS — Z72.89 ALCOHOL USE: ICD-10-CM

## 2022-05-19 DIAGNOSIS — Z72.0 TOBACCO USE: ICD-10-CM

## 2022-05-19 DIAGNOSIS — J44.9 CHRONIC OBSTRUCTIVE PULMONARY DISEASE, UNSPECIFIED COPD TYPE (HCC): ICD-10-CM

## 2022-05-19 DIAGNOSIS — E11.65 TYPE 2 DIABETES MELLITUS WITH HYPERGLYCEMIA, WITHOUT LONG-TERM CURRENT USE OF INSULIN (HCC): ICD-10-CM

## 2022-05-19 PROBLEM — D72.829 LEUKOCYTOSIS: Status: ACTIVE | Noted: 2022-05-19

## 2022-05-19 LAB
2HR DELTA HS TROPONIN: 22 NG/L
4HR DELTA HS TROPONIN: 25 NG/L
ANION GAP SERPL CALCULATED.3IONS-SCNC: 7 MMOL/L (ref 4–13)
AORTIC ROOT: 3.1 CM
APICAL FOUR CHAMBER EJECTION FRACTION: 60 %
APTT PPP: 28 SECONDS (ref 23–37)
ATRIAL RATE: 166 BPM
ATRIAL RATE: 90 BPM
BACTERIA UR QL AUTO: ABNORMAL /HPF
BASOPHILS # BLD AUTO: 0.06 THOUSANDS/ΜL (ref 0–0.1)
BASOPHILS NFR BLD AUTO: 0 % (ref 0–1)
BILIRUB UR QL STRIP: NEGATIVE
BUN SERPL-MCNC: 21 MG/DL (ref 5–25)
CALCIUM SERPL-MCNC: 10 MG/DL (ref 8.3–10.1)
CAOX CRY URNS QL MICRO: ABNORMAL /HPF
CARDIAC TROPONIN I PNL SERPL HS: 28 NG/L
CARDIAC TROPONIN I PNL SERPL HS: 31 NG/L
CARDIAC TROPONIN I PNL SERPL HS: 6 NG/L
CHLORIDE SERPL-SCNC: 105 MMOL/L (ref 100–108)
CHOLEST SERPL-MCNC: 205 MG/DL
CLARITY UR: CLEAR
CO2 SERPL-SCNC: 24 MMOL/L (ref 21–32)
COLOR UR: ABNORMAL
CREAT SERPL-MCNC: 1.13 MG/DL (ref 0.6–1.3)
E WAVE DECELERATION TIME: 178 MS
EOSINOPHIL # BLD AUTO: 0.18 THOUSAND/ΜL (ref 0–0.61)
EOSINOPHIL NFR BLD AUTO: 1 % (ref 0–6)
ERYTHROCYTE [DISTWIDTH] IN BLOOD BY AUTOMATED COUNT: 12.5 % (ref 11.6–15.1)
ETHANOL SERPL-MCNC: <3 MG/DL (ref 0–3)
FLUAV RNA RESP QL NAA+PROBE: NEGATIVE
FLUBV RNA RESP QL NAA+PROBE: NEGATIVE
FRACTIONAL SHORTENING: 38 (ref 28–44)
GFR SERPL CREATININE-BSD FRML MDRD: 71 ML/MIN/1.73SQ M
GLUCOSE SERPL-MCNC: 163 MG/DL (ref 65–140)
GLUCOSE SERPL-MCNC: 192 MG/DL (ref 65–140)
GLUCOSE SERPL-MCNC: 212 MG/DL (ref 65–140)
GLUCOSE SERPL-MCNC: 286 MG/DL (ref 65–140)
GLUCOSE UR STRIP-MCNC: NEGATIVE MG/DL
HCT VFR BLD AUTO: 48.9 % (ref 36.5–49.3)
HDLC SERPL-MCNC: 46 MG/DL
HGB BLD-MCNC: 16.5 G/DL (ref 12–17)
HGB UR QL STRIP.AUTO: NEGATIVE
HYALINE CASTS #/AREA URNS LPF: ABNORMAL /LPF
IMM GRANULOCYTES # BLD AUTO: 0.1 THOUSAND/UL (ref 0–0.2)
IMM GRANULOCYTES NFR BLD AUTO: 1 % (ref 0–2)
INR PPP: 0.91 (ref 0.84–1.19)
INTERVENTRICULAR SEPTUM IN DIASTOLE (PARASTERNAL SHORT AXIS VIEW): 1.2 CM
INTERVENTRICULAR SEPTUM: 1.2 CM (ref 0.6–1.1)
KETONES UR STRIP-MCNC: NEGATIVE MG/DL
LAAS-AP2: 20.1 CM2
LAAS-AP4: 17 CM2
LEFT ATRIUM AREA SYSTOLE SINGLE PLANE A4C: 17.3 CM2
LEFT ATRIUM SIZE: 3.4 CM
LEFT INTERNAL DIMENSION IN SYSTOLE: 3.2 CM (ref 2.1–4)
LEFT VENTRICULAR INTERNAL DIMENSION IN DIASTOLE: 5.2 CM (ref 3.5–6)
LEFT VENTRICULAR POSTERIOR WALL IN END DIASTOLE: 1 CM
LEFT VENTRICULAR STROKE VOLUME: 88 ML
LEUKOCYTE ESTERASE UR QL STRIP: NEGATIVE
LVSV (TEICH): 88 ML
LYMPHOCYTES # BLD AUTO: 4.42 THOUSANDS/ΜL (ref 0.6–4.47)
LYMPHOCYTES NFR BLD AUTO: 29 % (ref 14–44)
MAGNESIUM SERPL-MCNC: 1.8 MG/DL (ref 1.6–2.6)
MCH RBC QN AUTO: 32.3 PG (ref 26.8–34.3)
MCHC RBC AUTO-ENTMCNC: 33.7 G/DL (ref 31.4–37.4)
MCV RBC AUTO: 96 FL (ref 82–98)
MONOCYTES # BLD AUTO: 1.12 THOUSAND/ΜL (ref 0.17–1.22)
MONOCYTES NFR BLD AUTO: 7 % (ref 4–12)
MV E'TISSUE VEL-LAT: 11 CM/S
MV E'TISSUE VEL-SEP: 13 CM/S
MV PEAK A VEL: 0.56 M/S
MV PEAK E VEL: 70 CM/S
MV STENOSIS PRESSURE HALF TIME: 52 MS
MV VALVE AREA P 1/2 METHOD: 4.23
NEUTROPHILS # BLD AUTO: 9.47 THOUSANDS/ΜL (ref 1.85–7.62)
NEUTS SEG NFR BLD AUTO: 62 % (ref 43–75)
NITRITE UR QL STRIP: NEGATIVE
NON-SQ EPI CELLS URNS QL MICRO: ABNORMAL /HPF
NONHDLC SERPL-MCNC: 159 MG/DL
NRBC BLD AUTO-RTO: 0 /100 WBCS
NT-PROBNP SERPL-MCNC: 19 PG/ML
P AXIS: 79 DEGREES
PH UR STRIP.AUTO: 5.5 [PH]
PLATELET # BLD AUTO: 304 THOUSANDS/UL (ref 149–390)
PMV BLD AUTO: 9.3 FL (ref 8.9–12.7)
POTASSIUM SERPL-SCNC: 3.9 MMOL/L (ref 3.5–5.3)
PR INTERVAL: 150 MS
PROCALCITONIN SERPL-MCNC: <0.05 NG/ML
PROT UR STRIP-MCNC: ABNORMAL MG/DL
PROTHROMBIN TIME: 11.9 SECONDS (ref 11.6–14.5)
QRS AXIS: -72 DEGREES
QRS AXIS: 1 DEGREES
QRSD INTERVAL: 80 MS
QRSD INTERVAL: 88 MS
QT INTERVAL: 266 MS
QT INTERVAL: 338 MS
QTC INTERVAL: 413 MS
QTC INTERVAL: 449 MS
RBC # BLD AUTO: 5.11 MILLION/UL (ref 3.88–5.62)
RBC #/AREA URNS AUTO: ABNORMAL /HPF
RIGHT ATRIUM AREA SYSTOLE A4C: 18.8 CM2
RIGHT VENTRICLE ID DIMENSION: 4.6 CM
RSV RNA RESP QL NAA+PROBE: NEGATIVE
SARS-COV-2 RNA RESP QL NAA+PROBE: NEGATIVE
SL CV LEFT ATRIUM LENGTH A2C: 5 CM
SL CV LV EF: 65
SL CV PED ECHO LEFT VENTRICLE DIASTOLIC VOLUME (MOD BIPLANE) 2D: 128 ML
SL CV PED ECHO LEFT VENTRICLE SYSTOLIC VOLUME (MOD BIPLANE) 2D: 40 ML
SODIUM SERPL-SCNC: 136 MMOL/L (ref 136–145)
SP GR UR STRIP.AUTO: 1.02 (ref 1–1.03)
T WAVE AXIS: 40 DEGREES
T WAVE AXIS: 72 DEGREES
TR MAX PG: 10 MMHG
TR PEAK VELOCITY: 1.6 M/S
TRICUSPID VALVE PEAK REGURGITATION VELOCITY: 1.59 M/S
TRIGL SERPL-MCNC: 497 MG/DL
TSH SERPL DL<=0.05 MIU/L-ACNC: 1.38 UIU/ML (ref 0.45–4.5)
UROBILINOGEN UR STRIP-ACNC: <2 MG/DL
VENTRICULAR RATE: 172 BPM
VENTRICULAR RATE: 90 BPM
WBC # BLD AUTO: 15.35 THOUSAND/UL (ref 4.31–10.16)
WBC #/AREA URNS AUTO: ABNORMAL /HPF

## 2022-05-19 PROCEDURE — 99291 CRITICAL CARE FIRST HOUR: CPT | Performed by: EMERGENCY MEDICINE

## 2022-05-19 PROCEDURE — 71045 X-RAY EXAM CHEST 1 VIEW: CPT

## 2022-05-19 PROCEDURE — 81001 URINALYSIS AUTO W/SCOPE: CPT | Performed by: INTERNAL MEDICINE

## 2022-05-19 PROCEDURE — 83735 ASSAY OF MAGNESIUM: CPT | Performed by: INTERNAL MEDICINE

## 2022-05-19 PROCEDURE — 84443 ASSAY THYROID STIM HORMONE: CPT | Performed by: INTERNAL MEDICINE

## 2022-05-19 PROCEDURE — 80061 LIPID PANEL: CPT | Performed by: INTERNAL MEDICINE

## 2022-05-19 PROCEDURE — 99239 HOSP IP/OBS DSCHRG MGMT >30: CPT | Performed by: PHYSICIAN ASSISTANT

## 2022-05-19 PROCEDURE — 82948 REAGENT STRIP/BLOOD GLUCOSE: CPT

## 2022-05-19 PROCEDURE — 96376 TX/PRO/DX INJ SAME DRUG ADON: CPT

## 2022-05-19 PROCEDURE — 99223 1ST HOSP IP/OBS HIGH 75: CPT | Performed by: INTERNAL MEDICINE

## 2022-05-19 PROCEDURE — 36415 COLL VENOUS BLD VENIPUNCTURE: CPT

## 2022-05-19 PROCEDURE — 99285 EMERGENCY DEPT VISIT HI MDM: CPT

## 2022-05-19 PROCEDURE — 84484 ASSAY OF TROPONIN QUANT: CPT

## 2022-05-19 PROCEDURE — 93306 TTE W/DOPPLER COMPLETE: CPT | Performed by: INTERNAL MEDICINE

## 2022-05-19 PROCEDURE — 99222 1ST HOSP IP/OBS MODERATE 55: CPT | Performed by: INTERNAL MEDICINE

## 2022-05-19 PROCEDURE — 96374 THER/PROPH/DIAG INJ IV PUSH: CPT

## 2022-05-19 PROCEDURE — 80048 BASIC METABOLIC PNL TOTAL CA: CPT

## 2022-05-19 PROCEDURE — 84145 PROCALCITONIN (PCT): CPT | Performed by: INTERNAL MEDICINE

## 2022-05-19 PROCEDURE — 0241U HB NFCT DS VIR RESP RNA 4 TRGT: CPT

## 2022-05-19 PROCEDURE — 94664 DEMO&/EVAL PT USE INHALER: CPT

## 2022-05-19 PROCEDURE — 93005 ELECTROCARDIOGRAM TRACING: CPT

## 2022-05-19 PROCEDURE — 96375 TX/PRO/DX INJ NEW DRUG ADDON: CPT

## 2022-05-19 PROCEDURE — 93010 ELECTROCARDIOGRAM REPORT: CPT | Performed by: INTERNAL MEDICINE

## 2022-05-19 PROCEDURE — 93306 TTE W/DOPPLER COMPLETE: CPT

## 2022-05-19 PROCEDURE — 82077 ASSAY SPEC XCP UR&BREATH IA: CPT | Performed by: INTERNAL MEDICINE

## 2022-05-19 PROCEDURE — 85610 PROTHROMBIN TIME: CPT | Performed by: INTERNAL MEDICINE

## 2022-05-19 PROCEDURE — 94640 AIRWAY INHALATION TREATMENT: CPT

## 2022-05-19 PROCEDURE — 85025 COMPLETE CBC W/AUTO DIFF WBC: CPT

## 2022-05-19 PROCEDURE — 83880 ASSAY OF NATRIURETIC PEPTIDE: CPT

## 2022-05-19 PROCEDURE — 85730 THROMBOPLASTIN TIME PARTIAL: CPT | Performed by: INTERNAL MEDICINE

## 2022-05-19 PROCEDURE — 94760 N-INVAS EAR/PLS OXIMETRY 1: CPT

## 2022-05-19 RX ORDER — LORAZEPAM 1 MG/1
1 TABLET ORAL EVERY 6 HOURS PRN
Status: DISCONTINUED | OUTPATIENT
Start: 2022-05-19 | End: 2022-05-19 | Stop reason: HOSPADM

## 2022-05-19 RX ORDER — DILTIAZEM HYDROCHLORIDE 5 MG/ML
30 INJECTION INTRAVENOUS ONCE
Status: COMPLETED | OUTPATIENT
Start: 2022-05-19 | End: 2022-05-19

## 2022-05-19 RX ORDER — METOPROLOL SUCCINATE 25 MG/1
25 TABLET, EXTENDED RELEASE ORAL 2 TIMES DAILY
Status: DISCONTINUED | OUTPATIENT
Start: 2022-05-19 | End: 2022-05-19

## 2022-05-19 RX ORDER — NICOTINE 21 MG/24HR
21 PATCH, TRANSDERMAL 24 HOURS TRANSDERMAL DAILY
Status: DISCONTINUED | OUTPATIENT
Start: 2022-05-19 | End: 2022-05-19 | Stop reason: HOSPADM

## 2022-05-19 RX ORDER — PREDNISONE 20 MG/1
40 TABLET ORAL DAILY
Qty: 8 TABLET | Refills: 0 | Status: SHIPPED | OUTPATIENT
Start: 2022-05-20 | End: 2022-05-24

## 2022-05-19 RX ORDER — METHOCARBAMOL 750 MG/1
750 TABLET, FILM COATED ORAL EVERY 6 HOURS PRN
Status: DISCONTINUED | OUTPATIENT
Start: 2022-05-19 | End: 2022-05-19 | Stop reason: HOSPADM

## 2022-05-19 RX ORDER — LANOLIN ALCOHOL/MO/W.PET/CERES
100 CREAM (GRAM) TOPICAL DAILY
Refills: 0
Start: 2022-05-20

## 2022-05-19 RX ORDER — INSULIN LISPRO 100 [IU]/ML
1-5 INJECTION, SOLUTION INTRAVENOUS; SUBCUTANEOUS
Status: DISCONTINUED | OUTPATIENT
Start: 2022-05-19 | End: 2022-05-19

## 2022-05-19 RX ORDER — METOPROLOL TARTRATE 5 MG/5ML
5 INJECTION INTRAVENOUS ONCE
Status: COMPLETED | OUTPATIENT
Start: 2022-05-19 | End: 2022-05-19

## 2022-05-19 RX ORDER — POTASSIUM CHLORIDE 20MEQ/15ML
20 LIQUID (ML) ORAL ONCE
Status: COMPLETED | OUTPATIENT
Start: 2022-05-19 | End: 2022-05-19

## 2022-05-19 RX ORDER — METOPROLOL TARTRATE 50 MG/1
50 TABLET, FILM COATED ORAL EVERY 12 HOURS SCHEDULED
Status: DISCONTINUED | OUTPATIENT
Start: 2022-05-19 | End: 2022-05-19 | Stop reason: HOSPADM

## 2022-05-19 RX ORDER — ACETAMINOPHEN 325 MG/1
650 TABLET ORAL EVERY 6 HOURS PRN
Status: DISCONTINUED | OUTPATIENT
Start: 2022-05-19 | End: 2022-05-19 | Stop reason: HOSPADM

## 2022-05-19 RX ORDER — FOLIC ACID 1 MG/1
1 TABLET ORAL DAILY
Refills: 0
Start: 2022-05-20

## 2022-05-19 RX ORDER — INSULIN LISPRO 100 [IU]/ML
1-5 INJECTION, SOLUTION INTRAVENOUS; SUBCUTANEOUS EVERY 6 HOURS SCHEDULED
Status: DISCONTINUED | OUTPATIENT
Start: 2022-05-19 | End: 2022-05-19

## 2022-05-19 RX ORDER — MAGNESIUM SULFATE 1 G/100ML
1 INJECTION INTRAVENOUS ONCE
Status: COMPLETED | OUTPATIENT
Start: 2022-05-19 | End: 2022-05-19

## 2022-05-19 RX ORDER — LANOLIN ALCOHOL/MO/W.PET/CERES
100 CREAM (GRAM) TOPICAL DAILY
Status: DISCONTINUED | OUTPATIENT
Start: 2022-05-19 | End: 2022-05-19 | Stop reason: HOSPADM

## 2022-05-19 RX ORDER — METOPROLOL TARTRATE 50 MG/1
50 TABLET, FILM COATED ORAL EVERY 12 HOURS SCHEDULED
Qty: 60 TABLET | Refills: 0 | Status: SHIPPED | OUTPATIENT
Start: 2022-05-19 | End: 2022-07-21 | Stop reason: SDUPTHER

## 2022-05-19 RX ORDER — LEVALBUTEROL INHALATION SOLUTION 0.63 MG/3ML
0.63 SOLUTION RESPIRATORY (INHALATION)
Status: DISCONTINUED | OUTPATIENT
Start: 2022-05-19 | End: 2022-05-19 | Stop reason: HOSPADM

## 2022-05-19 RX ORDER — FOLIC ACID 1 MG/1
1 TABLET ORAL DAILY
Status: DISCONTINUED | OUTPATIENT
Start: 2022-05-19 | End: 2022-05-19 | Stop reason: HOSPADM

## 2022-05-19 RX ORDER — DILTIAZEM HYDROCHLORIDE 5 MG/ML
15 INJECTION INTRAVENOUS ONCE
Status: DISCONTINUED | OUTPATIENT
Start: 2022-05-19 | End: 2022-05-19

## 2022-05-19 RX ORDER — METHYLPREDNISOLONE SODIUM SUCCINATE 40 MG/ML
40 INJECTION, POWDER, LYOPHILIZED, FOR SOLUTION INTRAMUSCULAR; INTRAVENOUS EVERY 8 HOURS
Status: DISCONTINUED | OUTPATIENT
Start: 2022-05-19 | End: 2022-05-19 | Stop reason: HOSPADM

## 2022-05-19 RX ORDER — TIOTROPIUM BROMIDE 18 UG/1
18 CAPSULE ORAL; RESPIRATORY (INHALATION) DAILY
Qty: 30 CAPSULE | Refills: 0 | Status: SHIPPED | OUTPATIENT
Start: 2022-05-19 | End: 2022-08-02 | Stop reason: ALTCHOICE

## 2022-05-19 RX ORDER — ALBUTEROL SULFATE 90 UG/1
2 AEROSOL, METERED RESPIRATORY (INHALATION) EVERY 4 HOURS PRN
Qty: 18 G | Refills: 0 | Status: SHIPPED | OUTPATIENT
Start: 2022-05-19

## 2022-05-19 RX ORDER — INSULIN LISPRO 100 [IU]/ML
1-5 INJECTION, SOLUTION INTRAVENOUS; SUBCUTANEOUS
Status: DISCONTINUED | OUTPATIENT
Start: 2022-05-19 | End: 2022-05-19 | Stop reason: HOSPADM

## 2022-05-19 RX ORDER — DILTIAZEM HYDROCHLORIDE 5 MG/ML
INJECTION INTRAVENOUS
Status: COMPLETED
Start: 2022-05-19 | End: 2022-05-19

## 2022-05-19 RX ADMIN — FOLIC ACID 1 MG: 1 TABLET ORAL at 08:20

## 2022-05-19 RX ADMIN — METOPROLOL TARTRATE 5 MG: 1 INJECTION, SOLUTION INTRAVENOUS at 02:24

## 2022-05-19 RX ADMIN — IPRATROPIUM BROMIDE 0.5 MG: 0.5 SOLUTION RESPIRATORY (INHALATION) at 13:26

## 2022-05-19 RX ADMIN — THIAMINE HCL TAB 100 MG 100 MG: 100 TAB at 08:20

## 2022-05-19 RX ADMIN — METHYLPREDNISOLONE SODIUM SUCCINATE 40 MG: 40 INJECTION, POWDER, FOR SOLUTION INTRAMUSCULAR; INTRAVENOUS at 04:58

## 2022-05-19 RX ADMIN — METOPROLOL SUCCINATE 25 MG: 25 TABLET, FILM COATED, EXTENDED RELEASE ORAL at 08:20

## 2022-05-19 RX ADMIN — Medication 1 TABLET: at 08:20

## 2022-05-19 RX ADMIN — LEVALBUTEROL HYDROCHLORIDE 0.63 MG: 0.63 SOLUTION RESPIRATORY (INHALATION) at 13:26

## 2022-05-19 RX ADMIN — DOXYCYCLINE 100 MG: 100 INJECTION, POWDER, LYOPHILIZED, FOR SOLUTION INTRAVENOUS at 04:58

## 2022-05-19 RX ADMIN — DILTIAZEM HYDROCHLORIDE 30 MG: 5 INJECTION INTRAVENOUS at 01:43

## 2022-05-19 RX ADMIN — INSULIN LISPRO 1 UNITS: 100 INJECTION, SOLUTION INTRAVENOUS; SUBCUTANEOUS at 06:03

## 2022-05-19 RX ADMIN — METHYLPREDNISOLONE SODIUM SUCCINATE 40 MG: 40 INJECTION, POWDER, FOR SOLUTION INTRAMUSCULAR; INTRAVENOUS at 14:16

## 2022-05-19 RX ADMIN — IPRATROPIUM BROMIDE 0.5 MG: 0.5 SOLUTION RESPIRATORY (INHALATION) at 07:19

## 2022-05-19 RX ADMIN — NICOTINE 21 MG: 21 PATCH, EXTENDED RELEASE TRANSDERMAL at 10:09

## 2022-05-19 RX ADMIN — LEVALBUTEROL HYDROCHLORIDE 0.63 MG: 0.63 SOLUTION RESPIRATORY (INHALATION) at 07:20

## 2022-05-19 RX ADMIN — DILTIAZEM HYDROCHLORIDE 30 MG: 5 INJECTION INTRAVENOUS at 00:58

## 2022-05-19 RX ADMIN — MAGNESIUM SULFATE HEPTAHYDRATE 1 G: 1 INJECTION, SOLUTION INTRAVENOUS at 04:58

## 2022-05-19 RX ADMIN — POTASSIUM CHLORIDE 20 MEQ: 20 SOLUTION ORAL at 04:58

## 2022-05-19 RX ADMIN — APIXABAN 5 MG: 5 TABLET, FILM COATED ORAL at 08:22

## 2022-05-19 NOTE — DISCHARGE SUMMARY
[address] [facility]  Discharge- Ivon Sleeper 1965, 61 y o  male MRN: [id number]  Unit/Bed#: [facility] 510-01 Encounter: [id number]  Primary Care Provider: Lina Pennington MD   Date and time admitted to hospital: 5/19/2022 12:41 AM      DOS: 5/19/2022  * Atrial flutter ([facility] )  Assessment & Plan  · Pt presented with heart palpitations, SOB and cough with wheezing over last several weeks  · EKG on admission with a  Fib/flutter with RVR, HR in the 170s  · Similar to prior hospitalization 1 year ago for a   Flutter and bronchitis   · Patient was treated for a COPD exacerbation at that time and then underwent outpatient atrial flutter ablation with loop recorder implantation in July of 2021  · [id number] of 2  · S/p 2 doses of IV diltiazem 30 mg and IV metoprolol x 1 with HR in the 120s in the ED and diltiazem gtt with conversion back to NSR  · Cardiology evaluated,  · Lopressor increased to 50 mg BID  · Continue Eliquis 5 mg BID  · Recommending outpatient exercise stress ECHO and follow up with EP outpatient   · Stable for discharge from cardiac standpoint     COPD (chronic obstructive pulmonary disease) ([facility] )  Assessment & Plan  · Smokes 1 pack a day and has been doing this for 40 years  · Noted to be in acute COPD exacerbation on admission with diffuse inspiratory and expiratory wheezing  · CXR negative  · COVID swab negative   · Pulmonary evaluated,  · Recommending decreasing steroids to Prednisone 40 mg daily x 5 days total (1 day of IV steroids inpatient)  · D/c doxycycline  · Will place on spiriva on discharge  · PRN albuterol inhaler in place of nebulizers, pt to follow up with pulmonary clinic if need for nebulizer machine arises per discussion with pulmonary   · Will need outpatient PFTs and polysomnography   · Encourage tobacco cessation  · Currently saturating low 90s on RA    Leukocytosis  Assessment & Plan  · POA, WBC 15 35  · Etiology unknown, however appears to be fairly chronic on review of records   · Procal negative   · CXR negative   · UA unremarkable   · COVID swab negative   · Started on IV doxycycline for COPD exacerbation for ppx, recommended to d/c per pulmonary   · Otherwise no infectious etiology noted  · Trend CBC outpatient, however likely will be unreliable now as patient on steroids     NAYE (obstructive sleep apnea)  Assessment & Plan  · Review of notes shows reported history of NAYE but patient does not wear a CPAP  · Pulmonary following, will need outpatient sleep study     Alcohol use  Assessment & Plan  · Reports drinking 4 beers daily for years  · Continue daily thiamine and folic acid supplementation   · Medical alcohol level negative   · CIWA score 0 most recent  · Strongly encourage ETOH cessation    Type 2 diabetes mellitus with hyperglycemia, without long-term current use of insulin (HCC)  Assessment & Plan  Lab Results   Component Value Date    HGBA1C 7 5 (A) 07/06/2021       Recent Labs     05/19/22  0602 05/19/22  1312   POCGLU 163* 286*       Blood Sugar Average: Last 72 hrs:  (P) 224 5   · Resume metformin on discharge  · Place on glucometer at home while on prednisone therapy to evaluate for any hyperglycemia      Medical Problems             Resolved Problems  Date Reviewed: 5/19/2022   None               Discharging Physician / Practitioner: Marco Oliver PA-C  PCP: Kailey Santos MD  Admission Date:   Admission Orders (From admission, onward)     Ordered        05/19/22 0252  Inpatient Admission  Once                      Discharge Date: 05/19/22    Consultations During Hospital Stay:  · Pulmonary  · Cardiology     Procedures Performed:   · CXR 5/19 - No acute cardiopulmonary disease  · ECHO 5/19 - EF 65%    Significant Findings / Test Results:   · Trop 6/28/31  · Lipid panel with cholesterol 205 and triglycerides 497  · Procal negative   · WBC 15 35  · TSH WNL  · COVID swab negative    Incidental Findings:   · None     Test Results Pending at Discharge (will require follow up): · None     Outpatient Tests Requested:  · Per PCP, recommend outpatient CBC to monitor leukocytosis    Complications:  None    Reason for Admission: wheezing, shortness of breath    Hospital Course:   Court Kline is a 61 y o  male patient with significant past medical history of NAYE, COPD, ETOH and tobacco abuse, DM who originally presented to the hospital on 5/19/2022 due to heart palpitations, shortness of breath and wheezing  Pt was noted to be in a  Fib /flutter with RVR with HR in the 170s, he was placed on IV diltiazem gtt and given a few IV pushes of diltiazem and metoprolol with conversion to NSR  He was increased on his home metoprolol dose and cleared for discharge from cardiology standpoint with outpatient stress ECHO  Pt also noted to be in acute COPD exacerbation and was treated with nebulizers and IV steroids with improvement  He was evaluated by pulmonary as well and recommended decrease to prednisone therapy with outpatient inhalers and follow up for PFTs  Pt was cleared for discharge from cardiac and pulmonary standpoints  He was discharged in stable condition  For additional information please refer to medical records  Pt's symptoms resolved throughout hospitalization  Medication changes include: Addition of folic acid, thiamine supplementation, Prednisone 40 mg daily x 4 days (to complete 5 days total systemic steroid therapy), Spiriva inhaler and Lopressor increased to 50 mg BID  Glucometer also prescribed on discharge and PRN albuterol inhaler    The patient, initially admitted to the hospital as inpatient, was discharged earlier than expected given the following: rapid improvement and cardiac/pulmonary clearance   Please see above list of diagnoses and related plan for additional information       Condition at Discharge: stable    Discharge Day Visit / Exam:   * Please refer to separate progress note for these details *    Discussion with Family: Patient declined call to   Discharge instructions/Information to patient and family:   See after visit summary for information provided to patient and family  Provisions for Follow-Up Care:  See after visit summary for information related to follow-up care and any pertinent home health orders  Disposition:   Home    Planned Readmission: None     Discharge Statement:  I spent 50 minutes discharging the patient  This time was spent on the day of discharge  I had direct contact with the patient on the day of discharge  Greater than 50% of the total time was spent examining patient, answering all patient questions, arranging and discussing plan of care with patient as well as directly providing post-discharge instructions  Additional time then spent on discharge activities  Discharge Medications:  See after visit summary for reconciled discharge medications provided to patient and/or family        **Please Note: This note may have been constructed using a voice recognition system**

## 2022-05-19 NOTE — PROGRESS NOTES
[facility]  Progress Note - [facility] 1965, 61 y o  male MRN: [id number]  Unit/Bed#: [facility] 510-01 Encounter: [id number]  Primary Care Provider: Farida Harris MD   Date and time admitted to hospital: 5/19/2022 12:41 AM      DOS: 5/19/2022  POST ADMIT CHECK  * Atrial flutter ([facility] )  Assessment & Plan  · Pt presented with heart palpitations, SOB and cough with wheezing over last several weeks  · EKG on admission with a  Fib/flutter with RVR, HR in the 170s  · Similar to prior hospitalization 1 year ago for a   Flutter and bronchitis   · Patient was treated for a COPD exacerbation at that time and then underwent outpatient atrial flutter ablation with loop recorder implantation in July of 2021  · [id number] of 2  · S/p 2 doses of IV diltiazem 30 mg and IV metoprolol x 1 with HR in the 120s in the ED  · Therefore patient placed on IV Diltiazem gtt and restarted on home Toprol XL 25 mg BID  · Cardiology consultation pending, appreciate recommendations  · HR improved to the 90s today, NSR continue telemetry monitoring   · ECHO pending  · Continue Eliquis 5 mg BID for a/c  · keep potassium greater than 4, magnesium greater than 2  · Troponins 6/28/31, likely elevated in setting of a  Fib/flutter       [facility]) [facility]  Assessment & Plan  · Smokes 1 pack a day and has been doing this for 40 years  · Noted to be in acute COPD exacerbation on admission with diffuse inspiratory and expiratory wheezing  · CXR negative  · COVID swab negative   · Continue scheduled neb treatments  · Currently on IV solu-medrol 40 mg Q8H and doxycycline for ppx  · Denies ever having had formal PFTs performed  · Encourage tobacco cessation, nicotine patch while inpatient  · Due to recurrent hospitalizations and need for follow up, would obtain pulmonary consultation  · Currently saturating low 90s on RA, monitor oxygen saturations closely     Leukocytosis  Assessment & Plan  · POA, WBC 15 35  · Etiology unknown, however appears to be fairly chronic on review of records   · Procal negative   · CXR negative   · UA unremarkable   · COVID swab negative   · Started on IV doxycycline for COPD exacerbation for ppx  · Otherwise no infectious etiology noted  · Trend CBC, however likely will be unreliable now as patient on IV steroids     NAYE (obstructive sleep apnea)  Assessment & Plan  · Review of notes shows reported history of NAYE but patient does not wear a CPAP  · CPAP at night while in the hospital  · Pulmonary consultation as above    Alcohol use  Assessment & Plan  · Reports drinking 4 beers daily for years  · Continue daily thiamine and folic acid supplementation   · Medical alcohol level negative   · CIWA score 0 most recent, continue to monitor for withdrawal symptoms    Type 2 diabetes mellitus with hyperglycemia, without long-term current use of insulin (HCC)  Assessment & Plan  Lab Results   Component Value Date    HGBA1C 7 5 (A) 07/06/2021       Recent Labs     05/19/22  0602   POCGLU 163*       Blood Sugar Average: Last 72 hrs:  (P) 163   · On pta metformin, hold while inpatient   · Currently NPO in case of any cardiac intervention  · Continue SSI coverage and glucose checks Q6H for now while NPO, change to QID once cleared to start a carb diet   · Hypoglycemia protocol      VTE Pharmacologic Prophylaxis: VTE Score: 3 Moderate Risk (Score 3-4) - Pharmacological DVT Prophylaxis Ordered: apixaban (Eliquis)  Patient Centered Rounds: I evaluated the patient without nursing staff present due to speaking to nurse outside patient's room   Discussions with Specialists or Other Care Team Provider: Discussed with pulmonary, RN, CM and reviewed previous notes     Education and Discussions with Family / Patient: Patient declined call to   Time Spent for Care: 30 minutes   More than 50% of total time spent on counseling and coordination of care as described above     Current Length of Stay: 0 day(s)  Current Patient Status: Inpatient   Certification Statement: The patient will continue to require additional inpatient hospital stay due to IV solu-medrol for COPD exacerbation, cardiology and pulmonary evaluations, diltiazem gtt  Discharge Plan: Anticipate discharge in 48-72 hrs to home  Code Status: Level 1 - Full Code    Subjective:   Pt reports that he is doing much better today  No longer has any feelings of heart palpitations and notes that his heart rates are much better now  Denies any chest pain, abdominal pain, nausea or vomiting  Does still have some chest congestion but shortness of breath has improved as well  States he knows that he needs to quit smoking  Objective:     Vitals:   Temp (24hrs), Av 3 °F (36 8 °C), Min:98 1 °F (36 7 °C), Max:98 5 °F (36 9 °C)    Temp:  [98 1 °F (36 7 °C)-98 5 °F (36 9 °C)] 98 4 °F (36 9 °C)  HR:  [] 87  Resp:  [18-20] 18  BP: (104-181)/(70-84) 117/76  SpO2:  [90 %-93 %] 90 %  Body mass index is 34 87 kg/m²  Input and Output Summary (last 24 hours): Intake/Output Summary (Last 24 hours) at 2022 0914  Last data filed at 2022 0819  Gross per 24 hour   Intake 180 ml   Output 700 ml   Net -520 ml       Physical Exam:   Physical Exam  Vitals reviewed  Constitutional:       General: He is not in acute distress  Appearance: He is not toxic-appearing  Comments: Pt is in no acute distress lying in his hospital bed resting comfortably  Intermittent coughing fits  HENT:      Head: Normocephalic and atraumatic  Eyes:      Conjunctiva/sclera: Conjunctivae normal    Cardiovascular:      Rate and Rhythm: Normal rate and regular rhythm  Pulses: Normal pulses  Pulmonary:      Effort: Pulmonary effort is normal  No respiratory distress  Breath sounds: Wheezing present        Comments: Bilateral diffuse expiratory wheezing  Abdominal:      General: Bowel sounds are normal  There is no distension  Palpations: Abdomen is soft  Tenderness: There is no abdominal tenderness  Musculoskeletal:      Right lower leg: No edema  Left lower leg: No edema  Skin:     General: Skin is warm and dry  Findings: No erythema  Neurological:      Mental Status: He is alert     Psychiatric:         Mood and Affect: Mood normal           Additional Data:     Labs:  Results from last 7 days   Lab Units 05/19/22  0058   WBC Thousand/uL 15 35*   HEMOGLOBIN g/dL 16 5   HEMATOCRIT % 48 9   PLATELETS Thousands/uL 304   NEUTROS PCT % 62   LYMPHS PCT % 29   MONOS PCT % 7   EOS PCT % 1     Results from last 7 days   Lab Units 05/19/22  0058   SODIUM mmol/L 136   POTASSIUM mmol/L 3 9   CHLORIDE mmol/L 105   CO2 mmol/L 24   BUN mg/dL 21   CREATININE mg/dL 1 13   ANION GAP mmol/L 7   CALCIUM mg/dL 10 0   GLUCOSE RANDOM mg/dL 192*     Results from last 7 days   Lab Units 05/19/22  0342   INR  0 91     Results from last 7 days   Lab Units 05/19/22  0602   POC GLUCOSE mg/dl 163*         Results from last 7 days   Lab Units 05/19/22  0342   PROCALCITONIN ng/ml <0 05       Lines/Drains:  Invasive Devices  Report    Peripheral Intravenous Line  Duration           Peripheral IV 05/19/22 Left Antecubital <1 day    Peripheral IV 05/19/22 Right Antecubital <1 day                  Telemetry:  Telemetry Orders (From admission, onward)             48 Hour Telemetry Monitoring  Continuous x 48 hours        References:    Telemetry Guidelines   Question:  Reason for 48 Hour Telemetry  Answer:  Arrhythmias Requiring Medical Therapy (eg  SVT, Vtach/fib, Bradycardia, Uncontrolled A-fib)                 Telemetry Reviewed: Normal Sinus Rhythm  Indication for Continued Telemetry Use: Arrthymias requiring medical therapy             Imaging: Reviewed radiology reports from this admission including: chest xray    Recent Cultures (last 7 days):         Last 24 Hours Medication List:   Current Facility-Administered Medications Medication Dose Route Frequency Provider Last Rate    acetaminophen  650 mg Oral Q6H PRN Aman Fortune, DO      apixaban  5 mg Oral BID Aman Fortune, DO      diltiazem  1-15 mg/hr Intravenous Titrated Aman Fortune DO Stopped (05/19/22 0425)    doxycycline  100 mg Intravenous Q12H Aman Fortune,  mg (14/62/92 2298)    folic acid  1 mg Oral Daily Aman Fortune, DO      insulin lispro  1-5 Units Subcutaneous Q6H [address] Aman Fortune, DO      ipratropium  0 5 mg Nebulization TID Aman Fortune, DO      levalbuterol  0 63 mg Nebulization TID Aman Fortune, DO      LORazepam  1 mg Oral Q6H PRN Aman Fortune, DO      methocarbamol  750 mg Oral Q6H PRN Aman Fortune, DO      methylPREDNISolone sodium succinate  40 mg Intravenous Q8H Aman Fortune, DO      metoprolol succinate  25 mg Oral BID Aman Fortune, DO      multivitamin-minerals  1 tablet Oral Daily Aman Fortune, DO      thiamine  100 mg Oral Daily Aman Fortune, DO          Today, Patient Was Seen By: Jacinta Zaman PA-C    **Please Note: This note may have been constructed using a voice recognition system  **

## 2022-05-19 NOTE — ASSESSMENT & PLAN NOTE
Review of notes shows reported history of NAYE but patient does not wear a CPAP  Given time frame of episode of AFib with RVR, this may be contributing to symptoms  Plan  > CPAP at night while in the hospital  > consider pulmonary consultation

## 2022-05-19 NOTE — ASSESSMENT & PLAN NOTE
Lab Results   Component Value Date    HGBA1C 7 5 (A) 07/06/2021       No results for input(s): POCGLU in the last 72 hours      Blood Sugar Average: Last 72 hrs:     -On pta metformin  Plan:  >Hold oral hypoglycemic agents inpatient   >Sliding scale insulin q6h  >Order a1c

## 2022-05-19 NOTE — ASSESSMENT & PLAN NOTE
Lab Results   Component Value Date    HGBA1C 7 5 (A) 07/06/2021       Recent Labs     05/19/22  0602   POCGLU 163*       Blood Sugar Average: Last 72 hrs:  (P) 163   · On pta metformin, hold while inpatient   · Currently NPO in case of any cardiac intervention  · Continue SSI coverage and glucose checks Q6H for now while NPO, change to QID once cleared to start a carb diet   · Hypoglycemia protocol

## 2022-05-19 NOTE — ASSESSMENT & PLAN NOTE
· Smokes 1 pack a day and has been doing this for 40 years  · Noted to be in acute COPD exacerbation on admission with diffuse inspiratory and expiratory wheezing  · CXR negative  · COVID swab negative   · Continue scheduled neb treatments  · Pulmonary evaluated,  · Recommending decreasing steroids to Prednisone 40 mg daily x 5 days   · D/c doxycycline  · Will place on spiriva on discharge  · Will need outpatient PFTs and polysomnography   · Encourage tobacco cessation  · Currently saturating low 90s on RA

## 2022-05-19 NOTE — ED NOTES
Dr Jordan Levels and Dr Aguayo [facility] at bedside for pt evaluation     Jsoe Martin Salgado, RN  05/19/22 6215

## 2022-05-19 NOTE — ASSESSMENT & PLAN NOTE
· Pt presented with heart palpitations, SOB and cough with wheezing over last several weeks  · EKG on admission with a  Fib/flutter with RVR, HR in the 170s  · Similar to prior hospitalization 1 year ago for a   Flutter and bronchitis   · Patient was treated for a COPD exacerbation at that time and then underwent outpatient atrial flutter ablation with loop recorder implantation in July of 2021  · [id number] of 2  · S/p 2 doses of IV diltiazem 30 mg and IV metoprolol x 1 with HR in the 120s in the ED and diltiazem gtt with conversion back to NSR  · Cardiology evaluated,  · Lopressor increased to 50 mg BID  · Continue Eliquis 5 mg BID  · Recommending outpatient exercise stress ECHO and follow up with EP outpatient   · Stable for discharge from cardiac standpoint

## 2022-05-19 NOTE — ASSESSMENT & PLAN NOTE
· Review of notes shows reported history of NAYE but patient does not wear a CPAP  · Pulmonary following, will need outpatient sleep study

## 2022-05-19 NOTE — H&P
[facility] [place]  [facility] 1965, 61 [facility]  [facility] MRN: [id number]  Unit/Bed#: ED 19 Encounter: [id number]  Primary Care Provider: Ray Kang MD   Date and time admitted to hospital: 5/19/2022 12:41 AM    * Atrial flutter (HCC)  Assessment & Plan  Onset of palpitations while walking to the bathroom after getting up in the middle of the night at 11:00 p m  Last night  Reports progressive shortness of breath and nonproductive cough over the last several weeks as well as audible wheezing  Found to be in atrial fibrillation with rapid ventricular response with rates in the 170s  EKG in ER:  Atrial fibrillation with rapid ventricular response, rate 172, no stemi,  (of note it is difficult to say for certain that it isn't atrial flutter with rapid ventricular response)  Previously hospitalized about a year ago at [facility] for very similar presentation in which patient was found to have atrial flutter with rapid ventricular response in the setting of bronchitis  Patient was treated for a COPD exacerbation at that time and then underwent outpatient atrial flutter ablation with loop recorder implantation in July of 2021  [id number] of 2 (hypertension and diabetes)  On PTA Toprol 25 mg b i d; not on PTA anticoagulation  Appears after discussion with Cardiology team after a flutter ablation in July, anticoagulation was held and loop recorder was placed to see if patient had recurrent episodes  Note from Cardiology technologist on April 29, 2022 reported 1 AF episode   Given 2 pushes of diltiazem 30 mg IV in the ER as well as metoprolol 5 mg IV with rates improved to the low 120s with normotensive blood pressure  Exam notable for diffuse inspiratory and expiratory wheezing  Denies any chest pain  BNP negative  Plan  > Admit to medicine on telemetry  Start diltiazem drip at 5 per hour and titrate for goal heart rate less than 115   Will restart PTA Toprol 25 mg b i d  COPD treatment as below  Will restart apixaban 5 mg twice daily  Consult cardiology for further assistance  Keep NPO except for meds overnight  > order 2D echo, lipid panel, A1c and TSH  > keep potassium greater than 4, magnesium greater than 2  > hold PTA losartan to avoid provoking hypotension  > COPD treatment, NAYE treatment and alcohol abuse treatment as below      COPD (chronic obstructive pulmonary disease) ([facility])  Assessment & Plan  Smokes 1 pack a day and has been doing this for 40 years  Lung exam with diffuse inspiratory and expiratory wheezing  Chest x-ray with noted loop recorder, enlarged lung fields, no large effusion or infiltrate per my read awaiting official read  Denies ever having had formal PFTs performed  Plan  > Solu-Medrol 40 mg q 8 hours, doxycycline and scheduled DuoNebs 3 times daily to treat for COPD exacerbation  Will use Xopenex instead of albuterol to avoid provoking tachycardia  > tobacco cessation education  > consider pulmonary consultation    Patient will also likely benefit from outpatient PFTs once acute exacerbation resolved    NAYE (obstructive sleep apnea)  Assessment & Plan  Review of notes shows reported history of NAYE but patient does not wear a CPAP  Given time frame of episode of AFib with RVR, this may be contributing to symptoms  Plan  > CPAP at night while in the hospital  > consider pulmonary consultation    Alcohol use  Assessment & Plan  Reports drinking 4 beers daily for years and that he had more than 4 last night but did not give an exact number  Review of chart shows that patient had previously cut down to 1-2 beers daily  Unsure if he has ever had alcohol withdrawal seizures  Plan  > CIWA protocol  > daily thiamine and folate  > order ethanol level    Type 2 diabetes mellitus with hyperglycemia, without long-term current use of insulin ([facility])  Assessment & Plan  Lab Results   Component Value Date    HGBA1C 7 5 (A) 07/06/2021       No results for input(s): POCGLU in the last 72 hours  Blood Sugar Average: Last 72 hrs:     -On pta metformin  Plan:  >Hold oral hypoglycemic agents inpatient   >Sliding scale insulin q6h  >Order a1c          VTE Prophylaxis: Apixaban (Eliquis)  / sequential compression device   Code Status: Level 1 - Full Code     Anticipated Length of Stay:  Patient will be admitted on an Inpatient basis with an anticipated length of stay of  > 2 midnights  Justification for Hospital Stay: Please see detailed plans noted above  Chief Complaint:     Palpitations    History of Present Illness:  Nathaly Sepulveda is a 61 y o  male who has past medical history significant for atrial flutter with rapid ventricular response status post ablation with loop recorder in July 2021, uncontrolled COPD, obstructive sleep apnea, alcohol abuse, tobacco abuse, hypertension, diabetes, obesity who presented [facility] ER on the early morning hours of 05/19/2022 with symptoms of palpitations that began last night when he awoke from sleep around 11:00 p m  And was walking to the bathroom  Patient reports having had similar symptoms about a year ago when he was hospitalized at [facility] and found to have atrial flutter with rapid ventricular response  Patient denies any chest pain  Patient does report that he has been having worsening nonproductive cough over the last several weeks as well as shortness of breath  Patient reports that he does smoke 1 pack per day and has done this for about 40 years and that he has previously been treated for COPD exacerbations when he gets these respiratory symptoms  Of note, when patient was hospitalized a year ago for a flutter with RVR he was found to have a worsening bronchitis" and was treated with steroids and antibiotics for what sounds like a COPD exacerbation  Patient additionally reports that he has a history of obstructive sleep apnea but that he does not wear a CPAP at night    Patient's wife endorses at the bedside that he snores quite a bit when he sleeps  Patient also reports that he typically drinks about 4 beers a day, but that he had a couple more than 4 last night but did not give the specific amount  Patient reports that he had previously been cutting down on his alcohol use and previous notes allude to patient having been down to 1-2 beers daily previously  Patient denies any swelling in his legs, abdominal pain or back pain  Patient denies using any recreational drugs  Review of Systems:    Constitutional:  Denies fever or chills   Eyes:  Denies change in visual acuity   HENT:  Denies nasal congestion or sore throat   Respiratory:  Positive for cough and shortness of breath  Cardiovascular:  Positive for palpitations   GI:  Denies abdominal pain or bloody stools  :  Denies dysuria   Musculoskeletal:  Denies back pain or joint pain   Integument:  Denies rash   Neurologic:  Denies headache or sensory changes   Endocrine:  Denies polyuria or polydipsia   Lymphatic:  Denies swollen glands       Past Medical and Surgical History:   Past Medical History:   Diagnosis Date    Adhesive capsulitis of right shoulder 5/17/2022    Anxiety     Last assessed 12/20/2013    Contusion of right wrist 1/30/2018    COPD (chronic obstructive pulmonary disease) ([facility] ) 8/16/2016    Depression with anxiety     Diverticulitis 2016    Eczema 2/24/2016    Elevated ALT measurement 8/16/2016    Hypertension     Obesity 9/25/2012    Perianal abscess 3/5/2019    Type 2 diabetes mellitus with hyperglycemia, without long-term current use of insulin ([facility] )      Past Surgical History:   Procedure Laterality Date    COLONOSCOPY      KNEE SURGERY         Meds/Allergies:  (Not in a hospital admission)      Allergies:    Allergies   Allergen Reactions    Lisinopril Cough     History:    Substance Use History:   Social History     Substance and Sexual Activity   Alcohol Use Yes    Alcohol/week: 2 0 - 3 0 standard drinks    Types: 2 - 3 Cans of beer per week    Comment: down to 1-2 can of beer daily     Social History     Tobacco Use   Smoking Status Current Every Day Smoker    Packs/day: 1 00    Last attempt to quit: 2021    Years since quittin 8   Smokeless Tobacco Never Used     Social History     Substance and Sexual Activity   Drug Use No       Family History:  Family History   Problem Relation Age of Onset    Cancer Mother         Lung Cancer    Cancer Father     Diabetes Father     Diabetes Sister        Physical Exam:     Vitals:   Blood Pressure: 145/74 (22)  Pulse: (!) 110 (22)  Respirations: 18 (22)  Height: 5' 11" (180 3 cm) (22)  Weight - Scale: 113 kg (250 lb) (22)  SpO2: 92 % (22)    Constitutional:  Obese, non-toxic appearance   Eyes:  PERRL  HENT:  Atraumatic, Neck- non-tender   Respiratory:  Diffuse inspiratory and expiratory wheezing in all lung fields  Cardiovascular:  Irregularly irregular rate, tachycardia, no murmurs  GI:  Soft, no guarding   :  No costovertebral angle tenderness   Musculoskeletal:  no tenderness, no deformities  Back- no tenderness  Integument:  Well hydrated, no rash   Lymphatic:  No lymphadenopathy noted   Neurologic:  Alert &awake, communicative, CN 2-12 normal,  no focal deficits noted         Lab Results: I have personally reviewed pertinent reports  Results from last 7 days   Lab Units 22  0058   WBC Thousand/uL 15 35*   HEMOGLOBIN g/dL 16 5   HEMATOCRIT % 48 9   PLATELETS Thousands/uL 304   NEUTROS PCT % 62   LYMPHS PCT % 29   MONOS PCT % 7   EOS PCT % 1     Results from last 7 days   Lab Units 22  0058   POTASSIUM mmol/L 3 9   CHLORIDE mmol/L 105   CO2 mmol/L 24   BUN mg/dL 21   CREATININE mg/dL 1 13   CALCIUM mg/dL 10 0     Results from last 7 days   Lab Units 22  0342   INR  0 91         Imaging: I have personally reviewed pertinent reports        Cardiac EP device report    Result Date: 4/29/2022  Narrative: MARY [id number]/ ACTIVE SYSTEM IS MRI CONDITIONAL CARELINK TRANSMISSION: LOOP RECORDER  PRESENTING RHYTHM NSR @ 84 BPM  BATTERY STATUS "OK " 1 DEVICE CLASSIFIED AF EPISODE PREVIOUSLY ADDRESSED IN ALERT  NO PATIENT OR NEW DEVICE ACTIVATED EPISODES  NORMAL DEVICE FUNCTION  DL         ** Please Note: Dragon 360 Dictation voice to text software was used in the creation of this document   **

## 2022-05-19 NOTE — CONSULTS
PULMONOLOGY CONSULT NOTE     Name: Court Kline   Age & Sex: 61 y o  male   MRN: [id number]  Unit/Bed#: [facility] 510-01   Encounter: [id number]        Reason for consultation: NAYE, COPD    Requesting physician: Lyla Garcia PA-C    Assessment:   COPD with exacerbation  - Informal diagnosis  - Improving with IV glucocorticoids and nebulizers    NAYE  - Has not used CPAP    HFpEF  - EF 88% without systolic or diastolic dysfunction noted today    Atrial Flutter  - Better rate controlled recently    Alcohol use  - Counseled on cessation    Tobacco use  - >40 pack years  - Currently smoking    Plan:  - Solumedrol 40Q8H, can de-escalate to prednisone 40mg daily to complete 5 days  - Doxycycline can be discontinued  - Atrovent/Xopenex nebs  - Anticoagulation and rate control for atrial flutter  - Encouraged complete tobacco and alcohol cessation  - NRT with patch  - Needs outpatient pulmonary follow up for PFTs and PSG  Ok to discharge on Spiriva or incruse until pulmonary follow up       History of Present Illness   HPI:  Court Kline is a 61 y o  male who presented early this morning with palpitations that woke up from sleep last night  He also notes that he has been having significant dyspnea and a nonproductive cough for over a week  He endorses continued alcohol use with his last drink last night  He drinks about half a bottle of christopher a day per his report  He also has a significant smoking history and continues to smoke (40 pack years)  He has known history of COPD with prior exacerbations  He takes anoro at home and uses his albuterol PRN  He also has known NAYE, but does not wear CPAP  Review of systems:  12 point review of systems was completed and was otherwise negative except as listed in HPI        Historical Information   Past Medical History:   Diagnosis Date    Adhesive capsulitis of right shoulder 5/17/2022    Anxiety     Last assessed 12/20/2013    Contusion of right wrist 1/30/2018    COPD [facility]chronic obstructive pulmonary disease) [facility] ) 8/16/2016    Depression with anxiety     Diverticulitis 2016    Eczema 2/24/2016    Elevated ALT measurement 8/16/2016    Hypertension     Obesity 9/25/2012    Perianal abscess 3/5/2019    Type 2 diabetes mellitus with hyperglycemia, without long-term current use of insulin (HCC)      Past Surgical History:   Procedure Laterality Date    COLONOSCOPY      KNEE SURGERY       Family History   Problem Relation Age of Onset    Cancer Mother         Lung Cancer    Cancer Father     Diabetes Father     Diabetes Sister        Occupational History: No known exposure history    Social History: current smoker  Drinks 1/2 bottle of christopher a week  Denies other drug use       Meds/Allergies   Current Facility-Administered Medications   Medication Dose Route Frequency    acetaminophen (TYLENOL) tablet 650 mg  650 mg Oral Q6H PRN    apixaban (ELIQUIS) tablet 5 mg  5 mg Oral BID    diltiazem (CARDIZEM) 125 mg in sodium chloride 0 9 % 125 mL infusion  1-15 mg/hr Intravenous Titrated    doxycycline (VIBRAMYCIN) 100 mg in sodium chloride 0 9 % 100 mL IVPB  100 mg Intravenous H43O    folic acid (FOLVITE) tablet 1 mg  1 mg Oral Daily    insulin lispro (HumaLOG) 100 units/mL subcutaneous injection 1-5 Units  1-5 Units Subcutaneous TID AC    ipratropium (ATROVENT) 0 02 % inhalation solution 0 5 mg  0 5 mg Nebulization TID    levalbuterol (XOPENEX) inhalation solution 0 63 mg  0 63 mg Nebulization TID    LORazepam (ATIVAN) tablet 1 mg  1 mg Oral Q6H PRN    methocarbamol (ROBAXIN) tablet 750 mg  750 mg Oral Q6H PRN    methylPREDNISolone sodium succinate (Solu-MEDROL) injection 40 mg  40 mg Intravenous Q8H    metoprolol succinate (TOPROL-XL) 24 hr tablet 25 mg  25 mg Oral BID    multivitamin-minerals (CENTRUM) tablet 1 tablet  1 tablet Oral Daily    nicotine (NICODERM CQ) 21 mg/24 hr TD 24 hr patch 21 mg  21 mg Transdermal Daily    thiamine tablet 100 mg  100 mg Oral Daily     Medications Prior to Admission   Medication    acetaminophen (TYLENOL) 325 mg tablet    albuterol (Ventolin HFA) 90 mcg/act inhaler    ALPRAZolam (XANAX) 0 25 mg tablet    betamethasone, augmented, (DIPROLENE) 0 05 % ointment    Eliquis 5 MG    losartan (COZAAR) 50 mg tablet    meloxicam (Mobic) 15 mg tablet    metFORMIN (GLUCOPHAGE) 1000 MG tablet    methocarbamol (ROBAXIN) 750 mg tablet    metoprolol succinate (TOPROL-XL) 25 mg 24 hr tablet    multivitamin (THERAGRAN) TABS    nicotine (NICODERM CQ) 14 mg/24hr TD 24 hr patch     Allergies   Allergen Reactions    Lisinopril Cough       Vitals: Blood pressure 132/77, pulse 87, temperature 98 7 °F (37 1 °C), temperature source Oral, resp  rate 18, height 5' 11" (1 803 m), weight 113 kg (250 lb), SpO2 91 %  Body mass index is 34 87 kg/m²  Intake/Output Summary (Last 24 hours) at 5/19/2022 1133  Last data filed at 5/19/2022 0819  Gross per 24 hour   Intake 180 ml   Output 700 ml   Net -520 ml       Physical Exam  Vitals and nursing note reviewed  Constitutional:       General: He is not in acute distress  Appearance: Normal appearance  He is well-developed  HENT:      Head: Normocephalic and atraumatic  Mouth/Throat:      Mouth: Mucous membranes are moist       Pharynx: No oropharyngeal exudate or posterior oropharyngeal erythema  Eyes:      General: No scleral icterus  Conjunctiva/sclera: Conjunctivae normal    Cardiovascular:      Rate and Rhythm: Normal rate and regular rhythm  Heart sounds: No murmur heard  No friction rub  No gallop  Pulmonary:      Effort: Pulmonary effort is normal  No respiratory distress  Breath sounds: Wheezing (minimal scattered end expiratory wheezing) present  No rhonchi or rales  Abdominal:      Palpations: Abdomen is soft  Tenderness: There is no abdominal tenderness  There is no guarding  Musculoskeletal:      Right lower leg: Edema (trace) present        Left lower leg: Edema (trace) present  Skin:     General: Skin is warm and dry  Capillary Refill: Capillary refill takes less than 2 seconds  Neurological:      General: No focal deficit present  Mental Status: He is alert and oriented to person, place, and time  Motor: No weakness  Labs: I have personally reviewed pertinent lab results  Laboratory and Diagnostics  Results from last 7 days   Lab Units 05/19/22  0058   WBC Thousand/uL 15 35*   HEMOGLOBIN g/dL 16 5   HEMATOCRIT % 48 9   PLATELETS Thousands/uL 304   NEUTROS PCT % 62   MONOS PCT % 7     Results from last 7 days   Lab Units 05/19/22  0058   SODIUM mmol/L 136   POTASSIUM mmol/L 3 9   CHLORIDE mmol/L 105   CO2 mmol/L 24   ANION GAP mmol/L 7   BUN mg/dL 21   CREATININE mg/dL 1 13   CALCIUM mg/dL 10 0   GLUCOSE RANDOM mg/dL 192*     Results from last 7 days   Lab Units 05/19/22  0058   MAGNESIUM mg/dL 1 8      Results from last 7 days   Lab Units 05/19/22  0342   INR  0 91   PTT seconds 28                              Results from last 7 days   Lab Units 05/19/22  0342   PROCALCITONIN ng/ml <0 05       Imaging and other studies: I have personally reviewed pertinent films in PACS  XR chest 1 view portable    Result Date: 5/19/2022  Impression: No acute cardiopulmonary disease  Workstation performed: [id number]       Pulmonary function testing: None available    EKG, Pathology, and Other Studies: I have personally reviewed pertinent reports  Code Status: Level 1 - Full Code    VTE Pharmacologic Prophylaxis: Eliquis for aflutter    Disclaimer: Portions of the record may have been created with voice recognition software  Occasional wrong word or "sound a like" substitutions may have occurred due to the inherent limitations of voice recognition software  Careful consideration should be taken to recognize, using context, where substitutions have occurred      Emely Mchugh DO  Pulmonary/Critical Care Fellowship PGY-IV  [facility]

## 2022-05-19 NOTE — ASSESSMENT & PLAN NOTE
· Review of notes shows reported history of NAYE but patient does not wear a CPAP  · CPAP at night while in the hospital  · Pulmonary consultation as above

## 2022-05-19 NOTE — ASSESSMENT & PLAN NOTE
· POA, WBC 15 35  · Etiology unknown, however appears to be fairly chronic on review of records   · Procal negative   · CXR negative   · UA unremarkable   · COVID swab negative   · Started on IV doxycycline for COPD exacerbation for ppx  · Otherwise no infectious etiology noted  · Trend CBC, however likely will be unreliable now as patient on IV steroids

## 2022-05-19 NOTE — PROGRESS NOTES
Pastoral Care Progress Note    2022  Patient: Ney Marroquin : 1965  Admission Date & Time: 2022 0041  MRN: [id number] CSN: [id number]      Anni Garcia visited patient, provided blessing  Pt declined anointing       22 1500   Clinical Encounter Type   Visited With Patient   Routine Visit Introduction   Catholic Encounters   Catholic Needs Prayer   Sacramental Encounters   Sacrament of Sick-Anointing Patient declined anointing

## 2022-05-19 NOTE — ED ATTENDING ATTESTATION
5/19/2022  IChristopher MD, saw and evaluated the patient  I have discussed the patient with the resident/non-physician practitioner and agree with the resident's/non-physician practitioner's findings, Plan of Care, and MDM as documented in the resident's/non-physician practitioner's note, except where noted  All available labs and Radiology studies were reviewed  I was present for key portions of any procedure(s) performed by the resident/non-physician practitioner and I was immediately available to provide assistance  At this point I agree with the current assessment done in the Emergency Department  I have conducted an independent evaluation of this patient a history and physical is as follows:    ED Course     Patient presents for evaluation due to heart racing  He states that he felt well when he went to bed but woke up feeling palpitations  He does report having a history of atrial flutter for which he is on metoprolol  He denies taking any anticoagulants  He denies any chest pain  Patient does report recently having had bronchitis which was the trigger to his prior atrial flutter  No additional complaints  Exam: AAOx3, moderate distress, tachycardic, IRRR, CTA, S/NT/ND  A/P:  AFib with RVR  Will check cardiac labs and electrolytes  Will treat with 30 mg of Cardizem and reassess  Patient ultimately required 2nd dose of Cardizem with some improvement  Chart review indicates that patient has a loop recorder which was placed when his a flutter was ablated  Patient was on Eliquis initially but then he states they stop that shortly after the loop recorder was placed  Patient has not had any other episodes since that time until now  Will interrogate loop recorder  Critical Care Time  CriticalCare Time  Performed by: Christopher Noel MD  Authorized by:  Christopher Noel MD     Critical care provider statement:     Critical care time (minutes):  37    Critical care time was exclusive of:  Separately billable procedures and treating other patients and teaching time    Critical care was necessary to treat or prevent imminent or life-threatening deterioration of the following conditions:  Cardiac failure and circulatory failure    Critical care was time spent personally by me on the following activities:  Obtaining history from patient or surrogate, development of treatment plan with patient or surrogate, evaluation of patient's response to treatment, examination of patient, review of old charts, re-evaluation of patient's condition, ordering and review of radiographic studies and ordering and review of laboratory studies    I assumed direction of critical care for this patient from another provider in my specialty: no      ECG 12 Lead Documentation Only    Date/Time: 5/19/2022 12:49 AM  Performed by: Inocencio Vicente MD  Authorized by:  Inocencio Vicente MD     Indications / Diagnosis:  AFib  Patient location:  ED  Previous ECG:     Previous ECG:  Compared to current    Similarity:  Changes noted  Interpretation:     Interpretation: abnormal    Rate:     ECG rate:  172    ECG rate assessment: tachycardic    Rhythm:     Rhythm: atrial fibrillation    Ectopy:     Ectopy: none    QRS:     QRS axis:  Normal  ST segments:     ST segments:  Non-specific

## 2022-05-19 NOTE — ED PROVIDER NOTES
History  Chief Complaint   Patient presents with    Rapid Heart Rate     Pt c/o waking up with racing heart around midnight  Pt c/o mild sob, denies cp  Patient is a 25-year-old male with past medical history significant for hypertension, a flutter, diabetes presenting to the emergency department with sudden onset heart palpitations which started around midnight  Patient states this has happened before in the past   Patient states he has mild shortness of breath associated with it  He denies any chest pain, headache, dizziness, numbness or tingling in any of his extremities  He denies any fevers, chills  Patient sources congestion no throat pain  Patient has not tried anything for his symptoms decided to come to the hospital for further evaluation  Patient is not currently on any anticoagulation  Patient takes daily aspirin  Prior to Admission Medications   Prescriptions Last Dose Informant Patient Reported? Taking? ALPRAZolam (XANAX) 0 25 mg tablet   No No   Sig: Take 1 tablet (0 25 mg total) by mouth daily at bedtime as needed for anxiety or sleep   Eliquis 5 MG   No No   Sig: TAKE 1 TABLET BY MOUTH TWICE A DAY   acetaminophen (TYLENOL) 325 mg tablet   No No   Sig: Take 2 tablets (650 mg total) by mouth every 6 (six) hours as needed for mild pain   albuterol (Ventolin HFA) 90 mcg/act inhaler   No No   Sig: Inhale 2 puffs every 4 (four) hours as needed for wheezing   betamethasone, augmented, (DIPROLENE) 0 05 % ointment   Yes No   Sig: APPLY TWICE DAILY TO PSORIASIS AREAS X 2 WEEKS, THEN 2-3 X PER WEEK AS NEEDED   AVOID FACE/SKIN FOLDS   losartan (COZAAR) 50 mg tablet   No No   Sig: TAKE 1 TABLET BY MOUTH EVERY DAY   meloxicam (Mobic) 15 mg tablet   No No   Sig: Take 1 tablet (15 mg total) by mouth daily   metFORMIN (GLUCOPHAGE) 1000 MG tablet   No No   Sig: TAKE 1 TABLET BY MOUTH TWICE A DAY WITH MEALS   methocarbamol (ROBAXIN) 750 mg tablet   No No   Sig: Take 1 tablet (750 mg total) by mouth every 6 (six) hours as needed for muscle spasms   metoprolol succinate (TOPROL-XL) 25 mg 24 hr tablet   No No   Sig: Take 1 tablet (25 mg total) by mouth 2 (two) times a day   multivitamin (THERAGRAN) TABS  Self Yes No   Sig: Take 1 tablet by mouth daily   nicotine (NICODERM CQ) 14 mg/24hr TD 24 hr patch   No No   Sig: Place 1 patch on the skin daily      Facility-Administered Medications: None       Past Medical History:   Diagnosis Date    Adhesive capsulitis of right shoulder 2022    Anxiety     Last assessed 2013    Contusion of right wrist 2018    COPD (chronic obstructive pulmonary disease) ([facility] [place] [address] ) 2016    Depression with anxiety     Diverticulitis 2016    Eczema 2016    Elevated ALT measurement 2016    Hypertension     Obesity 2012    Perianal abscess 3/5/2019    Type 2 diabetes mellitus with hyperglycemia, without long-term current use of insulin (HCC)        Past Surgical History:   Procedure Laterality Date    COLONOSCOPY      KNEE SURGERY         Family History   Problem Relation Age of Onset    Cancer Mother         Lung Cancer    Cancer Father     Diabetes Father     Diabetes Sister      I have reviewed and agree with the history as documented  E-Cigarette/Vaping    E-Cigarette Use Never User      E-Cigarette/Vaping Substances    Nicotine No     THC No     CBD No     Flavoring No     Other No     Unknown No      Social History     Tobacco Use    Smoking status: Current Every Day Smoker     Packs/day: 1 00     Last attempt to quit: 2021     Years since quittin 8    Smokeless tobacco: Never Used   Vaping Use    Vaping Use: Never used   Substance Use Topics    Alcohol use: Yes     Alcohol/week: 2 0 - 3 0 standard drinks     Types: 2 - 3 Cans of beer per week     Comment: down to 1-2 can of beer daily    Drug use: No        Review of Systems   Constitutional: Positive for activity change  Negative for chills and fever     HENT: Positive for congestion  Negative for ear pain and sore throat  Eyes: Negative for pain and visual disturbance  Respiratory: Positive for shortness of breath  Negative for cough  Cardiovascular: Positive for palpitations  Negative for chest pain  Gastrointestinal: Negative for abdominal pain, constipation, diarrhea, nausea and vomiting  Genitourinary: Negative for dysuria and hematuria  Musculoskeletal: Negative for arthralgias and back pain  Skin: Negative for color change and rash  Neurological: Negative for dizziness, seizures, syncope, weakness, light-headedness, numbness and headaches  Psychiatric/Behavioral: Negative for agitation and behavioral problems  All other systems reviewed and are negative  Physical Exam  ED Triage Vitals [05/19/22 0050]   Temp Pulse Respirations Blood Pressure SpO2   -- (!) 170 20 (!) 181/84 93 %      Temp src Heart Rate Source Patient Position - Orthostatic VS BP Location FiO2 (%)   -- Monitor Lying Right arm --      Pain Score       --             Orthostatic Vital Signs  Vitals:    05/19/22 0050 05/19/22 0100 05/19/22 0200 05/19/22 0300   BP: (!) 181/84 129/70 143/74 145/74   Pulse: (!) 170 (!) 138 (!) 120 (!) 110   Patient Position - Orthostatic VS: Lying Lying Lying Lying       Physical Exam  Vitals and nursing note reviewed  Constitutional:       Appearance: Normal appearance  He is well-developed  HENT:      Head: Normocephalic and atraumatic  Right Ear: External ear normal       Left Ear: External ear normal       Nose: Nose normal       Mouth/Throat:      Mouth: Mucous membranes are moist    Eyes:      Extraocular Movements: Extraocular movements intact  Conjunctiva/sclera: Conjunctivae normal       Pupils: Pupils are equal, round, and reactive to light  Cardiovascular:      Rate and Rhythm: Normal rate and regular rhythm  Heart sounds: No murmur heard    Pulmonary:      Effort: Pulmonary effort is normal  No respiratory distress  Breath sounds: Normal breath sounds  Abdominal:      General: Abdomen is flat  Palpations: Abdomen is soft  Tenderness: There is no abdominal tenderness  There is no guarding  Musculoskeletal:         General: Normal range of motion  Cervical back: Neck supple  Right lower leg: No edema  Left lower leg: No edema  Skin:     General: Skin is warm and dry  Capillary Refill: Capillary refill takes less than 2 seconds  Neurological:      General: No focal deficit present  Mental Status: He is alert and oriented to person, place, and time     Psychiatric:         Mood and Affect: Mood normal          Behavior: Behavior normal          ED Medications  Medications   potassium chloride oral solution 20 mEq (has no administration in time range)   acetaminophen (TYLENOL) tablet 650 mg (has no administration in time range)   apixaban (ELIQUIS) tablet 5 mg (has no administration in time range)   methocarbamol (ROBAXIN) tablet 750 mg (has no administration in time range)   metoprolol succinate (TOPROL-XL) 24 hr tablet 25 mg (has no administration in time range)   methylPREDNISolone sodium succinate (Solu-MEDROL) injection 40 mg (has no administration in time range)   doxycycline (VIBRAMYCIN) 100 mg in sodium chloride 0 9 % 100 mL IVPB (has no administration in time range)   levalbuterol (XOPENEX) inhalation solution 0 63 mg (has no administration in time range)   ipratropium (ATROVENT) 0 02 % inhalation solution 0 5 mg (has no administration in time range)   diltiazem (CARDIZEM) 125 mg in sodium chloride 0 9 % 125 mL infusion (has no administration in time range)   insulin lispro (HumaLOG) 100 units/mL subcutaneous injection 1-5 Units (has no administration in time range)   insulin lispro (HumaLOG) 100 units/mL subcutaneous injection 1-5 Units (has no administration in time range)   thiamine tablet 100 mg (has no administration in time range)   folic acid (FOLVITE) tablet 1 mg (has no administration in time range)   diltiazem (CARDIZEM) injection 30 mg (30 mg Intravenous Given 5/19/22 0058)   diltiazem (CARDIZEM) injection 30 mg (30 mg Intravenous Given 5/19/22 0143)   metoprolol (LOPRESSOR) injection 5 mg (5 mg Intravenous Given 5/19/22 0224)       Diagnostic Studies  Results Reviewed     Procedure Component Value Units Date/Time    APTT [[id number]]  (Normal) Collected: 05/19/22 0342    Lab Status: Final result Specimen: Blood from Arm, Left Updated: 05/19/22 0404     PTT 28 seconds     Protime-INR [[id number]]  (Normal) Collected: 05/19/22 0342    Lab Status: Final result Specimen: Blood from Arm, Left Updated: 05/19/22 0404     Protime 11 9 seconds      INR 0 91    Magnesium [[id number]]  (Normal) Collected: 05/19/22 0058    Lab Status: Final result Specimen: Blood from Arm, Left Updated: 05/19/22 0356     Magnesium 1 8 mg/dL     TSH, 3rd generation with Free T4 reflex [[id number]]  (Normal) Collected: 05/19/22 0058    Lab Status: Final result Specimen: Blood from Arm, Left Updated: 05/19/22 0356     TSH 3RD GENERATON 1 380 uIU/mL     Narrative:      Patients undergoing fluorescein dye angiography may retain small amounts of fluorescein in the body for 48-72 hours post procedure  Samples containing fluorescein can produce falsely depressed TSH values  If the patient had this procedure,a specimen should be resubmitted post fluorescein clearance        Lipid panel [[id number]]  (Abnormal) Collected: 05/19/22 0058    Lab Status: Final result Specimen: Blood from Arm, Left Updated: 05/19/22 0356     Cholesterol 205 mg/dL      Triglycerides 497 mg/dL      HDL, Direct 46 mg/dL      LDL Calculated --     Non-HDL-Chol (CHOL-HDL) 159 mg/dl     HS Troponin I 2hr [[id number]]  (Abnormal) Collected: 05/19/22 0258    Lab Status: Final result Specimen: Blood from Arm, Left Updated: 05/19/22 0352     hs TnI 2hr 28 ng/L      Delta 2hr hsTnI 22 ng/L     Procalcitonin [[id number]] Collected: 05/19/22 0342 Lab Status: In process Specimen: Blood from Arm, Left Updated: 05/19/22 0347    HS Troponin I 4hr [[id number]]     Lab Status: No result Specimen: Blood     COVID/FLU/RSV [[id number]]  (Normal) Collected: 05/19/22 0058    Lab Status: Final result Specimen: Nares from Nasopharyngeal Swab Updated: 05/19/22 0155     SARS-CoV-2 Negative     INFLUENZA A PCR Negative     INFLUENZA B PCR Negative     RSV PCR Negative    Narrative:      FOR PEDIATRIC PATIENTS - copy/paste COVID Guidelines URL to browser: [email or web address]/  [employer]    SARS-CoV-2 assay is a Nucleic Acid Amplification assay intended for the  qualitative detection of nucleic acid from SARS-CoV-2 in nasopharyngeal  swabs  Results are for the presumptive identification of SARS-CoV-2 RNA  Positive results are indicative of infection with SARS-CoV-2, the virus  causing COVID-19, but do not rule out bacterial infection or co-infection  with other viruses  Laboratories within the [place] and its  territories are required to report all positive results to the appropriate  public health authorities  Negative results do not preclude SARS-CoV-2  infection and should not be used as the sole basis for treatment or other  patient management decisions  Negative results must be combined with  clinical observations, patient history, and epidemiological information  This test has not been FDA cleared or approved  This test has been authorized by FDA under an Emergency Use Authorization  (EUA)  This test is only authorized for the duration of time the  declaration that circumstances exist justifying the authorization of the  emergency use of an in vitro diagnostic tests for detection of SARS-CoV-2  virus and/or diagnosis of COVID-19 infection under section 564(b)(1) of  the Act, 21 U  S C  260PYR-5(P)(0), unless the authorization is terminated  or revoked sooner   The test has been validated but independent review by FDA  and CLIA is pending  Test performed using [employer] Gene[employer]pert: This RT-PCR assay targets N2,  a region unique to SARS-CoV-2  A conserved region in the E-gene was chosen  for pan-Sarbecovirus detection which includes SARS-CoV-2      HS Troponin 0hr (reflex protocol) [[id number]]  (Normal) Collected: 05/19/22 0058    Lab Status: Final result Specimen: Blood from Arm, Left Updated: 05/19/22 0142     hs TnI 0hr 6 ng/L     Basic metabolic panel [[id number]]  (Abnormal) Collected: 05/19/22 0058    Lab Status: Final result Specimen: Blood from Arm, Left Updated: 05/19/22 0139     Sodium 136 mmol/L      Potassium 3 9 mmol/L      Chloride 105 mmol/L      CO2 24 mmol/L      ANION GAP 7 mmol/L      BUN 21 mg/dL      Creatinine 1 13 mg/dL      Glucose 192 mg/dL      Calcium 10 0 mg/dL      eGFR 71 ml/min/1 73sq m     Narrative:      [place] guidelines for Chronic Kidney Disease (CKD):     Stage 1 with normal or high GFR (GFR > 90 mL/min/1 73 square meters)    Stage 2 Mild CKD (GFR = 60-89 mL/min/1 73 square meters)    Stage 3A Moderate CKD (GFR = 45-59 mL/min/1 73 square meters)    Stage 3B Moderate CKD (GFR = 30-44 mL/min/1 73 square meters)    Stage 4 Severe CKD (GFR = 15-29 mL/min/1 73 square meters)    Stage 5 End Stage CKD (GFR <15 mL/min/1 73 square meters)  Note: GFR calculation is accurate only with a steady state creatinine    NT-BNP PRO [[id number]]  (Normal) Collected: 05/19/22 0058    Lab Status: Final result Specimen: Blood from Arm, Left Updated: 05/19/22 0139     NT-proBNP 19 pg/mL     CBC and differential [[id number]]  (Abnormal) Collected: 05/19/22 0058    Lab Status: Final result Specimen: Blood from Arm, Left Updated: 05/19/22 0109     WBC 15 35 Thousand/uL      RBC 5 11 Million/uL      Hemoglobin 16 5 g/dL      Hematocrit 48 9 %      MCV 96 fL      MCH 32 3 pg      MCHC 33 7 g/dL      RDW 12 5 %      MPV 9 3 fL      Platelets 720 Thousands/uL      nRBC 0 /100 WBCs Neutrophils Relative 62 %      Immat GRANS % 1 %      Lymphocytes Relative 29 %      Monocytes Relative 7 %      Eosinophils Relative 1 %      Basophils Relative 0 %      Neutrophils Absolute 9 47 Thousands/µL      Immature Grans Absolute 0 10 Thousand/uL      Lymphocytes Absolute 4 42 Thousands/µL      Monocytes Absolute 1 12 Thousand/µL      Eosinophils Absolute 0 18 Thousand/µL      Basophils Absolute 0 06 Thousands/µL                  XR chest 1 view portable   ED Interpretation by Claudia Ferrara DO (05/19 0140)   No acute cardiopulmonary process            Procedures  Procedures      ED Course  ED Course as of 05/19/22 0406   Thu May 19, 2022   0110 WBC(!): 15 35   0140 Blood Pressure(!): 181/84   0140 Pulse(!): 170   0140 Respirations: 20   0140 SpO2: 93 %   0211 SARS-COV-2: Negative   0235 Patient re-evaluated heart rate still elevated  Patient with loop recorder will interrogate to look when he went into AFib  Patient is aware that he will be admitted  1619 Reached out to [facility] for admission  4462 Patient admitted to [facility]                             SBIRT 20yo+    Flowsheet Row Most Recent Value   SBIRT (23 yo +)    In order to provide better care to our patients, we are screening all of our patients for alcohol and drug use  Would it be okay to ask you these screening questions? No Filed at: 05/19/2022 0111                MDM  Number of Diagnoses or Management Options  Atrial fibrillation with rapid ventricular response (HCC)  Palpitations  Diagnosis management comments: Patient in AFib RVR with rates to the 160s  Preemptively placed patient on cardiac pads  Attempted 2 doses of Cardizem without significant rate control  Attempted 1 dose of Lopressor and reached out to [facility] for admission  Interrogated loop recorder find exact time that he went into AFib  Patient with bronchitis like symptoms starting past couple of days    Full cardiac workup including CBC, CMP, EKG, chest x-ray, troponin, BMP as well as COVID flu influenza  Patient aware of admission had no questions or concerns at this time  Disposition  Final diagnoses:   Atrial fibrillation with rapid ventricular response ([address] )   Palpitations     Time reflects when diagnosis was documented in both MDM as applicable and the Disposition within this note     Time User Action Codes Description Comment    5/19/2022  2:50 AM Mark Beatty Add [I48 91] Atrial fibrillation with rapid ventricular response ([address] )     5/19/2022  2:50 AM Mark Beatty Add [R00 2] Palpitations       ED Disposition     ED Disposition   Admit    Condition   Stable    Date/Time   Thu May 19, 2022  2:51 AM    Comment   Case was discussed with Dr Mireya Moraes and the patient's admission status was agreed to be inpatient to the service of Dr Tamiko Bernabe    None         Patient's Medications   Discharge Prescriptions    No medications on file     No discharge procedures on file  PDMP Review       Value Time User    PDMP Reviewed  Yes 7/6/2021 11:59 AM Arvind Murphy MD           ED Provider  Attending physically available and evaluated Mariaelena Coffey I managed the patient along with the ED Attending      Electronically Signed by         Kyung Arias DO  05/19/22 4778

## 2022-05-19 NOTE — ASSESSMENT & PLAN NOTE
· POA, WBC 15 35  · Etiology unknown, however appears to be fairly chronic on review of records   · Procal negative   · CXR negative   · UA unremarkable   · COVID swab negative   · Started on IV doxycycline for COPD exacerbation for ppx, recommended to d/c per pulmonary   · Otherwise no infectious etiology noted  · Trend CBC outpatient, however likely will be unreliable now as patient on steroids

## 2022-05-19 NOTE — ASSESSMENT & PLAN NOTE
Onset of palpitations while walking to the bathroom after getting up in the middle of the night at 11:00 p m  Last night  Reports progressive shortness of breath and nonproductive cough over the last several weeks as well as audible wheezing  Found to be in atrial fibrillation with rapid ventricular response with rates in the 170s  EKG in ER:  Atrial fibrillation with rapid ventricular response, rate 172, no stemi,  (of note it is difficult to say for certain that it isn't atrial flutter with rapid ventricular response)  Previously hospitalized about a year ago at [place] for very similar presentation in which patient was found to have atrial flutter with rapid ventricular response in the setting of bronchitis  Patient was treated for a COPD exacerbation at that time and then underwent outpatient atrial flutter ablation with loop recorder implantation in July of 2021  [id number] of 2 (hypertension and diabetes)  On PTA Toprol 25 mg b i d; not on PTA anticoagulation  Appears after discussion with Cardiology team after a flutter ablation in July, anticoagulation was held and loop recorder was placed to see if patient had recurrent episodes  Note from Cardiology technologist on April 29, 2022 reported 1 AF episode   Given 2 pushes of diltiazem 30 mg IV in the ER as well as metoprolol 5 mg IV with rates improved to the low 120s with normotensive blood pressure  Exam notable for diffuse inspiratory and expiratory wheezing  Denies any chest pain  BNP negative  Plan  > Admit to medicine on telemetry  Start diltiazem drip at 5 per hour and titrate for goal heart rate less than 115  Will restart PTA Toprol 25 mg b i d  COPD treatment as below  Will restart apixaban 5 mg twice daily  Consult cardiology for further assistance  Keep NPO except for meds overnight    > order 2D echo, lipid panel, A1c and TSH  > keep potassium greater than 4, magnesium greater than 2  > hold PTA losartan to avoid provoking hypotension  > COPD treatment, NAYE treatment and alcohol abuse treatment as below

## 2022-05-19 NOTE — ASSESSMENT & PLAN NOTE
· Pt presented with heart palpitations, SOB and cough with wheezing over last several weeks  · EKG on admission with a  Fib/flutter with RVR, HR in the 170s  · Similar to prior hospitalization 1 year ago for a   Flutter and bronchitis   · Patient was treated for a COPD exacerbation at that time and then underwent outpatient atrial flutter ablation with loop recorder implantation in July of 2021  · [id number] of 2  · S/p 2 doses of IV diltiazem 30 mg and IV metoprolol x 1 with HR in the 120s in the ED  · Therefore patient placed on IV Diltiazem gtt and restarted on home Toprol XL 25 mg BID  · Cardiology consultation pending, appreciate recommendations  · HR improved to the 90s today, continue telemetry monitoring   · ECHO pending  · Continue Eliquis 5 mg BID for a/c  · keep potassium greater than 4, magnesium greater than 2  · Troponins 6/28/31, likely elevated in setting of a  Fib/flutter

## 2022-05-19 NOTE — ASSESSMENT & PLAN NOTE
· Smokes 1 pack a day and has been doing this for 40 years  · Noted to be in acute COPD exacerbation on admission with diffuse inspiratory and expiratory wheezing  · CXR negative  · COVID swab negative   · Continue scheduled neb treatments  · Currently on IV solu-medrol 40 mg Q8H and doxycycline for ppx  · Denies ever having had formal PFTs performed  · Encourage tobacco cessation  · Due to recurrent hospitalizations and need for follow up, would obtain pulmonary consultation  · Currently saturating low 90s on RA, monitor oxygen saturations closely

## 2022-05-19 NOTE — ASSESSMENT & PLAN NOTE
Smokes 1 pack a day and has been doing this for 40 years  Lung exam with diffuse inspiratory and expiratory wheezing  Chest x-ray with noted loop recorder, enlarged lung fields, no large effusion or infiltrate per my read awaiting official read  Denies ever having had formal PFTs performed  Plan  > Solu-Medrol 40 mg q 8 hours, doxycycline and scheduled DuoNebs 3 times daily to treat for COPD exacerbation  Will use Xopenex instead of albuterol to avoid provoking tachycardia  > tobacco cessation education  > consider pulmonary consultation    Patient will also likely benefit from outpatient PFTs once acute exacerbation resolved

## 2022-05-19 NOTE — CONSULTS
Consultation - General Cardiology Team 2  Ofelia Hernandez 61 y o  male MRN: [id number]  Unit/Bed#: [facility] 510-01 Encounter: [id number]            Inpatient consult to Cardiology     Performed by  Colton Salinas MD     Authorized by Froy Mayorga DO            PCP: Kailey Santos MD   Outpatient Cardiologist: Bertha Sanon DO    History of Present Illness   Physician Requesting Consult: Solomon Hand DO  Reason for Consult / Principal Problem: Atrial fibrillation with RVR    HPI: Ofelia Hernandez is a 61y o  year old male with a history of NAYE, COPD (with last exacerbation last year), Diabetes mellitus type 2, hypertension, tobacco use  Patient presented to ED with palpitations that started the night prior and woke him up from sleep  Patient has been having worsening nonproductive cough with shortness of breath over the past several weeks  He has a history of smoking and has been smoking 1 PPD with history of several COPD exacerbations  He reports of noncompliance with NAYE and uses alcohol frequently (4 beers per day)  In the ED, patient was noted to be in atrial fibrillation with RVR  Patient was placed on telemetry, an echo was ordered  Cardiology is being consulted for further workup for atrial fibrillation  Of note, patient does have a prior history of atrial flutter noted following a hospitalization for worsening COPD/bronchitis, was scheduled for ablation at that time which was set to outpatient setting since patient spontaneously converted to NSR, s/p CTI RFA 07/15/2021 with anticoagulation discontinued (was on eliquis)  Patient had a loop recorder installed at that time (07/2021) which has shown 1 episode of atrial fibrillation in last interrogation 04/29  Last device interrogation 04/29/2022: 1 episode of atrial fibrillation, otherwise NSR    Review of Systems  ROS as noted above         Historical Information   Past Medical History:   Diagnosis Date    Adhesive capsulitis of right shoulder 5/17/2022  Anxiety     Last assessed 2013    Contusion of right wrist 2018    COPD [facility]chronic obstructive pulmonary disease) ([facility] [address] ) 2016    Depression with anxiety     Diverticulitis 2016    Eczema 2016    Elevated ALT measurement 2016    Hypertension     Obesity 2012    Perianal abscess 3/5/2019    Type 2 diabetes mellitus with hyperglycemia, without long-term current use of insulin (HCC)      Past Surgical History:   Procedure Laterality Date    COLONOSCOPY      KNEE SURGERY       Social History     Substance and Sexual Activity   Alcohol Use Yes    Alcohol/week: 2 0 - 3 0 standard drinks    Types: 2 - 3 Cans of beer per week    Comment: down to 1-2 can of beer daily     Social History     Substance and Sexual Activity   Drug Use No     Social History     Tobacco Use   Smoking Status Current Every Day Smoker    Packs/day: 1 00    Last attempt to quit: 2021    Years since quittin 8   Smokeless Tobacco Never Used     Family History:   Family History   Problem Relation Age of Onset    Cancer Mother         Lung Cancer    Cancer Father     Diabetes Father     Diabetes Sister        Meds/Allergies   Hospital Medications:   Current Facility-Administered Medications   Medication Dose Route Frequency    acetaminophen (TYLENOL) tablet 650 mg  650 mg Oral Q6H PRN    apixaban (ELIQUIS) tablet 5 mg  5 mg Oral BID    diltiazem (CARDIZEM) 125 mg in sodium chloride 0 9 % 125 mL infusion  1-15 mg/hr Intravenous Titrated    doxycycline (VIBRAMYCIN) 100 mg in sodium chloride 0 9 % 100 mL IVPB  100 mg Intravenous D16F    folic acid (FOLVITE) tablet 1 mg  1 mg Oral Daily    insulin lispro (HumaLOG) 100 units/mL subcutaneous injection 1-5 Units  1-5 Units Subcutaneous Q6H [address]    ipratropium (ATROVENT) 0 02 % inhalation solution 0 5 mg  0 5 mg Nebulization TID    levalbuterol (XOPENEX) inhalation solution 0 63 mg  0 63 mg Nebulization TID    LORazepam (ATIVAN) tablet 1 mg  1 mg Oral Q6H PRN    methocarbamol (ROBAXIN) tablet 750 mg  750 mg Oral Q6H PRN    methylPREDNISolone sodium succinate (Solu-MEDROL) injection 40 mg  40 mg Intravenous Q8H    metoprolol succinate (TOPROL-XL) 24 hr tablet 25 mg  25 mg Oral BID    multivitamin-minerals (CENTRUM) tablet 1 tablet  1 tablet Oral Daily    thiamine tablet 100 mg  100 mg Oral Daily     Home Medications:   Medications Prior to Admission   Medication    acetaminophen (TYLENOL) 325 mg tablet    albuterol (Ventolin HFA) 90 mcg/act inhaler    ALPRAZolam (XANAX) 0 25 mg tablet    betamethasone, augmented, (DIPROLENE) 0 05 % ointment    Eliquis 5 MG    losartan (COZAAR) 50 mg tablet    meloxicam (Mobic) 15 mg tablet    metFORMIN (GLUCOPHAGE) 1000 MG tablet    methocarbamol (ROBAXIN) 750 mg tablet    metoprolol succinate (TOPROL-XL) 25 mg 24 hr tablet    multivitamin (THERAGRAN) TABS    nicotine (NICODERM CQ) 14 mg/24hr TD 24 hr patch       Allergies   Allergen Reactions    Lisinopril Cough       Objective   Vitals: Blood pressure 117/76, pulse 87, temperature 98 4 °F (36 9 °C), resp  rate 18, height 5' 11" (1 803 m), weight 113 kg (250 lb), SpO2 90 %  Orthostatic Blood Pressures    Flowsheet Row Most Recent Value   Blood Pressure 117/76 filed at 05/19/2022 3247   Patient Position - Orthostatic VS Lying filed at 05/19/2022 0400            Invasive Devices  Report    Peripheral Intravenous Line  Duration           Peripheral IV 05/19/22 Left Antecubital <1 day    Peripheral IV 05/19/22 Right Antecubital <1 day                Physical Exam  Vitals reviewed  Constitutional:       Appearance: He is obese  HENT:      Head: Normocephalic  Cardiovascular:      Rate and Rhythm: Normal rate and regular rhythm  Pulses: Normal pulses  Heart sounds: Normal heart sounds     Pulmonary:      Effort: Pulmonary effort is normal       Comments: Wheezing heard bilaterally in lower lobes  Abdominal:      General: Bowel sounds are normal       Palpations: Abdomen is soft  Skin:     General: Skin is warm and dry  Capillary Refill: Capillary refill takes less than 2 seconds  Neurological:      Mental Status: He is alert and oriented to person, place, and time  Lab Results:   I have personally reviewed pertinent lab results  HS Troponin:   0   Lab Value Date/Time    HSTNI0 6 2022 0058    HSTNI2 28 2022 0258    HSTNI4 31 2022 0452     BNP:   Results from last 7 days   Lab Units 22  0058   POTASSIUM mmol/L 3 9   CHLORIDE mmol/L 105   CO2 mmol/L 24   BUN mg/dL 21   CREATININE mg/dL 1 13   CALCIUM mg/dL 10 0   EGFR ml/min/1 73sq m 71     TSH:   Results from last 7 days   Lab Units 22  0058   TSH 3RD GENERATON uIU/mL 1 380     Lipid Profile:   Results from last 7 days   Lab Units 22  0058   HDL mg/dL 46   TRIGLYCERIDES mg/dL 497*     Results from last 7 days   Lab Units 22  0058   NT-PRO BNP pg/mL 19     Results from last 7 days   Lab Units 22  0058   POTASSIUM mmol/L 3 9   CO2 mmol/L 24   CHLORIDE mmol/L 105   BUN mg/dL 21   CREATININE mg/dL 1 13     Results from last 7 days   Lab Units 22  0058   HEMOGLOBIN g/dL 16 5   HEMATOCRIT % 48 9   PLATELETS Thousands/uL 304     Results from last 7 days   Lab Units 22  0342   PTT seconds 28     Results from last 7 days   Lab Units 22  0058   HDL mg/dL 46   TRIGLYCERIDES mg/dL 497*         Imaging: CXR I have personally reviewed pertinent reports          ECHO: Results for orders placed during the hospital encounter of 21    Echo complete with contrast if indicated    Narrative  [facility]  [facility] [address] [facility]  [phone]    Transthoracic Echocardiogram  2D, M-mode, Doppler, and Color Doppler    Study date:  2021    Patient: Liban Gaspar  MR number: [id number]  Account number: [de-identified]  : 1965  Age: 61 years  Gender: Male  Status: Inpatient  Location: Bedside  Height: 71 in  Weight: 244 4 lb  BP: 110/ 76 mmHg    Indications: Aflutter    Diagnoses: I48 1 - Atrial flutter    Sonographer:  MARCIAL Lott  Primary Physician:  Angelique Whitt MD  Referring Physician:  Martín Petersen DO  Group:  [id number]  Cardiology Fellow:  Sammy Hinkle MD  Interpreting Physician:  Christofer Fallon MD    SUMMARY    PROCEDURE INFORMATION:  This was a technically difficult study  LEFT VENTRICLE:  Systolic function was normal  Ejection fraction was estimated to be 60 %  There were no regional wall motion abnormalities  There was no evidence of concentric hypertrophy  MITRAL VALVE:  There was mild annular calcification  There was trace regurgitation  TRICUSPID VALVE:  There was mild regurgitation  PULMONIC VALVE:  There was trace regurgitation  HISTORY: PRIOR HISTORY: COPD, obesity, HTN, aflutter, tobacco use, HLD, anxiety    PROCEDURE: The procedure was performed at the bedside  This was a routine study  The transthoracic approach was used  The study included complete 2D imaging, M-mode, complete spectral Doppler, and color Doppler  The heart rate was 90 bpm,  at the start of the study  Images were obtained from the parasternal, apical, subcostal, and suprasternal notch acoustic windows  Echocardiographic views were limited due to decreased penetration and lung interference  This was a  technically difficult study  LEFT VENTRICLE: Size was normal  Systolic function was normal  Ejection fraction was estimated to be 60 %  There were no regional wall motion abnormalities  Wall thickness was normal  There was no evidence of concentric hypertrophy  DOPPLER: Left ventricular diastolic function parameters were normal for the patient's age      RIGHT VENTRICLE: The size was normal  Systolic function was normal  Wall thickness was normal     LEFT ATRIUM: Size was normal     RIGHT ATRIUM: Size was normal     MITRAL VALVE: There was mild annular calcification  Valve structure was normal  There was normal leaflet separation  DOPPLER: The transmitral velocity was within the normal range  There was no evidence for stenosis  There was trace  regurgitation  AORTIC VALVE: The valve was trileaflet  Leaflets exhibited normal thickness and normal cuspal separation  DOPPLER: Transaortic velocity was within the normal range  There was no evidence for stenosis  There was no significant  regurgitation  TRICUSPID VALVE: The valve structure was normal  There was normal leaflet separation  DOPPLER: The transtricuspid velocity was within the normal range  There was no evidence for stenosis  There was mild regurgitation  Estimated peak PA  pressure was 30 mmHg  PULMONIC VALVE: Not well visualized  DOPPLER: The transpulmonic velocity was within the normal range  There was trace regurgitation  PERICARDIUM: There was no pericardial effusion  The pericardium was normal in appearance  AORTA: The root exhibited upper limit of normal size  The ascending aorta was at the upper limit of the normal     SYSTEMIC VEINS: IVC: The inferior vena cava was not well visualized      SYSTEM MEASUREMENT TABLES    2D  %FS: 30 91 %  Ao Diam: 3 55 cm  Ao asc: 3 7 cm  EDV(Teich): 139 05 ml  EF(Teich): 58 06 %  ESV(Teich): 58 31 ml  IVSd: 0 98 cm  LA Area: 17 91 cm2  LA Diam: 3 77 cm  LVEDV MOD A4C: 104 49 ml  LVEF MOD A4C: 66 33 %  LVESV MOD A4C: 35 18 ml  LVIDd: 5 36 cm  LVIDs: 3 7 cm  LVLd A4C: 8 34 cm  LVLs A4C: 7 08 cm  LVPWd: 0 94 cm  RA Area: 15 47 cm2  RVIDd: 3 93 cm  SV MOD A4C: 69 3 ml  SV(Teich): 80 74 ml    CW  AV Vmax: 1 12 m/s  AV maxP mmHg  PV Vmax: 0 81 m/s  PV maxP 61 mmHg  TR Vmax: 2 09 m/s  TR maxP 41 mmHg    MM  TAPSE: 2 38 cm    PW  E' Sept: 0 09 m/s  E/E' Sept: 8 27  LVOT Vmax: 1 04 m/s  LVOT maxP 31 mmHg  MV A Eriberto: 0 56 m/s  MV Dec Emanuel: 3 56 m/s2  MV DecT: 220 19 ms  MV E Eriberto: 0 76 m/s  MV E/A Ratio: 1 38  MV PHT: 63 85 ms  MVA By PHT: 3 45 cm2  RVOT Vmax: 0 7 m/s  RVOT maxP 98 mmHg    [facility]    Prepared and electronically signed by    Uzair Cabrera MD  Signed 2021 16:02:25        EKG:   Date: 2022  Interpretation: Atrial fibrillation with RVR, left axis deviation      VTE Prophylaxis: Sequential compression device (Venodyne)  On eliquis      Assessment/Plan     # Non-valvular atrial fibrillation: Patient admitted with atrial fibrillation in the setting of underlying illness and history of COPD  Patient does have a prior history of atrial flutter with COPD exacerbation/bronchitis episode for which he was hospitalized last year  CHADSVASc of 2 (hypertension and diabetes)  Patients atrial fibrillation may likely be occurring in context of underlying illness, patient may benefit from further telemetry monitoring for this  His atrial fibrillation is likely multifactorial in cause, NAYE noncompliance with CPAP and uncontrolled COPD with smoking history  - Would recommend treating underlying condition   - Started on metoprolol tartarate 50 bid for rate control  - Continue eliquis 5 mg bid  - Outpatient stress testing and consideration for flecainide  - Patient in NSR now, EKG obtained to get a baseline  - Echo pending results, will follow up on it  - Continue telemetry  - Loop recorder interrogation results noted  - Outpatient cardiology follow up      Case discussed and reviewed with Dr Larisa Echevarria  Please wait for final recommendations from Dr Larisa Echevarria  Thank you for involving us in the care of your patient  Melba Gill MD  Internal Medicine Residency PGY-1  [place]  Available on [employer]  [email or web address]    ==========================================================================================    Counseling / Coordination of Care  Total floor / unit time spent today 30 minutes    Greater than 50% of total time was spent with the patient and / or family counseling and / or coordination of care  Epic/ Allscripts/Care Everywhere records reviewed: Yes    ** Please Note: Fluency DirectDictation voice to text software may have been used in the creation of this document   **

## 2022-05-19 NOTE — ASSESSMENT & PLAN NOTE
· Reports drinking 4 beers daily for years  · Continue daily thiamine and folic acid supplementation   · Medical alcohol level negative   · CIWA score 0 most recent  · Strongly encourage ETOH cessation

## 2022-05-19 NOTE — ASSESSMENT & PLAN NOTE
· Reports drinking 4 beers daily for years  · Continue daily thiamine and folic acid supplementation   · Medical alcohol level negative   · CIWA score 0 most recent, continue to monitor for withdrawal symptoms

## 2022-05-19 NOTE — ASSESSMENT & PLAN NOTE
Lab Results   Component Value Date    HGBA1C 7 5 (A) 07/06/2021       Recent Labs     05/19/22  0602 05/19/22  1312   POCGLU 163* 286*       Blood Sugar Average: Last 72 hrs:  (P) 224 5   · Resume metformin on discharge  · Place on glucometer at home while on prednisone therapy to evaluate for any hyperglycemia

## 2022-05-19 NOTE — ASSESSMENT & PLAN NOTE
Reports drinking 4 beers daily for years and that he had more than 4 last night but did not give an exact number  Review of chart shows that patient had previously cut down to 1-2 beers daily  Unsure if he has ever had alcohol withdrawal seizures  Plan  > [facility] protocol  > daily thiamine and folate  > order ethanol level

## 2022-05-19 NOTE — DISCHARGE INSTRUCTIONS
Please follow up with cardiology and pulmonary in the outpatient setting for stress ECHO and outpatient pulmonary function tests/sleep study   Check blood sugars closely while on prednisone, if persistently high blood sugars please reach out to your PCP or come back to the hospital  Please follow up with your primary care provider and obtain CBC to monitor blood counts

## 2022-05-19 NOTE — RESPIRATORY THERAPY NOTE
RT Protocol Note  Vita Diaz 61 y o  male MRN: [id number]  Unit/Bed#: [facility] 510-01 Encounter: [id number]    Assessment    Principal Problem:    Atrial flutter ([place] [address] )  Active Problems:    COPD (chronic obstructive pulmonary disease) ([place] [address] )    Type 2 diabetes mellitus with hyperglycemia, without long-term current use of insulin ([facility])    Alcohol use    NAYE (obstructive sleep apnea)      Home Pulmonary Medications:  Albuterol mdi        Past Medical History:   Diagnosis Date    Adhesive capsulitis of right shoulder 2022    Anxiety     Last assessed 2013    Contusion of right wrist 2018    COPD (chronic obstructive pulmonary disease) ([place] [address] ) 2016    Depression with anxiety     Diverticulitis 2016    Eczema 2016    Elevated ALT measurement 2016    Hypertension     Obesity 2012    Perianal abscess 3/5/2019    Type 2 diabetes mellitus with hyperglycemia, without long-term current use of insulin ([place] [address] )      Social History     Socioeconomic History    Marital status: /Civil Union     Spouse name: None    Number of children: None    Years of education: None    Highest education level: None   Occupational History    None   Tobacco Use    Smoking status: Current Every Day Smoker     Packs/day: 1 00     Last attempt to quit: 2021     Years since quittin 8    Smokeless tobacco: Never Used   Vaping Use    Vaping Use: Never used   Substance and Sexual Activity    Alcohol use:  Yes     Alcohol/week: 2 0 - 3 0 standard drinks     Types: 2 - 3 Cans of beer per week     Comment: down to 1-2 can of beer daily    Drug use: No    Sexual activity: None   Other Topics Concern    None   Social History Narrative    None     Social Determinants of Health     Financial Resource Strain: Not on file   Food Insecurity: Not on file   Transportation Needs: Not on file   Physical Activity: Not on file   Stress: Not on file   Social Connections: Not on file   Intimate Partner Violence: Not on file Housing Stability: Not on file       Subjective         Objective    Physical Exam:   General Appearance: Awake  Respiratory Pattern: Normal  Chest Assessment: Chest expansion symmetrical  Bilateral Breath Sounds: Expiratory wheezes, Inspiratory wheezes    Vitals:  Blood pressure 104/72, pulse 90, temperature 98 5 °F (36 9 °C), resp  rate 18, height 5' 11" (1 803 m), weight 113 kg (250 lb), SpO2 91 %  Imaging and other studies: I have personally reviewed pertinent reports  Plan    Respiratory Plan: Home Bronchodilator Patient pathway        Resp Comments: assessed pt per protocol  came into ED for heart palpitations and increased SOB  pt has a hx of copd and NAYE  takes albuterol inhaler at home  pt ordered nebs for wheezing  will continue to monitor

## 2022-05-20 ENCOUNTER — TRANSITIONAL CARE MANAGEMENT (OUTPATIENT)
Dept: FAMILY MEDICINE CLINIC | Facility: CLINIC | Age: 57
End: 2022-05-20

## 2022-05-20 NOTE — UTILIZATION REVIEW
Initial Clinical Review    Admission: Date/Time/Statement:   Admission Orders (From admission, onward)     Ordered        05/19/22 0252  Inpatient Admission  Once                      Orders Placed This Encounter   Procedures    Inpatient Admission     Standing Status:   Standing     Number of Occurrences:   1     Order Specific Question:   Level of Care     Answer:   Med Surg [16]     Order Specific Question:   Estimated length of stay     Answer:   More than 2 Midnights     Order Specific Question:   Certification     Answer:   I certify that inpatient services are medically necessary for this patient for a duration of greater than two midnights  See H&P and MD Progress Notes for additional information about the patient's course of treatment  ED Arrival Information     Expected   -    Arrival   5/19/2022 00:34    Acuity   Emergent            Means of arrival   Walk-In    Escorted by   Self    Service   Hospitalist    Admission type   Emergency            Arrival complaint   heart racing           Chief Complaint   Patient presents with    Rapid Heart Rate     Pt c/o waking up with racing heart around midnight  Pt c/o mild sob, denies cp  Initial Presentation: 61 y o  male with PMH of aflutter, COPD, NAYE, alc abuse, HTN, DM, obesity presented to the ED from home with palpitations that started 11:00 last night  Pt denied chest pain  Reports worsening non productive cough and sob for several weeks as well as wheezing  Reports similar symptoms when he was admitted for aflutter with RVR in the setting of bronchitis a year ago,  treated with abx and steroids and underwent OP ablation with loop recorder  Reports he smokes 1 pack/day x 40 years and drinks 4 beers/day but drank more than 4 last night  In the ED, EKG:  Atrial fibrillation with rapid ventricular response, rate 172, no stemi,   On exam, with diffuse inspiratory and expiratory wheezing   Chest x-ray with noted loop recorder, enlarged lung fields, no large effusion or infiltrate  Given 2 pushes of diltiazem 30 mg IV and metoprolol 5 mg IV with improvement in HR  NIKKY normotensive  Plan: Inpatient admission for evaluation and treatment of atrial flutter, acute COPD exacerbation: Start diltiazem drip at 5 per hour and titrate for goal HR less than 115  Restart PTA Toprol 25 mg b i d  Restart apixaban 5 mg BID  Consult cardiology  Keep NPO  order 2D echo, lipid panel, A1c and TSH   keep potassium greater than 4, magnesium greater than 2  hold PTA losartan  Solu-Medrol 40 mg q 8 hours, doxycycline and scheduled DuoNebs 3 times daily  Xopenex instead of albuterol to avoid provoking tachycardia     05/19  Cardiology Consult:Paroxysmal Atrial Fibrillation  Loop recorder was checked  Pt had 1 episode in April and this current episode, both asymptomatic  ,Now back in NSR  Continue increased dose of metoprolol to 50 mg bid  Restart Eliquis 5 mg b i d  emphasized need to abstain from alcohol or at least minimize, and get outpatient sleep study  Avoid smoking  F/u echo results  Cont telemetry  Outpatient stress testing and consideration for flecainide    IM Notes: Pt was in acute COPD exacerbation and was treated with nebulizers and IV steroids with improvement  Pulmonology evaluated pt and recommended decreased steroids to prednisone 40 mg daily, dc doxy, prn albuterol   OP PFTs and polysomnography       ED Triage Vitals   Temperature Pulse Respirations Blood Pressure SpO2   05/19/22 0400 05/19/22 0050 05/19/22 0050 05/19/22 0050 05/19/22 0050   98 1 °F (36 7 °C) (!) 170 20 (!) 181/84 93 %      Temp Source Heart Rate Source Patient Position - Orthostatic VS BP Location FiO2 (%)   05/19/22 0400 05/19/22 0050 05/19/22 0050 05/19/22 0050 --   Oral Monitor Lying Right arm       Pain Score       05/19/22 0526       No Pain          Wt Readings from Last 1 Encounters:   05/19/22 113 kg (250 lb)     Additional Vital Signs:   Date/Time Temp Pulse Resp BP MAP (mmHg) SpO2 O2 Device Patient Position - Orthostatic VS   05/19/22 15:32:47 98 5 °F (36 9 °C) 86 16 148/79 102 90 % None (Room air) --   05/19/22 10:27:15 98 7 °F (37 1 °C) 87 18 132/77 95 91 % None (Room air) Lying   05/19/22 08:21:45 98 4 °F (36 9 °C) 87 -- 117/76 90 90 % -- --   05/19/22 0820 -- 87 -- 117/76 -- -- -- --   05/19/22 0715 -- 83 -- 104/72 -- -- -- --   05/19/22 05:28:16 98 5 °F (36 9 °C) 90 18 104/72 83 91 % -- --   05/19/22 0400 98 1 °F (36 7 °C) 80 18 124/75 94 92 % None (Room air) Lying   05/19/22 0300 -- 110 Abnormal  18 145/74 103 92 % None (Room air) Lying   05/19/22 0200 -- 120 Abnormal  18 143/74 -- 93 % None (Room air) Lying   05/19/22 0100 -- 138 Abnormal  20 129/70 94 90 % None (Room air) Lying     Pertinent Labs/Diagnostic Test Results:   XR chest 1 view portable   ED Interpretation by Jenn Gray DO (05/19 7430)   No acute cardiopulmonary process      Final Result by Elvia Melo MD (05/19 8187)      No acute cardiopulmonary disease  Workstation performed: [id number]           05/19  EKG result: Atrial fibrillation with rapid ventricular response with premature ventricular or aberrantly conducted complexes  Left axis deviation  ECHO:     Left Ventricle: Left ventricular cavity size is normal  Wall thickness is normal  The left ventricular ejection fraction is 65%   Systolic function is normal  Wall motion is normal  Diastolic function is normal     Right Ventricle: Right ventricular cavity size is normal  Systolic function is normal   EKG 2 result: Normal sinus rhythm  Normal ECG    Results from last 7 days   Lab Units 05/19/22 0058   SARS-COV-2  Negative     Results from last 7 days   Lab Units 05/19/22 0058   WBC Thousand/uL 15 35*   HEMOGLOBIN g/dL 16 5   HEMATOCRIT % 48 9   PLATELETS Thousands/uL 304   NEUTROS ABS Thousands/µL 9 47*         Results from last 7 days   Lab Units 05/19/22  0058   SODIUM mmol/L 136   POTASSIUM mmol/L 3 9   CHLORIDE mmol/L 105 CO2 mmol/L 24   ANION GAP mmol/L 7   BUN mg/dL 21   CREATININE mg/dL 1 13   EGFR ml/min/1 73sq m 71   CALCIUM mg/dL 10 0   MAGNESIUM mg/dL 1 8         Results from last 7 days   Lab Units 05/19/22  1605 05/19/22  1312 05/19/22  0602   POC GLUCOSE mg/dl 212* 286* 163*     Results from last 7 days   Lab Units 05/19/22  0058   GLUCOSE RANDOM mg/dL 192*       Results from last 7 days   Lab Units 05/19/22  0452 05/19/22  0258 05/19/22  0058   HS TNI 0HR ng/L  --   --  6   HS TNI 2HR ng/L  --  28  --    HSTNI D2 ng/L  --  22*  --    HS TNI 4HR ng/L 31  --   --    HSTNI D4 ng/L 25*  --   --          Results from last 7 days   Lab Units 05/19/22  0342   PROTIME seconds 11 9   INR  0 91   PTT seconds 28     Results from last 7 days   Lab Units 05/19/22  0058   TSH 3RD GENERATON uIU/mL 1 380     Results from last 7 days   Lab Units 05/19/22  0342   PROCALCITONIN ng/ml <0 05     Results from last 7 days   Lab Units 05/19/22  0058   NT-PRO BNP pg/mL 19     Results from last 7 days   Lab Units 05/19/22  0557   CLARITY UA  Clear   COLOR UA  Light Yellow   SPEC GRAV UA  1 023   PH UA  5 5   GLUCOSE UA mg/dl Negative   KETONES UA mg/dl Negative   BLOOD UA  Negative   PROTEIN UA mg/dl Trace*   NITRITE UA  Negative   BILIRUBIN UA  Negative   UROBILINOGEN UA (BE) mg/dl <2 0   LEUKOCYTES UA  Negative   WBC UA /hpf None Seen   RBC UA /hpf None Seen   BACTERIA UA /hpf None Seen   EPITHELIAL CELLS WET PREP /hpf Occasional     Results from last 7 days   Lab Units 05/19/22  0058   INFLUENZA A PCR  Negative   INFLUENZA B PCR  Negative   RSV PCR  Negative             Results from last 7 days   Lab Units 05/19/22  0452   ETHANOL LVL mg/dL <3     ED Treatment:   Medication Administration from 05/19/2022 0033 to 05/19/2022 0518       Date/Time Order Dose Route Action     05/19/2022 0058 diltiazem (CARDIZEM) injection 30 mg 30 mg Intravenous Given     05/19/2022 0143 diltiazem (CARDIZEM) injection 30 mg 30 mg Intravenous Given     05/19/2022 0224 metoprolol (LOPRESSOR) injection 5 mg 5 mg Intravenous Given     05/19/2022 0458 potassium chloride oral solution 20 mEq 20 mEq Oral Given     05/19/2022 0458 methylPREDNISolone sodium succinate (Solu-MEDROL) injection 40 mg 40 mg Intravenous Given     05/19/2022 0458 doxycycline (VIBRAMYCIN) 100 mg in sodium chloride 0 9 % 100 mL IVPB 100 mg Intravenous New Bag     05/19/2022 0330 levalbuterol (XOPENEX) inhalation solution 0 63 mg   Nebulization Canceled Entry     05/19/2022 0330 ipratropium (ATROVENT) 0 02 % inhalation solution 0 5 mg   Nebulization Canceled Entry     05/19/2022 0425 diltiazem (CARDIZEM) 125 mg in sodium chloride 0 9 % 125 mL infusion 0 mg/hr Intravenous Hold     05/19/2022 0458 magnesium sulfate IVPB (premix) SOLN 1 g 1 g Intravenous New Bag        Past Medical History:   Diagnosis Date    Adhesive capsulitis of right shoulder 5/17/2022    Anxiety     Last assessed 12/20/2013    Contusion of right wrist 1/30/2018    COPD (chronic obstructive pulmonary disease) ([facility] ) 8/16/2016    Depression with anxiety     Diverticulitis 2016    Eczema 2/24/2016    Elevated ALT measurement 8/16/2016    Hypertension     Obesity 9/25/2012    Perianal abscess 3/5/2019    Type 2 diabetes mellitus with hyperglycemia, without long-term current use of insulin ([facility] )      Present on Admission:   [facility]   Type 2 diabetes mellitus with hyperglycemia, without long-term current use of insulin ([facility])   Alcohol use   Atrial flutter (HCC)      Admitting Diagnosis: Palpitations [R00 2]  Chest pain [R07 9]  Atrial fibrillation with rapid ventricular response ([facility] ) [I48 91]  Tobacco use [Z72 0]  Alcohol consumption heavy [Z78 9]  Age/Sex: 61 y o  male  Admission Orders:  NPO   Continuous pulse ox  CIWA    Scheduled Medications:  thiamine tablet 100 mg po daily  nicotine (NICODERM CQ) 21 mg/24 hr TD 24 hr patch 21 mg daily TD  multivitamin-minerals (CENTRUM) tablet 1 tablet po dailymetoprolol succinate (TOPROL-XL) 24 hr tablet 25 mg po bid  methylPREDNISolone sodium succinate (Solu-MEDROL) injection 40 mg q8h IV  levalbuterol (XOPENEX) inhalation solution 0 63 mg tid nebulization  ipratropium (ATROVENT) 0 02 % inhalation solution 0 5 mg TID nebuliztion  apixaban (ELIQUIS) tablet 5 mg po bid  folic acid (FOLVITE) tablet 1 mg po daily  insulin lispro (HumaLOG) 100 units/mL subcutaneous injection 1-5 Units q6h SC    Continuous IV Infusions:      PRN Meds:  methocarbamol (ROBAXIN) tablet 750 mg q6h po prn  LORazepam (ATIVAN) tablet 1 mg q6h po prn  acetaminophen (TYLENOL) tablet 650 mg q6h po prn    IP CONSULT TO CARDIOLOGY  IP CONSULT TO PULMONOLOGY    Network Utilization Review Department  ATTENTION: Please call with any questions or concerns to [phone] and carefully listen to the prompts so that you are directed to the right person  All voicemails are confidential   Summer Das all requests for admission clinical reviews, approved or denied determinations and any other requests to dedicated fax number below belonging to the campus where the patient is receiving treatment   List of dedicated fax numbers for the Facilities:  [address] DENIALS (Administrative/Medical Necessity) [phone]   [address] (Maternity/NICU/Pediatrics) [phone]   [address]  [phone]   [address] [id number] [place] [address] [address] [facility] [address] [place] [address] [phone]

## 2022-05-24 ENCOUNTER — EVALUATION (OUTPATIENT)
Dept: PHYSICAL THERAPY | Facility: OTHER | Age: 57
End: 2022-05-24
Payer: COMMERCIAL

## 2022-05-24 DIAGNOSIS — M75.51 SUBACROMIAL BURSITIS OF RIGHT SHOULDER JOINT: Primary | ICD-10-CM

## 2022-05-24 PROCEDURE — 97110 THERAPEUTIC EXERCISES: CPT | Performed by: PHYSICAL THERAPIST

## 2022-05-24 PROCEDURE — 97162 PT EVAL MOD COMPLEX 30 MIN: CPT | Performed by: PHYSICAL THERAPIST

## 2022-05-24 NOTE — PROGRESS NOTES
PT Evaluation     Today's date: 2022  Patient name: Nathaly Sepulveda  : 1965  MRN: [id number]  Referring provider: Tello Balbuena*  Dx:   Encounter Diagnosis     ICD-10-CM    1  Subacromial bursitis of right shoulder joint  M75 51                   Assessment  Assessment details: Nathaly Sepulveda is a pleasant 61 y o  male who presents with chronic R shoulder pain  He demonstrates decreased ROM, decreased scap stabilization, decreased tspine mobility and shoulder pain  The primary movement problem is R shoulder pain with movement coordination impairments limiting his ability to care for self, carry, dress independently, exercise or recreation, lift, perform household chores, reach overhead, sleep and wash behind the back  No further referral appears necessary at this time based upon examination results  The patient's greatest concerns are concern at no signs of improvement and fear of not being able to keep active  Problem List:  1) decreased ROM  2) decreased scap stabilizer strength  3) shoulder pain      Impairments: abnormal or restricted ROM, abnormal movement, activity intolerance, impaired physical strength, lacks appropriate home exercise program, pain with function, scapular dyskinesis and weight-bearing intolerance  Prognosis details: Positive prognostic indicators include positive attitude toward recovery  Negative prognostic indicators include chronicity of symptoms, hypertension, diabetes, COPD, high symptom irritability  Goals  STG's to be achieved in 4 weeks:  1) Patient will demonstrate normal pain free AROM of R shoulder  2) Patient will improve scap strength by 1/2 muscle grade  3) Patient will report no greater than 2/10 shoulder pain with ADLs  LTG's to be achieved in8 weeks:  1) Patient will be independent and compliant with HEP  2) Patient will return to lifting overhead with no greater than 2/10 shoulder pain     3) Patient will return to all work related tasks with no greater than 2/10 shoulder pain  4) Patient will score 75 or greater  Subjective Evaluation    History of Present Illness  Mechanism of injury: Patient reports R shoulder pain beginning approximately 3 months ago  He notes difficulty with reaching over head and out in front of him  Patient works in concrete construction  Notes difficulty with finishing of concrete  Patient also notes difficulty falling asleep due to shoulder pain  He has difficulty with ADL's such as dressing and bathing  Pain  Current pain ratin  At best pain ratin  At worst pain ratin    Patient Goals  Patient goals for therapy: increased strength, independence with ADLs/IADLs, return to sport/leisure activities, decreased pain and increased motion          Objective     Active Range of Motion   Left Shoulder   Normal active range of motion    Right Shoulder   Flexion: 165 degrees with pain  Abduction: 130 degrees with pain  External rotation BTH: C7 with pain  Internal rotation BTB: sacrum with pain    Additional Active Range of Motion Details  Limited cervical ROM    Strength/Myotome Testing     Left Shoulder     Planes of Motion   Flexion: 5   Abduction: 5   External rotation at 0°: 4+   External rotation at 90°: 4   Internal rotation at 0°: 4+     Isolated Muscles   Lower trapezius: 3+   Middle trapezius: 3+   Rhomboids: 3+     Right Shoulder     Planes of Motion   Flexion: 5   Abduction: 5   External rotation at 0°: 4   Internal rotation at 0°: 4     Additional Strength Details  Unable to test 90/90 position and scap stabilizers on R due to high pain levels    Tests     Right Shoulder   Positive Hawkin's, Neer's and bicep load        General Comments:      Shoulder Comments   Hypertonicity of upper trap             Precautions: A fib, COPD, HTN      Manuals             [facility] post mobs             [facility] inf mobs                                       Neuro Re-Ed             scap squeeze 3 x 10, 5" hold S/l ER  2 x 10, 2#            TB LPD/ Row             Ball on wall             scap punch             scap ABC             Bent over row             Ther Ex             ube             pec stretch 4 x 30"            Upper trap stretch  4 x 30"            [place]                                                                 Ther Activity                                       Gait Training                                       Modalities

## 2022-05-27 ENCOUNTER — OFFICE VISIT (OUTPATIENT)
Dept: PHYSICAL THERAPY | Facility: OTHER | Age: 57
End: 2022-05-27
Payer: COMMERCIAL

## 2022-05-27 DIAGNOSIS — M75.51 SUBACROMIAL BURSITIS OF RIGHT SHOULDER JOINT: Primary | ICD-10-CM

## 2022-05-27 PROCEDURE — 97140 MANUAL THERAPY 1/> REGIONS: CPT

## 2022-05-27 PROCEDURE — 97110 THERAPEUTIC EXERCISES: CPT

## 2022-05-27 PROCEDURE — 97112 NEUROMUSCULAR REEDUCATION: CPT

## 2022-05-27 NOTE — PROGRESS NOTES
Daily Note     Today's date: 2022  Patient name: Sharla Kirkpatrick  : 1965  MRN: [id number]  Referring provider: Adelina Myers*  Dx:   Encounter Diagnosis     ICD-10-CM    1  Subacromial bursitis of right shoulder joint  M75 51                   Subjective: Pt states he is actually sore today as he has been working over head a lot lately  Objective: See treatment diary below      Assessment: Tolerated treatment well with decreased pain post manual PT  Pt required VC with tband exercises to decrease UT compensation  Pt was limited with IR seen with manual PT  Patient would benefit from continued PT      Plan: Continue per plan of care        Precautions: A fib, COPD, HTN      Manuals             [facility] post mobs             GH inf mobs             PROM   CF           STM  CF           Neuro Re-Ed             scap squeeze 3 x 10, 5" hold 3 x 10, 5" hold           S/l ER  2 x 10, 2# 2# 2  x10            TB LPD/ Row  GTB 20x            Ball on wall             scap punch  2# 20x            scap ABC             Bent over row             Ther Ex             ube  3 min retro            pec stretch 4 x 30" 4  x30"            Upper trap stretch  4 x 30" 4  X30"            [place]  10x                                                                Ther Activity                                       Gait Training                                       Modalities

## 2022-06-01 ENCOUNTER — OFFICE VISIT (OUTPATIENT)
Dept: PHYSICAL THERAPY | Facility: OTHER | Age: 57
End: 2022-06-01
Payer: COMMERCIAL

## 2022-06-01 DIAGNOSIS — M75.51 SUBACROMIAL BURSITIS OF RIGHT SHOULDER JOINT: Primary | ICD-10-CM

## 2022-06-01 PROCEDURE — 97110 THERAPEUTIC EXERCISES: CPT | Performed by: PHYSICAL THERAPIST

## 2022-06-01 PROCEDURE — 97112 NEUROMUSCULAR REEDUCATION: CPT | Performed by: PHYSICAL THERAPIST

## 2022-06-01 PROCEDURE — 97140 MANUAL THERAPY 1/> REGIONS: CPT | Performed by: PHYSICAL THERAPIST

## 2022-06-01 NOTE — PROGRESS NOTES
Daily Note     Today's date: 2022  Patient name: Mariaelena Coffey  : 1965  MRN: [id number]  Referring provider: Dorothy Ge*  Dx:   Encounter Diagnosis     ICD-10-CM    1  Subacromial bursitis of right shoulder joint  M75 51        Start Time: 1700  Stop Time: 1740  Total time in clinic (min): 40 minutes  1 on 1 for entirety    Subjective: Pt reports he was initially noting improvement in shoulder pain with HEP  But, states last week he did some overhead work at work and has been feeling flared up ever since  Objective: See treatment diary below      Assessment: Tolerated treatment well  Patient continues with pain with overhead work and especially with eccentric lowering  Patient would benefit from continued PT  Patient pain free with elevation post self MWM  Educated patient on set up for home self mob  Plan: Continue per plan of care        Precautions: A fib, COPD, HTN      Manuals           [facility] post mobs   EB          GH inf mobs   EB          PROM   CF EB          STM  CF           Neuro Re-Ed             scap squeeze 3 x 10, 5" hold 3 x 10, 5" hold           S/l ER  2 x 10, 2# 2# 2  x10  3# 30x          TB LPD/ Row  GTB 20x  BTB 30 ea          Ball on wall             scap punch  2# 20x  30 x 3#          scap ABC   1 x 3#          Bent over row   10# 3 x 10           YTI             Corkscrew press   10 x 5#          Ther Ex             ube  3 min retro  2/2          pec stretch 4 x 30" 4  x30"  4 x 30"          Upper trap stretch  4 x 30" 4  X30"            [place]  10x  10x          Standing sleeper stretch   10 x 10"          Self flex MWM   10 x 10"          Post capsule stretch                          Ther Activity                                       Gait Training                                       Modalities

## 2022-06-03 ENCOUNTER — APPOINTMENT (OUTPATIENT)
Dept: PHYSICAL THERAPY | Facility: OTHER | Age: 57
End: 2022-06-03
Payer: COMMERCIAL

## 2022-06-07 ENCOUNTER — OFFICE VISIT (OUTPATIENT)
Dept: PHYSICAL THERAPY | Facility: OTHER | Age: 57
End: 2022-06-07
Payer: COMMERCIAL

## 2022-06-07 DIAGNOSIS — M75.51 SUBACROMIAL BURSITIS OF RIGHT SHOULDER JOINT: Primary | ICD-10-CM

## 2022-06-07 PROCEDURE — 97110 THERAPEUTIC EXERCISES: CPT

## 2022-06-07 PROCEDURE — 97140 MANUAL THERAPY 1/> REGIONS: CPT

## 2022-06-07 PROCEDURE — 97112 NEUROMUSCULAR REEDUCATION: CPT

## 2022-06-07 NOTE — PROGRESS NOTES
Daily Note     Today's date: 2022  Patient name: Raphael Meng  : 1965  MRN: [id number]  Referring provider: Nai Cedeno*  Dx:   Encounter Diagnosis     ICD-10-CM    1  Subacromial bursitis of right shoulder joint  M75 51        Start Time: 1500  Stop Time: 1805  Total time in clinic (min): 50 minutes      Subjective: Seema Ellington reports he was lifting with (R) arm last night and is sore from lifting activity  He states he took an OTC pain reliever which helped  He reports he continues with pain with OH movements and eccentrically lowering  Objective: See treatment diary below      Assessment: Patient tolerated treatment fair  Increased symptoms with OH movements and ER this visit  Reps reduced t/o session d/t increased anterior shoulder pain which did not resolve with rest or self MWM  Patient responded well to manuals with no symptoms demonstrated at end range, but slight symptoms at 90* flexion  Patient would benefit from continued PT  Plan: Continue per plan of care        Precautions: A fib, COPD, HTN      Manuals          [facility] post mobs   EB NV         GH inf mobs   EB NV         PROM   CF EB          STM  CF           Neuro Re-Ed             scap squeeze 3 x 10, 5" hold 3 x 10, 5" hold  3 x 10, 5" hold         S/l ER  2 x 10, 2# 2# 2  x10  3# 30x 3# x15         TB LPD/ Row  GTB 20x  BTB 30 ea BTB 3x10 ea         Ball on wall             scap punch  2# 20x  30 x 3# 30 x 3#         scap ABC   1 x 3# 1 x 3#         Bent over row   10# 3 x 10  10# 3 x 10          YTI             Corkscrew press   10 x 5# 1x5   5#         Ther Ex             ube  3 min retro  //         pec stretch 4 x 30" 4  x30"  4 x 30" missed         Upper trap stretch  4 x 30" 4  X30"            [place]  10x  10x 10x         Standing sleeper stretch   10 x 10" 10 x 10"         Self flex MWM   10 x 10" 10 x 10"         Post capsule stretch                          Ther Activity Gait Training                                       Modalities

## 2022-06-09 ENCOUNTER — OFFICE VISIT (OUTPATIENT)
Dept: PHYSICAL THERAPY | Facility: OTHER | Age: 57
End: 2022-06-09
Payer: COMMERCIAL

## 2022-06-09 DIAGNOSIS — M75.51 SUBACROMIAL BURSITIS OF RIGHT SHOULDER JOINT: Primary | ICD-10-CM

## 2022-06-09 PROCEDURE — 97112 NEUROMUSCULAR REEDUCATION: CPT | Performed by: PEDIATRICS

## 2022-06-09 PROCEDURE — 97110 THERAPEUTIC EXERCISES: CPT | Performed by: PEDIATRICS

## 2022-06-09 PROCEDURE — 97140 MANUAL THERAPY 1/> REGIONS: CPT | Performed by: PEDIATRICS

## 2022-06-09 NOTE — PROGRESS NOTES
Daily Note     Today's date: 2022  Patient name: Patricia Christianson  : 1965  MRN: [id number]  Referring provider: Adriana Blanco*  Dx:   Encounter Diagnosis     ICD-10-CM    1  Subacromial bursitis of right shoulder joint  M75 51                     Subjective: Heidi Lara reports he feels most discomfort with movement, especially overhead  Objective: See treatment diary below      Assessment: Patient tolerated treatment well  Stayed within pain free range throughout session  Held on corkscrew press as this has been an aggravating exercise  Patient would benefit from continued PT  Plan: Continue per plan of care  Precautions: A fib, COPD, HTN      Manuals         [address] post mobs   EB NV         GH inf mobs   EB NV         PROM   CF EB  EW        STM  CF           Neuro Re-Ed             scap squeeze 3 x 10, 5" hold 3 x 10, 5" hold  3 x 10, 5" hold 3 x 10, 5" hold        S/l ER  2 x 10, 2# 2# 2  x10  3# 30x 3# x15 3# x15        TB LPD/ Row  GTB 20x  BTB 30 ea BTB 3x10 ea BTB 3x10 ea        Ball on wall             scap punch  2# 20x  30 x 3# 30 x 3# 30x  3#        scap ABC   1 x 3# 1 x 3# 1x 3#        Bent over row   10# 3 x 10  10# 3 x 10  10# 3 x 10         YTI             Corkscrew press   10 x 5# 1x5   5# P!         Ther Ex             ube  3 min retro  2/2 2/2 2/2        pec stretch 4 x 30" 4  x30"  4 x 30" missed 4x30"        Upper trap stretch  4 x 30" 4  X30"            [place]  10x  10x 10x 15x        Standing sleeper stretch   10 x 10" 10 x 10" 10"x10        Self flex MWM   10 x 10" 10 x 10" 10"x10        Post capsule stretch                          Ther Activity                                       Gait Training                                       Modalities

## 2022-06-14 ENCOUNTER — OFFICE VISIT (OUTPATIENT)
Dept: OBGYN CLINIC | Facility: OTHER | Age: 57
End: 2022-06-14
Payer: COMMERCIAL

## 2022-06-14 ENCOUNTER — OFFICE VISIT (OUTPATIENT)
Dept: PHYSICAL THERAPY | Facility: OTHER | Age: 57
End: 2022-06-14
Payer: COMMERCIAL

## 2022-06-14 VITALS
DIASTOLIC BLOOD PRESSURE: 81 MMHG | BODY MASS INDEX: 35.17 KG/M2 | HEIGHT: 71 IN | WEIGHT: 251.2 LBS | SYSTOLIC BLOOD PRESSURE: 129 MMHG | HEART RATE: 90 BPM

## 2022-06-14 DIAGNOSIS — R29.898 SHOULDER WEAKNESS: ICD-10-CM

## 2022-06-14 DIAGNOSIS — M67.911 TENDINOPATHY OF ROTATOR CUFF, RIGHT: Primary | ICD-10-CM

## 2022-06-14 DIAGNOSIS — M75.21 BICEPS TENDINITIS OF RIGHT SHOULDER: ICD-10-CM

## 2022-06-14 DIAGNOSIS — M75.51 SUBACROMIAL BURSITIS OF RIGHT SHOULDER JOINT: Primary | ICD-10-CM

## 2022-06-14 PROCEDURE — 99213 OFFICE O/P EST LOW 20 MIN: CPT | Performed by: ORTHOPAEDIC SURGERY

## 2022-06-14 PROCEDURE — 97112 NEUROMUSCULAR REEDUCATION: CPT | Performed by: PHYSICAL THERAPIST

## 2022-06-14 PROCEDURE — 97140 MANUAL THERAPY 1/> REGIONS: CPT | Performed by: PHYSICAL THERAPIST

## 2022-06-14 PROCEDURE — 4004F PT TOBACCO SCREEN RCVD TLK: CPT | Performed by: ORTHOPAEDIC SURGERY

## 2022-06-14 PROCEDURE — 97110 THERAPEUTIC EXERCISES: CPT | Performed by: PHYSICAL THERAPIST

## 2022-06-14 PROCEDURE — 3008F BODY MASS INDEX DOCD: CPT | Performed by: ORTHOPAEDIC SURGERY

## 2022-06-14 NOTE — PROGRESS NOTES
Daily Note     Today's date: 2022  Patient name: Ney Marroquin  : 1965  MRN: [id number]  Referring provider: Angelica Cavazos*  Dx:   Encounter Diagnosis     ICD-10-CM    1  Subacromial bursitis of right shoulder joint  M75 51                   Subjective: patient reports he went see the doctor today and they ordered him an MRI which he will be getting       Objective: See treatment diary below      Assessment:Patient tolerated treatment well  He was able to complete and progress program as noted below  He demonstrates good technique throughout the session  He would benefit from continuing physical therapy  Plan: Continue per plan of care  Precautions: A fib, COPD, HTN      Manuals        [address] post mobs   EB NV  SK       GH inf mobs   EB NV  SK       PROM   CF EB  EW SK       STM  CF           Neuro Re-Ed             scap squeeze 3 x 10, 5" hold 3 x 10, 5" hold  3 x 10, 5" hold 3 x 10, 5" hold 3 x 10, 5" hold       S/l ER  2 x 10, 2# 2# 2  x10  3# 30x 3# x15 3# x15 3# x15       TB LPD/ Row  GTB 20x  BTB 30 ea BTB 3x10 ea BTB 3x10 ea BTB 3x10 ea       Ball on wall             scap punch  2# 20x  30 x 3# 30 x 3# 30x  3# 30x  4#       scap ABC   1 x 3# 1 x 3# 1x 3# 1x 4#       Bent over row   10# 3 x 10  10# 3 x 10  10# 3 x 10  10# 3 x 10        YTI             Corkscrew press   10 x 5# 1x5   5# P!         Ther Ex             ube  3 min retro  2/2 2/2 2/2 2/2       pec stretch 4 x 30" 4  x30"  4 x 30" missed 4x30" 4x30"       Upper trap stretch  4 x 30" 4  X30"            [place]  10x  10x 10x 15x 20x       Standing sleeper stretch   10 x 10" 10 x 10" 10"x10 10"x10        Self flex MWM   10 x 10" 10 x 10" 10"x10 10"x10       Post capsule stretch                          Ther Activity                                       Gait Training                                       Modalities

## 2022-06-14 NOTE — PROGRESS NOTES
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  He underwent right glenohumeral corticosteroid injection on 03/31/2022 with Kole Oconnor PA-C followed by subacromial corticosteroid injection on 05/17/2022  Today still reports pain with overhead movements and reaching backwards  He has had no injuries since last visit  Localizes pain to his lateral shoulder  Denies any numbness and tingling down to his hand  Denies any new injury  I have personally reviewed pertinent films in PACS  Patient Active Problem List   Diagnosis    Acne    COPD (chronic obstructive pulmonary disease) ([facility] [address] )    Depression with anxiety    Eczema    Erectile dysfunction of non-organic origin    Hyperlipidemia    Essential hypertension    Type 2 diabetes mellitus with hyperglycemia, without long-term current use of insulin (HCC)    Insomnia    Irritable bowel syndrome    Nicotine dependence    Obesity (BMI 30-39  9)    Psoriasis    Alcohol use    Screen for colon cancer    Screening for prostate cancer    Atrial flutter (HCC)    Acute bronchitis    Tobacco use    Alcohol consumption heavy    Subacromial bursitis of right shoulder joint    Adhesive capsulitis of right shoulder    NAYE (obstructive sleep apnea)    Leukocytosis        Current Outpatient Medications on File Prior to Visit   Medication Sig Dispense Refill    albuterol (Ventolin HFA) 90 mcg/act inhaler Inhale 2 puffs every 4 (four) hours as needed for wheezing 18 g 0    apixaban (Eliquis) 5 mg Take 1 tablet (5 mg total) by mouth in the morning and 1 tablet (5 mg total) in the evening   60 tablet 0    betamethasone, augmented, (DIPROLENE) 0 05 % ointment APPLY TWICE DAILY TO PSORIASIS AREAS X 2 WEEKS, THEN 2-3 X PER WEEK AS NEEDED  AVOID FACE/SKIN FOLDS      folic acid (FOLVITE) 1 mg tablet Take 1 tablet (1 mg total) by mouth in the morning  0    losartan (COZAAR) 50 mg tablet TAKE 1 TABLET BY MOUTH EVERY DAY 30 tablet 5    meloxicam (Mobic) 15 mg tablet Take 1 tablet (15 mg total) by mouth daily 30 tablet 0    metFORMIN (GLUCOPHAGE) 1000 MG tablet TAKE 1 TABLET BY MOUTH TWICE A DAY WITH MEALS 60 tablet 5    metoprolol tartrate (LOPRESSOR) 50 mg tablet Take 1 tablet (50 mg total) by mouth every 12 (twelve) hours 60 tablet 0    multivitamin (THERAGRAN) TABS Take 1 tablet by mouth daily      thiamine 100 MG tablet Take 1 tablet (100 mg total) by mouth in the morning  0    tiotropium (Spiriva HandiHaler) 18 mcg inhalation capsule Place 1 capsule (18 mcg total) into inhaler and inhale in the morning  30 capsule 0    acetaminophen (TYLENOL) 325 mg tablet Take 2 tablets (650 mg total) by mouth every 6 (six) hours as needed for mild pain 30 tablet 0    methocarbamol (ROBAXIN) 750 mg tablet Take 1 tablet (750 mg total) by mouth every 6 (six) hours as needed for muscle spasms 15 tablet 0    nicotine (NICODERM CQ) 14 mg/24hr TD 24 hr patch Place 1 patch on the skin daily 28 patch 0     No current facility-administered medications on file prior to visit          Allergies   Allergen Reactions    Lisinopril Cough        Tobacco Use: High Risk    Smoking Tobacco Use: Current Every Day Smoker    Smokeless Tobacco Use: Never Used        Social Determinants of Health     Tobacco Use: High Risk    Smoking Tobacco Use: Current Every Day Smoker    Smokeless Tobacco Use: Never Used   Alcohol Use: Not on file   Financial Resource Strain: Not on file   Food Insecurity: Not on file   Transportation Needs: Not on file   Physical Activity: Not on file   Stress: Not on file   Social Connections: Not on file   Intimate Partner Violence: Not on file   Depression: Not at risk    PHQ-2 Score: 0   Housing Stability: Not on file               Review of Systems   Constitutional: Negative for chills and fever  HENT: Negative for ear pain and sore throat  Eyes: Negative for pain and visual disturbance  Respiratory: Negative for cough and shortness of breath  Cardiovascular: Negative for chest pain and palpitations  Gastrointestinal: Negative for abdominal pain and vomiting  Genitourinary: Negative for dysuria and hematuria  Musculoskeletal: Negative for arthralgias and back pain  Skin: Negative for color change and rash  Neurological: Negative for seizures and syncope  All other systems reviewed and are negative  Body mass index is 35 04 kg/m²  Physical Exam  Vitals and nursing note reviewed  Constitutional:       General: He is not in acute distress  HENT:      Head: Atraumatic  Eyes:      Conjunctiva/sclera: Conjunctivae normal    Cardiovascular:      Rate and Rhythm: Normal rate  Pulses: Normal pulses  Pulmonary:      Effort: Pulmonary effort is normal  No respiratory distress  Skin:     General: Skin is warm and dry  Coloration: Skin is not jaundiced  Neurological:      Mental Status: He is alert and oriented to person, place, and time  Psychiatric:         Mood and Affect: Mood normal          Behavior: Behavior normal           Ortho Exam:    Body part: right shoulder    Inspection:  No swelling or deformity noted    Palpation:  Mild tenderness to palpation at subacromial region    Range of motion:  Flexion 140, abduction 120°  Internal rotation to sacral region  External rotation 50°  subscap 4/5, supraspinatus 5/5    Special Tests: discomfort with Chilel and Neer's, positive speed's  Distal Neurovascular Status: Intact, Yes    Procedures       Assessment:     Diagnosis ICD-10-CM Associated Orders   1   Tendinopathy of rotator cuff, right  M67 911 MRI shoulder right wo contrast   2  Shoulder weakness  R29 898 MRI shoulder right wo contrast   3  Biceps tendinitis of right shoulder  M75 21 MRI shoulder right wo contrast        Plan:    Discussed clinical exam and findings with Mikey Oconnell  We also reviewed his radiologic imaging  He has not had much improvement of his shoulder pain since last visit  He has had a right glenohumeral cortisone injection and a subsequent subacromial cortisone injection  At this time I have requested an MRI for evaluation for possible subscapularis tear and biceps tendon injury  We will see him back for evaluation of the MRI has been obtained  Follow-Up:    Follow-up after MRI        Portions of the record may have been created with voice recognition software  Occasional wrong word or "sound alike" substitutions may have occurred due to the inherent limitations of voice recognition software  Please review the chart carefully and recognize, using context, where substitutions/typographical errors may have occurred

## 2022-06-16 ENCOUNTER — OFFICE VISIT (OUTPATIENT)
Dept: PHYSICAL THERAPY | Facility: OTHER | Age: 57
End: 2022-06-16
Payer: COMMERCIAL

## 2022-06-16 DIAGNOSIS — M75.51 SUBACROMIAL BURSITIS OF RIGHT SHOULDER JOINT: Primary | ICD-10-CM

## 2022-06-16 PROCEDURE — 97110 THERAPEUTIC EXERCISES: CPT | Performed by: PHYSICAL MEDICINE & REHABILITATION

## 2022-06-16 PROCEDURE — 97112 NEUROMUSCULAR REEDUCATION: CPT | Performed by: PHYSICAL MEDICINE & REHABILITATION

## 2022-06-16 PROCEDURE — 97140 MANUAL THERAPY 1/> REGIONS: CPT | Performed by: PHYSICAL MEDICINE & REHABILITATION

## 2022-06-16 NOTE — PROGRESS NOTES
Daily Note     Today's date: 2022  Patient name: Evangelista Raymond  : 1965  MRN: [id number]  Referring provider: Blane Hutchison*  Dx:   Encounter Diagnosis     ICD-10-CM    1  Subacromial bursitis of right shoulder joint  M75 51               Subjective: Patient notes increased UE demands at current job over the past few days  Objective: See treatment diary below    Assessment:Patient tolerated treatment well  Most challenged with s/l ER this date  Intermittent VCs for form maintenance with good carryover  Continue as able nv  Plan: Continue per plan of care  Precautions: A fib, COPD, HTN    Manuals       [address] post mobs   EB NV  SK LH      GH inf mobs   EB NV  SK       PROM   CF EB  EW SK       STM  CF           Neuro Re-Ed             scap squeeze 3 x 10, 5" hold 3 x 10, 5" hold  3 x 10, 5" hold 3 x 10, 5" hold 3 x 10, 5" hold       S/l ER  2 x 10, 2# 2# 2  x10  3# 30x 3# x15 3# x15 3# x15 3x10 3#      TB LPD/ Row  GTB 20x  BTB 30 ea BTB 3x10 ea BTB 3x10 ea BTB 3x10 ea BTB 3x10 ea      Ball on wall             scap punch  2# 20x  30 x 3# 30 x 3# 30x  3# 30x  4# 3x10x4"      scap ABC   1 x 3# 1 x 3# 1x 3# 1x 4# 1x 4#      Bent over row   10# 3 x 10  10# 3 x 10  10# 3 x 10  10# 3 x 10  3x10, 10#      YTI             Corkscrew press   10 x 5# 1x5   5# P!         Ther Ex             ube  3 min retro  2/2 2/2 2/2 2/2 2'/2'      pec stretch 4 x 30" 4  x30"  4 x 30" missed 4x30" 4x30" 4x30"      Upper trap stretch  4 x 30" 4  X30"            [place]  10x  10x 10x 15x 20x 20x      Standing sleeper stretch   10 x 10" 10 x 10" 10"x10 10"x10        Self flex MWM   10 x 10" 10 x 10" 10"x10 10"x10       Post capsule stretch                          Ther Activity                                       Gait Training                                       Modalities

## 2022-06-21 ENCOUNTER — OFFICE VISIT (OUTPATIENT)
Dept: PHYSICAL THERAPY | Facility: OTHER | Age: 57
End: 2022-06-21
Payer: COMMERCIAL

## 2022-06-21 DIAGNOSIS — M75.51 SUBACROMIAL BURSITIS OF RIGHT SHOULDER JOINT: Primary | ICD-10-CM

## 2022-06-21 PROCEDURE — 97110 THERAPEUTIC EXERCISES: CPT

## 2022-06-21 PROCEDURE — 97112 NEUROMUSCULAR REEDUCATION: CPT

## 2022-06-21 PROCEDURE — 97140 MANUAL THERAPY 1/> REGIONS: CPT

## 2022-06-21 NOTE — PROGRESS NOTES
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 and using is arm a little more than he should have  States activities [place] are a little more painful than usual, upon arrival to therapy today  Objective: See treatment diary below      Assessment: Tolerated treatment well  Continued with program as outlined below  Slight discomfort reported Patient would benefit from continued PT      Plan: Continue per plan of care  Precautions: A fib, COPD, HTN    Manuals      [address] post mobs   EB NV  SK  KA     GH inf mobs   EB NV  SK  KA     PROM   CF EB  EW SK  AFB     STM  CF           Neuro Re-Ed             scap squeeze 3 x 10, 5" hold 3 x 10, 5" hold  3 x 10, 5" hold 3 x 10, 5" hold 3 x 10, 5" hold       S/l ER  2 x 10, 2# 2# 2  x10  3# 30x 3# x15 3# x15 3# x15 3x10 3# 2x10 3#     TB LPD/ Row  GTB 20x  BTB 30 ea BTB 3x10 ea BTB 3x10 ea BTB 3x10 ea BTB 3x10 ea BTB 3x10 ea     Ball on wall             scap punch  2# 20x  30 x 3# 30 x 3# 30x  3# 30x  4# 3x10x4" 30x  4#     scap ABC   1 x 3# 1 x 3# 1x 3# 1x 4# 1x 4# nv     Bent over row   10# 3 x 10  10# 3 x 10  10# 3 x 10  10# 3 x 10  3x10, 10# 30x  4#     YTI             Corkscrew press   10 x 5# 1x5   5# P!         Ther Ex             ube  3 min retro  2/2 2/2 2/2 2/2 2'/2' 2'/2'     pec stretch 4 x 30" 4  x30"  4 x 30" missed 4x30" 4x30" 4x30" 4x30"     Upper trap stretch  4 x 30" 4  X30"            [place]  10x  10x 10x 15x 20x 20x 20x     Standing sleeper stretch   10 x 10" 10 x 10" 10"x10 10"x10        Self flex MWM   10 x 10" 10 x 10" 10"x10 10"x10       Post capsule stretch                          Ther Activity Gait Training                                       Modalities

## 2022-06-23 ENCOUNTER — OFFICE VISIT (OUTPATIENT)
Dept: PHYSICAL THERAPY | Facility: OTHER | Age: 57
End: 2022-06-23
Payer: COMMERCIAL

## 2022-06-23 DIAGNOSIS — M75.51 SUBACROMIAL BURSITIS OF RIGHT SHOULDER JOINT: Primary | ICD-10-CM

## 2022-06-23 PROCEDURE — 97110 THERAPEUTIC EXERCISES: CPT | Performed by: PHYSICAL THERAPIST

## 2022-06-23 PROCEDURE — 97112 NEUROMUSCULAR REEDUCATION: CPT | Performed by: PHYSICAL THERAPIST

## 2022-06-23 NOTE — PROGRESS NOTES
Daily Note     Today's date: 2022  Patient name: Brisa Verde  : 1965  MRN: [id number]  Referring provider: Dipesh Vigil*  Dx:   Encounter Diagnosis     ICD-10-CM    1  Subacromial bursitis of right shoulder joint  M75 51        Start Time: 1700  Stop Time: 1756  Total time in clinic (min): 56 minutes    Patient canceled or no showed all remaining appointments  Patient is discharged from formal PT at this time  Subjective: Pt reports continued pain in his shoulder with overhead motion  States he is scheduled for an MRI in the beginning of July  Objective: See treatment diary below      Assessment: Tolerated treatment well  Progressed scap strengthening as charted below  Patient with improvement in overhead reach following scap activation and self-MWM  Patient would benefit from continued PT      Plan: Continue per plan of care  Precautions: A fib, COPD, HTN    Manuals      [facility] post mobs   EB NV  [facility]     GH inf mobs   EB NV  [facility]     PROM   CF EB  EW [facility]     STM  CF           Neuro Re-Ed             scap squeeze 3 x 10, 5" hold 3 x 10, 5" hold  3 x 10, 5" hold 3 x 10, 5" hold 3 x 10, 5" hold       S/l ER  2 x 10, 2# 2# 2  x10  3# 30x 3# x15 3# x15 3# x15 3x10 3# 2x10 3#     TB LPD/ Row  GTB 20x  BTB 30 ea BTB 3x10 ea BTB 3x10 ea BTB 3x10 ea BTB 3x10 ea BTB 3x10 ea anahi 25#    Ball on wall             scap punch  2# 20x  30 x 3# 30 x 3# 30x  3# 30x  4# 3x10x4" 30x  4#     scap ABC   1 x 3# 1 x 3# 1x 3# 1x 4# 1x 4# nv     Bent over row   10# 3 x 10  10# 3 x 10  10# 3 x 10  10# 3 x 10  3x10, 10# 30x  4# 30 x 10#    YTI         3 x 10 PBALL    Corkscrew press   10 x 5# 1x5   5# P!     Unable due to pain    Wall slide         10 x 10" plum     scap clock         5 x b/l plum    ER resist scap punch and s' flex         OTB 10 x     Ther Ex             ube  3 min retro  2/2 2/2 2/2 2/2 2'/2' 2'/2' 2/2    pec stretch 4 x 30" 4  x30"  4 x 30" missed 4x30" 4x30" 4x30" 4x30" 4 x 30"    Upper trap stretch  4 x 30" 4  X30"            [place]  10x  10x 10x 15x 20x 20x 20x     Standing sleeper stretch   10 x 10" 10 x 10" 10"x10 10"x10        Self flex MWM   10 x 10" 10 x 10" 10"x10 10"x10   10 x 10"    Post capsule stretch                          Ther Activity                                       Gait Training                                       Modalities

## 2022-06-28 ENCOUNTER — APPOINTMENT (OUTPATIENT)
Dept: PHYSICAL THERAPY | Facility: OTHER | Age: 57
End: 2022-06-28
Payer: COMMERCIAL

## 2022-06-30 ENCOUNTER — APPOINTMENT (OUTPATIENT)
Dept: PHYSICAL THERAPY | Facility: OTHER | Age: 57
End: 2022-06-30
Payer: COMMERCIAL

## 2022-07-01 ENCOUNTER — REMOTE DEVICE CLINIC VISIT (OUTPATIENT)
Dept: CARDIOLOGY CLINIC | Facility: CLINIC | Age: 57
End: 2022-07-01
Payer: COMMERCIAL

## 2022-07-01 DIAGNOSIS — Z95.818 PRESENCE OF OTHER CARDIAC IMPLANTS AND GRAFTS: Primary | ICD-10-CM

## 2022-07-01 PROCEDURE — G2066 INTER DEVC REMOTE 30D: HCPCS | Performed by: INTERNAL MEDICINE

## 2022-07-01 PROCEDURE — 93298 REM INTERROG DEV EVAL SCRMS: CPT | Performed by: INTERNAL MEDICINE

## 2022-07-01 NOTE — PROGRESS NOTES
MDT LNQ22/ ACTIVE SYSTEM IS MRI CONDITIONAL   CARELINK TRANSMISSION: LOOP RECORDER  PRESENTING RHYTHM NSR @ 72 BPM  BATTERY STATUS "OK " 1 TACHY AND 2 DEVICE CLASSIFIED AF EPISODES, LONGEST EPISODE IS 3:44 HOURS , AVAILABLE STRIPS DEMONSTRATE  ATRIAL FIBRILLATION W/ RVR @ 133-200 BPM [ NO ALERTS RECEIVED]  AF BURDEN IS 0 3%  HX OF AFL W/ RVR   PT TAKES ELIQUIS AND METOPROLOL SUCC  NO PATIENT ACTIVATED EPISODES  NORMAL DEVICE FUNCTION   DL

## 2022-07-08 ENCOUNTER — HOSPITAL ENCOUNTER (OUTPATIENT)
Dept: RADIOLOGY | Facility: HOSPITAL | Age: 57
Discharge: HOME | End: 2022-07-08
Attending: ORTHOPAEDIC SURGERY
Payer: COMMERCIAL

## 2022-07-08 DIAGNOSIS — M75.21 BICEPS TENDINITIS OF RIGHT SHOULDER: ICD-10-CM

## 2022-07-08 DIAGNOSIS — R29.898 SHOULDER WEAKNESS: ICD-10-CM

## 2022-07-08 DIAGNOSIS — M67.911 TENDINOPATHY OF ROTATOR CUFF, RIGHT: ICD-10-CM

## 2022-07-08 PROCEDURE — 73221 MRI JOINT UPR EXTREM W/O DYE: CPT

## 2022-07-08 PROCEDURE — G1004 CDSM NDSC: HCPCS

## 2022-07-12 DIAGNOSIS — I10 ESSENTIAL HYPERTENSION: ICD-10-CM

## 2022-07-12 DIAGNOSIS — E11.65 TYPE 2 DIABETES MELLITUS WITH HYPERGLYCEMIA, WITHOUT LONG-TERM CURRENT USE OF INSULIN (HCC): ICD-10-CM

## 2022-07-12 RX ORDER — LOSARTAN POTASSIUM 50 MG/1
TABLET ORAL
Qty: 30 TABLET | Refills: 5 | Status: SHIPPED | OUTPATIENT
Start: 2022-07-12 | End: 2022-08-08

## 2022-07-13 ENCOUNTER — TELEPHONE (OUTPATIENT)
Dept: OBGYN CLINIC | Facility: HOSPITAL | Age: 57
End: 2022-07-13

## 2022-07-13 NOTE — TELEPHONE ENCOUNTER
Patient sees Dr Elisha Roberts      Patient is calling to ask if someone can call him with his results   He currently doesn't have a follow up but would like to know what the results are and his next steps        : [phone]

## 2022-07-13 NOTE — TELEPHONE ENCOUNTER
I spoke to patient and advised that I sent him a link to mychart  He should follow this and set up mychart  He will be able to see results in meantime while we wait till Dr Abdirahman Mccullough is back in office  Patient verbalized understanding

## 2022-07-21 DIAGNOSIS — I48.91 ATRIAL FIBRILLATION WITH RAPID VENTRICULAR RESPONSE (HCC): ICD-10-CM

## 2022-07-21 NOTE — TELEPHONE ENCOUNTER
Patient ([phone]) called to schedule appointment for medication management  Scheduled for August 2nd, which was the soonest available  Patient reports that medication metoprolol tartrate will run out prior to appointment  Asks if possible to receive a short order refill to hold over until visit

## 2022-07-22 RX ORDER — METOPROLOL TARTRATE 50 MG/1
50 TABLET, FILM COATED ORAL EVERY 12 HOURS SCHEDULED
Qty: 60 TABLET | Refills: 0 | Status: SHIPPED | OUTPATIENT
Start: 2022-07-22 | End: 2022-08-02 | Stop reason: SDUPTHER

## 2022-07-26 ENCOUNTER — RA CDI HCC (OUTPATIENT)
Dept: OTHER | Facility: HOSPITAL | Age: 57
End: 2022-07-26

## 2022-07-26 NOTE — PROGRESS NOTES
[facility]  coding opportunities       Chart reviewed, no opportunity found: CHART REVIEWED, NO OPPORTUNITY FOUND        Patients Insurance        Commercial Insurance: [employer]

## 2022-07-28 NOTE — TELEPHONE ENCOUNTER
[address] [facility] [address]  Phone: [phone]  Fax: [phone]    Discharge Summary  2-15    Patient name: Dayday Lopez  : 1989  Provider#: [id number]  Referral source: Red Alex MD      Medical/Treatment Diagnosis: Low back pain [M54.5]     Prior Hospitalization: see medical history     Comorbidities: See Plan of Care  Prior Level of Function:See Plan of Care  Medications: Verified on Patient Summary List    Start of Care: 19      Onset Date:   Visits from Start of Care: 4     Missed Visits: 3 cancellations  Reporting Period : 19 to 19      ASSESSMENT/SUMMARY OF CARE: Patient was seen for 4 PT visits over the course of 8 weeks with a lack of consistent scheduling due to work conflicts. She did not experience a significant change in symptoms and her insurance authorization has now . Short Term Goals: To be accomplished in 8 treatments:  1. Patient will be able to perform all bed mobility tasks, including sit->supine or rolling in either direction, with no pain or limitation. Not met. 2. Patient will be able to stand for 20 minutes with no pain or limitation. Not met. 3. Patient will be able to bend forward and tie her shoes with no pain or limitation. Not met. Long Term Goals: To be accomplished in 16 treatments:  1. Patient will be able to  40# from the floor with no pain or limitation. Not met. 2. Patient will be able to walk two blocks with no pain or limitation. Not met. 3. Patient will work an entire weeks worth of shifts with no pain or limitation. Not met. RECOMMENDATIONS:  [x]Discontinue therapy: []Patient has reached or is progressing toward set goals      []Patient is non-compliant or has abdicated      [x]Due to lack of appreciable progress towards set goals      []Other    Boykin Bence, PT , DPT, OCS, Cert.  DN   2019 Please advise if you can help with patient's MRI results  He has been waiting for 2 weeks    Thanks

## 2022-07-28 NOTE — TELEPHONE ENCOUNTER
Spoke to the patient  Will schedule office F/U appointment  No immediate surgical indication on shoulder MRI

## 2022-08-01 ENCOUNTER — OFFICE VISIT (OUTPATIENT)
Dept: CARDIOLOGY CLINIC | Facility: CLINIC | Age: 57
End: 2022-08-01
Payer: COMMERCIAL

## 2022-08-01 VITALS
WEIGHT: 249.7 LBS | SYSTOLIC BLOOD PRESSURE: 100 MMHG | BODY MASS INDEX: 34.83 KG/M2 | HEART RATE: 83 BPM | DIASTOLIC BLOOD PRESSURE: 64 MMHG

## 2022-08-01 DIAGNOSIS — I10 HYPERTENSION, UNSPECIFIED TYPE: ICD-10-CM

## 2022-08-01 DIAGNOSIS — E78.00 PURE HYPERCHOLESTEROLEMIA: ICD-10-CM

## 2022-08-01 DIAGNOSIS — I48.92 ATRIAL FLUTTER WITH RAPID VENTRICULAR RESPONSE (HCC): ICD-10-CM

## 2022-08-01 DIAGNOSIS — I48.91 ATRIAL FIBRILLATION WITH RAPID VENTRICULAR RESPONSE (HCC): ICD-10-CM

## 2022-08-01 DIAGNOSIS — Z79.01 ANTICOAGULATION ADEQUATE: ICD-10-CM

## 2022-08-01 DIAGNOSIS — J43.8 OTHER EMPHYSEMA (HCC): ICD-10-CM

## 2022-08-01 DIAGNOSIS — I48.92 ATRIAL FLUTTER, UNSPECIFIED TYPE (HCC): Primary | ICD-10-CM

## 2022-08-01 DIAGNOSIS — Z78.9 ALCOHOL USE: ICD-10-CM

## 2022-08-01 DIAGNOSIS — I10 ESSENTIAL HYPERTENSION: ICD-10-CM

## 2022-08-01 PROCEDURE — 93000 ELECTROCARDIOGRAM COMPLETE: CPT | Performed by: INTERNAL MEDICINE

## 2022-08-01 PROCEDURE — 99214 OFFICE O/P EST MOD 30 MIN: CPT | Performed by: INTERNAL MEDICINE

## 2022-08-01 NOTE — PROGRESS NOTES
EPS follow-up - Darya Westbrook 61 y o  male MRN: [id number]           ASSESSMENT:  1  Atrial flutter, unspecified type ([facility] [address] )  POCT ECG   2  Hypertension, unspecified type     3  Atrial fibrillation with rapid ventricular response [facility])  Ambulatory referral to Cardiology   4  Atrial flutter with rapid ventricular response (HCC)  apixaban (Eliquis) 5 mg   5  Essential hypertension     6  Other emphysema ([facility] [address] )     7  Pure hypercholesterolemia     8  Anticoagulation adequate     9  Alcohol use             PLAN:    1  Artrial Flutter status post ablation  No recurrent atrial flutter    2  Essential hypertension  adequately controlled    3  Anticoagulation recommended that he stay on given elevated chads two Vasc score and atrial fibrillation documented on his loop recorder    4  Recent atrial fibrillation converted with diltiazem drip  I interrogated his loop recorder today he had one single episode of atrial fibrillation paroxysmal in May  I explained to him that for recurrent episodes of atrial fibrillation given that he is on Eliquis as long as he is not having significant chest pain or shortness of breath lightheadedness or dizziness he can take an extra 50 mg of metoprolol and give it 4-6 hours to break   For recurrent atrial fibrillation would recommend flecainide or ablation    All questions answered follow-up one year    CC/HPI:       Patient presents to the office today he had an episode of atrial fibrillation may of 2022  He woke up went to the bathroom and suddenly felt his heart racing  He was treated in the emergency room and spontaneously converted to normal sinus rhythm  ROS: ROS   He currently denies chest pain shortness of breath lightheadedness dizziness or palpitations  All other 12 point review of systems negative he did mention he is drinking more alcohol than usual and I counseled him to cut down      Objective:     Vitals: Blood pressure 100/64, pulse 83, weight 113 kg (249 lb 11 2 oz)  , Body mass index is 34 83 kg/m²  ,        Physical Exam:    GEN: Мария Lara appears well, alert and oriented x 3, pleasant and cooperative he is obese  HEENT: pupils equal, round, and reactive to light; extraocular muscles intact  NECK: supple, no carotid bruits   HEART: regular rhythm, normal S1 and S2, no murmurs, clicks, gallops or rubs   LUNGS: clear to auscultation bilaterally; no wheezes, rales, or rhonchi   ABDOMEN: normal bowel sounds, soft, no tenderness, no distention  EXTREMITIES: peripheral pulses normal; no clubbing, cyanosis, or edema  NEURO: no focal findings   SKIN: normal without suspicious lesions on exposed skin    Medications:      Current Outpatient Medications:     albuterol (Ventolin HFA) 90 mcg/act inhaler, Inhale 2 puffs every 4 (four) hours as needed for wheezing, Disp: 18 g, Rfl: 0    apixaban (Eliquis) 5 mg, Take 1 tablet (5 mg total) by mouth 2 (two) times a day, Disp: 180 tablet, Rfl: 3    folic acid (FOLVITE) 1 mg tablet, Take 1 tablet (1 mg total) by mouth in the morning , Disp: , Rfl: 0    losartan (COZAAR) 50 mg tablet, TAKE 1 TABLET BY MOUTH EVERY DAY, Disp: 30 tablet, Rfl: 5    metFORMIN (GLUCOPHAGE) 1000 MG tablet, TAKE 1 TABLET BY MOUTH TWICE A DAY WITH MEALS, Disp: 180 tablet, Rfl: 1    metoprolol tartrate (LOPRESSOR) 50 mg tablet, Take 1 tablet (50 mg total) by mouth every 12 (twelve) hours, Disp: 60 tablet, Rfl: 0    multivitamin (THERAGRAN) TABS, Take 1 tablet by mouth daily, Disp: , Rfl:     thiamine 100 MG tablet, Take 1 tablet (100 mg total) by mouth in the morning , Disp: , Rfl: 0    acetaminophen (TYLENOL) 325 mg tablet, Take 2 tablets (650 mg total) by mouth every 6 (six) hours as needed for mild pain, Disp: 30 tablet, Rfl: 0    methocarbamol (ROBAXIN) 750 mg tablet, Take 1 tablet (750 mg total) by mouth every 6 (six) hours as needed for muscle spasms, Disp: 15 tablet, Rfl: 0    nicotine (NICODERM CQ) 14 mg/24hr TD 24 hr patch, Place 1 patch on the skin daily, Disp: 28 patch, Rfl: 0    tiotropium (Spiriva HandiHaler) 18 mcg inhalation capsule, Place 1 capsule (18 mcg total) into inhaler and inhale in the morning , Disp: 30 capsule, Rfl: 0     Family History   Problem Relation Age of Onset    Cancer Mother         Lung Cancer    Cancer Father     Diabetes Father     Diabetes Sister      Social History     Socioeconomic History    Marital status: /Civil Union     Spouse name: Not on file    Number of children: Not on file    Years of education: Not on file    Highest education level: Not on file   Occupational History    Not on file   Tobacco Use    Smoking status: Current Every Day Smoker     Packs/day: 1 00     Last attempt to quit: 2021     Years since quittin 0    Smokeless tobacco: Never Used   Vaping Use    Vaping Use: Never used   Substance and Sexual Activity    Alcohol use:  Yes     Alcohol/week: 2 0 - 3 0 standard drinks     Types: 2 - 3 Cans of beer per week     Comment: down to 1-2 can of beer daily    Drug use: No    Sexual activity: Not on file   Other Topics Concern    Not on file   Social History Narrative    Not on file     Social Determinants of Health     Financial Resource Strain: Not on file   Food Insecurity: Not on file   Transportation Needs: Not on file   Physical Activity: Not on file   Stress: Not on file   Social Connections: Not on file   Intimate Partner Violence: Not on file   Housing Stability: Not on file     Social History     Tobacco Use   Smoking Status Current Every Day Smoker    Packs/day: 1 00    Last attempt to quit: 2021    Years since quittin 0   Smokeless Tobacco Never Used     Social History     Substance and Sexual Activity   Alcohol Use Yes    Alcohol/week: 2 0 - 3 0 standard drinks    Types: 2 - 3 Cans of beer per week    Comment: down to 1-2 can of beer daily       Labs & Results:  Below is the patient's most recent value for Albumin, ALT, AST, BUN, Calcium, Chloride, Cholesterol, CO2, Creatinine, GFR, Glucose, HDL, Hematocrit, Hemoglobin, Hemoglobin A1C, LDL, Magnesium, Phosphorus, Platelets, Potassium, PSA, Sodium, Triglycerides, and WBC  Lab Results   Component Value Date    ALT 49 2021    AST 22 2021    BUN 21 2022    CALCIUM 10 0 2022     2022    CHOL 296 (H) 2016    CO2 24 2022    CREATININE 1 13 2022    HDL 46 2022    HCT 48 9 2022    HGB 16 5 2022    HGBA1C 7 5 (A) 2021    MG 1 8 2022     2022    K 3 9 2022     2016    TRIG 497 (H) 2022    WBC 15 35 (H) 2022     Note: for a comprehensive list of the patient's lab results, access the Results Review activity  Cardiac testing:   Results for orders placed during the hospital encounter of 21    Echo complete with contrast if indicated    Narrative  [facility] [address]  [facility], [address] [facility]  [phone]    Transthoracic Echocardiogram  2D, M-mode, Doppler, and Color Doppler    Study date:  2021    Patient: Travis Butt  MR number: [id number]  Account number: [de-identified]  : 1965  Age: 61 years  Gender: Male  Status: Inpatient  Location: Bedside  Height: 71 in  Weight: 244 4 lb  BP: 110/ 76 mmHg    Indications: Aflutter    Diagnoses: I48 1 - Atrial flutter    Sonographer:  MARCIAL Han  Primary Physician:  Yuliya Avina MD  Referring Physician:  Deepika Howard DO  Group:  [facility]  Cardiology Fellow:  Kiesha Holguin MD  Interpreting Physician:  Selma Henriquez MD    SUMMARY    PROCEDURE INFORMATION:  This was a technically difficult study  LEFT VENTRICLE:  Systolic function was normal  Ejection fraction was estimated to be 60 %  There were no regional wall motion abnormalities  There was no evidence of concentric hypertrophy      MITRAL VALVE:  There was mild annular calcification  There was trace regurgitation  TRICUSPID VALVE:  There was mild regurgitation  PULMONIC VALVE:  There was trace regurgitation  HISTORY: PRIOR HISTORY: COPD, obesity, HTN, aflutter, tobacco use, HLD, anxiety    PROCEDURE: The procedure was performed at the bedside  This was a routine study  The transthoracic approach was used  The study included complete 2D imaging, M-mode, complete spectral Doppler, and color Doppler  The heart rate was 90 bpm,  at the start of the study  Images were obtained from the parasternal, apical, subcostal, and suprasternal notch acoustic windows  Echocardiographic views were limited due to decreased penetration and lung interference  This was a  technically difficult study  LEFT VENTRICLE: Size was normal  Systolic function was normal  Ejection fraction was estimated to be 60 %  There were no regional wall motion abnormalities  Wall thickness was normal  There was no evidence of concentric hypertrophy  DOPPLER: Left ventricular diastolic function parameters were normal for the patient's age  RIGHT VENTRICLE: The size was normal  Systolic function was normal  Wall thickness was normal     LEFT ATRIUM: Size was normal     RIGHT ATRIUM: Size was normal     MITRAL VALVE: There was mild annular calcification  Valve structure was normal  There was normal leaflet separation  DOPPLER: The transmitral velocity was within the normal range  There was no evidence for stenosis  There was trace  regurgitation  AORTIC VALVE: The valve was trileaflet  Leaflets exhibited normal thickness and normal cuspal separation  DOPPLER: Transaortic velocity was within the normal range  There was no evidence for stenosis  There was no significant  regurgitation  TRICUSPID VALVE: The valve structure was normal  There was normal leaflet separation  DOPPLER: The transtricuspid velocity was within the normal range  There was no evidence for stenosis  There was mild regurgitation  Estimated peak PA  pressure was 30 mmHg  PULMONIC VALVE: Not well visualized  DOPPLER: The transpulmonic velocity was within the normal range  There was trace regurgitation  PERICARDIUM: There was no pericardial effusion  The pericardium was normal in appearance  AORTA: The root exhibited upper limit of normal size  The ascending aorta was at the upper limit of the normal     SYSTEMIC VEINS: IVC: The inferior vena cava was not well visualized  SYSTEM MEASUREMENT TABLES    2D  %FS: 30 91 %  Ao Diam: 3 55 cm  Ao asc: 3 7 cm  EDV(Teich): 139 05 ml  EF(Teich): 58 06 %  ESV(Teich): 58 31 ml  IVSd: 0 98 cm  LA Area: 17 91 cm2  LA Diam: 3 77 cm  LVEDV MOD A4C: 104 49 ml  LVEF MOD A4C: 66 33 %  LVESV MOD A4C: 35 18 ml  LVIDd: 5 36 cm  LVIDs: 3 7 cm  LVLd A4C: 8 34 cm  LVLs A4C: 7 08 cm  LVPWd: 0 94 cm  RA Area: 15 47 cm2  RVIDd: 3 93 cm  SV MOD A4C: 69 3 ml  SV(Teich): 80 74 ml    CW  AV Vmax: 1 12 m/s  AV maxP mmHg  PV Vmax: 0 81 m/s  PV maxP 61 mmHg  TR Vmax: 2 09 m/s  TR maxP 41 mmHg    MM  TAPSE: 2 38 cm    PW  E' Sept: 0 09 m/s  E/E' Sept: 8 27  LVOT Vmax: 1 04 m/s  LVOT maxP 31 mmHg  MV A Eriberto: 0 56 m/s  MV Dec Hunt: 3 56 m/s2  MV DecT: 220 19 ms  MV E Eriberto: 0 76 m/s  MV E/A Ratio: 1 38  MV PHT: 63 85 ms  MVA By PHT: 3 45 cm2  RVOT Vmax: 0 7 m/s  RVOT maxP 98 mmHg    [employer] Accredited Echocardiography Laboratory    Prepared and electronically signed by    Daria Thorpe MD  Signed 2021 16:02:25    No results found for this or any previous visit  No results found for this or any previous visit  No results found for this or any previous visit

## 2022-08-02 ENCOUNTER — OFFICE VISIT (OUTPATIENT)
Dept: FAMILY MEDICINE CLINIC | Facility: CLINIC | Age: 57
End: 2022-08-02
Payer: COMMERCIAL

## 2022-08-02 VITALS
RESPIRATION RATE: 20 BRPM | HEIGHT: 71 IN | WEIGHT: 245.2 LBS | OXYGEN SATURATION: 96 % | SYSTOLIC BLOOD PRESSURE: 112 MMHG | BODY MASS INDEX: 34.33 KG/M2 | HEART RATE: 88 BPM | DIASTOLIC BLOOD PRESSURE: 76 MMHG | TEMPERATURE: 98.4 F

## 2022-08-02 DIAGNOSIS — E11.65 TYPE 2 DIABETES MELLITUS WITH HYPERGLYCEMIA, WITHOUT LONG-TERM CURRENT USE OF INSULIN (HCC): Primary | ICD-10-CM

## 2022-08-02 DIAGNOSIS — Z23 ENCOUNTER FOR IMMUNIZATION: ICD-10-CM

## 2022-08-02 DIAGNOSIS — Z78.9 ALCOHOL USE: ICD-10-CM

## 2022-08-02 DIAGNOSIS — J43.8 OTHER EMPHYSEMA (HCC): ICD-10-CM

## 2022-08-02 DIAGNOSIS — E78.2 MIXED HYPERLIPIDEMIA: ICD-10-CM

## 2022-08-02 DIAGNOSIS — I48.91 ATRIAL FIBRILLATION WITH RAPID VENTRICULAR RESPONSE (HCC): ICD-10-CM

## 2022-08-02 DIAGNOSIS — Z12.5 SCREENING FOR PROSTATE CANCER: ICD-10-CM

## 2022-08-02 DIAGNOSIS — I10 ESSENTIAL HYPERTENSION: ICD-10-CM

## 2022-08-02 DIAGNOSIS — Z72.0 TOBACCO USE: ICD-10-CM

## 2022-08-02 DIAGNOSIS — I48.92 ATRIAL FLUTTER, UNSPECIFIED TYPE (HCC): ICD-10-CM

## 2022-08-02 PROBLEM — J20.9 ACUTE BRONCHITIS: Status: RESOLVED | Noted: 2021-06-27 | Resolved: 2022-08-02

## 2022-08-02 LAB — SL AMB POCT HEMOGLOBIN AIC: 6.7 (ref ?–6.5)

## 2022-08-02 PROCEDURE — 3078F DIAST BP <80 MM HG: CPT | Performed by: FAMILY MEDICINE

## 2022-08-02 PROCEDURE — 90471 IMMUNIZATION ADMIN: CPT

## 2022-08-02 PROCEDURE — 99214 OFFICE O/P EST MOD 30 MIN: CPT | Performed by: FAMILY MEDICINE

## 2022-08-02 PROCEDURE — 90677 PCV20 VACCINE IM: CPT

## 2022-08-02 PROCEDURE — 83036 HEMOGLOBIN GLYCOSYLATED A1C: CPT | Performed by: FAMILY MEDICINE

## 2022-08-02 PROCEDURE — 3074F SYST BP LT 130 MM HG: CPT | Performed by: FAMILY MEDICINE

## 2022-08-02 PROCEDURE — 3044F HG A1C LEVEL LT 7.0%: CPT | Performed by: FAMILY MEDICINE

## 2022-08-02 PROCEDURE — 3725F SCREEN DEPRESSION PERFORMED: CPT | Performed by: FAMILY MEDICINE

## 2022-08-02 RX ORDER — METOPROLOL TARTRATE 50 MG/1
50 TABLET, FILM COATED ORAL EVERY 12 HOURS SCHEDULED
Qty: 60 TABLET | Refills: 5 | Status: SHIPPED | OUTPATIENT
Start: 2022-08-02 | End: 2022-09-12

## 2022-08-02 NOTE — PROGRESS NOTES
Chief Complaint   Patient presents with    Medication Management     Health Maintenance   Topic Date Due    COVID-19 Vaccine (1) Never done    DM Eye Exam  Never done    HIV Screening  Never done    Annual Physical  Never done    DTaP,Tdap,and Td Vaccines (1 - Tdap) 03/13/2014    Lung Cancer Screening  Never done    PT PLAN OF CARE  06/23/2022    BMI: Followup Plan  07/06/2022    Influenza Vaccine (1) 09/01/2022    HEMOGLOBIN A1C  02/02/2023    BMI: Adult  08/02/2023    Diabetic Foot Exam  08/02/2023    Colorectal Cancer Screening  03/26/2026    Hepatitis C Screening  Completed    Pneumococcal Vaccine: Pediatrics (0 to 5 Years) and At-Risk Patients (6 to 59 Years)  Completed    HIB Vaccine  Aged Out    Hepatitis B Vaccine  Aged Out    IPV Vaccine  Aged Out    Hepatitis A Vaccine  Aged Out    Meningococcal ACWY Vaccine  Aged Out    HPV Vaccine  Aged Out     BMI Counseling: Body mass index is 34 2 kg/m²  The BMI is above normal  Nutrition recommendations include decreasing portion sizes, encouraging healthy choices of fruits and vegetables, decreasing fast food intake, consuming healthier snacks and limiting drinks that contain sugar  Exercise recommendations include moderate physical activity 150 minutes/week  No pharmacotherapy was ordered  Rationale for BMI follow-up plan is due to patient being overweight or obese  Assessment/Plan:    COPD (chronic obstructive pulmonary disease) ([facility] [address] )  Pt states he cannot tolerate spiriva  Use albuterol prn  Essential hypertension  Controlled  DASH diet  Continue losartan 50mg QD and metoprolol 50mg bid  Atrial flutter (HCC)  Continue eliquis 5mg bid per cardiology  Tobacco use  Strongly advised pt to quit  Check CT lung screen  Alcohol use  Advised pt to limit to 3 drinks/day and 7 drinks/week       Type 2 diabetes mellitus with hyperglycemia, without long-term current use of insulin [facility])    Lab Results   Component Value Date HGBA1C 6 7 (A) 08/02/2022     Improved  Low carb diet  Continue metformin 1000mg bid  Advised pt to follow ophthalmology  Hyperlipidemia  5/2022 lipid 205/497/46 elevated TG  Pt refused statin today  Advised pt to follow low fat diet  Recommend flu shot yearly  Got Covid19 shots and booster  Give PCV20 today  OK to check PSA  Pros and cons educated pt  Colonoscopy 3/2021, repeat in 5 years  RTO in 6 months  Diagnoses and all orders for this visit:    Type 2 diabetes mellitus with hyperglycemia, without long-term current use of insulin (HCC)  -     POCT hemoglobin A1c  -     CBC; Future  -     Comprehensive metabolic panel; Future  -     Hemoglobin A1C; Future  -     Lipid panel; Future  -     Microalbumin / creatinine urine ratio; Future  -     CBC  -     Comprehensive metabolic panel  -     Lipid panel    Essential hypertension  -     CBC; Future  -     Comprehensive metabolic panel; Future  -     Hemoglobin A1C; Future  -     Lipid panel; Future  -     Microalbumin / creatinine urine ratio; Future  -     CBC  -     Comprehensive metabolic panel  -     Lipid panel    Atrial fibrillation with rapid ventricular response (HCC)  -     metoprolol tartrate (LOPRESSOR) 50 mg tablet; Take 1 tablet (50 mg total) by mouth every 12 (twelve) hours  -     CBC; Future  -     Comprehensive metabolic panel; Future  -     Hemoglobin A1C; Future  -     Lipid panel; Future  -     Microalbumin / creatinine urine ratio; Future  -     CBC  -     Comprehensive metabolic panel  -     Lipid panel    Atrial flutter, unspecified type (HCC)    Mixed hyperlipidemia  -     CBC; Future  -     Comprehensive metabolic panel; Future  -     Hemoglobin A1C; Future  -     Lipid panel; Future  -     Microalbumin / creatinine urine ratio; Future  -     CBC  -     Comprehensive metabolic panel  -     Lipid panel    Other emphysema (HCC)  -     CBC; Future  -     Comprehensive metabolic panel;  Future  -     Hemoglobin A1C; Future  -     Lipid panel; Future  -     Microalbumin / creatinine urine ratio; Future  -     CBC  -     Comprehensive metabolic panel  -     Lipid panel    Tobacco use  -     CT lung screening program; Future    Alcohol use    Encounter for immunization  -     Pneumococcal Conjugate Vaccine 20-valent (PCV20)    Screening for prostate cancer  -     PSA, Total Screen          Subjective:      Patient ID: Sylvie Young is a 61 y o  male  HPI    Pt is here by himself  Afib/flutter---[facility] cardiology  He is on metoprolol 50mg bid, eliquis 5mg bid  HTN---He is on losartan 50mg QD and metoprolol 50mg bid now  Denies headache, SOB or chest pain    BP is good today       DM----Today HgA1C 6 7 in office  Tried to eat healthy  He is on metformin 1000mg bid  Denies SE  Will see ophthalmology Dr Finesse Aquino  [facility] podiatry Dr Michele Parekh  Mother and brother has DM       Hyperlipidemia---Tried to eat healthy  He is not on statins        COPD---use albuterol as needed  Cannot tolertate spiriva per pt       Smoke 1ppd for 30 years  Would like to check CT lung  Alcohol 4-5 beer at night  No drugs  Denies depression         The following portions of the patient's history were reviewed and updated as appropriate: allergies, current medications, past family history, past medical history, past social history, past surgical history and problem list     Review of Systems   Constitutional: Negative for appetite change, chills and fever  HENT: Negative for congestion, ear pain, sinus pain and sore throat  Eyes: Negative for discharge and itching  Respiratory: Negative for apnea, cough, chest tightness, shortness of breath and wheezing  Cardiovascular: Negative for chest pain, palpitations and leg swelling  Gastrointestinal: Negative for abdominal pain, anal bleeding, constipation, diarrhea, nausea and vomiting  Endocrine: Negative for cold intolerance, heat intolerance and polyuria     Genitourinary: Negative for difficulty urinating and dysuria  Musculoskeletal: Negative for arthralgias, back pain and myalgias  Skin: Negative for rash  Neurological: Negative for dizziness and headaches  Psychiatric/Behavioral: Negative for agitation  Objective:      /76 (BP Location: Left arm, Patient Position: Sitting, Cuff Size: Large)   Pulse 88   Temp 98 4 °F (36 9 °C) (Tympanic)   Resp 20   Ht 5' 11" (1 803 m)   Wt 111 kg (245 lb 3 2 oz)   SpO2 96%   BMI 34 20 kg/m²          Physical Exam  Constitutional:       Appearance: He is well-developed  HENT:      Head: Normocephalic and atraumatic  Eyes:      General:         Right eye: No discharge  Left eye: No discharge  Conjunctiva/sclera: Conjunctivae normal    Cardiovascular:      Rate and Rhythm: Normal rate and regular rhythm  Pulses: no weak pulses          Dorsalis pedis pulses are 2+ on the right side and 2+ on the left side  Heart sounds: Normal heart sounds  No murmur heard  No friction rub  No gallop  Pulmonary:      Effort: Pulmonary effort is normal  No respiratory distress  Breath sounds: Normal breath sounds  No wheezing or rales  Abdominal:      General: Bowel sounds are normal  There is no distension  Palpations: Abdomen is soft  Tenderness: There is no abdominal tenderness  There is no guarding  Musculoskeletal:         General: Normal range of motion  Cervical back: Normal range of motion and neck supple  No tenderness  Right lower leg: No edema  Left lower leg: No edema  Feet:      Right foot:      Skin integrity: No ulcer, skin breakdown, erythema, warmth, callus or dry skin  Left foot:      Skin integrity: No ulcer, skin breakdown, erythema, warmth, callus or dry skin  Lymphadenopathy:      Cervical: No cervical adenopathy  Neurological:      Mental Status: He is alert  Patient's shoes and socks removed      Right Foot/Ankle   Right Foot Inspection  Skin Exam: skin normal and skin intact  No dry skin, no warmth, no callus, no erythema, no maceration, no abnormal color, no pre-ulcer, no ulcer and no callus  Sensory   Monofilament testing: intact    Vascular  The right DP pulse is 2+  Left Foot/Ankle  Left Foot Inspection  Skin Exam: skin normal and skin intact  No dry skin, no warmth, no erythema, no maceration, normal color, no pre-ulcer, no ulcer and no callus  Sensory   Monofilament testing: intact    Vascular  The left DP pulse is 2+       Assign Risk Category  No deformity present  No loss of protective sensation  No weak pulses  Risk: 0

## 2022-08-02 NOTE — ASSESSMENT & PLAN NOTE
Lab Results   Component Value Date    HGBA1C 6 7 (A) 08/02/2022     Improved  Low carb diet  Continue metformin 1000mg bid  Advised pt to follow ophthalmology

## 2022-08-08 DIAGNOSIS — I10 ESSENTIAL HYPERTENSION: ICD-10-CM

## 2022-08-08 PROCEDURE — 4010F ACE/ARB THERAPY RXD/TAKEN: CPT | Performed by: FAMILY MEDICINE

## 2022-08-08 RX ORDER — LOSARTAN POTASSIUM 50 MG/1
TABLET ORAL
Qty: 90 TABLET | Refills: 2 | Status: SHIPPED | OUTPATIENT
Start: 2022-08-08

## 2022-09-02 ENCOUNTER — TELEPHONE (OUTPATIENT)
Dept: PULMONOLOGY | Facility: CLINIC | Age: 57
End: 2022-09-02

## 2022-09-02 NOTE — TELEPHONE ENCOUNTER
LM for Pt to call back to schedule hospital follow up with Dr Kylie Carlos at the [facility]

## 2022-09-12 DIAGNOSIS — I48.91 ATRIAL FIBRILLATION WITH RAPID VENTRICULAR RESPONSE (HCC): ICD-10-CM

## 2022-09-12 RX ORDER — METOPROLOL TARTRATE 50 MG/1
TABLET, FILM COATED ORAL
Qty: 180 TABLET | Refills: 2 | Status: SHIPPED | OUTPATIENT
Start: 2022-09-12

## 2022-09-27 ENCOUNTER — REMOTE DEVICE CLINIC VISIT (OUTPATIENT)
Dept: CARDIOLOGY CLINIC | Facility: CLINIC | Age: 57
End: 2022-09-27
Payer: COMMERCIAL

## 2022-09-27 DIAGNOSIS — Z95.818 PRESENCE OF OTHER CARDIAC IMPLANTS AND GRAFTS: Primary | ICD-10-CM

## 2022-09-27 PROCEDURE — 93298 REM INTERROG DEV EVAL SCRMS: CPT | Performed by: INTERNAL MEDICINE

## 2022-09-27 PROCEDURE — G2066 INTER DEVC REMOTE 30D: HCPCS | Performed by: INTERNAL MEDICINE

## 2022-09-27 NOTE — PROGRESS NOTES
MDT LNQ22/ ACTIVE SYSTEM IS MRI CONDITIONAL   CARELINK TRANSMISSION: LOOP RECORDER  PRESENTING RHYTHM NSR @ 90 BPM   BATTERY STATUS "OK " NO PATIENT OR DEVICE ACTIVATED EPISODES  NORMAL DEVICE FUNCTION   DL

## 2022-10-12 PROBLEM — Z12.5 SCREENING FOR PROSTATE CANCER: Status: RESOLVED | Noted: 2020-12-07 | Resolved: 2022-10-12

## 2022-10-12 PROBLEM — Z12.11 SCREEN FOR COLON CANCER: Status: RESOLVED | Noted: 2020-12-07 | Resolved: 2022-10-12

## 2022-10-20 DIAGNOSIS — I48.92 ATRIAL FLUTTER WITH RAPID VENTRICULAR RESPONSE (HCC): ICD-10-CM

## 2022-10-20 RX ORDER — APIXABAN 5 MG/1
TABLET, FILM COATED ORAL
Qty: 60 TABLET | Refills: 11 | Status: SHIPPED | OUTPATIENT
Start: 2022-10-20

## 2022-12-30 ENCOUNTER — REMOTE DEVICE CLINIC VISIT (OUTPATIENT)
Dept: CARDIOLOGY CLINIC | Facility: CLINIC | Age: 57
End: 2022-12-30

## 2022-12-30 DIAGNOSIS — Z95.818 PRESENCE OF OTHER CARDIAC IMPLANTS AND GRAFTS: Primary | ICD-10-CM

## 2023-01-07 ENCOUNTER — OFFICE VISIT (OUTPATIENT)
Dept: URGENT CARE | Age: 58
End: 2023-01-07

## 2023-01-07 VITALS — TEMPERATURE: 97.9 F | HEART RATE: 95 BPM | RESPIRATION RATE: 16 BRPM | OXYGEN SATURATION: 93 %

## 2023-01-07 DIAGNOSIS — J20.8 ACUTE BACTERIAL BRONCHITIS: Primary | ICD-10-CM

## 2023-01-07 DIAGNOSIS — B96.89 ACUTE BACTERIAL BRONCHITIS: Primary | ICD-10-CM

## 2023-01-07 RX ORDER — PREDNISONE 10 MG/1
TABLET ORAL
Qty: 21 TABLET | Refills: 0 | Status: SHIPPED | OUTPATIENT
Start: 2023-01-07

## 2023-01-07 RX ORDER — ALBUTEROL SULFATE 90 UG/1
2 AEROSOL, METERED RESPIRATORY (INHALATION) EVERY 6 HOURS PRN
Qty: 6.7 G | Refills: 0 | Status: SHIPPED | OUTPATIENT
Start: 2023-01-07

## 2023-01-07 RX ORDER — AZITHROMYCIN 250 MG/1
TABLET, FILM COATED ORAL
Qty: 6 TABLET | Refills: 0 | Status: SHIPPED | OUTPATIENT
Start: 2023-01-07 | End: 2023-01-11

## 2023-01-07 NOTE — PROGRESS NOTES
[facility]        NAME: Beatris Mendez is a 61 y o  male  : 1965    MRN: [id number]  DATE: 2023  TIME: 9:37 AM    Assessment and Plan   Acute bacterial bronchitis [J20 8, B96 89]  1  Acute bacterial bronchitis  predniSONE 10 mg tablet    albuterol (PROVENTIL HFA,VENTOLIN HFA) 90 mcg/act inhaler    azithromycin (ZITHROMAX) 250 mg tablet            Patient Instructions       Follow up with PCP in 3-5 days  Proceed to  ER if symptoms worsen  Chief Complaint     Chief Complaint   Patient presents with   • Cough     Productive cough, chest congestion, for 1 month, no fever or body ache or chills         History of Present Illness       Here for evaluation of cough, chest congestion for the past month  Patient states he does have a history of COPD and chronic bronchitis with acute flareups  Patient feels like his symptoms have gotten little worse  He denies any difficulty breathing or shortness of breath  Cough  Associated symptoms include wheezing  Pertinent negatives include no ear pain, postnasal drip, rhinorrhea, sore throat or shortness of breath  Review of Systems   Review of Systems   Constitutional: Negative  HENT: Positive for congestion  Negative for ear discharge, ear pain, postnasal drip, rhinorrhea, sinus pressure, sinus pain, sneezing, sore throat and trouble swallowing  Eyes: Negative  Respiratory: Positive for cough and wheezing  Negative for shortness of breath  Cardiovascular: Negative  Gastrointestinal: Negative  Neurological: Negative            Current Medications       Current Outpatient Medications:   •  albuterol (PROVENTIL HFA,VENTOLIN HFA) 90 mcg/act inhaler, Inhale 2 puffs every 6 (six) hours as needed for wheezing, Disp: 6 7 g, Rfl: 0  •  azithromycin (ZITHROMAX) 250 mg tablet, Take 2 tablets day 1 then 1 tab days 2-5, Disp: 6 tablet, Rfl: 0  •  Eliquis 5 MG, TAKE 1 TABLET BY MOUTH TWICE A DAY, Disp: 60 tablet, Rfl: 11  •  folic acid (FOLVITE) 1 mg tablet, Take 1 tablet (1 mg total) by mouth in the morning , Disp: , Rfl: 0  •  losartan (COZAAR) 50 mg tablet, TAKE 1 TABLET BY MOUTH EVERY DAY, Disp: 90 tablet, Rfl: 2  •  metFORMIN (GLUCOPHAGE) 1000 MG tablet, TAKE 1 TABLET BY MOUTH TWICE A DAY WITH MEALS, Disp: 180 tablet, Rfl: 1  •  metoprolol tartrate (LOPRESSOR) 50 mg tablet, TAKE 1 TABLET BY MOUTH EVERY 12 HOURS , Disp: 180 tablet, Rfl: 2  •  multivitamin (THERAGRAN) TABS, Take 1 tablet by mouth daily, Disp: , Rfl:   •  predniSONE 10 mg tablet, Six tablets day 1; 5 tablets day 2; 4 tablets day 3; 3 tablets day 4; 2 tablets day 5; and 1 tablet day 6, Disp: 21 tablet, Rfl: 0  •  thiamine 100 MG tablet, Take 1 tablet (100 mg total) by mouth in the morning , Disp: , Rfl: 0    Current Allergies     Allergies as of 01/07/2023 - Reviewed 01/07/2023   Allergen Reaction Noted   • Lisinopril Cough 12/07/2020   • Spiriva respimat [tiotropium bromide monohydrate] Anxiety 08/02/2022            The following portions of the patient's history were reviewed and updated as appropriate: allergies, current medications, past family history, past medical history, past social history, past surgical history and problem list      Past Medical History:   Diagnosis Date   • Adhesive capsulitis of right shoulder 5/17/2022   • Anxiety     Last assessed 12/20/2013   • Contusion of right wrist 1/30/2018   • [facility] ) 8/16/2016   • Depression with anxiety    • Diverticulitis 2016   • Eczema 2/24/2016   • Elevated ALT measurement 8/16/2016   • Hypertension    • Obesity 9/25/2012   • Perianal abscess 3/5/2019   • Type 2 diabetes mellitus with hyperglycemia, without long-term current use of insulin (HCC)        Past Surgical History:   Procedure Laterality Date   • COLONOSCOPY     • KNEE SURGERY         Family History   Problem Relation Age of Onset   • Cancer Mother         Lung Cancer   • Cancer Father    • Diabetes Father    • Diabetes Sister          Medications have been verified  Objective   Pulse 95   Temp 97 9 °F (36 6 °C)   Resp 16   SpO2 93%   No LMP for male patient  Physical Exam     Physical Exam  Vitals and nursing note reviewed  Constitutional:       General: He is not in acute distress  Appearance: Normal appearance  He is well-developed  He is not ill-appearing, toxic-appearing or diaphoretic  HENT:      Head: Normocephalic and atraumatic  Right Ear: Tympanic membrane and ear canal normal       Left Ear: Tympanic membrane and ear canal normal       Nose: No congestion or rhinorrhea  Mouth/Throat:      Mouth: Mucous membranes are moist       Pharynx: No oropharyngeal exudate or posterior oropharyngeal erythema  Eyes:      General:         Right eye: No discharge  Left eye: No discharge  Extraocular Movements: Extraocular movements intact  Conjunctiva/sclera: Conjunctivae normal       Pupils: Pupils are equal, round, and reactive to light  Cardiovascular:      Rate and Rhythm: Normal rate and regular rhythm  Heart sounds: Normal heart sounds  No murmur heard  Pulmonary:      Effort: Pulmonary effort is normal  No respiratory distress  Breath sounds: No stridor  Wheezing (Bilateral expiratory) present  No rales  Comments: Intermittent coarse breath sounds bilateral upper airway  Lymphadenopathy:      Cervical: No cervical adenopathy  Skin:     General: Skin is warm and dry  Neurological:      General: No focal deficit present  Mental Status: He is alert and oriented to person, place, and time  Psychiatric:         Mood and Affect: Mood normal          Behavior: Behavior normal          Thought Content:  Thought content normal          Judgment: Judgment normal

## 2023-01-31 ENCOUNTER — RA CDI HCC (OUTPATIENT)
Dept: OTHER | Facility: HOSPITAL | Age: 58
End: 2023-01-31

## 2023-03-31 ENCOUNTER — REMOTE DEVICE CLINIC VISIT (OUTPATIENT)
Dept: CARDIOLOGY CLINIC | Facility: CLINIC | Age: 58
End: 2023-03-31

## 2023-03-31 DIAGNOSIS — Z95.818 PRESENCE OF OTHER CARDIAC IMPLANTS AND GRAFTS: Primary | ICD-10-CM

## 2023-03-31 NOTE — PROGRESS NOTES
"MDT LNQ22/ ACTIVE SYSTEM IS MRI CONDITIONAL   CARELINK TRANSMISSION: LOOP RECORDER  PRESENTING RHYTHM NSR @ 90 BPM  BATTERY STATUS \"OK  \" NO PATIENT OR DEVICE ACTIVATED EPISODES  NORMAL DEVICE FUNCTION   DL   "

## 2023-06-21 ENCOUNTER — OFFICE VISIT (OUTPATIENT)
Dept: FAMILY MEDICINE CLINIC | Facility: CLINIC | Age: 58
End: 2023-06-21
Payer: COMMERCIAL

## 2023-06-21 VITALS
TEMPERATURE: 98.2 F | DIASTOLIC BLOOD PRESSURE: 66 MMHG | HEART RATE: 92 BPM | BODY MASS INDEX: 34.02 KG/M2 | RESPIRATION RATE: 18 BRPM | OXYGEN SATURATION: 94 % | SYSTOLIC BLOOD PRESSURE: 132 MMHG | WEIGHT: 243 LBS | HEIGHT: 71 IN

## 2023-06-21 DIAGNOSIS — Z00.00 ANNUAL PHYSICAL EXAM: ICD-10-CM

## 2023-06-21 DIAGNOSIS — I48.92 ATRIAL FLUTTER, UNSPECIFIED TYPE (HCC): ICD-10-CM

## 2023-06-21 DIAGNOSIS — J44.1 COPD EXACERBATION (HCC): Primary | ICD-10-CM

## 2023-06-21 DIAGNOSIS — R13.10 DYSPHAGIA, UNSPECIFIED TYPE: ICD-10-CM

## 2023-06-21 DIAGNOSIS — Z78.9 ALCOHOL CONSUMPTION HEAVY: ICD-10-CM

## 2023-06-21 DIAGNOSIS — E66.9 OBESITY (BMI 30-39.9): ICD-10-CM

## 2023-06-21 DIAGNOSIS — I10 ESSENTIAL HYPERTENSION: ICD-10-CM

## 2023-06-21 DIAGNOSIS — Z12.5 SCREENING FOR PROSTATE CANCER: ICD-10-CM

## 2023-06-21 DIAGNOSIS — R06.83 SNORES: ICD-10-CM

## 2023-06-21 DIAGNOSIS — E78.00 PURE HYPERCHOLESTEROLEMIA: ICD-10-CM

## 2023-06-21 DIAGNOSIS — E11.65 TYPE 2 DIABETES MELLITUS WITH HYPERGLYCEMIA, WITHOUT LONG-TERM CURRENT USE OF INSULIN (HCC): ICD-10-CM

## 2023-06-21 DIAGNOSIS — F17.219 CIGARETTE NICOTINE DEPENDENCE WITH NICOTINE-INDUCED DISORDER: ICD-10-CM

## 2023-06-21 LAB
CREAT UR-MCNC: 76.1 MG/DL
MICROALBUMIN UR-MCNC: 5.4 MG/L (ref 0–20)
MICROALBUMIN/CREAT 24H UR: 7 MG/G CREATININE (ref 0–30)
SL AMB POCT HEMOGLOBIN AIC: 6.6 (ref ?–6.5)

## 2023-06-21 PROCEDURE — 99214 OFFICE O/P EST MOD 30 MIN: CPT | Performed by: FAMILY MEDICINE

## 2023-06-21 PROCEDURE — 82570 ASSAY OF URINE CREATININE: CPT | Performed by: FAMILY MEDICINE

## 2023-06-21 PROCEDURE — 82043 UR ALBUMIN QUANTITATIVE: CPT | Performed by: FAMILY MEDICINE

## 2023-06-21 PROCEDURE — 99396 PREV VISIT EST AGE 40-64: CPT | Performed by: FAMILY MEDICINE

## 2023-06-21 PROCEDURE — 83036 HEMOGLOBIN GLYCOSYLATED A1C: CPT | Performed by: FAMILY MEDICINE

## 2023-06-21 RX ORDER — ALBUTEROL SULFATE 90 UG/1
2 AEROSOL, METERED RESPIRATORY (INHALATION) EVERY 6 HOURS PRN
Qty: 18 G | Refills: 2 | Status: SHIPPED | OUTPATIENT
Start: 2023-06-21

## 2023-06-21 RX ORDER — AZITHROMYCIN 250 MG/1
TABLET, FILM COATED ORAL
Qty: 6 TABLET | Refills: 0 | Status: SHIPPED | OUTPATIENT
Start: 2023-06-21 | End: 2023-06-25

## 2023-06-21 RX ORDER — PREDNISONE 10 MG/1
TABLET ORAL
Qty: 28 TABLET | Refills: 0 | Status: SHIPPED | OUTPATIENT
Start: 2023-06-21

## 2023-06-21 RX ORDER — CALCIPOTRIENE 50 UG/G
CREAM TOPICAL
COMMUNITY
Start: 2023-05-16

## 2023-06-21 RX ORDER — BETAMETHASONE DIPROPIONATE 0.5 MG/G
OINTMENT TOPICAL
COMMUNITY
Start: 2023-04-05

## 2023-06-21 NOTE — PROGRESS NOTES
[facility]    NAME: Jose Luis Andrade  AGE: 60 y o  SEX: male  : 1965     DATE: 2023     Assessment and Plan:     Problem List Items Addressed This Visit        Digestive    Dysphagia     Check image and refer to GI  Relevant Orders    FL barium swallow ROUTINE esophagus    Ambulatory Referral to Gastroenterology       Endocrine    Type 2 diabetes mellitus with hyperglycemia, without long-term current use of insulin (HCC)       Lab Results   Component Value Date    HGBA1C 6 6 (A) 2023     Controlled  Low carb diet  Continue metformin 1000mg bid  Relevant Orders    POCT hemoglobin A1c (Completed)    CBC and differential    Comprehensive metabolic panel    Lipid Panel with Direct LDL reflex    TSH, 3rd generation with Free T4 reflex    PSA, Total Screen    Albumin / creatinine urine ratio       Respiratory    COPD (chronic obstructive pulmonary disease) ([facility]) - Primary     Flare up recently  Give zpack, prednisone and albuterol prn  Pt cannot tolerate spiriva  Relevant Medications    predniSONE 10 mg tablet    azithromycin (ZITHROMAX) 250 mg tablet    albuterol (PROVENTIL HFA,VENTOLIN HFA) 90 mcg/act inhaler       Cardiovascular and Mediastinum    Essential hypertension     Controlled  DASH diet  Continue losartan 50mg QD and metoprolol 50mg bid  Relevant Orders    CBC and differential    Comprehensive metabolic panel    Lipid Panel with Direct LDL reflex    TSH, 3rd generation with Free T4 reflex    PSA, Total Screen    Atrial flutter (HCC)     Advised pt to continue eliquis and metoprolol  FU cardiology  Relevant Orders    CBC and differential    Comprehensive metabolic panel    Lipid Panel with Direct LDL reflex    TSH, 3rd generation with Free T4 reflex    PSA, Total Screen       Other    Hyperlipidemia     Low fat diet  Will check labs            Relevant Orders    CBC and differential    Comprehensive metabolic panel    Lipid Panel with Direct LDL reflex    TSH, 3rd generation with Free T4 reflex    PSA, Total Screen    Nicotine dependence     Strongly advised pt to quit! Relevant Orders    CT lung screening program    Obesity (BMI 30-39  9)    Alcohol consumption heavy     10 shots/day  Strongly advised pt to quit! Other Visit Diagnoses     Snores        Relevant Orders    Home Study    Screening for prostate cancer        Relevant Orders    CBC and differential    Comprehensive metabolic panel    Lipid Panel with Direct LDL reflex    TSH, 3rd generation with Free T4 reflex    PSA, Total Screen    Annual physical exam              Recommend flu shot yearly  Got Covid19 shots and booster  Got PCV20 in 2022  Will check insurance for coverage of Tdap and shingrix  OK to check PSA  Pros and cons educated pt  Colonoscopy 3/2021, repeat in 5 years  RTO in 6 months           Immunizations and preventive care screenings were discussed with patient today  Appropriate education was printed on patient's after visit summary  Counseling:  Alcohol/drug use: discussed moderation in alcohol intake, the recommendations for healthy alcohol use, and avoidance of illicit drug use  Dental Health: discussed importance of regular tooth brushing, flossing, and dental visits  Injury prevention: discussed safety/seat belts, safety helmets, smoke detectors, carbon dioxide detectors, and smoking near bedding or upholstery  Sexual health: discussed sexually transmitted diseases, partner selection, use of condoms, avoidance of unintended pregnancy, and contraceptive alternatives  · Exercise: the importance of regular exercise/physical activity was discussed  Recommend exercise 3-5 times per week for at least 30 minutes  BMI Counseling: Body mass index is 33 89 kg/m²   The BMI is above normal  Nutrition recommendations include decreasing portion sizes, encouraging healthy choices of fruits and vegetables, decreasing fast food intake, consuming healthier snacks and limiting drinks that contain sugar  Exercise recommendations include moderate physical activity 150 minutes/week  No pharmacotherapy was ordered  Rationale for BMI follow-up plan is due to patient being overweight or obese  Tobacco Cessation Counseling: Tobacco cessation counseling was provided  The patient is sincerely urged to quit consumption of tobacco  He is ready to quit tobacco  Medication options and side effects of medication discussed  Patient refused medication  Return in about 6 months (around 12/21/2023) for Recheck  Chief Complaint:     Chief Complaint   Patient presents with   • Physical Exam     Annual   • Follow-up     routine      History of Present Illness:     Adult Annual Physical   Patient here for a comprehensive physical exam  The patient reports problems - cough   Pt is here by himself       C/o Food stuck in throat for several weeks  Solid food cause problem  No choking  Denies fever, n/v/abd pain  COPD---Cough and wheezing daily  He uses albuterol daily    Cannot tolertate spiriva per pt  DM----Today HgA1C 6 6 in office    Tried to eat healthy  He is on metformin 1000mg bid  Denies SE  No hypoglycemia  Denies neuropathy  Will see ophthalmology per pt  No foot problem  Mother and brother has DM      Afib/flutter---FU cardiology  He is on metoprolol 50mg bid  He did not take eliquis 5mg bid recently, advised pt to restart       HTN---He is on losartan 50mg QD and metoprolol 50mg bid now    Denies headache, SOB or chest pain    BP is good today       Hyperlipidemia---Tried to eat healthy  He is not on statins        Psoriasis---FU derm  He got dovonex and diprolene which helped       Smoke 1ppd for 35 years  Would like to check CT lung screen  Alcohol 10 shots at night   Pt states he will consider Rehab if he cannot quit by himself       Denies depression  Stress in life  Son had drug problem and in and out of rehab several times  Diet and Physical Activity  · Diet/Nutrition: well balanced diet  · Exercise: walking  Depression Screening  PHQ-2/9 Depression Screening         General Health  · Sleep: gets 4-6 hours of sleep on average  · Hearing: decreased - right  · Vision: wears glasses  · Dental: regular dental visits   Health  · Symptoms include: none     Review of Systems:     Review of Systems   Constitutional: Negative for appetite change, chills and fever  HENT: Negative for congestion, ear pain, sinus pain and sore throat  Eyes: Negative for discharge and itching  Respiratory: Positive for cough  Negative for apnea, chest tightness, shortness of breath and wheezing  Cardiovascular: Negative for chest pain, palpitations and leg swelling  Gastrointestinal: Negative for abdominal pain, anal bleeding, constipation, diarrhea, nausea and vomiting  Endocrine: Negative for cold intolerance, heat intolerance and polyuria  Genitourinary: Negative for difficulty urinating and dysuria  Musculoskeletal: Negative for arthralgias, back pain and myalgias  Skin: Negative for rash  Neurological: Negative for dizziness and headaches  Psychiatric/Behavioral: Negative for agitation        Past Medical History:     Past Medical History:   Diagnosis Date   • Adhesive capsulitis of right shoulder 5/17/2022   • Anxiety     Last assessed 12/20/2013   • Contusion of right wrist 1/30/2018   • COPD [facility]ch[facility] disease[facility] ) 8/16/2016   • Depression with anxiety    • Diverticulitis 2016   • Eczema 2/24/2016   • Elevated ALT measurement 8/16/2016   • Hypertension    • Obesity 9/25/2012   • Perianal abscess 3/5/2019   • Type 2 diabetes mellitus with hyperglycemia, without long-term current use of insulin [facility])       Past Surgical History:     Past Surgical History:   Procedure Laterality Date   • COLONOSCOPY • KNEE SURGERY        Family History:     Family History   Problem Relation Age of Onset   • Cancer Mother         Lung Cancer   • Cancer Father    • Diabetes Father    • Diabetes Sister       Social History:     Social History     Socioeconomic History   • Marital status: /Civil Union     Spouse name: None   • Number of children: None   • Years of education: None   • Highest education level: None   Occupational History   • None   Tobacco Use   • Smoking status: Every Day     Packs/day: 1 00     Years: 35 00     Total pack years: 35 00     Types: Cigarettes   • Smokeless tobacco: Never   Vaping Use   • Vaping Use: Never used   Substance and Sexual Activity   • Alcohol use: Yes     Alcohol/week: 70 0 standard drinks of alcohol     Types: 70 Shots of liquor per week     Comment: 10 shots/day   • Drug use: No   • Sexual activity: None   Other Topics Concern   • None   Social History Narrative   • None     Social Determinants of Health     Financial Resource Strain: Not on file   Food Insecurity: Not on file   Transportation Needs: Not on file   Physical Activity: Not on file   Stress: Not on file   Social Connections: Not on file   Intimate Partner Violence: Not on file   Housing Stability: Not on file      Current Medications:     Current Outpatient Medications   Medication Sig Dispense Refill   • albuterol (PROVENTIL HFA,VENTOLIN HFA) 90 mcg/act inhaler Inhale 2 puffs every 6 (six) hours as needed for wheezing 18 g 2   • azithromycin (ZITHROMAX) 250 mg tablet Take 2 tabs on day 1, then take 1 tab daily from day 2-day 5  6 tablet 0   • betamethasone, augmented, (DIPROLENE) 0 05 % ointment PLEASE SEE ATTACHED FOR DETAILED DIRECTIONS     • calcipotriene (DOVONEX) 0 005 % cream APPLY TOPICALLY TWICE A DAY TO AREAS OF PSORIASIS MONDAY - FRIDAY     • folic acid (FOLVITE) 1 mg tablet Take 1 tablet (1 mg total) by mouth in the morning    0   • losartan (COZAAR) 50 mg tablet TAKE 1 TABLET BY MOUTH EVERY DAY 90 tablet "0   • metFORMIN (GLUCOPHAGE) 1000 MG tablet TAKE 1 TABLET BY MOUTH TWICE A DAY WITH MEALS 180 tablet 1   • metoprolol tartrate (LOPRESSOR) 50 mg tablet TAKE 1 TABLET BY MOUTH EVERY 12 HOURS  180 tablet 2   • multivitamin (THERAGRAN) TABS Take 1 tablet by mouth daily     • predniSONE 10 mg tablet Take 4 tabs for 4 days, then 3 tabs for 2 days, then 2 tabs for 2 days and then 1 tab for 2 day 28 tablet 0   • thiamine 100 MG tablet Take 1 tablet (100 mg total) by mouth in the morning  0   • Eliquis 5 MG TAKE 1 TABLET BY MOUTH TWICE A DAY (Patient not taking: Reported on 6/21/2023) 60 tablet 11     No current facility-administered medications for this visit  Allergies: Allergies   Allergen Reactions   • Lisinopril Cough   • Spiriva Respimat [Tiotropium Bromide Monohydrate] Anxiety      Physical Exam:     /66   Pulse 92   Temp 98 2 °F (36 8 °C) (Temporal)   Resp 18   Ht 5' 11\" (1 803 m)   Wt 110 kg (243 lb)   SpO2 94%   BMI 33 89 kg/m²     Physical Exam  Vitals reviewed  Constitutional:       Appearance: Normal appearance  HENT:      Head: Normocephalic and atraumatic  Eyes:      General:         Right eye: No discharge  Left eye: No discharge  Conjunctiva/sclera: Conjunctivae normal    Neck:      Vascular: No carotid bruit  Cardiovascular:      Rate and Rhythm: Normal rate and regular rhythm  Pulses: Pulses are weak  Dorsalis pedis pulses are 2+ on the right side and 1+ on the left side  Heart sounds: Normal heart sounds  No murmur heard  No friction rub  No gallop  Pulmonary:      Effort: Pulmonary effort is normal  No respiratory distress  Breath sounds: Wheezing present  No rales  Abdominal:      General: Bowel sounds are normal  There is no distension  Palpations: Abdomen is soft  Tenderness: There is no abdominal tenderness  Musculoskeletal:         General: No swelling, tenderness or deformity  Normal range of motion        " Cervical back: Normal range of motion and neck supple  No muscular tenderness  Feet:      Right foot:      Skin integrity: No ulcer, skin breakdown, erythema, warmth, callus or dry skin  Left foot:      Skin integrity: No ulcer, skin breakdown, erythema, warmth, callus or dry skin  Lymphadenopathy:      Cervical: No cervical adenopathy  Neurological:      Mental Status: He is alert  Psychiatric:         Mood and Affect: Mood normal         Patient's shoes and socks removed  Right Foot/Ankle   Right Foot Inspection  Skin Exam: skin normal and skin intact  No dry skin, no warmth, no callus, no erythema, no maceration, no abnormal color, no pre-ulcer, no ulcer and no callus  Sensory   Monofilament testing: intact    Vascular  The right DP pulse is 2+  Left Foot/Ankle  Left Foot Inspection  Skin Exam: skin normal and skin intact  No dry skin, no warmth, no erythema, no maceration, normal color, no pre-ulcer, no ulcer and no callus  Sensory   Monofilament testing: intact    Vascular  The left DP pulse is 1+       Assign Risk Category  No deformity present  No loss of protective sensation  Weak pulses  Risk: 2        Makayla August MD  [address] [place] [address]

## 2023-06-21 NOTE — ASSESSMENT & PLAN NOTE
Lab Results   Component Value Date    HGBA1C 6 6 (A) 06/21/2023     Controlled  Low carb diet  Continue metformin 1000mg bid

## 2023-06-29 ENCOUNTER — REMOTE DEVICE CLINIC VISIT (OUTPATIENT)
Dept: CARDIOLOGY CLINIC | Facility: CLINIC | Age: 58
End: 2023-06-29
Payer: COMMERCIAL

## 2023-06-29 DIAGNOSIS — Z95.818 PRESENCE OF OTHER CARDIAC IMPLANTS AND GRAFTS: Primary | ICD-10-CM

## 2023-06-29 PROCEDURE — 93298 REM INTERROG DEV EVAL SCRMS: CPT | Performed by: INTERNAL MEDICINE

## 2023-06-29 PROCEDURE — G2066 INTER DEVC REMOTE 30D: HCPCS | Performed by: INTERNAL MEDICINE

## 2023-06-29 NOTE — PROGRESS NOTES
"MDT LNQ22/ ACTIVE SYSTEM IS MRI CONDITIONAL   CARELINK TRANSMISSION: LOOP RECORDER  PRESENTING RHYTHM NSR @ 84 BPM  BATTERY STATUS \"OK  \" 1 DEVICE CLASSIFIED AF EPISODES, LONGEST EPISODE IS 2:54 HOURS, AVAILABLE STRIPS DEMONSTRATE PACs AND ATRIAL FIBRILLATION  AF BURDEN IS 0 4%  AF BURDEN MAYBE OVERESTIMATED DUE TO INAPPROPRIATE CLASSIFICATION OF AF  SOME STRIPS MAY NOT BE INTERPRETABLE OR AVAILABLE, CANNOT DEFINITIVELY RULE OUT ATRIAL FIBRILLATION  HX OF AFL  PT TAKES ELIQUIS AND METOPROLOL TART  NO PATIENT ACTIVATED EPISODES  NORMAL DEVICE FUNCTION   DL   "

## 2023-06-30 ENCOUNTER — TELEPHONE (OUTPATIENT)
Dept: SLEEP CENTER | Facility: CLINIC | Age: 58
End: 2023-06-30

## 2023-06-30 NOTE — TELEPHONE ENCOUNTER
----- Message from Preeti Luciano MD sent at 6/29/2023  7:33 PM EDT -----  approved  ----- Message -----  From: Darwin Hampton  Sent: 6/26/2023   7:55 AM EDT  To: Sleep Medicine [facility] Provider    This home sleep study needs approval      If approved please sign and return to clerical pool  If denied please include reasons why  Also provide alternative testing if warranted  Please sign and return to clerical pool

## 2023-07-03 ENCOUNTER — HOSPITAL ENCOUNTER (OUTPATIENT)
Dept: RADIOLOGY | Facility: HOSPITAL | Age: 58
Discharge: HOME | End: 2023-07-03
Payer: COMMERCIAL

## 2023-07-03 DIAGNOSIS — F17.219 CIGARETTE NICOTINE DEPENDENCE WITH NICOTINE-INDUCED DISORDER: ICD-10-CM

## 2023-07-03 PROCEDURE — 71271 CT THORAX LUNG CANCER SCR C-: CPT

## 2023-07-07 DIAGNOSIS — I48.91 ATRIAL FIBRILLATION WITH RAPID VENTRICULAR RESPONSE (HCC): ICD-10-CM

## 2023-07-07 RX ORDER — METOPROLOL TARTRATE 50 MG/1
TABLET, FILM COATED ORAL
Qty: 60 TABLET | Refills: 8 | Status: SHIPPED | OUTPATIENT
Start: 2023-07-07

## 2023-07-14 ENCOUNTER — REMOTE DEVICE CLINIC VISIT (OUTPATIENT)
Dept: CARDIOLOGY CLINIC | Facility: CLINIC | Age: 58
End: 2023-07-14
Payer: COMMERCIAL

## 2023-07-14 DIAGNOSIS — Z95.818 PRESENCE OF OTHER CARDIAC IMPLANTS AND GRAFTS: Primary | ICD-10-CM

## 2023-07-14 PROCEDURE — 93298 REM INTERROG DEV EVAL SCRMS: CPT | Performed by: INTERNAL MEDICINE

## 2023-07-14 PROCEDURE — G2066 INTER DEVC REMOTE 30D: HCPCS | Performed by: INTERNAL MEDICINE

## 2023-07-14 NOTE — PROGRESS NOTES
MDT LNQ22/ ACTIVE SYSTEM IS MRI CONDITIONAL   CARELINK TRANSMISSION: LOOP RECORDER. PRESENTING RHYTHM NSR @ 90 BPM. BATTERY STATUS "OK." NO PATIENT OR DEVICE ACTIVATED EPISODES. HOWEVER THERE IS AN EGRAM PREVIOUSLY ADDRESSED IN ALERT. NORMAL DEVICE FUNCTION.  DL

## 2023-07-17 ENCOUNTER — TELEPHONE (OUTPATIENT)
Dept: OBGYN CLINIC | Facility: HOSPITAL | Age: 58
End: 2023-07-17

## 2023-08-07 DIAGNOSIS — I48.91 ATRIAL FIBRILLATION WITH RAPID VENTRICULAR RESPONSE (HCC): ICD-10-CM

## 2023-08-07 RX ORDER — METOPROLOL TARTRATE 50 MG/1
TABLET, FILM COATED ORAL
Qty: 180 TABLET | Refills: 3 | Status: SHIPPED | OUTPATIENT
Start: 2023-08-07

## 2023-08-10 LAB
LEFT EYE DIABETIC RETINOPATHY: NORMAL
RIGHT EYE DIABETIC RETINOPATHY: NORMAL

## 2023-08-22 ENCOUNTER — TELEPHONE (OUTPATIENT)
Dept: SLEEP CENTER | Facility: CLINIC | Age: 58
End: 2023-08-22

## 2023-08-22 NOTE — TELEPHONE ENCOUNTER
----- Message from Evon Soriano MD sent at 8/17/2023  9:10 PM EDT -----  approved  ----- Message -----  From: Verona Christie  Sent: 8/14/2023   8:50 AM EDT  To: Sleep Medicine [facility] Provider    This home sleep study needs approval.     If approved please sign and return to clerical pool. If denied please include reasons why. Also provide alternative testing if warranted. Please sign and return to clerical pool.

## 2023-09-10 DIAGNOSIS — E11.65 TYPE 2 DIABETES MELLITUS WITH HYPERGLYCEMIA, WITHOUT LONG-TERM CURRENT USE OF INSULIN (HCC): ICD-10-CM

## 2023-09-10 DIAGNOSIS — I10 ESSENTIAL HYPERTENSION: ICD-10-CM

## 2023-09-11 RX ORDER — LOSARTAN POTASSIUM 50 MG/1
TABLET ORAL
Qty: 30 TABLET | Refills: 5 | Status: SHIPPED | OUTPATIENT
Start: 2023-09-11

## 2023-09-29 ENCOUNTER — REMOTE DEVICE CLINIC VISIT (OUTPATIENT)
Dept: CARDIOLOGY CLINIC | Facility: CLINIC | Age: 58
End: 2023-09-29
Payer: COMMERCIAL

## 2023-09-29 DIAGNOSIS — Z95.818 PRESENCE OF OTHER CARDIAC IMPLANTS AND GRAFTS: Primary | ICD-10-CM

## 2023-09-29 PROCEDURE — G2066 INTER DEVC REMOTE 30D: HCPCS | Performed by: INTERNAL MEDICINE

## 2023-09-29 PROCEDURE — 93298 REM INTERROG DEV EVAL SCRMS: CPT | Performed by: INTERNAL MEDICINE

## 2023-11-29 ENCOUNTER — APPOINTMENT (EMERGENCY)
Dept: RADIOLOGY | Facility: HOSPITAL | Age: 58
End: 2023-11-29
Payer: COMMERCIAL

## 2023-11-29 ENCOUNTER — HOSPITAL ENCOUNTER (EMERGENCY)
Facility: HOSPITAL | Age: 58
Discharge: HOME | End: 2023-11-29
Attending: EMERGENCY MEDICINE
Payer: COMMERCIAL

## 2023-11-29 VITALS
SYSTOLIC BLOOD PRESSURE: 136 MMHG | HEART RATE: 92 BPM | RESPIRATION RATE: 20 BRPM | OXYGEN SATURATION: 92 % | DIASTOLIC BLOOD PRESSURE: 85 MMHG | TEMPERATURE: 97.9 F
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
## 2023-11-29 DIAGNOSIS — M25.511 RIGHT SHOULDER PAIN: ICD-10-CM

## 2023-11-29 DIAGNOSIS — M79.642 LEFT HAND PAIN: ICD-10-CM

## 2023-11-29 PROCEDURE — 73030 X-RAY EXAM OF SHOULDER: CPT

## 2023-11-29 PROCEDURE — 99284 EMERGENCY DEPT VISIT MOD MDM: CPT

## 2023-11-29 PROCEDURE — 99284 EMERGENCY DEPT VISIT MOD MDM: CPT | Performed by: EMERGENCY MEDICINE

## 2023-11-29 PROCEDURE — 73130 X-RAY EXAM OF HAND: CPT

## 2023-11-29 NOTE — ED ATTENDING ATTESTATION
11/29/2023  I, Chrystal Samuels DO, saw and evaluated the patient. I have discussed the patient with the resident/non-physician practitioner and agree with the resident's/non-physician practitioner's findings, Plan of Care, and MDM as documented in the resident's/non-physician practitioner's note, except where noted. All available labs and Radiology studies were reviewed. I was present for key portions of any procedure(s) performed by the resident/non-physician practitioner and I was immediately available to provide assistance. At this point I agree with the current assessment done in the Emergency Department. I have conducted an independent evaluation of this patient a history and physical is as follows:Medical Decision Making  58M mechanical 4 fall days prior, no LOC noted. Did strike head, no current blood thinners noted. Left  wrist and hand pain, left hand and wrist pain noted. Left wrist noted with ttp, full ROM, no bony injury noted no fx. Right shoulder pain- likely contusion. Xray negative. Plan pain control andf ortho f/u    Amount and/or Complexity of Data Reviewed  Radiology: ordered and independent interpretation performed. All labs reviewed and interpreted by myself   All radiology studies independently viewed by me and interpreted by myself     XR shoulder 2+ views RIGHT   ED Interpretation   No acute fracture or dislocation        Final Result      No acute osseous abnormality. Workstation performed: [id number]         XR hand 3+ views LEFT   ED Interpretation   No acute fracture or dislocation       Final Result      No acute osseous abnormality.             Workstation performed: [id number]             Labs Reviewed - No data to display    Clinical Impression:    Final diagnoses:   Fall, initial encounter   Right shoulder pain   Left hand pain         ED Course  ED Course as of 11/29/23 1541   Wed Nov 29, 2023   0910 X-rays interpreted by myself is unremarkable and normal with no fractures.          Critical Care Time  Procedures

## 2023-11-29 NOTE — DISCHARGE INSTRUCTIONS
You were seen in the emergency department today for right shoulder pain, left hand pain. Your testing showed no acute fractures. Follow up with Orthopedics. Take Tylenol / Ibuprofen as needed following the instructions on the bottle. Return to the emergency department for any new or concerning symptoms including worsening pain. Thank you for choosing [facility] for your care today.

## 2023-11-29 NOTE — ED PROVIDER NOTES
History  Chief Complaint   Patient presents with    Fall     Pt reports mechanical fall down 13 steps at his home on Sunday - +headstrike, -LOC, -thinners. Having worsening bilateral shoulder pain, worse in R. L wrist and thumb pain. Pt also has bruising around L eye and reports pain in L temple when moving jaw. HPI  Patient is a 60 y.o. male with history of no relevant PMH, not currently taking Eliquis presenting to the emergency department for fall. Patient states that he had a mechanical fall down 8 steps at his home on Sunday. Patient states that he did hit his head, did not lose consciousness or have repeated vomiting. He complains of having pain in his left hand and right shoulder since falling. He states that he had previously injured his right shoulder but it is worse now. Also complains of having pain in his left temple when he opens and closes his mouth. Prior to Admission Medications   Prescriptions Last Dose Informant Patient Reported? Taking? Eliquis 5 MG   No No   Sig: TAKE 1 TABLET BY MOUTH TWICE A DAY   Patient not taking: Reported on 6/21/2023   albuterol (PROVENTIL HFA,VENTOLIN HFA) 90 mcg/act inhaler   No No   Sig: INHALE 2 PUFFS EVERY 6 HOURS AS NEEDED FOR WHEEZING   betamethasone, augmented, (DIPROLENE) 0.05 % ointment   Yes No   Sig: PLEASE SEE ATTACHED FOR DETAILED DIRECTIONS   calcipotriene (DOVONEX) 0.005 % cream   Yes No   Sig: APPLY TOPICALLY TWICE A DAY TO AREAS OF PSORIASIS MONDAY - FRIDAY   folic acid (FOLVITE) 1 mg tablet  Self No No   Sig: Take 1 tablet (1 mg total) by mouth in the morning.    losartan (COZAAR) 50 mg tablet   No No   Sig: TAKE 1 TABLET BY MOUTH EVERY DAY   metFORMIN (GLUCOPHAGE) 1000 MG tablet   No No   Sig: TAKE 1 TABLET BY MOUTH TWICE A DAY WITH FOOD   metoprolol tartrate (LOPRESSOR) 50 mg tablet   No No   Sig: TAKE 1 TABLET BY MOUTH EVERY 12 HOURS   multivitamin (THERAGRAN) TABS  Self Yes No   Sig: Take 1 tablet by mouth daily   predniSONE 10 mg tablet No No   Sig: Take 4 tabs for 4 days, then 3 tabs for 2 days, then 2 tabs for 2 days and then 1 tab for 2 day   thiamine 100 MG tablet  Self No No   Sig: Take 1 tablet (100 mg total) by mouth in the morning. Facility-Administered Medications: None       Past Medical History:   Diagnosis Date    Adhesive capsulitis of right shoulder 5/17/2022    Anxiety     Last assessed 12/20/2013    Contusion of right wrist 1/30/2018    COPD (chronic obstructive pulmonary disease) ([address]) 8/16/2016    Depression with anxiety     Diverticulitis 2016    Eczema 2/24/2016    Elevated ALT measurement 8/16/2016    Hypertension     Obesity 9/25/2012    Perianal abscess 3/5/2019    Type 2 diabetes mellitus with hyperglycemia, without long-term current use of insulin (HCC)        Past Surgical History:   Procedure Laterality Date    COLONOSCOPY      KNEE SURGERY         Family History   Problem Relation Age of Onset    Cancer Mother         Lung Cancer    Cancer Father     Diabetes Father     Diabetes Sister      I have reviewed and agree with the history as documented. E-Cigarette/Vaping    E-Cigarette Use Never User      E-Cigarette/Vaping Substances    Nicotine No     THC No     CBD No     Flavoring No     Other No     Unknown No      Social History     Tobacco Use    Smoking status: Every Day     Packs/day: 1.00     Years: 35.00     Total pack years: 35.00     Types: Cigarettes    Smokeless tobacco: Never   Vaping Use    Vaping Use: Never used   Substance Use Topics    Alcohol use: Yes     Alcohol/week: 70.0 standard drinks of alcohol     Types: 70 Shots of liquor per week     Comment: 10 shots/day    Drug use: No        Review of Systems   Constitutional:  Negative for fever. HENT:  Negative for congestion. Eyes:  Negative for pain. Respiratory:  Negative for cough. Cardiovascular:  Negative for chest pain. Gastrointestinal:  Negative for diarrhea and vomiting. Genitourinary:  Negative for dysuria.    Musculoskeletal: Negative for back pain. Right shoulder pain, left hand pain   Skin:  Negative for rash. Neurological:  Negative for dizziness. All other systems reviewed and are negative. Physical Exam  ED Triage Vitals [11/29/23 0726]   Temperature Pulse Respirations Blood Pressure SpO2   97.9 °F (36.6 °C) 92 20 136/85 92 %      Temp Source Heart Rate Source Patient Position - Orthostatic VS BP Location FiO2 (%)   Temporal Monitor Sitting Left arm --      Pain Score       10 - Worst Possible Pain             Orthostatic Vital Signs  Vitals:    11/29/23 0726   BP: 136/85   Pulse: 92   Patient Position - Orthostatic VS: Sitting       Physical Exam  Vitals and nursing note reviewed. Constitutional:       Appearance: Normal appearance. HENT:      Head: Normocephalic and atraumatic. Mouth/Throat:      Mouth: Mucous membranes are moist.   Eyes:      Conjunctiva/sclera: Conjunctivae normal.   Cardiovascular:      Rate and Rhythm: Normal rate and regular rhythm. Pulses: Normal pulses. Heart sounds: Normal heart sounds. Pulmonary:      Effort: Pulmonary effort is normal.      Breath sounds: Normal breath sounds. Abdominal:      Palpations: Abdomen is soft. Tenderness: There is no abdominal tenderness. Musculoskeletal:      Cervical back: Neck supple. Comments: Tenderness to palpation left thumb diffusely. Pain with ROM of right shoulder, no focal tenderness to palpation. Patient unable to abduct laterally higher than 90 degrees with his right shoulder. Skin:     General: Skin is warm and dry. Capillary Refill: Capillary refill takes less than 2 seconds. Neurological:      General: No focal deficit present. Mental Status: He is alert and oriented to person, place, and time. Mental status is at baseline. Cranial Nerves: No cranial nerve deficit. Sensory: No sensory deficit. Motor: No weakness.       Coordination: Coordination normal.      Gait: Gait normal. Psychiatric:         Mood and Affect: Mood normal.         ED Medications  Medications - No data to display    Diagnostic Studies  Results Reviewed       None                   XR shoulder 2+ views RIGHT   ED Interpretation by Waqas Austin DO (11/29 1895)   No acute fracture or dislocation        Final Result by Ami Rowley MD (11/29 1033)      No acute osseous abnormality. Workstation performed: [id number]         XR hand 3+ views LEFT   ED Interpretation by Waqas Austin DO (11/29 9814)   No acute fracture or dislocation       Final Result by Ami Rowley MD (11/29 1033)      No acute osseous abnormality. Workstation performed: [id number]               Procedures  Procedures      ED Course                             SBIRT 20yo+      Flowsheet Row Most Recent Value   Initial Alcohol Screen: US AUDIT-C     1. How often do you have a drink containing alcohol? 6 Filed at: 11/29/2023 0730   2. How many drinks containing alcohol do you have on a typical day you are drinking? 6 Filed at: 11/29/2023 0730   3a. Male UNDER 65: How often do you have five or more drinks on one occasion? 6 Filed at: 11/29/2023 0730   Audit-C Score 18 Filed at: 11/29/2023 0730   Full Alcohol Screen: US AUDIT    4. How often during the last year have you found that you were not able to stop drinking once you had started? 0 Filed at: 11/29/2023 0730   5. How often during past year have you failed to do what was normally expected of you because of drinking? 0 Filed at: 11/29/2023 0730   6. How often in past year have you needed a first drink in the morning to get yourself going after a heavy drinking session? 0 Filed at: 11/29/2023 0730   7. How often in past year have you had feeling of guilt or remorse after drinking? 0 Filed at: 11/29/2023 0730   8. How often in past year have you been unable to remember what happened night before because you had been drinking? 0 Filed at: 11/29/2023 0730   9. Have you or someone else been injured as a result of your drinking? 0 Filed at: 11/29/2023 0730   10. Has a relative, friend, doctor or other health worker been concerned about your drinking and suggested you cut down?  0 Filed at: 11/29/2023 0730   AUDIT Total Score 18 Filed at: 11/29/2023 0730   JOSE M: How many times in the past year have you. .. Used an illegal drug or used a prescription medication for non-medical reasons? Never Filed at: 11/29/2023 0730                  Medical Decision Making  Amount and/or Complexity of Data Reviewed  Radiology: ordered and independent interpretation performed. Patient is a 60 y.o. male with PMH of no relevant PMH who presents to the ED with right shoulder pain, left hand pain after fall. Vital signs stable. On exam tenderness diffusely left thumb. Patient has decreased ROM of right shoulder, no focal tenderness, no deformity. History and physical exam most consistent with possible rotator cuff injury of right shoulder. However, differential diagnosis included but not limited to fracture, dislocation. Plan: will obtain Xray of right shoulder and left hand. Offered patient pain medication at this time, patient does not want anything for pain at this time. States that he mostly wants an MRI of his right shoulder but states that he has to be seen in the ED/by his PCP prior to seeing orthopedics. Will refer to orthopedics. View ED course above for further discussion on patient workup. All labs reviewed and utilized in the medical decision making process  All radiology studies independently viewed by me and interpreted by the radiologist.  I reviewed all testing with the patient. Upon re-evaluation patients resting comfortably. Disposition: I have reviewed the patient's vital signs, nursing notes, and other relevant tests/information. I had a detailed discussion with the patient regarding the history, exam findings, and any diagnostic results.    Plan to discharge home in stable condition, follow up with orthopedics. Discussed with patient who is agreeable to plan. I discussed discharge instructions, need for follow-up, and oral return precautions for what to return for in addition to the written return precautions and discharge instructions, specifically highlighting areas of special concern. The patient verbalized understanding of the discharge instructions and warnings that would necessitate return to the Emergency Department including worsening pain. All questions the patient had were answered prior to discharge to the best of my ability. Portions of the record may have been created with voice recognition software. Occasional wrong word or "sound a like" substitutions may have occurred due to the inherent limitations of voice recognition software. Read the chart carefully and recognize, using context, where substitutions have occurred. Disposition  Final diagnoses:   Fall, initial encounter   Right shoulder pain   Left hand pain     Time reflects when diagnosis was documented in both MDM as applicable and the Disposition within this note       Time User Action Codes Description Comment    11/29/2023  9:11 AM Reina Beth Add [W16. VQMK] Fall, initial encounter     11/29/2023  9:11 AM Reina Beth Add [M25.511] Right shoulder pain     11/29/2023  9:11 AM Reina Beth Add [X23.675] Left hand pain           ED Disposition       ED Disposition   Discharge    Condition   Stable    Date/Time   Wed Nov 29, 2023 [address] discharge to home/self care.                    Follow-up Information       Follow up With Specialties Details Why Contact Info Additional [address], MD Family Medicine   [address]  [phone]       [address] [facility] Call in 3 days  [address] [address]  [address] [facility], [address] [facility], [place], [place], [address]  Use Entrance A             Discharge Medication List as of 11/29/2023  9:12 AM        CONTINUE these medications which have NOT CHANGED    Details   albuterol (PROVENTIL HFA,VENTOLIN HFA) 90 mcg/act inhaler INHALE 2 PUFFS EVERY 6 HOURS AS NEEDED FOR WHEEZING, Normal      betamethasone, augmented, (DIPROLENE) 0.05 % ointment PLEASE SEE ATTACHED FOR DETAILED DIRECTIONS, Historical Med      calcipotriene (DOVONEX) 0.005 % cream APPLY TOPICALLY TWICE A DAY TO AREAS OF PSORIASIS MONDAY - FRIDAY, Historical Med      Eliquis 5 MG TAKE 1 TABLET BY MOUTH TWICE A DAY, Normal      folic acid (FOLVITE) 1 mg tablet Take 1 tablet (1 mg total) by mouth in the morning., Starting Fri 5/20/2022, No Print      losartan (COZAAR) 50 mg tablet TAKE 1 TABLET BY MOUTH EVERY DAY, Normal      metFORMIN (GLUCOPHAGE) 1000 MG tablet TAKE 1 TABLET BY MOUTH TWICE A DAY WITH FOOD, Starting Fri 10/6/2023, Normal      metoprolol tartrate (LOPRESSOR) 50 mg tablet TAKE 1 TABLET BY MOUTH EVERY 12 HOURS, Normal      multivitamin (THERAGRAN) TABS Take 1 tablet by mouth daily, Historical Med      predniSONE 10 mg tablet Take 4 tabs for 4 days, then 3 tabs for 2 days, then 2 tabs for 2 days and then 1 tab for 2 day, Normal      thiamine 100 MG tablet Take 1 tablet (100 mg total) by mouth in the morning., Starting Fri 5/20/2022, No Print               PDMP Review         Value Time User    PDMP Reviewed  Yes 5/19/2022  4:31 PM Beth Cesar PA-C             ED Provider  Attending physically available and evaluated Ernesto Snow. I managed the patient along with the ED Attending.     Electronically Signed by           Edwin Munoz DO  12/02/23 5774

## 2023-12-04 ENCOUNTER — OFFICE VISIT (OUTPATIENT)
Dept: OBGYN CLINIC | Facility: CLINIC | Age: 58
End: 2023-12-04
Payer: COMMERCIAL

## 2023-12-04 VITALS
HEIGHT: 71 IN | BODY MASS INDEX: 34.02 KG/M2 | DIASTOLIC BLOOD PRESSURE: 74 MMHG | SYSTOLIC BLOOD PRESSURE: 177 MMHG | WEIGHT: 243 LBS | HEART RATE: 102 BPM

## 2023-12-04 DIAGNOSIS — M25.511 ACUTE PAIN OF RIGHT SHOULDER: Primary | ICD-10-CM

## 2023-12-04 DIAGNOSIS — M25.511 RIGHT SHOULDER PAIN: ICD-10-CM
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
## 2023-12-04 DIAGNOSIS — S46.001A ROTATOR CUFF INJURY, RIGHT, INITIAL ENCOUNTER: ICD-10-CM

## 2023-12-04 DIAGNOSIS — M19.011 OSTEOARTHRITIS OF RIGHT GLENOHUMERAL JOINT: ICD-10-CM

## 2023-12-04 DIAGNOSIS — R29.898 SHOULDER WEAKNESS: ICD-10-CM

## 2023-12-04 PROCEDURE — 99213 OFFICE O/P EST LOW 20 MIN: CPT | Performed by: PHYSICIAN ASSISTANT

## 2023-12-04 NOTE — PATIENT INSTRUCTIONS
Rotator Cuff Tear Repair   AMBULATORY CARE:   What you need to know about rotator cuff tear repair:  Rotator cuff repair is surgery to fix a tear in one or more of your rotator cuff tendons. A tendon is a cord of tough tissue that connects your muscles to your bones. The rotator cuff is made up of a group of muscles and tendons that hold the shoulder joint in place. How to prepare for surgery: Your surgeon will tell you how to prepare for surgery. Tell your surgeon about all medicines you take. Include prescription and over-the-counter medicines. You may need to stop taking some medicines days or weeks before surgery. Your surgeon will give you specific instructions to follow. Arrange to have someone drive you home and stay with you to make sure you are okay. You may be given antibiotics to prevent an infection caused by bacteria. You may be given general anesthesia to keep you asleep and free from pain during surgery. You may instead be given anesthesia to make your shoulder area numb. Tell your surgeon if you have ever had an allergic reaction to antibiotics or anesthesia. Your surgeon may tell you not to eat or drink anything after midnight on the day of your surgery. He or she will tell you which medicines to take or not take on the day of your surgery. What will happen during surgery: The tear can be repaired in several ways. Your surgeon will talk to you about the different kinds of surgery. He or she will tell you the kind that is best for you. This will mainly depend on the kind of tear you have and if it is severe. For an open repair or a mini-open repair , your surgeon will make an incision in your shoulder. An open repair may be needed if the tear is large or you need a tendon transfer. You may be able to have a mini-open repair if the tear is small and your surgeon can repair it easily. For either kind of open repair, your surgeon will examine the tendons.      For arthroscopy , your surgeon will make small incisions in your shoulder. He or she will put an arthroscope (a camera) into the joint. The camera will show pictures of your joint on a monitor. Your surgeon will put tools into other small incisions in the area. He or she will use the tools to repair the rotator cuff tear. Your surgeon may need to do several things during any kind of surgery. He or she may remove damaged tissue, bone spurs, or a swollen bursa (small sac of fluid around the joint). If the rotator cuff tear is small, he or she may sew it back together. If the tear is big, he or she may attach the tendon to the bone in your shoulder. This can be done with stitches and bone anchors to hold the tendon to the bone. A graft (a piece of another muscle or tissue) may be used to attach and repair the tendon. Your surgeon may reshape your shoulder bone to help it stay in place. Stitches may be used to close the incision. The wound will then be covered with a bandage. What to expect after surgery: You should expect to feel pain after surgery. This is normal and should get better soon. You will be given pain medicine. Your healthcare provider may also put ice or a cold pack on your shoulder. This treatment may decrease pain, swelling, and muscle spasms. A sling may be placed on your arm to keep it from moving. A pillow attached to the sling holds your arm away from your body. This position decreases pressure on the surgery area. It also helps blood flow to the area to help it heal.    Risks of rotator cuff surgery: You may get an infection from this surgery or bleed more than expected. You may have nerve damage. If you had an open repair, the muscle that was detached at the beginning may not reattach correctly. Rotator cuff surgery may cause muscles in your shoulder to become weak. This can decrease your ability to use your shoulder. Your tendon may not heal, and your arm and shoulder may be weak and painful.  If a graft was used for your repair, you may have swelling and get an infection around the graft. After surgery, tissue around the joint can swell and become stiff. This can cause new pain and problems with moving your arm. The anchor used to fix your tear may press on the tendon and cause pain. Your tendon may tear after surgery. You may need to have surgery again if the new tear causes pain or movement problems. Call your local emergency number (911 in the [address]) if:   You suddenly have shortness of breath. You have sudden numbness or tingling down your arm. Seek care immediately if:   The stitches on your shoulder wound come apart. You have new shoulder pain that does not go away, even after you take pain medicine. Your surgery area is swollen, red, or has pus coming from it. Call your surgeon or orthopedist if:   You have chills, a cough, or feel weak and achy. You have a fever. You have stiffness or pain in your shoulder that is worse and makes you stop your physical therapy. You are vomiting. Your skin is itchy, swollen, or has a rash. You have questions or concerns about your condition or care. Medicines: You may need any of the following:  Antibiotics  help prevent or treat an infection caused by bacteria. Prescription pain medicine  may be given. Ask your healthcare provider how to take this medicine safely. Some prescription pain medicines contain acetaminophen. Do not take other medicines that contain acetaminophen without talking to your healthcare provider. Too much acetaminophen may cause liver damage. Prescription pain medicine may cause constipation. Ask your healthcare provider how to prevent or treat constipation. NSAIDs  help decrease swelling and pain or fever. This medicine is available with or without a doctor's order. NSAIDs can cause stomach bleeding or kidney problems in certain people.  If you take blood thinner medicine, always ask your healthcare provider if NSAIDs are safe for you. Always read the medicine label and follow directions. Take your medicine as directed. Contact your healthcare provider if you think your medicine is not helping or if you have side effects. Tell your provider if you are allergic to any medicine. Keep a list of the medicines, vitamins, and herbs you take. Include the amounts, and when and why you take them. Bring the list or the pill bottles to follow-up visits. Carry your medicine list with you in case of an emergency. Care for your incision area:  Keep the area clean and dry. Cover it with plastic before you take a shower. Do not take a bath or swim until your surgeon tells you it is okay. Check the area every day for signs of infection, such as swelling, red streaks, or pus. A fever may also be a sign of infection. Ask about activity:  Your surgeon will tell you when it is okay to use your shoulder and arm. Until then, do not use your arm to lift anything. Do not use your arm to move around, push yourself up from lying down, or to change positions. Your surgeon will tell you when it is okay to drive, go back to work, and do your daily activities. Apply ice to your shoulder: Ice may decrease pain, swelling, and muscle spasms. Use an ice pack, or put crushed ice in a plastic bag. Cover it with a towel and place it on your shoulder for 15 to 20 minutes every hour or as directed. Ice may be helpful for 10 to 14 days after surgery. Use a sling as directed: You may need to use an abduction immobilizer sling for 4 to 6 weeks after surgery. The amount of time will depend on how severe your tear was. The sling will prevent arm movement while the tendon heals. A pillow attached to the sling holds your arm away from your body. This position decreases pressure on your wound, and helps blood flow to the surgery area. Your healthcare provider will tell you how long to use the sling each day.   Go to physical therapy for as long as directed: Physical therapy is done in stages. Each stage helps increase motion or strength, and helps relieve or prevent stiffness. Your surgeon and physical therapist will create a plan for your therapy. The plan will be based on your age, health, and recovery goals. The following are general guidelines:  Stage 1 is the first 4 to 8 weeks after surgery. Physical therapy may begin soon after surgery. At first, a physical therapist will work with you to do safe exercises that will allow your rotator cuff to heal. This is called passive exercise because your physical therapist will be moving your arm for you. After a few weeks, the therapist may suggest therapy in a pool. The water allows you to move your arm with very little stress on your shoulder. The water also provides resistance. This helps increase muscle strength. Stage 2 continues to weeks 8 to 12. At this stage, your physical therapist may have you start doing active exercises. These are exercises you will do without the help of your physical therapist. Exercises include using your shoulder more. You will begin exercises such as raising and stretching your arm. Do only those exercises that you have been shown. Do them only as often as you are told to do them. Stage 3 continues to weeks 12 to 16. At this stage, you will begin doing exercises that make your shoulder muscles stronger. Your physical therapy will focus on range of motion (ROM) without pain. You will also increase endurance. Your physical therapist will help you stretch and strengthen your shoulder. You may be shown how to use resistance bands. At this stage, you can expect to have about 80% to 90% of your ROM again. This will depend on the type of tear you had repaired. You will need to be able to do strengthening exercises and daily activities without pain to move to the next phase. Stage 4 continues to weeks 16 to 26. This stage is for advanced strengthening.  Exercises will target specific areas of your shoulder. You will still not be able to lift your arm over your head or play sports in this stage. Your physical therapist will help you build the strength and stability needed for these activities. The goal is to have full ROM that is the same in both arms, and no pain at rest or during activities. These will be needed before you can return to sports or overhead lifting. It may take up to a year to return to all of your regular daily activities after you have a rotator cuff tear repair. Follow up with your surgeon or orthopedist as directed: Ask when you should return to have your stitches taken out. Write down your questions so you remember to ask them during your visits. © Copyright [employer] Hanson 2023 Information is for End User's use only and may not be sold, redistributed or otherwise used for commercial purposes. The above information is an  only. It is not intended as medical advice for individual conditions or treatments. Talk to your doctor, nurse or pharmacist before following any medical regimen to see if it is safe and effective for you.

## 2023-12-04 NOTE — PROGRESS NOTES
Orthopaedic Surgery - Office Note  Ernesto Snow (5 y.o. male)   : 1965   MRN: [id number]  Encounter Date: 2023    Chief Complaint   Patient presents with    Right Shoulder - Pain         Assessment/Plan  Diagnoses and all orders for this visit:    Acute pain of right shoulder  -     Ambulatory Referral to Physical Therapy; Future  -     MRI shoulder right wo contrast; Future  -     Ambulatory Referral to Orthopedic Surgery; Future    Fall down steps, subsequent encounter-2023  -     Ambulatory Referral to Physical Therapy; Future  -     MRI shoulder right wo contrast; Future  -     Ambulatory Referral to Orthopedic Surgery; Future    Osteoarthritis of right glenohumeral joint-mild  -     Ambulatory Referral to Physical Therapy; Future  -     MRI shoulder right wo contrast; Future  -     Ambulatory Referral to Orthopedic Surgery; Future    Right shoulder pain  -     Ambulatory Referral to Orthopedic Surgery  -     Ambulatory Referral to Physical Therapy; Future  -     MRI shoulder right wo contrast; Future  -     Ambulatory Referral to Orthopedic Surgery; Future    Rotator cuff injury, right, initial encounter  -     Ambulatory Referral to Physical Therapy; Future  -     MRI shoulder right wo contrast; Future  -     Ambulatory Referral to Orthopedic Surgery; Future    Shoulder weakness  -     Ambulatory Referral to Physical Therapy; Future  -     MRI shoulder right wo contrast; Future  -     Ambulatory Referral to Orthopedic Surgery; Future    The diagnosis as well as treatment options were reviewed with the patient in the office today. I discussed with the patient the concern for a full-thickness rupture of the rotator cuff causing the pain and weakness with overhead motion. Conservative treatment options were reviewed as well as advanced imaging with an MRI to evaluate for full-thickness rotator cuff tear.   Shared decision-making was utilized and patient elected to proceed with the MRI for surgical planning purposes in case there is a full-thickness tear. He will ice the shoulder 20 minutes on 1 hour off 3 times a day. He will be started in formal physical therapy to work on improving range of motion and strength. I encouraged strict diabetic control to improve outcomes and decrease risk of complications. All question and concerns were answered in the office today. Return for Recheck with shoulder surgeon to discuss MRI of right shoulder. History of Present Illness  This is a new patient who injured his right shoulder at home when he fell down 13 steps on 11/26/2023. He was seen at the emergency department on 11/29/2023. He had x-rays taken at that time which were negative for fracture. He takes Eliquis chronically. He has a history of right shoulder adhesive capsulitis and rotator cuff tendinitis in 2022. He is an everyday smoker and admits to 10 shots of alcohol daily. He is diabetic with his most recent hemoglobin A1c being 6.6. Patient reports he is having difficulty lifting the arm over the head since the fall down the stairs. He has difficulty reaching behind his back. No paresthesias are noted. He reports prior to the fall down the stairs he had no difficulty with overhead reaching motion or strength. Review of Systems  Pertinent items are noted in HPI. All other systems were reviewed and are negative. Physical Exam  BP (!) 177/74 (BP Location: Left arm, Patient Position: Sitting, Cuff Size: Large) Comment: rushing to armen here  Pulse 102   Ht 5' 11" (1.803 m)   Wt 110 kg (243 lb)   BMI 33.89 kg/m²   Cons: Appears well. No apparent distress. Psych: Alert. Oriented x3. Mood and affect normal.  On examination patient's right shoulder has no skin breakdown lesions or signs of infection. He has no tenderness at the [facility] joint or proximal humerus. Active forward flexion is 90 degrees. Passively to 165 degrees with end motion pain.   He has a positive impingement sign.  He has a positive drop arm test.  External rotation strength is decreased to 4 out of 5. Internal rotation strength is 5 out of 5. His elbow exam is unremarkable. There is no shoulder instability on clinical examination. There are no gross neurological deficits appreciated in the right upper extremity. Studies Reviewed  RIGHT SHOULDER     INDICATION:   right shoulder pain. COMPARISON:  None     VIEWS:  XR SHOULDER 2+ VW RIGHT        FINDINGS:     There is no acute fracture or dislocation. Mild osteoarthritis glenohumeral joint. No lytic or blastic osseous lesion. Soft tissues are unremarkable. IMPRESSION:     No acute osseous abnormality. Workstation performed: [id number]  X-ray images as well as reports were reviewed by myself in the office today and I agree with radiologist interpretation. Emergency department notes from 11/29/2023 were reviewed by myself in the office today. Previous right shoulder notes from 2022 were reviewed by myself in the office today. Procedures  No procedures today. Medical, Surgical, Family, and Social History  The patient's medical history, family history, and social history, were reviewed and updated as appropriate.     Past Medical History:   Diagnosis Date    Adhesive capsulitis of right shoulder 5/17/2022    Anxiety     Last assessed 12/20/2013    Contusion of right wrist 1/30/2018    COPD (chronic obstructive pulmonary disease) ([address]) 8/16/2016    Depression with anxiety     Diverticulitis 2016    Eczema 2/24/2016    Elevated ALT measurement 8/16/2016    Hypertension     Obesity 9/25/2012    Perianal abscess 3/5/2019    Type 2 diabetes mellitus with hyperglycemia, without long-term current use of insulin (HCC)        Past Surgical History:   Procedure Laterality Date    COLONOSCOPY      KNEE SURGERY         Family History   Problem Relation Age of Onset    Cancer Mother         Lung Cancer    Cancer Father     Diabetes Father Diabetes Sister        Social History     Occupational History    Not on file   Tobacco Use    Smoking status: Every Day     Packs/day: 1.00     Years: 35.00     Total pack years: 35.00     Types: Cigarettes    Smokeless tobacco: Never   Vaping Use    Vaping Use: Never used   Substance and Sexual Activity    Alcohol use:  Yes     Alcohol/week: 70.0 standard drinks of alcohol     Types: 70 Shots of liquor per week     Comment: 10 shots/day    Drug use: No    Sexual activity: Not on file       Allergies   Allergen Reactions    Lisinopril Cough    Spiriva Respimat [Tiotropium Bromide Monohydrate] Anxiety         Current Outpatient Medications:     albuterol (PROVENTIL HFA,VENTOLIN HFA) 90 mcg/act inhaler, INHALE 2 PUFFS EVERY 6 HOURS AS NEEDED FOR WHEEZING, Disp: 18 g, Rfl: 2    betamethasone, augmented, (DIPROLENE) 0.05 % ointment, PLEASE SEE ATTACHED FOR DETAILED DIRECTIONS, Disp: , Rfl:     calcipotriene (DOVONEX) 0.005 % cream, APPLY TOPICALLY TWICE A DAY TO AREAS OF PSORIASIS MONDAY - FRIDAY, Disp: , Rfl:     Eliquis 5 MG, TAKE 1 TABLET BY MOUTH TWICE A DAY (Patient not taking: Reported on 6/21/2023), Disp: 60 tablet, Rfl: 11    folic acid (FOLVITE) 1 mg tablet, Take 1 tablet (1 mg total) by mouth in the morning., Disp: , Rfl: 0    losartan (COZAAR) 50 mg tablet, TAKE 1 TABLET BY MOUTH EVERY DAY, Disp: 90 tablet, Rfl: 3    metFORMIN (GLUCOPHAGE) 1000 MG tablet, TAKE 1 TABLET BY MOUTH TWICE A DAY WITH FOOD, Disp: 180 tablet, Rfl: 3    metoprolol tartrate (LOPRESSOR) 50 mg tablet, TAKE 1 TABLET BY MOUTH EVERY 12 HOURS, Disp: 180 tablet, Rfl: 3    multivitamin (THERAGRAN) TABS, Take 1 tablet by mouth daily, Disp: , Rfl:     predniSONE 10 mg tablet, Take 4 tabs for 4 days, then 3 tabs for 2 days, then 2 tabs for 2 days and then 1 tab for 2 day, Disp: 28 tablet, Rfl: 0    thiamine 100 MG tablet, Take 1 tablet (100 mg total) by mouth in the morning., Disp: , Rfl: 0      Yajaira West PA-C

## 2023-12-05 ENCOUNTER — TELEPHONE (OUTPATIENT)
Dept: OBGYN CLINIC | Facility: CLINIC | Age: 58
End: 2023-12-05

## 2023-12-05 NOTE — TELEPHONE ENCOUNTER
Called and spoke with patient regarding scheduling his MRI ordered by Jose García. Patient said that he will call to schedule.  Provided ph [phone]

## 2023-12-14 ENCOUNTER — REMOTE DEVICE CLINIC VISIT (OUTPATIENT)
Dept: CARDIOLOGY CLINIC | Facility: CLINIC | Age: 58
End: 2023-12-14
Payer: COMMERCIAL

## 2023-12-14 DIAGNOSIS — Z95.818 PRESENCE OF OTHER CARDIAC IMPLANTS AND GRAFTS: Primary | ICD-10-CM

## 2023-12-14 PROCEDURE — G2066 INTER DEVC REMOTE 30D: HCPCS | Performed by: INTERNAL MEDICINE

## 2023-12-14 PROCEDURE — 93298 REM INTERROG DEV EVAL SCRMS: CPT | Performed by: INTERNAL MEDICINE

## 2023-12-14 NOTE — PROGRESS NOTES
MDT LNQ22/ ACTIVE SYSTEM IS MRI CONDITIONAL   CARELINK TRANSMISSION: LOOP RECORDER. PRESENTING RHYTHM ST @ 102 BPM. BATTERY STATUS "OK." NO PATIENT OR DEVICE ACTIVATED EPISODES. HOWEVER THERE IS AN EGRAM PREVIOUSLY ADDRESSED IN ALERT. NORMAL DEVICE FUNCTION.  DL

## 2023-12-17 ENCOUNTER — OFFICE VISIT (OUTPATIENT)
Dept: URGENT CARE | Age: 58
End: 2023-12-17
Payer: COMMERCIAL

## 2023-12-17 VITALS
DIASTOLIC BLOOD PRESSURE: 72 MMHG | HEART RATE: 60 BPM | TEMPERATURE: 97.4 F | SYSTOLIC BLOOD PRESSURE: 156 MMHG | RESPIRATION RATE: 20 BRPM | OXYGEN SATURATION: 93 %

## 2023-12-17 DIAGNOSIS — J44.1 COPD EXACERBATION (HCC): Primary | ICD-10-CM

## 2023-12-17 PROCEDURE — G0382 LEV 3 HOSP TYPE B ED VISIT: HCPCS | Performed by: STUDENT IN AN ORGANIZED HEALTH CARE EDUCATION/TRAINING PROGRAM

## 2023-12-17 RX ORDER — ALBUTEROL SULFATE 2.5 MG/3ML
2.5 SOLUTION RESPIRATORY (INHALATION) ONCE
Status: COMPLETED | OUTPATIENT
Start: 2023-12-17 | End: 2023-12-17

## 2023-12-17 RX ORDER — PREDNISONE 20 MG/1
40 TABLET ORAL DAILY
Qty: 10 TABLET | Refills: 0 | Status: SHIPPED | OUTPATIENT
Start: 2023-12-17 | End: 2023-12-22

## 2023-12-17 RX ORDER — PREDNISONE 20 MG/1
40 TABLET ORAL ONCE
Status: COMPLETED | OUTPATIENT
Start: 2023-12-17 | End: 2023-12-17

## 2023-12-17 RX ORDER — AZITHROMYCIN 250 MG/1
TABLET, FILM COATED ORAL
Qty: 6 TABLET | Refills: 0 | Status: SHIPPED | OUTPATIENT
Start: 2023-12-17 | End: 2023-12-21

## 2023-12-17 RX ADMIN — PREDNISONE 40 MG: 20 TABLET ORAL at 08:50

## 2023-12-17 RX ADMIN — ALBUTEROL SULFATE 2.5 MG: 2.5 SOLUTION RESPIRATORY (INHALATION) at 08:51

## 2023-12-17 NOTE — PROGRESS NOTES
[facility]        NAME: Callum Nielson is a 58 y.o. male  : 1965    MRN: [id number]  DATE: 2023  TIME: 9:29 AM    Assessment and Plan   COPD exacerbation (HCC) [J44.1]  1. COPD exacerbation (HCC)  albuterol inhalation solution 2.5 mg    predniSONE tablet 40 mg    predniSONE 20 mg tablet    azithromycin (ZITHROMAX) 250 mg tablet      Given albuterol neb in office today.  Given first dose of prednisone.  Will continue with course of prednisone, azithromycin.  To follow-up with PCP for further management of his COPD.    To proceed to ER if his symptoms are worsening despite the above treatment.      Patient Instructions       Follow up with PCP in 3-5 days.  Proceed to  ER if symptoms worsen.    Chief Complaint     Chief Complaint   Patient presents with    Cough     Patient has history of COPD and been dealing with a cough for 1 month         History of Present Illness       Patient presents for 1 month of cough.  History of COPD.  For the last 1 month, he has been having cough, wheezing, cough with increased mucus production, copious and white in color per patient.  This feels like his previous exacerbations.  He does feel a lot of wheezing, however denies shortness of breath.  He is not on a maintenance inhaler typically, he does have albuterol inhaler at home.  He has been using this but feels like it is not helping enough.  No fevers, chills.  Denies other symptoms.        Review of Systems   Review of Systems   All other systems reviewed and are negative.        Current Medications       Current Outpatient Medications:     azithromycin (ZITHROMAX) 250 mg tablet, Take 2 tablets today then 1 tablet daily x 4 days, Disp: 6 tablet, Rfl: 0    predniSONE 20 mg tablet, Take 2 tablets (40 mg total) by mouth daily for 5 days, Disp: 10 tablet, Rfl: 0    albuterol (PROVENTIL HFA,VENTOLIN HFA) 90 mcg/act inhaler, INHALE 2 PUFFS EVERY 6 HOURS AS NEEDED FOR WHEEZING, Disp: 18 g, Rfl: 2     betamethasone, augmented, (DIPROLENE) 0.05 % ointment, PLEASE SEE ATTACHED FOR DETAILED DIRECTIONS, Disp: , Rfl:     calcipotriene (DOVONEX) 0.005 % cream, APPLY TOPICALLY TWICE A DAY TO AREAS OF PSORIASIS MONDAY - FRIDAY, Disp: , Rfl:     Eliquis 5 MG, TAKE 1 TABLET BY MOUTH TWICE A DAY (Patient not taking: Reported on 6/21/2023), Disp: 60 tablet, Rfl: 11    folic acid (FOLVITE) 1 mg tablet, Take 1 tablet (1 mg total) by mouth in the morning., Disp: , Rfl: 0    losartan (COZAAR) 50 mg tablet, TAKE 1 TABLET BY MOUTH EVERY DAY, Disp: 90 tablet, Rfl: 3    metFORMIN (GLUCOPHAGE) 1000 MG tablet, TAKE 1 TABLET BY MOUTH TWICE A DAY WITH FOOD, Disp: 180 tablet, Rfl: 3    metoprolol tartrate (LOPRESSOR) 50 mg tablet, TAKE 1 TABLET BY MOUTH EVERY 12 HOURS, Disp: 180 tablet, Rfl: 3    multivitamin (THERAGRAN) TABS, Take 1 tablet by mouth daily, Disp: , Rfl:     thiamine 100 MG tablet, Take 1 tablet (100 mg total) by mouth in the morning., Disp: , Rfl: 0  No current facility-administered medications for this visit.    Current Allergies     Allergies as of 12/17/2023 - Reviewed 12/17/2023   Allergen Reaction Noted    Lisinopril Cough 12/07/2020    Spiriva respimat [tiotropium bromide monohydrate] Anxiety 08/02/2022            The following portions of the patient's history were reviewed and updated as appropriate: allergies, current medications, past family history, past medical history, past social history, past surgical history and problem list.     Past Medical History:   Diagnosis Date    Adhesive capsulitis of right shoulder 5/17/2022    Anxiety     Last assessed 12/20/2013    Contusion of right wrist 1/30/2018    COPD (chronic obstructive pulmonary disease) (HCC) 8/16/2016    Depression with anxiety     Diverticulitis 2016    Eczema 2/24/2016    Elevated ALT measurement 8/16/2016    Hypertension     Obesity 9/25/2012    Perianal abscess 3/5/2019    Type 2 diabetes mellitus with hyperglycemia, without long-term current use  of insulin (HCC)        Past Surgical History:   Procedure Laterality Date    COLONOSCOPY      KNEE SURGERY         Family History   Problem Relation Age of Onset    Cancer Mother         Lung Cancer    Cancer Father     Diabetes Father     Diabetes Sister          Medications have been verified.        Objective   /72 (BP Location: Right arm, Patient Position: Sitting, Cuff Size: Standard)   Pulse 84   Temp (!) 97.4 °F (36.3 °C)   Resp 20   SpO2 92%   No LMP for male patient.       Physical Exam     Physical Exam  Constitutional:       General: He is not in acute distress.     Appearance: Normal appearance. He is not ill-appearing or toxic-appearing.   HENT:      Head: Normocephalic and atraumatic.      Right Ear: External ear normal.      Left Ear: External ear normal.      Nose: Nose normal.      Mouth/Throat:      Mouth: Mucous membranes are moist.   Eyes:      Extraocular Movements: Extraocular movements intact.   Cardiovascular:      Rate and Rhythm: Normal rate and regular rhythm.      Heart sounds: Normal heart sounds.   Pulmonary:      Effort: Pulmonary effort is normal. No respiratory distress.      Breath sounds: No stridor. Wheezing present. No rhonchi.      Comments: Before albuterol neb, with tight breath sounds, diffuse expiratory wheezing  After neb, improved air movement, continues with wheezing.  Pulse ox up from 92% to 93%.  Musculoskeletal:         General: No swelling, tenderness or deformity.      Right lower leg: No edema.      Left lower leg: No edema.   Skin:     General: Skin is warm and dry.      Capillary Refill: Capillary refill takes less than 2 seconds.      Findings: No rash.   Neurological:      Mental Status: He is alert.      Gait: Gait normal.   Psychiatric:         Behavior: Behavior normal.

## 2023-12-17 NOTE — PATIENT INSTRUCTIONS
Take antibiotic and steroid as prescribed.  Start azithromycin today  You had your first dose of prednisone today.  Please start with your next dose tomorrow morning.  Please follow-up with your PCP for continued management of your COPD.    If your symptoms are worsening including shortness of breath, please go to the ER.

## 2023-12-18 ENCOUNTER — TELEPHONE (OUTPATIENT)
Dept: FAMILY MEDICINE CLINIC | Facility: CLINIC | Age: 58
End: 2023-12-18

## 2023-12-18 DIAGNOSIS — F41.8 SITUATIONAL ANXIETY: Primary | ICD-10-CM

## 2023-12-18 RX ORDER — ALPRAZOLAM 0.5 MG/1
TABLET ORAL
Qty: 2 TABLET | Refills: 0 | Status: SHIPPED | OUTPATIENT
Start: 2023-12-18

## 2023-12-18 NOTE — TELEPHONE ENCOUNTER
Patient ([phone]) called to ask if possible for provider to send prescription for single dose of xanax to take prior to MRI to help with claustrophobia. Asks to send script to [facility] [address] [place] [address] [facility]. Requests call to confirm.

## 2023-12-22 ENCOUNTER — HOSPITAL ENCOUNTER (OUTPATIENT)
Dept: RADIOLOGY | Facility: HOSPITAL | Age: 58
Discharge: HOME | End: 2023-12-22
Payer: COMMERCIAL

## 2023-12-22 DIAGNOSIS — M19.011 OSTEOARTHRITIS OF RIGHT GLENOHUMERAL JOINT: ICD-10-CM

## 2023-12-22 DIAGNOSIS — M25.511 RIGHT SHOULDER PAIN: ICD-10-CM
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
## 2023-12-22 DIAGNOSIS — S46.001A ROTATOR CUFF INJURY, RIGHT, INITIAL ENCOUNTER: ICD-10-CM

## 2023-12-22 DIAGNOSIS — M25.511 ACUTE PAIN OF RIGHT SHOULDER: ICD-10-CM

## 2023-12-22 DIAGNOSIS — R29.898 SHOULDER WEAKNESS: ICD-10-CM

## 2023-12-22 PROCEDURE — G1004 CDSM NDSC: HCPCS

## 2023-12-22 PROCEDURE — 73221 MRI JOINT UPR EXTREM W/O DYE: CPT

## 2024-01-05 ENCOUNTER — OFFICE VISIT (OUTPATIENT)
Dept: LAB | Age: 59
End: 2024-01-05
Payer: COMMERCIAL

## 2024-01-05 ENCOUNTER — OFFICE VISIT (OUTPATIENT)
Dept: OBGYN CLINIC | Facility: CLINIC | Age: 59
End: 2024-01-05
Payer: COMMERCIAL

## 2024-01-05 ENCOUNTER — TELEPHONE (OUTPATIENT)
Dept: CARDIOLOGY CLINIC | Facility: CLINIC | Age: 59
End: 2024-01-05

## 2024-01-05 ENCOUNTER — APPOINTMENT (OUTPATIENT)
Dept: LAB | Age: 59
End: 2024-01-05
Payer: COMMERCIAL

## 2024-01-05 VITALS
BODY MASS INDEX: 34.72 KG/M2 | HEIGHT: 71 IN | WEIGHT: 248 LBS | HEART RATE: 76 BPM | DIASTOLIC BLOOD PRESSURE: 84 MMHG | SYSTOLIC BLOOD PRESSURE: 131 MMHG

## 2024-01-05 DIAGNOSIS — I10 ESSENTIAL HYPERTENSION, MALIGNANT: ICD-10-CM

## 2024-01-05 DIAGNOSIS — R29.898 SHOULDER WEAKNESS: ICD-10-CM

## 2024-01-05 DIAGNOSIS — E11.65 TYPE II DIABETES MELLITUS WITH HYPEROSMOLARITY, UNCONTROLLED (HCC): Primary | ICD-10-CM

## 2024-01-05 DIAGNOSIS — M25.511 ACUTE PAIN OF RIGHT SHOULDER: ICD-10-CM

## 2024-01-05 DIAGNOSIS — M19.011 OSTEOARTHRITIS OF RIGHT GLENOHUMERAL JOINT: ICD-10-CM
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
## 2024-01-05 DIAGNOSIS — S46.811A FULL THICKNESS TEAR OF RIGHT SUBSCAPULARIS TENDON, INITIAL ENCOUNTER: Primary | ICD-10-CM

## 2024-01-05 DIAGNOSIS — S46.811A FULL THICKNESS TEAR OF RIGHT SUBSCAPULARIS TENDON, INITIAL ENCOUNTER: ICD-10-CM

## 2024-01-05 DIAGNOSIS — I48.92 ATRIAL FLUTTER, UNSPECIFIED TYPE (HCC): ICD-10-CM

## 2024-01-05 DIAGNOSIS — S46.001A ROTATOR CUFF INJURY, RIGHT, INITIAL ENCOUNTER: ICD-10-CM

## 2024-01-05 DIAGNOSIS — M25.511 RIGHT SHOULDER PAIN: ICD-10-CM

## 2024-01-05 DIAGNOSIS — E11.00 TYPE II DIABETES MELLITUS WITH HYPEROSMOLARITY, UNCONTROLLED (HCC): Primary | ICD-10-CM

## 2024-01-05 LAB
ANION GAP SERPL CALCULATED.3IONS-SCNC: 11 MMOL/L
BASOPHILS # BLD AUTO: 0.12 THOUSANDS/ÂΜL (ref 0–0.1)
BASOPHILS NFR BLD AUTO: 1 % (ref 0–1)
BUN SERPL-MCNC: 15 MG/DL (ref 5–25)
CALCIUM SERPL-MCNC: 10.1 MG/DL (ref 8.4–10.2)
CHLORIDE SERPL-SCNC: 97 MMOL/L (ref 96–108)
CO2 SERPL-SCNC: 29 MMOL/L (ref 21–32)
CREAT SERPL-MCNC: 0.96 MG/DL (ref 0.6–1.3)
EOSINOPHIL # BLD AUTO: 0.35 THOUSAND/ÂΜL (ref 0–0.61)
EOSINOPHIL NFR BLD AUTO: 3 % (ref 0–6)
ERYTHROCYTE [DISTWIDTH] IN BLOOD BY AUTOMATED COUNT: 12 % (ref 11.6–15.1)
GFR SERPL CREATININE-BSD FRML MDRD: 86 ML/MIN/1.73SQ M
GLUCOSE SERPL-MCNC: 190 MG/DL (ref 65–140)
HCT VFR BLD AUTO: 50.8 % (ref 36.5–49.3)
HGB BLD-MCNC: 16.8 G/DL (ref 12–17)
IMM GRANULOCYTES # BLD AUTO: 0.05 THOUSAND/UL (ref 0–0.2)
IMM GRANULOCYTES NFR BLD AUTO: 1 % (ref 0–2)
LYMPHOCYTES # BLD AUTO: 2.49 THOUSANDS/ÂΜL (ref 0.6–4.47)
LYMPHOCYTES NFR BLD AUTO: 25 % (ref 14–44)
MCH RBC QN AUTO: 32.7 PG (ref 26.8–34.3)
MCHC RBC AUTO-ENTMCNC: 33.1 G/DL (ref 31.4–37.4)
MCV RBC AUTO: 99 FL (ref 82–98)
MONOCYTES # BLD AUTO: 0.76 THOUSAND/ÂΜL (ref 0.17–1.22)
MONOCYTES NFR BLD AUTO: 8 % (ref 4–12)
NEUTROPHILS # BLD AUTO: 6.39 THOUSANDS/ÂΜL (ref 1.85–7.62)
NEUTS SEG NFR BLD AUTO: 62 % (ref 43–75)
NRBC BLD AUTO-RTO: 0 /100 WBCS
PLATELET # BLD AUTO: 283 THOUSANDS/UL (ref 149–390)
PMV BLD AUTO: 9.7 FL (ref 8.9–12.7)
POTASSIUM SERPL-SCNC: 5 MMOL/L (ref 3.5–5.3)
RBC # BLD AUTO: 5.13 MILLION/UL (ref 3.88–5.62)
SODIUM SERPL-SCNC: 137 MMOL/L (ref 135–147)
WBC # BLD AUTO: 10.16 THOUSAND/UL (ref 4.31–10.16)

## 2024-01-05 PROCEDURE — 93005 ELECTROCARDIOGRAM TRACING: CPT

## 2024-01-05 PROCEDURE — 99214 OFFICE O/P EST MOD 30 MIN: CPT | Performed by: ORTHOPAEDIC SURGERY

## 2024-01-05 PROCEDURE — 36415 COLL VENOUS BLD VENIPUNCTURE: CPT

## 2024-01-05 PROCEDURE — 80048 BASIC METABOLIC PNL TOTAL CA: CPT

## 2024-01-05 PROCEDURE — 85025 COMPLETE CBC W/AUTO DIFF WBC: CPT

## 2024-01-05 NOTE — TELEPHONE ENCOUNTER
Call from  Orthopedics [phone].  Patient scheduled 1/17/24 for right shoulder replacement and requires cardiac clearance.    He was last seen by you August 2022. He does have a loop with last transmission 12/14/23.      Can you clear without seeing.  I'm not sure I can get a PA to see patient before surgery date.      He is s/p atrial flutter ablation July 2021    On Eliquis 5mg bid, metoprolol tartrate 50mg bid

## 2024-01-05 NOTE — PROGRESS NOTES
"CHIEF COMPLAIN/REASON FOR VISIT  No chief complaint on file.      HISTORY OF PRESENT ILLNESS  Callum Nielson is a RHD 58 y.o. male who presents for evaluation of their right shoulder. Patient said he continues to suffer from disabling pain in his right shoulder. He has difficulty with overhead activities and behind the back activities. Overall, he feels the pain is disabling and affecting his activities of daily living. Patient said he smokes 1-1.5 PPD. He continues to have daily alcohol use.    HPI from [facility] on 12/4/23: \"This is a new patient who injured his right shoulder at home when he fell down 13 steps on 11/26/2023.  He was seen at the emergency department on 11/29/2023.  He had x-rays taken at that time which were negative for fracture.  He takes Eliquis chronically.  He has a history of right shoulder adhesive capsulitis and rotator cuff tendinitis in 2022.  He is an everyday smoker and admits to 10 shots of alcohol daily.  He is diabetic with his most recent hemoglobin A1c being 6.6.  Patient reports he is having difficulty lifting the arm over the head since the fall down the stairs.  He has difficulty reaching behind his back.  No paresthesias are noted.  He reports prior to the fall down the stairs he had no difficulty with overhead reaching motion or strength. \"    REVIEW OF SYSTEMS  Review of systems was performed and, woutside that mentioned in the HPI, it was negative for symptomology related to the integumentary, hematologic, immunologic, allergic, neurologic, cardiovascular, respiratory, GI or  systems.     MEDICAL HISTORY  Patient Active Problem List   Diagnosis    Acne    COPD (chronic obstructive pulmonary disease) ([facility])    Depression with anxiety    Eczema    Hyperlipidemia    Essential hypertension    Type 2 diabetes mellitus with hyperglycemia, without long-term current use of insulin ([facility])    Insomnia    Irritable bowel syndrome    Nicotine dependence    Obesity (BMI 30-39.9)    " Psoriasis    Atrial flutter (HCC)    Tobacco use    Alcohol consumption heavy    Subacromial bursitis of right shoulder joint    Adhesive capsulitis of right shoulder    NYAE (obstructive sleep apnea)    Leukocytosis    Anticoagulation adequate    Dysphagia       SURGICAL HISTORY  Past Surgical History:   Procedure Laterality Date    COLONOSCOPY      KNEE SURGERY         CURRENT MEDICATIONS    Current Outpatient Medications:     albuterol (PROVENTIL HFA,VENTOLIN HFA) 90 mcg/act inhaler, INHALE 2 PUFFS EVERY 6 HOURS AS NEEDED FOR WHEEZING, Disp: 18 g, Rfl: 2    ALPRAZolam (XANAX) 0.5 mg tablet, Take 1-2 tabs before MRI., Disp: 2 tablet, Rfl: 0    betamethasone, augmented, (DIPROLENE) 0.05 % ointment, PLEASE SEE ATTACHED FOR DETAILED DIRECTIONS, Disp: , Rfl:     calcipotriene (DOVONEX) 0.005 % cream, APPLY TOPICALLY TWICE A DAY TO AREAS OF PSORIASIS MONDAY - FRIDAY, Disp: , Rfl:     Eliquis 5 MG, TAKE 1 TABLET BY MOUTH TWICE A DAY (Patient not taking: Reported on 6/21/2023), Disp: 60 tablet, Rfl: 11    folic acid (FOLVITE) 1 mg tablet, Take 1 tablet (1 mg total) by mouth in the morning., Disp: , Rfl: 0    losartan (COZAAR) 50 mg tablet, TAKE 1 TABLET BY MOUTH EVERY DAY, Disp: 90 tablet, Rfl: 3    metFORMIN (GLUCOPHAGE) 1000 MG tablet, TAKE 1 TABLET BY MOUTH TWICE A DAY WITH FOOD, Disp: 180 tablet, Rfl: 3    metoprolol tartrate (LOPRESSOR) 50 mg tablet, TAKE 1 TABLET BY MOUTH EVERY 12 HOURS, Disp: 180 tablet, Rfl: 3    multivitamin (THERAGRAN) TABS, Take 1 tablet by mouth daily, Disp: , Rfl:     thiamine 100 MG tablet, Take 1 tablet (100 mg total) by mouth in the morning., Disp: , Rfl: 0    SOCIAL HISTORY  Social History     Socioeconomic History    Marital status: /Civil Union     Spouse name: Not on file    Number of children: Not on file    Years of education: Not on file    Highest education level: Not on file   Occupational History    Not on file   Tobacco Use    Smoking status: Every Day     Current  packs/day: 1.00     Average packs/day: 1 pack/day for 35.0 years (35.0 ttl pk-yrs)     Types: Cigarettes    Smokeless tobacco: Never   Vaping Use    Vaping status: Never Used   Substance and Sexual Activity    Alcohol use: Yes     Alcohol/week: 70.0 standard drinks of alcohol     Types: 70 Shots of liquor per week     Comment: 10 shots/day    Drug use: No    Sexual activity: Not on file   Other Topics Concern    Not on file   Social History Narrative    Not on file     Social Determinants of Health     Financial Resource Strain: Not on file   Food Insecurity: Not on file   Transportation Needs: Not on file   Physical Activity: Not on file   Stress: Not on file   Social Connections: Not on file   Intimate Partner Violence: Not on file   Housing Stability: Not on file       Objective     VITAL SIGNS  There were no vitals taken for this visit.     PHYSICAL EXAM  General:   Well-appearing  No acute distress  Appears stated age    Right Shoulder  Negative tenderness to palpation over the acromioclavicular joint  Negative tenderness to palpation over the long head of the biceps tendon  Shoulder effusion none present  Shoulder abduction 90 / 180  Shoulder forward flexion 90 / 180  Shoulder internal rotation back pocket  Supraspinatus/ABD 4/5   Infraspinatus/ER 4/5   Subscapularis 3 /5  Positive Belly Press/SS  Lift Off Test positive  Positive Neer  Positive Chilel  Positive O'briens  Skin is intact with no erythema, warmth or drainage  Motor strength intact distally  Sensation to light touch is normal in the axillary, radial, ulnar, and median nerve distributions.  Fingers warm and perfused    RADIOGRAPHIC EXAMINATION/DIAGNOSTICS:  Procedure: MRI shoulder right wo contrast    Result Date: 12/26/2023  Narrative: MRI SHOULDER RIGHT WO CONTRAST INDICATION:   M25.511: Pain in right shoulder W10.8XXD: Fall (on) (from) other stairs and steps, subsequent encounter M19.011: Primary osteoarthritis, right shoulder M25.511: Pain in  right shoulder S46.001A: Unspecified injury of muscle(s) and tendon(s) of the rotator cuff of right shoulder, initial encounter R29.898: Other symptoms and signs involving the musculoskeletal system. Shoulder pain status post fall. COMPARISON: MRI right shoulder dated July 8, 2022 TECHNIQUE:  Multiplanar/multisequence MR of the right shoulder was performed. FINDINGS: SUBCUTANEOUS TISSUES: Normal JOINT EFFUSION: None. ACROMION PROCESS: Normal. ROTATOR CUFF: Full thickness tear of superior band of subscapularis at the subcoracoid space with the torn tendon edge proximally retracted by 1.9 cm (series 4 image 16, series 6 images 7-14). Mild supraspinatus and infraspinatus tendinosis with small low-grade interstitial tears. Intact teres minor. No muscle atrophy. SUBACROMIAL/SUBDELTOID BURSA: Trace bursal fluid. LONG HEAD OF BICEPS TENDON: Normal. GLENOID LABRUM: Mild degenerative changes of the superior labrum. GLENOHUMERAL JOINT: Intact. ACROMIOCLAVICULAR JOINT: There is mild osteoarthritis. BONES: Normal.     Impression: 1. Full thickness tear of superior band of subscapularis at the subcoracoid space with the torn tendon edge proximally retracted by 1.9 cm. No muscle atrophy. 2. Mild supraspinatus and infraspinatus tendinosis with small low-grade interstitial tears. 3. Mild acromioclavicular osteoarthritis. Workstation performed: [id number]     Procedure: XR shoulder 2+ views RIGHT    Result Date: 11/29/2023  Narrative: RIGHT SHOULDER INDICATION:   right shoulder pain. COMPARISON:  None VIEWS:  XR SHOULDER 2+ VW RIGHT FINDINGS: There is no acute fracture or dislocation. Mild osteoarthritis glenohumeral joint. No lytic or blastic osseous lesion. Soft tissues are unremarkable.     Impression: No acute osseous abnormality. Workstation performed: [id number]      ASSESSMENT/PLAN:  1. Right shoulder full thickness rotator cuff tear    Callum AZUL Nielson injured their shoulder acutely, resulting in a traumatic rotator cuff  tear. We discussed the importance of the rotator cuff in the stability and strength of the shoulder and other overhead activities. We had an extensive discussion regarding the diagnosis and its natural history. We also discussed both non-operative and operative treatment strategies. In an active individual who is frequently using the upper extremity for work and other activities, I would recommend rotator cuff repair. he as continued to experience significant symptoms and dysfunction and is ready to proceed with surgery. I certainly understand and am in agreement.    Pending medical clearance, we will plan to proceed with shoulder arthroscopy with rotator cuff debridement versus repair, possible biceps tenodesis, subacromial decompression,  and all other indicated procedures. We described the significant post-operative recovery, including a minimum of 6 to 9 months return to full strenuous upper extremity activity if everything goes well. We discussed the anticipated pre, intra, and postoperative course in great detail. Specifically, we discussed the recovery and rehabilitation protocol and time lines.There is certainly a risk of reinjury upon returning full strenuous activities.    We discussed risks of surgery, which include shoulder stiffness, infection, risk of reinjury and future arthritis.    Educated on importance of smoking cessation and alcohol cessation in helping improve clinical outcomes.    Patient will schedule rotator cuff repair surgery. he will need a preoperative clearance/physical appointment and physical therapy.      Scribe Attestation      I,:  Jeremi Jefferson PA-C am acting as a scribe while in the presence of the attending physician.:       I,:  Waldo Chambers MD personally performed the services described in this documentation    as scribed in my presence.:

## 2024-01-06 LAB
ATRIAL RATE: 0 BPM
ATRIAL RATE: 91 BPM
P AXIS: 81 DEGREES
PR INTERVAL: 156 MS
QRS AXIS: -47 DEGREES
QRS AXIS: 0 DEGREES
QRSD INTERVAL: 0 MS
QRSD INTERVAL: 82 MS
QT INTERVAL: 0 MS
QT INTERVAL: 344 MS
QTC INTERVAL: 0 MS
QTC INTERVAL: 423 MS
T WAVE AXIS: 0 DEGREES
T WAVE AXIS: 48 DEGREES
VENTRICULAR RATE: 0 BPM
VENTRICULAR RATE: 91 BPM

## 2024-01-06 PROCEDURE — 93010 ELECTROCARDIOGRAM REPORT: CPT | Performed by: INTERNAL MEDICINE

## 2024-01-10 ENCOUNTER — OFFICE VISIT (OUTPATIENT)
Dept: FAMILY MEDICINE CLINIC | Facility: CLINIC | Age: 59
End: 2024-01-10
Payer: COMMERCIAL

## 2024-01-10 ENCOUNTER — ANESTHESIA EVENT (OUTPATIENT)
Age: 59
End: 2024-01-10
Payer: COMMERCIAL

## 2024-01-10 VITALS
WEIGHT: 250.6 LBS | OXYGEN SATURATION: 90 % | RESPIRATION RATE: 18 BRPM | TEMPERATURE: 98.1 F | HEIGHT: 71 IN | BODY MASS INDEX: 35.08 KG/M2 | SYSTOLIC BLOOD PRESSURE: 134 MMHG | HEART RATE: 108 BPM | DIASTOLIC BLOOD PRESSURE: 80 MMHG

## 2024-01-10 DIAGNOSIS — I48.92 ATRIAL FLUTTER, UNSPECIFIED TYPE (HCC): ICD-10-CM

## 2024-01-10 DIAGNOSIS — F41.8 SITUATIONAL ANXIETY: ICD-10-CM

## 2024-01-10 DIAGNOSIS — E66.9 OBESITY (BMI 30-39.9): ICD-10-CM

## 2024-01-10 DIAGNOSIS — E11.65 TYPE 2 DIABETES MELLITUS WITH HYPERGLYCEMIA, WITHOUT LONG-TERM CURRENT USE OF INSULIN (HCC): Primary | ICD-10-CM

## 2024-01-10 DIAGNOSIS — G47.33 OSA (OBSTRUCTIVE SLEEP APNEA): ICD-10-CM

## 2024-01-10 DIAGNOSIS — F17.219 CIGARETTE NICOTINE DEPENDENCE WITH NICOTINE-INDUCED DISORDER: ICD-10-CM

## 2024-01-10 DIAGNOSIS — Z78.9 ALCOHOL CONSUMPTION HEAVY: ICD-10-CM

## 2024-01-10 DIAGNOSIS — I10 ESSENTIAL HYPERTENSION: ICD-10-CM

## 2024-01-10 DIAGNOSIS — J44.1 COPD EXACERBATION (HCC): ICD-10-CM

## 2024-01-10 DIAGNOSIS — J44.9 CHRONIC OBSTRUCTIVE PULMONARY DISEASE, UNSPECIFIED COPD TYPE (HCC): ICD-10-CM

## 2024-01-10 PROBLEM — Z72.0 TOBACCO USE: Status: RESOLVED | Noted: 2021-06-27 | Resolved: 2024-01-10

## 2024-01-10 LAB — SL AMB POCT HEMOGLOBIN AIC: 7.7 (ref ?–6.5)

## 2024-01-10 PROCEDURE — 83036 HEMOGLOBIN GLYCOSYLATED A1C: CPT | Performed by: NURSE PRACTITIONER

## 2024-01-10 PROCEDURE — 99213 OFFICE O/P EST LOW 20 MIN: CPT | Performed by: NURSE PRACTITIONER

## 2024-01-10 RX ORDER — ALPRAZOLAM 0.5 MG/1
TABLET ORAL
Qty: 30 TABLET | Refills: 0 | Status: SHIPPED | OUTPATIENT
Start: 2024-01-10

## 2024-01-10 NOTE — ASSESSMENT & PLAN NOTE
Continues on eliquis and metoprolol. Patient stopped eliquis yesterday on his own in anticipation of upcoming surgery. He has an appointment for cardiac clearance on 1/12.

## 2024-01-10 NOTE — PROGRESS NOTES
INTERNAL MEDICINE PRE-OPERATIVE EVALUATION  [facility]    NAME: Callum Nielson  AGE: 58 y.o. SEX: male  : 1965     DATE: 1/10/2024     Internal Medicine Pre-Operative Evaluation:     Chief Complaint: Pre-operative Evaluation     Surgery:     REPAIR ROTATOR CUFF  ARTHROSCOPIC (Right: Shoulder)       ARTHROSCOPIC SUBACROMIAL DECOMPRESSION (Right: Shoulder)       DISTAL CLAVICLE EXCISION (Right: Shoulder)       ARTHROSCOPIC BICEPS TENODESIS   Anticipated Date of Surgery: 2024  Referring Provider: Waldo Chambers MD        History of Present Illness:     Callum Nielson is a 58 y.o. male who presents to the office today for a preoperative consultation at the request of surgeon, Waldo chambers MD, who plans on performing see above on 2024. Planned anesthesia is general. Patient has a bleeding risk of: no recent abnormal bleeding. Current anti-platelet/anti-coagulation medications that the patient is prescribed includes: Apixaban (Eliquis).      Assessment of Chronic Conditions:   1. Type 2 diabetes mellitus with hyperglycemia, without long-term current use of insulin ([facility])  Assessment & Plan:    Lab Results   Component Value Date    HGBA1C 7.7 (A) 01/10/2024   Increased since last visit. Not taking metformin twice daily as recommended.  Will start taking twice daily. Recently stopped drinking alcohol, was drinking a bottle of christopher daily. Weight loss recommended    Orders:  -     POCT hemoglobin A1c  -     Hemoglobin A1C; Future; Expected date: 04/10/2024    2. Situational anxiety  -     ALPRAZolam (XANAX) 0.5 mg tablet; Take one pill at bedtime    3. Atrial flutter, unspecified type ([facility])  Assessment & Plan:  Continues on eliquis and metoprolol. Patient stopped eliquis yesterday on his own in anticipation of upcoming surgery. He has an appointment for cardiac clearance on .      4. COPD exacerbation ([facility])    5. Chronic obstructive pulmonary  disease, unspecified COPD type (HCC)  Assessment & Plan:  Quit smoking two days ago in anticipation of upcoming surgery       6. NAYE (obstructive sleep apnea)  Assessment & Plan:  Never tested in the past but when hospitalized they recommended a sleep study. This was ordered in June. The patient has not yet scheduled the test. Encouraged the patient to schedule after upcoming surgery. He is aware of increased risk of cardiovascular event with untreated NAYE      7. Essential hypertension  Assessment & Plan:  Controlled at present time. Patient recently quit smoking and drinking alcohol. Continue losartan and metoprolol. Weight loss recommended      8. Alcohol consumption heavy  Assessment & Plan:  Was drinking one bottle of christopher daily. Recently quit approximately one week ago. Continue to refrain.       9. Cigarette nicotine dependence with nicotine-induced disorder  Assessment & Plan:  Quit smoking two days ago.  Continue to refrain from smoking      10. Obesity (BMI 30-39.9)  Assessment & Plan:  BMI in the office today is 34.95.  Exercise and weight loss recommended.              Assessment of Cardiac Risk:  Denies unstable or severe angina or MI in the last 6 weeks or history of stent placement in the last year   Denies decompensated heart failure (e.g. New onset heart failure, NYHA functional class IV heart failure, or worsening existing heart failure)  Denies significant arrhythmias such as high grade AV block, symptomatic ventricular arrhythmia, newly recognized ventricular tachycardia, supraventricular tachycardia with resting heart rate >100, or symptomatic bradycardia  Denies severe heart valve disease including aortic stenosis or symptomatic mitral stenosis     Exercise Capacity:  Able to walk 4 blocks without symptoms?: Yes  Able to walk 2 flights without symptoms?: Yes    Prior Anesthesia Reactions: No     Personal history of venous thromboembolic disease? No    History of steroid use for >2 weeks  within last year? No    STOP-BANG Sleep Apnea Screening Questionnaire:      Do you SNORE loudly (louder than talking or loud enough to be heard through closed doors)? Yes = 1 point   Do you often feel TIRED, fatigued, or sleepy during daytime? Yes = 1 point   Has anyone OBSERVED you stop breathing during your sleep? Yes = 1 point   Do you have or are you being treated for high blood pressure? Yes = 1 point   BMI more than 35 kg/m2? Yes = 1 point   AGE over 50 years old? Yes = 1 point   NECK circumference > 16 inches (40 cm)? Yes = 1 point   Male GENDER? Yes = 1 point   TOTAL SCORE 8 = HIGH risk of NAYE       Review of Systems:     Review of Systems   Constitutional: Negative.  Negative for fatigue.   HENT: Negative.  Negative for congestion, postnasal drip, rhinorrhea and trouble swallowing.    Eyes: Negative.  Negative for visual disturbance.   Respiratory: Negative.  Negative for choking and shortness of breath.    Cardiovascular: Negative.  Negative for chest pain.   Gastrointestinal: Negative.    Endocrine: Negative.    Genitourinary: Negative.    Musculoskeletal:  Positive for arthralgias. Negative for back pain, myalgias and neck pain.   Skin: Negative.    Neurological:  Negative for dizziness and headaches.   Psychiatric/Behavioral: Negative.          Problem List:     Patient Active Problem List   Diagnosis   • Acne   • COPD (chronic obstructive pulmonary disease) ([facility])   • Depression with anxiety   • Eczema   • Hyperlipidemia   • Essential hypertension   • Type 2 diabetes mellitus with hyperglycemia, without long-term current use of insulin ([facility])   • Insomnia   • Irritable bowel syndrome   • Nicotine dependence   • Obesity (BMI 30-39.9)   • Psoriasis   • Atrial flutter ([facility])   • Alcohol consumption heavy   • Subacromial bursitis of right shoulder joint   • Adhesive capsulitis of right shoulder   • NAYE (obstructive sleep apnea)   • Leukocytosis   • Anticoagulation adequate   • Dysphagia        Allergies:      Allergies   Allergen Reactions   • Lisinopril Cough   • Spiriva Respimat [Tiotropium Bromide Monohydrate] Anxiety        Current Medications:       Current Outpatient Medications:   •  albuterol (PROVENTIL HFA,VENTOLIN HFA) 90 mcg/act inhaler, INHALE 2 PUFFS EVERY 6 HOURS AS NEEDED FOR WHEEZING, Disp: 18 g, Rfl: 2  •  ALPRAZolam (XANAX) 0.5 mg tablet, Take one pill at bedtime, Disp: 30 tablet, Rfl: 0  •  betamethasone, augmented, (DIPROLENE) 0.05 % ointment, PLEASE SEE ATTACHED FOR DETAILED DIRECTIONS, Disp: , Rfl:   •  folic acid (FOLVITE) 1 mg tablet, Take 1 tablet (1 mg total) by mouth in the morning., Disp: , Rfl: 0  •  losartan (COZAAR) 50 mg tablet, TAKE 1 TABLET BY MOUTH EVERY DAY, Disp: 90 tablet, Rfl: 3  •  metFORMIN (GLUCOPHAGE) 1000 MG tablet, TAKE 1 TABLET BY MOUTH TWICE A DAY WITH FOOD, Disp: 180 tablet, Rfl: 3  •  metoprolol tartrate (LOPRESSOR) 50 mg tablet, TAKE 1 TABLET BY MOUTH EVERY 12 HOURS, Disp: 180 tablet, Rfl: 3  •  multivitamin (THERAGRAN) TABS, Take 1 tablet by mouth daily, Disp: , Rfl:   •  thiamine 100 MG tablet, Take 1 tablet (100 mg total) by mouth in the morning., Disp: , Rfl: 0  •  calcipotriene (DOVONEX) 0.005 % cream, APPLY TOPICALLY TWICE A DAY TO AREAS OF PSORIASIS MONDAY - FRIDAY (Patient not taking: Reported on 1/5/2024), Disp: , Rfl:   •  Eliquis 5 MG, TAKE 1 TABLET BY MOUTH TWICE A DAY (Patient not taking: Reported on 6/21/2023), Disp: 60 tablet, Rfl: 11     Past History:     Past Medical History:   Diagnosis Date   • Adhesive capsulitis of right shoulder 05/17/2022   • Anxiety     Last assessed 12/20/2013   • Contusion of right wrist 01/30/2018   • COPD (chronic obstructive pulmonary disease) ([facility]) 08/16/2016   • COPD (chronic obstructive pulmonary disease) ([facility]) 08/16/2016   • Depression with anxiety    • Diverticulitis 2016   • Eczema 02/24/2016   • Elevated ALT measurement 08/16/2016   • Hypertension    • Obesity 09/25/2012   • Perianal abscess 03/05/2019   • Type 2  "diabetes mellitus with hyperglycemia, without long-term current use of insulin (HCC)         Past Surgical History:   Procedure Laterality Date   • COLONOSCOPY     • KNEE SURGERY          Family History   Problem Relation Age of Onset   • Cancer Mother         Lung Cancer   • Cancer Father    • Diabetes Father    • Diabetes Sister         Social History     Socioeconomic History   • Marital status: /Civil Union     Spouse name: Not on file   • Number of children: Not on file   • Years of education: Not on file   • Highest education level: Not on file   Occupational History   • Not on file   Tobacco Use   • Smoking status: Former     Current packs/day: 0.00     Average packs/day: 1 pack/day for 35.0 years (35.0 ttl pk-yrs)     Types: Cigarettes     Quit date: 1/8/2024   • Smokeless tobacco: Never   Vaping Use   • Vaping status: Never Used   Substance and Sexual Activity   • Alcohol use: Not Currently     Alcohol/week: 70.0 standard drinks of alcohol     Types: 70 Shots of liquor per week     Comment: 10 shots/day   • Drug use: No   • Sexual activity: Not on file   Other Topics Concern   • Not on file   Social History Narrative   • Not on file     Social Determinants of Health     Financial Resource Strain: Not on file   Food Insecurity: Not on file   Transportation Needs: Not on file   Physical Activity: Not on file   Stress: Not on file   Social Connections: Not on file   Intimate Partner Violence: Not on file   Housing Stability: Not on file        Physical Exam:      /80 (BP Location: Left arm, Patient Position: Sitting, Cuff Size: Adult)   Pulse (!) 108   Temp 98.1 °F (36.7 °C) (Tympanic)   Resp 18   Ht 5' 11\" (1.803 m)   Wt 114 kg (250 lb 9.6 oz)   SpO2 90%   BMI 34.95 kg/m²     Physical Exam  Vitals reviewed.   Constitutional:       General: He is not in acute distress.     Appearance: Normal appearance. He is well-developed. He is obese.   HENT:      Head: Normocephalic and atraumatic.      " Right Ear: External ear normal. There is impacted cerumen.      Left Ear: External ear normal. There is impacted cerumen.      Nose: Nose normal.      Mouth/Throat:      Mouth: Mucous membranes are moist.      Pharynx: Oropharynx is clear. No oropharyngeal exudate.   Eyes:      General:         Right eye: No discharge.         Left eye: No discharge.      Conjunctiva/sclera: Conjunctivae normal.      Pupils: Pupils are equal, round, and reactive to light.   Neck:      Thyroid: No thyromegaly.      Vascular: No JVD.      Trachea: No tracheal deviation.   Cardiovascular:      Rate and Rhythm: Normal rate and regular rhythm.      Pulses: Normal pulses.      Heart sounds: Normal heart sounds. No murmur heard.  Pulmonary:      Effort: Pulmonary effort is normal. No respiratory distress.      Breath sounds: Normal breath sounds. No wheezing.   Abdominal:      General: Bowel sounds are normal.      Palpations: Abdomen is soft.      Tenderness: There is no abdominal tenderness.   Musculoskeletal:         General: Normal range of motion.      Cervical back: Normal range of motion and neck supple.   Lymphadenopathy:      Cervical: No cervical adenopathy.   Skin:     General: Skin is warm and dry.      Capillary Refill: Capillary refill takes less than 2 seconds.   Neurological:      General: No focal deficit present.      Mental Status: He is alert and oriented to person, place, and time. Mental status is at baseline.   Psychiatric:         Mood and Affect: Mood normal.         Behavior: Behavior normal.         Thought Content: Thought content normal.         Judgment: Judgment normal.           Data:     Pre-operative work-up    Laboratory Results: I have personally reviewed the pertinent laboratory results/reports     EKG: I have personally reviewed pertinent reports.      Chest x-ray:  not performed at time of appointment           Plan:     58 y.o. male with planned surgery:     REPAIR ROTATOR CUFF  ARTHROSCOPIC (Right:  Shoulder)       ARTHROSCOPIC SUBACROMIAL DECOMPRESSION (Right: Shoulder)       DISTAL CLAVICLE EXCISION (Right: Shoulder)       ARTHROSCOPIC BICEPS TENODESIS .      Cardiac Risk Estimation: per the Revised Cardiac Risk Index (Circ. 100:1043, 1999), the patient's risk factors for cardiac complications include  obesity, diabetes, suspected sleep apnea , putting him in: RCI RISK CLASS II (1 risk factor, risk of major cardiac compl. appr. 1.3%).    1. Further preoperative workup as follows:   - None; no further preoperative work-up is required    2. Medication Management/Recommendations:   -surgeon office to provide patient with pre op medication instructions    3. Prophylaxis for cardiac events with perioperative beta-blockers: not indicated.    4. Patient requires further consultation with: Cardiology    Clearance  Patient is CLEARED for surgery without any additional cardiac testing. He has an appointment on 1/12/2024 for cardiac clearance.      LARA Larson  [address] [address]  Phone#  [phone]  Fax#  [phone]

## 2024-01-10 NOTE — ASSESSMENT & PLAN NOTE
Controlled at present time. Patient recently quit smoking and drinking alcohol. Continue losartan and metoprolol. Weight loss recommended

## 2024-01-10 NOTE — PATIENT INSTRUCTIONS
DASH Eating Plan   WHAT YOU NEED TO KNOW:   The DASH (Dietary Approaches to Stop Hypertension) Eating Plan is designed to help prevent or lower high blood pressure. It can also help to lower LDL (bad) cholesterol and decrease your risk for heart disease. The plan is low in sodium, sugar, unhealthy fats, and total fat. It is high in potassium, calcium, magnesium, and fiber. These nutrients are added when you eat more fruits, vegetables, and whole grains. With the DASH eating plan, you need to eat a certain number of servings from each food group. This will help you get enough of certain nutrients and limit others. The amount of servings you should eat depends on how many calories you need. Your dietitian can help you create meal plans with the right number of servings for each food group.       DISCHARGE INSTRUCTIONS:   What you need to know about sodium:  Your dietitian will tell you how much sodium is safe for you to have each day. People with high blood pressure should have no more than 1,500 to 2,300 mg of sodium in a day. A teaspoon (tsp) of salt has 2,300 mg of sodium. This may seem like a difficult goal, but small changes to the foods you eat can make a big difference. Your healthcare provider or dietitian can help you create a meal plan that follows your sodium limit.  Read food labels.  Food labels can help you choose foods that are low in sodium. The amount of sodium is listed in milligrams (mg). The % Daily Value (DV) column tells you how much of your daily needs are met by 1 serving of the food for each nutrient listed. Choose foods that have less than 5% of the DV of sodium. These foods are considered low in sodium. Foods that have 20% or more of the DV of sodium are considered high in sodium. Avoid foods that have more than 300 mg of sodium in each serving. Choose foods that say low-sodium, reduced-sodium, or no salt added on the food label.         Limit added salt.  Do not salt food at the table if  you add salt when you cook. Use herbs and spices, such as onions, garlic, and salt-free seasonings to add flavor. Try lemon or lime juice or vinegar to add a tart flavor. Use hot peppers or a small amount of hot pepper sauce to add a spicy flavor. Limit foods high in added salt, such as the following:    Seasonings made with salt, such as garlic salt, celery salt, onion salt, seasoned salt, meat tenderizers, and monosodium glutamate (MSG)    Miso soup and canned or dried soup mixes    Regular soy sauce, barbecue sauce, teriyaki sauce, steak sauce, Worcestershire sauce, and most flavored vinegars    Snack foods, such as salted chips, popcorn, pretzels, pork rinds, salted crackers, and salted nuts    Frozen foods, such as dinners, entrees, vegetables with sauces, and breaded meats    Ask about salt substitutes.  Ask your healthcare provider if you may use salt substitutes. Some salt substitutes have ingredients that can be harmful if you have certain health conditions.    Choose foods carefully at restaurants.  Meals from restaurants, especially fast food restaurants, are often high in sodium. Some restaurants have nutrition information that tells you the amount of sodium in their foods. Ask to have your food prepared with less, or no salt.    What you need to know about fats:  Healthy fats include unsaturated fats and omega-3 fatty acids. Unhealthy fats include saturated fats and trans fats.  Include healthy fats, such as the following:      Cooking oils, such as soybean, canola, olive, or sunflower    Fatty fish, such as salmon, tuna, mackerel, or sardines    Flaxseed oil or ground flaxseed    ½ cup of cooked beans, such as black beans, kidney beans, or robin beans    1½ ounces of low-sodium nuts, such as almonds or walnuts    Low-sugar, low-sodium peanut butter    Seeds such as julian seeds or sunflower seeds       Limit or do not have unhealthy fats, such as the following:      Foods that contain fat from animals,  such as fatty meats, whole milk, butter, and cream    Shortening, stick margarine, palm oil, and coconut oil    Full-fat or creamy salad dressing    Creamy soup    Crackers, chips, and baked goods made with margarine or shortening    Foods that are fried in unhealthy fats    Gravy and sauces, such as Neel or cheese sauces    What you need to know about carbohydrates (carbs):  All carbs break down into sugar. Complex carbs contain more fiber than simple carbs. This means complex carbs go into the bloodstream more slowly and cause less of a blood sugar spike. Try to include more complex carbs and fewer simple carbs.  Include complex carbs, such as the followin slice of whole-grain bread    1 ounce of dry cereal that does not contain added sugar    ½ cup of cooked oatmeal    2 ounces of cooked whole-grain pasta    ½ cup of cooked brown rice    Limit or do not have simple carbs, such as the following:      Baked goods, such as doughnuts, pastries, and cookies    Mixes for cornbread and biscuits    White rice and pasta mixes, such as boxed macaroni and cheese    Instant and cold cereals that contain sugar    Jelly, jam, and ice cream that contain sugar    Condiments such as ketchup    Drinks high in sugar, such as soft drinks, lemonade, and fruit juice    What you need to know about vegetables and fruits:  Vegetables and fruits can be fresh, frozen, or canned. If possible, try to choose low-sodium canned options.  Include a variety of vegetables and fruits, such as the followin medium apple, pear, or peach (about ½ cup chopped)    ½ small banana    ½ cup berries, such as blueberries, strawberries, or blackberries    1 cup of raw leafy greens, such as lettuce, spinach, kale, or barb greens    ½ cup of frozen or canned (no added salt) vegetables, such as green beans    ½ cup of fresh, frozen, or canned fruit (canned in light syrup or fruit juice)    ½ cup of vegetable or fruit juice    Limit or do  not have vegetables and fruits made in the following ways:      Frozen fruit such as cherries that have added sugar    Fruit in cream or butter sauce    Canned vegetables that are high in sodium    Sauerkraut, pickled vegetables, and other foods prepared in brine    Fried vegetables or vegetables in butter or high-fat sauces    What you need to know about protein foods:   Include lean or low-fat protein foods, such as the following:      Poultry (chicken, turkey) with no skin    Fish (especially fatty fish, such as salmon, fresh tuna, or mackerel)    Lean beef and pork (loin, round, extra lean hamburger)    Egg whites and egg substitutes    1 cup of nonfat (skim) or 1% milk    1½ ounces of fat-free or low-fat cheese    6 ounces of nonfat or low-fat yogurt    Limit or do not have high-fat protein foods, such as the following:      Smoked or cured meat, such as corned beef, witt, ham, hot dogs, and sausage    Canned beans and canned meats or spreads, such as potted meats, sardines, anchovies, and imitation seafood    Deli or lunch meats, such as bologna, ham, turkey, and roast beef    High-fat meat (T-bone steak, regular hamburger, and ribs)    Whole eggs and egg yolks    Whole milk, 2% milk, and cream    Regular cheese and processed cheese    Other guidelines to follow:   Maintain a healthy weight.  Your risk for heart disease is higher if you have extra weight. Ask your healthcare provider what a healthy weight is for you. Your provider may suggest that you lose weight. You can lose weight by eating fewer calories and foods that have added sugars and fat. The DASH meal plan can help you do this. Decrease calories by eating smaller portions at each meal and fewer snacks. Ask your provider for more information about how to lose weight.    Exercise regularly.  Regular exercise can help you reach or maintain a healthy weight. Regular exercise can also help decrease your blood pressure and improve your cholesterol  levels. Get 30 minutes or more of moderate exercise each day of the week. To lose weight, get at least 60 minutes of exercise. Talk to your healthcare provider about the best exercise program for you.         Limit alcohol.  Women should limit alcohol to 1 drink a day. Men should limit alcohol to 2 drinks a day. A drink of alcohol is 12 ounces of beer, 5 ounces of wine, or 1½ ounces of liquor.    For more information:   [employer] [facility] [facility] [place]  [facility]  [address] [address]  [place] [address]  Phone: [phone]  Web Address: http://www.nhlbi.nih.gov/health/infoctr/index.htm    © Copyright [employer] 2023 Information is for End User's use only and may not be sold, redistributed or otherwise used for commercial purposes.  The above information is an  only. It is not intended as medical advice for individual conditions or treatments. Talk to your doctor, nurse or pharmacist before following any medical regimen to see if it is safe and effective for you.

## 2024-01-10 NOTE — ASSESSMENT & PLAN NOTE
Was drinking one bottle of christopher daily. Recently quit approximately one week ago. Continue to refrain.

## 2024-01-10 NOTE — ASSESSMENT & PLAN NOTE
Lab Results   Component Value Date    HGBA1C 7.7 (A) 01/10/2024   Increased since last visit. Not taking metformin twice daily as recommended.  Will start taking twice daily. Recently stopped drinking alcohol, was drinking a bottle of christopher daily. Weight loss recommended

## 2024-01-11 ENCOUNTER — TELEPHONE (OUTPATIENT)
Dept: OBGYN CLINIC | Facility: CLINIC | Age: 59
End: 2024-01-11

## 2024-01-11 NOTE — PRE-PROCEDURE INSTRUCTIONS
Pre-Surgery Instructions:   Medication Instructions    albuterol (PROVENTIL HFA,VENTOLIN HFA) 90 mcg/act inhaler Uses PRN- OK to take day of surgery    ALPRAZolam (XANAX) 0.5 mg tablet Take night before surgery    folic acid (FOLVITE) 1 mg tablet Hold day of surgery.    losartan (COZAAR) 50 mg tablet Hold day of surgery.    metFORMIN (GLUCOPHAGE) 1000 MG tablet Hold day of surgery.    metoprolol tartrate (LOPRESSOR) 50 mg tablet Take day of surgery.    multivitamin (THERAGRAN) TABS Hold day of surgery.    thiamine 100 MG tablet Hold day of surgery.    Medication instructions for day surgery reviewed. Please use only a sip of water to take your instructed medications. Avoid all over the counter vitamins, supplements and NSAIDS for one week prior to surgery per anesthesia guidelines. Tylenol is ok to take as needed.     You will receive a call one business day prior to surgery with an arrival time and hospital directions. If your surgery is scheduled on a Monday, the hospital will be calling you on the Friday prior to your surgery. If you have not heard from anyone by 8pm, please call the hospital supervisor through the hospital  at [phone]. (Jose David [phone]).    Do not eat or drink anything after midnight the night before your surgery, including candy, mints, lifesavers, or chewing gum. Do not drink alcohol 24hrs before your surgery. Try not to smoke at least 24hrs before your surgery.       Follow the pre surgery showering instructions as listed in the “My Surgical Experience Booklet” or otherwise provided by your surgeon's office. Do not use a blade to shave the surgical area 1 week before surgery. It is okay to use a clean electric clippers up to 24 hours before surgery. Do not apply any lotions, creams, including makeup, cologne, deodorant, or perfumes after showering on the day of your surgery. Do not use dry shampoo, hair spray, hair gel, or any type of hair products.     No contact lenses,  eye make-up, or artificial eyelashes. Remove nail polish, including gel polish, and any artificial, gel, or acrylic nails if possible. Remove all jewelry including rings and body piercing jewelry.     Wear causal clothing that is easy to take on and off. Consider your type of surgery.    Keep any valuables, jewelry, piercings at home. Please bring any specially ordered equipment (sling, braces) if indicated.    Arrange for a responsible person to drive you to and from the hospital on the day of your surgery. Visitor Guidelines discussed.     Call the surgeon's office with any new illnesses, exposures, or additional questions prior to surgery.    Please reference your “My Surgical Experience Booklet” for additional information to prepare for your upcoming surgery.

## 2024-01-11 NOTE — TELEPHONE ENCOUNTER
Called and spoke with patient to inform that he does not need chest xray for PAT.. only labs and EKG, and any other clearances. Patient understood.

## 2024-01-12 ENCOUNTER — CONSULT (OUTPATIENT)
Dept: CARDIOLOGY CLINIC | Facility: CLINIC | Age: 59
End: 2024-01-12
Payer: COMMERCIAL

## 2024-01-12 VITALS
WEIGHT: 250.1 LBS | HEART RATE: 86 BPM | BODY MASS INDEX: 35.01 KG/M2 | OXYGEN SATURATION: 92 % | SYSTOLIC BLOOD PRESSURE: 128 MMHG | DIASTOLIC BLOOD PRESSURE: 70 MMHG | HEIGHT: 71 IN

## 2024-01-12 DIAGNOSIS — I10 ESSENTIAL HYPERTENSION: Primary | ICD-10-CM

## 2024-01-12 DIAGNOSIS — I48.0 PAROXYSMAL ATRIAL FIBRILLATION (HCC): ICD-10-CM

## 2024-01-12 DIAGNOSIS — I48.92 ATRIAL FLUTTER, UNSPECIFIED TYPE (HCC): ICD-10-CM

## 2024-01-12 DIAGNOSIS — Z01.810 PREOPERATIVE CARDIOVASCULAR EXAMINATION: ICD-10-CM

## 2024-01-12 PROCEDURE — 99214 OFFICE O/P EST MOD 30 MIN: CPT | Performed by: INTERNAL MEDICINE

## 2024-01-12 NOTE — PROGRESS NOTES
[facility]    Name:Callum Nielson   DOS: 1/12/2024     Chief Complaint:   Chief Complaint   Patient presents with    Follow-up     Cardiac clearance, no cardiac complaints        HISTORY OF PRESENT ILLNESS:    HPI:  Callum Nielson is a 58 y.o. male. He  has a past medical history of Adhesive capsulitis of right shoulder (05/17/2022), Anxiety, Contusion of right wrist (01/30/2018), COPD (chronic obstructive pulmonary disease) ([facility]) (08/16/2016), COPD (chronic obstructive pulmonary disease) ([facility]) (08/16/2016), Depression with anxiety, Diverticulitis (2016), Eczema (02/24/2016), Elevated ALT measurement (08/16/2016), History of atrial fibrillation, Hypertension, Obesity (09/25/2012), Perianal abscess (03/05/2019), and Type 2 diabetes mellitus with hyperglycemia, without long-term current use of insulin ([facility]).    HE presents to establish care with a general cardiologist to discuss pre-operative cardiac risk assessment prior to planned arthroscopic right shoulder rotator cuff repair with Dr. Chambers under local and general anesthesia. The procedure is scheduled to be performed on 1/17/24 at our [place] [facility].     He is known to our group as he sees my colleague Dr. Bonds with EP for management of paroxysmal atrial fibrillation. He is s/p atrial flutter ablation in the past.  Maintained chronically on Eliquis 5 mg twice daily for primary thromboembolism prophylaxis in the setting of his arrhythmia, patient reports that he has not been taking Eliquis recently and made this decision on his own.    Today, he reports no active cardiopulmonary complaints.  Specifically denies chest pain, shortness of breath, diaphoresis, dizziness, palpitations, orthopnea, edema.  He has had no syncopal episodes.  Reports no issues with bruising or bleeding while he was taking Eliquis.  Reports a good functional capacity greater than 4 metabolic equivalents, stating that he is able to walk on a flat surface  greater than 4 blocks and/or climbs greater than 2 flights of stairs without angina or limiting dyspnea.         ROS    ROS: Pertinent positives and negatives as described in History of Present Illness. Remainder of a 14 point review of systems was negative.     Allergies   Allergen Reactions    Lisinopril Cough    Spiriva Respimat [Tiotropium Bromide Monohydrate] Anxiety        Current Outpatient Medications on File Prior to Visit   Medication Sig Dispense Refill    albuterol (PROVENTIL HFA,VENTOLIN HFA) 90 mcg/act inhaler INHALE 2 PUFFS EVERY 6 HOURS AS NEEDED FOR WHEEZING 18 g 2    ALPRAZolam (XANAX) 0.5 mg tablet Take one pill at bedtime 30 tablet 0    folic acid (FOLVITE) 1 mg tablet Take 1 tablet (1 mg total) by mouth in the morning.  0    losartan (COZAAR) 50 mg tablet TAKE 1 TABLET BY MOUTH EVERY DAY 90 tablet 3    metFORMIN (GLUCOPHAGE) 1000 MG tablet TAKE 1 TABLET BY MOUTH TWICE A DAY WITH FOOD 180 tablet 3    metoprolol tartrate (LOPRESSOR) 50 mg tablet TAKE 1 TABLET BY MOUTH EVERY 12 HOURS 180 tablet 3    multivitamin (THERAGRAN) TABS Take 1 tablet by mouth daily      thiamine 100 MG tablet Take 1 tablet (100 mg total) by mouth in the morning.  0    betamethasone, augmented, (DIPROLENE) 0.05 % ointment PLEASE SEE ATTACHED FOR DETAILED DIRECTIONS (Patient not taking: Reported on 1/11/2024)      calcipotriene (DOVONEX) 0.005 % cream APPLY TOPICALLY TWICE A DAY TO AREAS OF PSORIASIS MONDAY - FRIDAY (Patient not taking: Reported on 1/5/2024)      Eliquis 5 MG TAKE 1 TABLET BY MOUTH TWICE A DAY (Patient not taking: Reported on 6/21/2023) 60 tablet 11     No current facility-administered medications on file prior to visit.       Past Medical History:   Diagnosis Date    Adhesive capsulitis of right shoulder 05/17/2022    Anxiety     Last assessed 12/20/2013    Contusion of right wrist 01/30/2018    COPD (chronic obstructive pulmonary disease) ([facility]) 08/16/2016    COPD (chronic obstructive pulmonary disease)  "([facility]) 2016    Depression with anxiety     Diverticulitis 2016    Eczema 2016    Elevated ALT measurement 2016    History of atrial fibrillation     Hypertension     Obesity 2012    Perianal abscess 2019    Type 2 diabetes mellitus with hyperglycemia, without long-term current use of insulin (HCC)        Past Surgical History:   Procedure Laterality Date    CARDIAC LOOP RECORDER      COLONOSCOPY      KNEE SURGERY      staph infection       Family History   Problem Relation Age of Onset    Cancer Mother         Lung Cancer    Cancer Father     Diabetes Father     Diabetes Sister        Social History     Socioeconomic History    Marital status: /Civil Union     Spouse name: Not on file    Number of children: Not on file    Years of education: Not on file    Highest education level: Not on file   Occupational History    Not on file   Tobacco Use    Smoking status: Former     Current packs/day: 0.00     Average packs/day: 1 pack/day for 42.0 years (42.0 ttl pk-yrs)     Types: Cigarettes     Start date:      Quit date:      Years since quittin.0    Smokeless tobacco: Never   Vaping Use    Vaping status: Never Used   Substance and Sexual Activity    Alcohol use: Not Currently     Alcohol/week: 70.0 standard drinks of alcohol     Types: 70 Shots of liquor per week     Comment: 10 shots/day-quit 2024    Drug use: No    Sexual activity: Not on file   Other Topics Concern    Not on file   Social History Narrative    Not on file     Social Determinants of Health     Financial Resource Strain: Not on file   Food Insecurity: Not on file   Transportation Needs: Not on file   Physical Activity: Not on file   Stress: Not on file   Social Connections: Not on file   Intimate Partner Violence: Not on file   Housing Stability: Not on file       OBJECTIVE:    /70 (BP Location: Left arm, Patient Position: Sitting, Cuff Size: Standard)   Pulse 86   Ht 5' 11\" (1.803 m)   Wt " 113 kg (250 lb 1.6 oz)   SpO2 92%   BMI 34.88 kg/m²      BP Readings from Last 3 Encounters:   24 128/70   01/10/24 134/80   24 131/84       Wt Readings from Last 3 Encounters:   24 113 kg (250 lb 1.6 oz)   01/10/24 114 kg (250 lb 9.6 oz)   24 112 kg (248 lb)         Physical Exam  Vitals reviewed.   Constitutional:       General: He is not in acute distress.     Appearance: Normal appearance. He is not diaphoretic.   HENT:      Head: Normocephalic and atraumatic.   Eyes:      Conjunctiva/sclera: Conjunctivae normal.   Neck:      Vascular: No carotid bruit or JVD.   Cardiovascular:      Rate and Rhythm: Normal rate and regular rhythm.      Pulses: Normal pulses.      Heart sounds: Normal heart sounds. No murmur heard.     No friction rub. No gallop.   Pulmonary:      Effort: Pulmonary effort is normal.      Breath sounds: Wheezing (Soft end expiratory wheezing) present. No rhonchi or rales.   Abdominal:      General: Abdomen is flat. Bowel sounds are normal.   Musculoskeletal:      Right lower leg: No edema.      Left lower leg: No edema.   Skin:     General: Skin is warm and dry.   Neurological:      General: No focal deficit present.      Mental Status: He is alert and oriented to person, place, and time.   Psychiatric:         Mood and Affect: Mood normal.         Behavior: Behavior normal.                                                       Cardiac testing:   EKG reviewed personally as performed on 24. My read: NSR. Possible LA enlargement. Leftward axis.     Results for orders placed during the hospital encounter of 21    Echo complete with contrast if indicated    Narrative  [address] [address] [phone]    Transthoracic Echocardiogram  2D, M-mode, Doppler, and Color Doppler    Study date:  2021    Patient: LITO ROBIN  MR number: [id number]  Account number: [id number]  : 1965  Age: 56  years  Gender: Male  Status: Inpatient  Location: Bedside  Height: 71 in  Weight: 244.4 lb  BP: 110/ 76 mmHg    Indications: Aflutter    Diagnoses: I48.1 - Atrial flutter    Sonographer:  MARCIAL Tan  Primary Physician:  Lamont Guzmán MD  Referring Physician:  Meena Johnson DO  Group:  [facility]  Cardiology Fellow:  Angel Ga MD  Interpreting Physician:  Zeyad Sharma MD    SUMMARY    PROCEDURE INFORMATION:  This was a technically difficult study.    LEFT VENTRICLE:  Systolic function was normal. Ejection fraction was estimated to be 60 %.  There were no regional wall motion abnormalities.  There was no evidence of concentric hypertrophy.    MITRAL VALVE:  There was mild annular calcification.  There was trace regurgitation.    TRICUSPID VALVE:  There was mild regurgitation.    PULMONIC VALVE:  There was trace regurgitation.    HISTORY: PRIOR HISTORY: COPD, obesity, HTN, aflutter, tobacco use, HLD, anxiety    PROCEDURE: The procedure was performed at the bedside. This was a routine study. The transthoracic approach was used. The study included complete 2D imaging, M-mode, complete spectral Doppler, and color Doppler. The heart rate was 90 bpm,  at the start of the study. Images were obtained from the parasternal, apical, subcostal, and suprasternal notch acoustic windows. Echocardiographic views were limited due to decreased penetration and lung interference. This was a  technically difficult study.    LEFT VENTRICLE: Size was normal. Systolic function was normal. Ejection fraction was estimated to be 60 %. There were no regional wall motion abnormalities. Wall thickness was normal. There was no evidence of concentric hypertrophy.  DOPPLER: Left ventricular diastolic function parameters were normal for the patient's age.    RIGHT VENTRICLE: The size was normal. Systolic function was normal. Wall thickness was normal.    LEFT ATRIUM: Size was normal.    RIGHT ATRIUM: Size was  normal.    MITRAL VALVE: There was mild annular calcification. Valve structure was normal. There was normal leaflet separation. DOPPLER: The transmitral velocity was within the normal range. There was no evidence for stenosis. There was trace  regurgitation.    AORTIC VALVE: The valve was trileaflet. Leaflets exhibited normal thickness and normal cuspal separation. DOPPLER: Transaortic velocity was within the normal range. There was no evidence for stenosis. There was no significant  regurgitation.    TRICUSPID VALVE: The valve structure was normal. There was normal leaflet separation. DOPPLER: The transtricuspid velocity was within the normal range. There was no evidence for stenosis. There was mild regurgitation. Estimated peak PA  pressure was 30 mmHg.    PULMONIC VALVE: Not well visualized. DOPPLER: The transpulmonic velocity was within the normal range. There was trace regurgitation.    PERICARDIUM: There was no pericardial effusion. The pericardium was normal in appearance.    AORTA: The root exhibited upper limit of normal size. The ascending aorta was at the upper limit of the normal.    SYSTEMIC VEINS: IVC: The inferior vena cava was not well visualized.    SYSTEM MEASUREMENT TABLES    2D  %FS: 30.91 %  Ao Diam: 3.55 cm  Ao asc: 3.7 cm  EDV(Teich): 139.05 ml  EF(Teich): 58.06 %  ESV(Teich): 58.31 ml  IVSd: 0.98 cm  LA Area: 17.91 cm2  LA Diam: 3.77 cm  LVEDV MOD A4C: 104.49 ml  LVEF MOD A4C: 66.33 %  LVESV MOD A4C: 35.18 ml  LVIDd: 5.36 cm  LVIDs: 3.7 cm  LVLd A4C: 8.34 cm  LVLs A4C: 7.08 cm  LVPWd: 0.94 cm  RA Area: 15.47 cm2  RVIDd: 3.93 cm  SV MOD A4C: 69.3 ml  SV(Teich): 80.74 ml    CW  AV Vmax: 1.12 m/s  AV maxP mmHg  PV Vmax: 0.81 m/s  PV maxP.61 mmHg  TR Vmax: 2.09 m/s  TR maxP.41 mmHg    MM  TAPSE: 2.38 cm    PW  E' Sept: 0.09 m/s  E/E' Sept: 8.27  LVOT Vmax: 1.04 m/s  LVOT maxP.31 mmHg  MV A Eriberto: 0.56 m/s  MV Dec Vermilion: 3.56 m/s2  MV DecT: 220.19 ms  MV E Eriberto: 0.76 m/s  MV E/A  Ratio: 1.38  MV PHT: 63.85 ms  MVA By PHT: 3.45 cm2  RVOT Vmax: 0.7 m/s  RVOT maxP.98 mmHg    [facility] Accredited Echocardiography Laboratory    Prepared and electronically signed by    Zeyad Sharma MD  Signed 2021 16:02:25          LABS:  Lab Results   Component Value Date    BUN 15 2024    CREATININE 0.96 2024    CALCIUM 10.1 2024     2016    K 5.0 2024    CO2 29 2024    CL 97 2024    ALKPHOS 85 2021    BILITOT 0.3 2016    PROT 6.7 2016    AST 22 2021    ALT 49 2021        Lab Results   Component Value Date    WBC 10.16 2024    HGB 16.8 2024    HCT 50.8 (H) 2024    MCV 99 (H) 2024     2024       Lab Results   Component Value Date    CHOL 296 (H) 2016    HDL 46 2022    LDLCALC  2022      Comment:      Calculated LDL invalid, triglycerides >400 mg/dl  This screening LDL is a calculated result.   It does not have the accuracy of the Direct Measured LDL in the monitoring of patients with hyperlipidemia and/or statin therapy.   Direct Measure LDL (ELQ211) must be ordered separately in these patients.    TRIG 497 (H) 2022       Lab Results   Component Value Date    HGBA1C 7.7 (A) 01/10/2024         ASSESSMENT/PLAN:  Diagnoses and all orders for this visit:    Essential hypertension    Atrial flutter, unspecified type (HCC)    Paroxysmal atrial fibrillation (HCC)    Preoperative cardiovascular examination    No cardiac contraindication to proceeding with surgery as low cardiac risk for surgery as planned and outlined above.  I do not anticipate that additional cardiac testing with further optimize his risk profile.  His RCRI score is 0, suggesting low risk of cardiovascular events.  He is presently not taking Eliquis on his own accord, so I did advise him to stay off of Eliquis until after surgery after which she should resume Eliquis 5 mg twice daily  "for primary thromboembolism prophylaxis in setting of paroxysmal atrial fibrillation and [id number] 2.  In regards to hypertension, his blood pressure is adequately controlled on losartan and metoprolol tartrate as outlined above.  I have recommended continuing this regimen.  Continue his beta-blocker perioperatively.          Chrissie Nichols MD      Portions of the record may have been created with voice recognition software. Occasional wrong word or \"sound alike\" substitutions may have occurred due to the inherent limitations of voice recognition software. Please review the chart carefully and recognize, using context, where substitutions/typographical errors may have occurred.     "

## 2024-01-12 NOTE — PATIENT INSTRUCTIONS
You were seen today in the Cardiology office for follow up evaluation.     Please continue your current cardiac medications as prescribed.    Thank you for choosing [facility].    Please call our office or use [employer] with any questions.

## 2024-01-17 ENCOUNTER — ANESTHESIA (OUTPATIENT)
Age: 59
End: 2024-01-17
Payer: COMMERCIAL

## 2024-01-17 ENCOUNTER — HOSPITAL ENCOUNTER (OUTPATIENT)
Age: 59
Setting detail: OUTPATIENT SURGERY
Discharge: HOME | End: 2024-01-17
Attending: ORTHOPAEDIC SURGERY | Admitting: ORTHOPAEDIC SURGERY
Payer: COMMERCIAL

## 2024-01-17 VITALS
TEMPERATURE: 98.8 F | SYSTOLIC BLOOD PRESSURE: 119 MMHG | DIASTOLIC BLOOD PRESSURE: 67 MMHG | HEART RATE: 82 BPM | HEIGHT: 71 IN | BODY MASS INDEX: 34.02 KG/M2 | RESPIRATION RATE: 20 BRPM | OXYGEN SATURATION: 90 % | WEIGHT: 243 LBS

## 2024-01-17 DIAGNOSIS — Z98.890 STATUS POST ROTATOR CUFF REPAIR: Primary | ICD-10-CM

## 2024-01-17 LAB
GLUCOSE SERPL-MCNC: 148 MG/DL (ref 65–140)
GLUCOSE SERPL-MCNC: 173 MG/DL (ref 65–140)

## 2024-01-17 PROCEDURE — 29826 SHO ARTHRS SRG DECOMPRESSION: CPT

## 2024-01-17 PROCEDURE — 29827 SHO ARTHRS SRG RT8TR CUF RPR: CPT | Performed by: ORTHOPAEDIC SURGERY

## 2024-01-17 PROCEDURE — 29828 SHO ARTHRS SRG BICP TENODSIS: CPT | Performed by: ORTHOPAEDIC SURGERY

## 2024-01-17 PROCEDURE — 29828 SHO ARTHRS SRG BICP TENODSIS: CPT

## 2024-01-17 PROCEDURE — 29826 SHO ARTHRS SRG DECOMPRESSION: CPT | Performed by: ORTHOPAEDIC SURGERY

## 2024-01-17 PROCEDURE — C1713 ANCHOR/SCREW BN/BN,TIS/BN: HCPCS | Performed by: ORTHOPAEDIC SURGERY

## 2024-01-17 PROCEDURE — 82948 REAGENT STRIP/BLOOD GLUCOSE: CPT

## 2024-01-17 PROCEDURE — 29827 SHO ARTHRS SRG RT8TR CUF RPR: CPT

## 2024-01-17 DEVICE — IMPLANTABLE DEVICE: Type: IMPLANTABLE DEVICE | Site: SHOULDER | Status: FUNCTIONAL

## 2024-01-17 RX ORDER — PROPOFOL 10 MG/ML
INJECTION, EMULSION INTRAVENOUS AS NEEDED
Status: DISCONTINUED | OUTPATIENT
Start: 2024-01-17 | End: 2024-01-17

## 2024-01-17 RX ORDER — CEFAZOLIN SODIUM 2 G/50ML
2000 SOLUTION INTRAVENOUS ONCE
Status: COMPLETED | OUTPATIENT
Start: 2024-01-17 | End: 2024-01-17

## 2024-01-17 RX ORDER — ONDANSETRON 2 MG/ML
INJECTION INTRAMUSCULAR; INTRAVENOUS AS NEEDED
Status: DISCONTINUED | OUTPATIENT
Start: 2024-01-17 | End: 2024-01-17

## 2024-01-17 RX ORDER — NAPROXEN 250 MG/1
250 TABLET ORAL 2 TIMES DAILY WITH MEALS
Status: CANCELLED | OUTPATIENT
Start: 2024-01-17

## 2024-01-17 RX ORDER — FENTANYL CITRATE/PF 50 MCG/ML
25 SYRINGE (ML) INJECTION
Status: DISCONTINUED | OUTPATIENT
Start: 2024-01-17 | End: 2024-01-17 | Stop reason: HOSPADM

## 2024-01-17 RX ORDER — SODIUM CHLORIDE, SODIUM LACTATE, POTASSIUM CHLORIDE, CALCIUM CHLORIDE 600; 310; 30; 20 MG/100ML; MG/100ML; MG/100ML; MG/100ML
125 INJECTION, SOLUTION INTRAVENOUS CONTINUOUS
Status: DISCONTINUED | OUTPATIENT
Start: 2024-01-17 | End: 2024-01-17 | Stop reason: HOSPADM

## 2024-01-17 RX ORDER — SODIUM CHLORIDE, SODIUM LACTATE, POTASSIUM CHLORIDE, CALCIUM CHLORIDE 600; 310; 30; 20 MG/100ML; MG/100ML; MG/100ML; MG/100ML
INJECTION, SOLUTION INTRAVENOUS CONTINUOUS PRN
Status: DISCONTINUED | OUTPATIENT
Start: 2024-01-17 | End: 2024-01-17

## 2024-01-17 RX ORDER — OXYCODONE HYDROCHLORIDE 5 MG/1
5 TABLET ORAL EVERY 4 HOURS PRN
Qty: 15 TABLET | Refills: 0 | Status: SHIPPED | OUTPATIENT
Start: 2024-01-17

## 2024-01-17 RX ORDER — ONDANSETRON 2 MG/ML
4 INJECTION INTRAMUSCULAR; INTRAVENOUS ONCE AS NEEDED
Status: DISCONTINUED | OUTPATIENT
Start: 2024-01-17 | End: 2024-01-17 | Stop reason: HOSPADM

## 2024-01-17 RX ORDER — ALBUTEROL SULFATE 90 UG/1
AEROSOL, METERED RESPIRATORY (INHALATION) AS NEEDED
Status: DISCONTINUED | OUTPATIENT
Start: 2024-01-17 | End: 2024-01-17

## 2024-01-17 RX ORDER — DEXAMETHASONE SODIUM PHOSPHATE 10 MG/ML
INJECTION, SOLUTION INTRAMUSCULAR; INTRAVENOUS AS NEEDED
Status: DISCONTINUED | OUTPATIENT
Start: 2024-01-17 | End: 2024-01-17

## 2024-01-17 RX ORDER — HYDROMORPHONE HCL IN WATER/PF 6 MG/30 ML
0.2 PATIENT CONTROLLED ANALGESIA SYRINGE INTRAVENOUS
Status: DISCONTINUED | OUTPATIENT
Start: 2024-01-17 | End: 2024-01-17 | Stop reason: HOSPADM

## 2024-01-17 RX ORDER — SODIUM CHLORIDE, SODIUM LACTATE, POTASSIUM CHLORIDE, CALCIUM CHLORIDE 600; 310; 30; 20 MG/100ML; MG/100ML; MG/100ML; MG/100ML
100 INJECTION, SOLUTION INTRAVENOUS CONTINUOUS
Status: DISCONTINUED | OUTPATIENT
Start: 2024-01-17 | End: 2024-01-17

## 2024-01-17 RX ORDER — TRANEXAMIC ACID 100 MG/ML
INJECTION, SOLUTION INTRAVENOUS AS NEEDED
Status: DISCONTINUED | OUTPATIENT
Start: 2024-01-17 | End: 2024-01-17

## 2024-01-17 RX ORDER — ROCURONIUM BROMIDE 10 MG/ML
INJECTION, SOLUTION INTRAVENOUS AS NEEDED
Status: DISCONTINUED | OUTPATIENT
Start: 2024-01-17 | End: 2024-01-17

## 2024-01-17 RX ORDER — ACETAMINOPHEN 325 MG/1
325 TABLET ORAL EVERY 6 HOURS PRN
Qty: 30 TABLET | Refills: 0 | Status: SHIPPED | OUTPATIENT
Start: 2024-01-17

## 2024-01-17 RX ORDER — OXYCODONE HYDROCHLORIDE 5 MG/1
5 TABLET ORAL EVERY 4 HOURS PRN
Status: DISCONTINUED | OUTPATIENT
Start: 2024-01-17 | End: 2024-01-17 | Stop reason: HOSPADM

## 2024-01-17 RX ORDER — LIDOCAINE HYDROCHLORIDE 10 MG/ML
INJECTION, SOLUTION EPIDURAL; INFILTRATION; INTRACAUDAL; PERINEURAL AS NEEDED
Status: DISCONTINUED | OUTPATIENT
Start: 2024-01-17 | End: 2024-01-17

## 2024-01-17 RX ADMIN — SUGAMMADEX 441 MG: 100 INJECTION, SOLUTION INTRAVENOUS at 10:55

## 2024-01-17 RX ADMIN — ALBUTEROL SULFATE 2 PUFF: 90 AEROSOL, METERED RESPIRATORY (INHALATION) at 11:02

## 2024-01-17 RX ADMIN — PROPOFOL 180 MG: 10 INJECTION, EMULSION INTRAVENOUS at 09:30

## 2024-01-17 RX ADMIN — SODIUM CHLORIDE, SODIUM LACTATE, POTASSIUM CHLORIDE, AND CALCIUM CHLORIDE: .6; .31; .03; .02 INJECTION, SOLUTION INTRAVENOUS at 10:02

## 2024-01-17 RX ADMIN — SODIUM CHLORIDE, SODIUM LACTATE, POTASSIUM CHLORIDE, AND CALCIUM CHLORIDE 125 ML/HR: .6; .31; .03; .02 INJECTION, SOLUTION INTRAVENOUS at 08:38

## 2024-01-17 RX ADMIN — LIDOCAINE HYDROCHLORIDE 50 MG: 10 INJECTION, SOLUTION EPIDURAL; INFILTRATION; INTRACAUDAL; PERINEURAL at 09:30

## 2024-01-17 RX ADMIN — ALBUTEROL SULFATE 2 PUFF: 90 AEROSOL, METERED RESPIRATORY (INHALATION) at 09:50

## 2024-01-17 RX ADMIN — ROCURONIUM BROMIDE 10 MG: 10 INJECTION, SOLUTION INTRAVENOUS at 10:25

## 2024-01-17 RX ADMIN — ROCURONIUM BROMIDE 10 MG: 10 INJECTION, SOLUTION INTRAVENOUS at 10:38

## 2024-01-17 RX ADMIN — SODIUM CHLORIDE, SODIUM LACTATE, POTASSIUM CHLORIDE, AND CALCIUM CHLORIDE: .6; .31; .03; .02 INJECTION, SOLUTION INTRAVENOUS at 09:01

## 2024-01-17 RX ADMIN — ROCURONIUM BROMIDE 20 MG: 10 INJECTION, SOLUTION INTRAVENOUS at 10:06

## 2024-01-17 RX ADMIN — ONDANSETRON 4 MG: 2 INJECTION INTRAMUSCULAR; INTRAVENOUS at 09:34

## 2024-01-17 RX ADMIN — ROCURONIUM BROMIDE 20 MG: 10 INJECTION, SOLUTION INTRAVENOUS at 09:57

## 2024-01-17 RX ADMIN — EPINEPHRINE: 1 INJECTION INTRAMUSCULAR; INTRAVENOUS; SUBCUTANEOUS at 10:47

## 2024-01-17 RX ADMIN — ROCURONIUM BROMIDE 10 MG: 10 INJECTION, SOLUTION INTRAVENOUS at 09:41

## 2024-01-17 RX ADMIN — DEXAMETHASONE SODIUM PHOSPHATE 10 MG: 10 INJECTION INTRAMUSCULAR; INTRAVENOUS at 09:34

## 2024-01-17 RX ADMIN — ALBUTEROL SULFATE 2 PUFF: 90 AEROSOL, METERED RESPIRATORY (INHALATION) at 09:38

## 2024-01-17 RX ADMIN — TRANEXAMIC ACID 1 G: 1 INJECTION, SOLUTION INTRAVENOUS at 09:40

## 2024-01-17 RX ADMIN — PHENYLEPHRINE HYDROCHLORIDE 30 MCG/MIN: 10 INJECTION INTRAVENOUS at 09:41

## 2024-01-17 RX ADMIN — CEFAZOLIN SODIUM 2000 MG: 2 SOLUTION INTRAVENOUS at 09:30

## 2024-01-17 RX ADMIN — ROCURONIUM BROMIDE 50 MG: 10 INJECTION, SOLUTION INTRAVENOUS at 09:30

## 2024-01-17 NOTE — DISCHARGE INSTR - AVS FIRST PAGE
POSTOPERATIVE INSTRUCTIONS following ROTATOR CUFF REPAIR    MEDICATIONS:  Resume all home medications unless otherwise instructed by your surgeon.  Pain Medication:  Oxycodone 5 mg, 1 tablets every 4 hours as needed  If you were given a regional anesthetic (nerve block), please begin taking the pain medication as soon as you get home, even if you have minimal or no pain.  DO NOT WAIT FOR THE NERVE BLOCK TO WEAR OFF.  Possible side effects include nausea, constipation, and urinary retention.  If you experience these side effects, please call our office for assistance.  Pain med refills are authorized only during office hours (8am-4pm, Mon-Fri).  Anti-Inflammatory:  Tylenol 325 mg, 1-2 tablets every 6 hours for 4 weeks  TAKE WITH FOOD.  Stop if you experience nausea, reflux, or stomach pain.    WOUND CARE:  Keep the dressing clean and dry.  Light drainage may occur the first 2 days postop.  You may remove the dressings and get the incision wet in the shower 72 hours after surgery.  Do not remove steri-strips or sutures.  Do not immerse the incision under water.  Carefully pat the incision dry.  If there is wound drainage, re-apply a fresh dry gauze dressing.  Please call our office ([phone]) if you experience either of the following:  Sudden increase in swelling, redness, or warmth at the surgical site  Excessive incisional drainage that persists beyond the 3rd day after surgery  Oral temperature greater than 101 degrees, not relieved with Tylenol  Shortness of breath, chest pain, nausea, or any other concerning symptoms    SWELLING CONTROL:  Cold Therapy:  The cold therapy device may be used either continuously or only as needed, according to your preference.  Do not let the pad directly touch your skin.  Alternatively, apply ice (20 min on, 20 min off) as often as you feel is necessary.    SLING:  Wear your sling at all times (including sleep) until your first postoperative office visit.  You may remove the  sling for showering but must keep your arm at your side.    ACTIVITY:   Do not lift, carry, push, or pull anything with your operative arm.  You are not allowed range of motion of your shoulder until you are given permission from your surgeon. You may do gentle range of motion of the elbow, wrist, and fingers.  Place a pillow behind the elbow while lying down.  Sleeping in a more upright position (recliner) may be more comfortable initially.  Wrist / Finger Motion:  With the sling on, move your wrist and fingers through a full range of motion 20 times per hour while awake.    PHYSICAL THERAPY:  You may begin physical therapy after you are seen in the office for your postoperative appointment. We will place a referral to physical therapy after the procedure so you can call in advance to get an appointment set up after the first postoperative appointment.    FOLLOW-UP APPOINTMENT:  10-14 days after surgery with:    Dr. Waldo Chambers MD  [facility]  [address] [place], [address] ([facility] Location)  [address] [place], [address] ([facility])  [phone]

## 2024-01-17 NOTE — OP NOTE
OPERATIVE REPORT  PATIENT NAME: Callum Nielson    :  1965  MRN: [id number]  Pt Location: [facility] OR ROOM 06    SURGERY DATE: 2024    Surgeons and Role:     * Waldo Chambers MD - Primary     * SOPHIA Fabian-C - Assisting    Preop Diagnosis:  Full thickness tear of right subscapularis tendon, initial encounter [S46.811A]    Post-Op Diagnosis Codes:     * Full thickness tear of right subscapularis tendon, initial encounter [S46.811A]    Procedure(s):  Right - REPAIR ROTATOR CUFF  ARTHROSCOPIC  Right - ARTHROSCOPIC SUBACROMIAL DECOMPRESSION  Right - ARTHROSCOPIC BICEPS TENODESIS    Specimen(s):  * No specimens in log *    Estimated Blood Loss:   Minimal    Drains:  * No LDAs found *    Anesthesia Type:   General w/ Regional    Operative Indications:  Full thickness tear of right subscapularis tendon, initial encounter [S46.811A]  Impingement syndrome, right shoulder    Operative Findings:  As expected    Complications:   None    Procedure and Technique:  NAME OF OPERATION:    Right Shoulder arthroscopic rotator cuff repair of the supraspinatus and infraspinatus.   Right Shoulder arthroscopic subacromial decompression, acromioplasty.   Right Biceps tenodesis, arthroscopic    ASSISTANT: I requested SOPHIA Blue to assist with this procedure. Assistance was medically necessary in order to safely perform the procedure without increased blood loss or morbidity. Assistance was provided through positioning, instrumentation, wound closure, and instillation of anesthetic, application of sterile dressing, and safe transport from the operative suite.    There was no qualified resident available to assist    INDICATIONS:  Callum Nielson is a 58 y.o. who had injured his shoulder, followed by pain and dysfunction. MRI was consistent with full thickness rotator cuff tear of the subscapularis tendon. he had failed conservative measures and was therefore indicated for surgical repair.  he understood the risks,  benefits and alternatives to the procedure, and elected to proceed.    The patient was identified in the preoperative holding area and the correct operative site was signed. The patient was then brought into the Operating Room and Anesthesia then successfully introduced a regional block without complications. The patient was then positioned in a beach chair position. The operative shoulder and upper extremity were prepped and draped in the usual sterile fashion. Prior to making incision, a time-out occurred at which time all members of the surgical team confirmed the patient identity, laterality and correct operation against the informed written consent. It was also confirmed that the patient received preoperative antibiotics.    A posterior portal was established. The arthroscope was inserted into the glenohumeral joint. An anterior portal was established in the rotator interval. Diagnostic arthroscopy then took place. The articular surfaces of the glenoid and humeral head were within normal limits. The labrum circumferentially had some mild fraying that was gently debrided with an arthroscopic shaver. The labral attachment to the bone was solid, therefore, no labral repair was warranted.  The long head of the biceps tendon was torn. Using the loop and tack technique, the biceps was tagged with a fiberlink luggage tag and set aside.  The supraspinatus and infraspinatus tendons appeared intact without tearing. The teres minor tendon were within normal limits.    The subscapularis tendon demonstrated a full thickness tear of the superior subscap tendon with retraction to the glenoid. Using the apollo and the shaver, the tenodon was debrided and scar tissue was removed in order to mobilize the tendon.  Next, using a scorpion and suture tape, a horizontal mattress suture was placed into the torn tendon edge. An additional luggage tag fiber link was placed superior to the mattress suture.  The footprint of the subscap  was debrided and the tendon was tensioned laterally, the sutures for the subscap and biceps were then dunked into its footprint with a 4.75 swivel lock, nicely approximating the tendon to its anatomic footprint.     The arthroscope was inserted into the subacromial space. There was an impingement lesion of the CA ligament. The CA ligament was removed, revealing a Type II acromial spur. A 5.5 shaver was used to juhi this down such that it was flush with the remainder of the acromion. An extensive bursectomy then took place; the bursa was hyperemic as was the rotator cuff. This revealed an intact supraspinatus and infraspinatus tendons.     At this point, all arthroscopic instruments were removed. The portal sites were closed with 3-0 nylon. Dry sterile dressing was applied. Patient was placed in a shoulder immobilizer and brought to the recovery room in stable condition.    At the conclusion of the case, the patient tolerated the procedure well. No complications. All sponge and instrument counts were correct.        Post-Operative Rehabilitation Guidelines for Standard Rotator Cuff Tears    1-4 Weeks:   Sling Immobilization  Active ROM Elbow, Wrist and Hand  True Passive (ONLY) ROM Shoulder. NO ACTIVE MOTION.   Pendulums, Supine Elevation in Scapular plane, External Rotation to tolerance with arm at side. (emphasize ER, minimum goal 40°)  Scapular Stabilization exercises (sidelying)  Deltoid isometrics in neutral (submaximal) as ROM improves  No Pulley/Canes until 6 weeks post-op (these are active motions)    4-6 Weeks:   Discontinue sling use.  Begin Active Assist ROM and advance to Active as Tolerated  Elevation in scapular plane and external rotation as tolerated  No Internal rotation or behind back until 6wks.  Begin Cuff Isometrics at 6wks with arm at the side    6-12 Weeks:   Active Assist to Active ROM Shoulder As Tolerated  Elevation in scapular plane and external rotation to tolerance  Begin internal  rotation as tolerated  Light stretching at end ranges  Cuff Isometrics with the arm at the side    3-12 Months:  Advance to full ROM as tolerated with passive stretching at end ranges  Advance strengthening as tolerated: isometrics ? bands ? light weights (1-5   lbs); 8-12 reps/2-3 sets per rotator cuff, deltoid, and scapular stabilizers  Limit strengthening 3x/week to avoid rotator cuff tendonitis  Begin eccentrically resisted motions, pylometrics (ex. Weighted ball toss),  proprioception (es. body blade)  Begin sports related rehab at 4 ½ months, including advanced conditioning  Return to throwing at 6 months  Throw from pitcher's mound at 9 months  Collision sports at 9 months  MMI is usually at 12 months post-op      I was present for the entire procedure.    Patient Disposition:  PACU         SIGNATURE: Waldo Chambers MD  DATE: January 17, 2024  TIME: 10:54 AM

## 2024-01-17 NOTE — ANESTHESIA PREPROCEDURE EVALUATION
Procedure:  REPAIR ROTATOR CUFF  ARTHROSCOPIC (Right: Shoulder)  ARTHROSCOPIC SUBACROMIAL DECOMPRESSION (Right: Shoulder)  DISTAL CLAVICLE EXCISION (Right: Shoulder)  ARTHROSCOPIC BICEPS TENODESIS (Right: Shoulder)    Relevant Problems   CARDIO   (+) Atrial flutter (HCC)   (+) Essential hypertension   (+) Hyperlipidemia   (+) Paroxysmal atrial fibrillation (HCC)      ENDO   (+) Type 2 diabetes mellitus with hyperglycemia, without long-term current use of insulin (HCC)      GI/HEPATIC   (+) Dysphagia      MUSCULOSKELETAL   (+) Adhesive capsulitis of right shoulder      NEURO/PSYCH   (+) Depression with anxiety      PULMONARY   (+) COPD (chronic obstructive pulmonary disease) (HCC)   (+) NAYE (obstructive sleep apnea)     Lab Results   Component Value Date    WBC 10.16 01/05/2024    HGB 16.8 01/05/2024    HCT 50.8 (H) 01/05/2024    MCV 99 (H) 01/05/2024     01/05/2024     Lab Results   Component Value Date     08/12/2016    K 5.0 01/05/2024    CO2 29 01/05/2024    CL 97 01/05/2024    BUN 15 01/05/2024    CREATININE 0.96 01/05/2024     Lab Results   Component Value Date    INR 0.91 05/19/2022    PROTIME 11.9 05/19/2022     Lab Results   Component Value Date    PTT 28 05/19/2022     Lab Results   Component Value Date    HGBA1C 7.7 (A) 01/10/2024       Physical Exam    Airway    Mallampati score: I  TM Distance: >3 FB  Neck ROM: full     Dental   Comment: Several missing teeth     Cardiovascular      Pulmonary      Other Findings        Anesthesia Plan  ASA Score- 2     Anesthesia Type- general with ASA Monitors.         Additional Monitors:     Airway Plan:     Comment: Discussed long acting interscalene nerve block for postoperative pain control with risks/benefits/alternatives. Patient made aware of possible postoperative shortness of breath related to transient hemidiaphragmatic paralysis. Discussed extremely low likelihood of transient or permanent nerve injury in the setting of ultrasound guidance and  patient participation. Patient aware and would like to proceed.    .       Plan Factors-Exercise tolerance (METS): >4 METS.    Chart reviewed.   Existing labs reviewed. Patient summary reviewed.                  Induction- intravenous.    Postoperative Plan- Plan for postoperative opioid use. Planned trial extubation    Informed Consent- Anesthetic plan and risks discussed with patient.  I personally reviewed this patient with the CRNA. Discussed and agreed on the Anesthesia Plan with the CRNA..

## 2024-01-17 NOTE — ANESTHESIA POSTPROCEDURE EVALUATION
Post-Op Assessment Note    CV Status:  Stable  Pain Score: 0    Pain management: adequate       Mental Status:  Alert and awake   Hydration Status:  Euvolemic   PONV Controlled:  Controlled   Airway Patency:  Patent  Two or more mitigation strategies used for obstructive sleep apnea   Post Op Vitals Reviewed: Yes      Staff: Anesthesiologist, CRNA               BP   122/69   Temp   98.8   Pulse  84   Resp   16   SpO2   96

## 2024-01-24 ENCOUNTER — OFFICE VISIT (OUTPATIENT)
Dept: FAMILY MEDICINE CLINIC | Facility: CLINIC | Age: 59
End: 2024-01-24
Payer: COMMERCIAL

## 2024-01-24 VITALS
RESPIRATION RATE: 16 BRPM | BODY MASS INDEX: 34.07 KG/M2 | SYSTOLIC BLOOD PRESSURE: 118 MMHG | HEIGHT: 71 IN | OXYGEN SATURATION: 95 % | HEART RATE: 85 BPM | WEIGHT: 243.4 LBS | DIASTOLIC BLOOD PRESSURE: 70 MMHG | TEMPERATURE: 97.1 F

## 2024-01-24 DIAGNOSIS — L73.9 FOLLICULITIS: Primary | ICD-10-CM

## 2024-01-24 PROCEDURE — 99213 OFFICE O/P EST LOW 20 MIN: CPT | Performed by: FAMILY MEDICINE

## 2024-01-24 RX ORDER — CEPHALEXIN 500 MG/1
500 CAPSULE ORAL EVERY 6 HOURS SCHEDULED
Qty: 28 CAPSULE | Refills: 0 | Status: SHIPPED | OUTPATIENT
Start: 2024-01-24 | End: 2024-01-31

## 2024-01-24 NOTE — PROGRESS NOTES
Name: Callum Nielson      : 1965      MRN: [id number]  Encounter Provider: Lamont Guzmán MD  Encounter Date: 2024   Encounter department: [facility]  Chief Complaint   Patient presents with    sore on cheeck     Been there for over a month      Health Maintenance   Topic Date Due    COVID-19 Vaccine (1) Never done    HIV Screening  Never done    Hepatitis A Vaccine (1 of 2 - Risk 2-dose series) Never done    DTaP,Tdap,and Td Vaccines (1 - Tdap) 2014    Zoster Vaccine (1 of 2) Never done    PT PLAN OF CARE  2022    Influenza Vaccine (1) 2023    Annual Physical  2024    Kidney Health Evaluation: Albumin/Creatinine Ratio  2024    Diabetic Foot Exam  2024    Lung Cancer Screening  2024    HEMOGLOBIN A1C  07/10/2024    Kidney Health Evaluation: GFR  2025    Depression Screening  01/10/2025    DM Eye Exam  08/10/2025    Colorectal Cancer Screening  2026    Hepatitis C Screening  Completed    Pneumococcal Vaccine: Pediatrics (0 to 5 Years) and At-Risk Patients (6 to 64 Years)  Completed    HIB Vaccine  Aged Out    IPV Vaccine  Aged Out    Meningococcal ACWY Vaccine  Aged Out    HPV Vaccine  Aged Out     Assessment & Plan     1. Folliculitis  -     mupirocin (BACTROBAN) 2 % ointment; Apply topically 3 (three) times a day  -     cephalexin (KEFLEX) 500 mg capsule; Take 1 capsule (500 mg total) by mouth every 6 (six) hours for 7 days           Subjective      HPI    Pt is here by himself.     Sore on left cheek for 1 month.   No pain.   NO itchy.   Sometimes blood coming out.   He saw another one just below it this week.   Denies fever.   Denies SOB, CP, n/vabd pain.         Review of Systems   Constitutional:  Negative for appetite change, chills and fever.   HENT:  Negative for congestion, ear pain, sinus pain and sore throat.    Eyes:  Negative for discharge and itching.   Respiratory:  Negative for apnea, cough, chest tightness,  shortness of breath and wheezing.    Cardiovascular:  Negative for chest pain, palpitations and leg swelling.   Gastrointestinal:  Negative for abdominal pain, anal bleeding, constipation, diarrhea, nausea and vomiting.   Endocrine: Negative for cold intolerance, heat intolerance and polyuria.   Genitourinary:  Negative for difficulty urinating and dysuria.   Musculoskeletal:  Negative for arthralgias, back pain and myalgias.   Skin:  Positive for rash.   Neurological:  Negative for dizziness and headaches.   Psychiatric/Behavioral:  Negative for agitation.        Current Outpatient Medications on File Prior to Visit   Medication Sig    acetaminophen (TYLENOL) 325 mg tablet Take 1 tablet (325 mg total) by mouth every 6 (six) hours as needed for mild pain    albuterol (PROVENTIL HFA,VENTOLIN HFA) 90 mcg/act inhaler INHALE 2 PUFFS EVERY 6 HOURS AS NEEDED FOR WHEEZING    ALPRAZolam (XANAX) 0.5 mg tablet Take one pill at bedtime    folic acid (FOLVITE) 1 mg tablet Take 1 tablet (1 mg total) by mouth in the morning.    losartan (COZAAR) 50 mg tablet TAKE 1 TABLET BY MOUTH EVERY DAY    metFORMIN (GLUCOPHAGE) 1000 MG tablet TAKE 1 TABLET BY MOUTH TWICE A DAY WITH FOOD    metoprolol tartrate (LOPRESSOR) 50 mg tablet TAKE 1 TABLET BY MOUTH EVERY 12 HOURS    multivitamin (THERAGRAN) TABS Take 1 tablet by mouth daily    thiamine 100 MG tablet Take 1 tablet (100 mg total) by mouth in the morning.    betamethasone, augmented, (DIPROLENE) 0.05 % ointment PLEASE SEE ATTACHED FOR DETAILED DIRECTIONS (Patient not taking: Reported on 1/11/2024)    calcipotriene (DOVONEX) 0.005 % cream APPLY TOPICALLY TWICE A DAY TO AREAS OF PSORIASIS MONDAY - FRIDAY (Patient not taking: Reported on 1/5/2024)    Eliquis 5 MG TAKE 1 TABLET BY MOUTH TWICE A DAY (Patient not taking: Reported on 1/24/2024)    oxyCODONE (Roxicodone) 5 immediate release tablet Take 1 tablet (5 mg total) by mouth every 4 (four) hours as needed for moderate pain for up to 15  "doses Max Daily Amount: 30 mg (Patient not taking: Reported on 1/24/2024)       Objective     /70 (BP Location: Left arm, Patient Position: Sitting, Cuff Size: Large)   Pulse 85   Temp (!) 97.1 °F (36.2 °C)   Resp 16   Ht 5' 11\" (1.803 m)   Wt 110 kg (243 lb 6.4 oz)   SpO2 95%   BMI 33.95 kg/m²     Physical Exam  Constitutional:       Appearance: He is well-developed.   HENT:      Head: Normocephalic and atraumatic.   Eyes:      General:         Right eye: No discharge.         Left eye: No discharge.      Conjunctiva/sclera: Conjunctivae normal.   Cardiovascular:      Rate and Rhythm: Normal rate and regular rhythm.      Heart sounds: Normal heart sounds. No murmur heard.     No friction rub. No gallop.   Pulmonary:      Effort: Pulmonary effort is normal. No respiratory distress.      Breath sounds: Normal breath sounds. No wheezing or rales.   Abdominal:      General: Bowel sounds are normal.      Palpations: Abdomen is soft.   Musculoskeletal:         General: Normal range of motion.      Cervical back: Normal range of motion and neck supple.   Skin:     Findings: Rash present.      Comments: Left cheek 2 bumps, size 3-5mm, mild erythema, no exudate   Neurological:      Mental Status: He is alert.       Lamont Guzmán MD    "

## 2024-01-29 NOTE — PROGRESS NOTES
PT Evaluation     Today's date: 2024  Patient name: Callum Nielson  : 1965  MRN: [id number]  Referring provider: Jeremi Jefferson PA*  Dx:   Encounter Diagnosis     ICD-10-CM    1. Status post rotator cuff repair  Z98.890 Ambulatory referral to Physical Therapy          Start Time: 1545  Stop Time: 1630  Total time in clinic (min): 45 minutes    Assessment  Assessment details: Patient is a 58 y.o. male 2 weeks s/p R subscapularis tendon repair with subacromial Decompression and Biceps Tenodesis on 24. On exam patient presents with decreased shoulder mobility and shoulder effusion which is expected at his stage of recovery. 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  Impairments: abnormal or restricted ROM, impaired physical strength and lacks appropriate home exercise program  Understanding of Dx/Px/POC: good   Prognosis: good    Goals  Within 8 weeks,  1. Patient will report independence with his HEP.  2. Patient will be able to demonstrate R shoulder AROM flexion >120.   3. Patient will be able to demonstrate R shoulder IR behind back at least L4.   4. Patient will demonstrate R shoulder ER AROM >=30*.     Within 16 weeks,   1. Patient will report no difficulty with dressing with his R shoulder.   2. Patient will be able to return to light work duty.   4. Patient will be able to demonstrate R shoulder abduction strength >=4/5.   5. Patient will be able to demonstrate R shoulder ER strength >=4/5.   6. Patient will be able to demonstrate R shoulder IR strength >=4/5.   7. Patient will be able to demonstrate R shoulder AROM flexion >140*.  8. Patient will be able to demonstrate R shoulder IR behind back at least L1.  9. Patient will demonstrate R shoulder ER AROM >=45*      Plan  Patient would benefit from: skilled physical therapy  Planned  modality interventions: cryotherapy and thermotherapy: hydrocollator packs  Planned therapy interventions: abdominal trunk stabilization, joint mobilization, manual therapy, activity modification, balance, balance/weight bearing training, neuromuscular re-education, body mechanics training, postural training, patient education, therapeutic activities, therapeutic exercise, functional ROM exercises, strengthening, stretching, transfer training, gait training and home exercise program  Frequency: 1-2x/week.  Duration in weeks: 16  Plan of Care beginning date: 2024  Plan of Care expiration date: 2024  Treatment plan discussed with: patient      Subjective Evaluation    History of Present Illness  Mechanism of injury: Patient is s/p R subscapularis tendon repair with subacromial Decompression and Biceps Tenodesis on 24.     He fell in 2023 down the steps and knew immediately something happened. After the MRI he was recommended surgery.   Patient Goals  Patient goals for therapy: return to work and independence with ADLs/IADLs  Patient goal: wants to exercise at the gym  Pain  Current pain ratin    Social Support    Employment status: working ( - mir)  Hand dominance: right        Objective     Active Range of Motion     Right Elbow   Flexion: WFL  Extension: WFL    Passive Range of Motion     Right Shoulder   Flexion: 50 degrees   External rotation 0°: 10 degrees     General Comments:      Shoulder Comments   R shoulder incision: stitches out but redness at stitch location, mild shoulder effusion             POC expires Unit limit Auth Expiration date PT/OT/ST + Visit Limit?   25                              Visit/Unit Tracking  AUTH Status:  Date              Pending Used 1              Remaining                 Diagnosis: R subscap repair +    Precautions: RTC precautions   Insurance: [employer]              Patient Ed     FOTO      Precautions Educated                                                                             Neuro Re-Ed                                                                                        Ther Ex           Aerobic conditioning           Elbow PROM 2 x 10          Wrist PROM 2 x 10                                                                 Ther Activity                                 Manual           Shoulder ER PROM   2 x 10          Shoulder flexion PROM   3x                                           Modalities

## 2024-01-30 ENCOUNTER — OFFICE VISIT (OUTPATIENT)
Dept: OBGYN CLINIC | Facility: CLINIC | Age: 59
End: 2024-01-30

## 2024-01-30 VITALS
DIASTOLIC BLOOD PRESSURE: 78 MMHG | HEART RATE: 84 BPM | HEIGHT: 71 IN | SYSTOLIC BLOOD PRESSURE: 143 MMHG | BODY MASS INDEX: 33.74 KG/M2 | WEIGHT: 241 LBS

## 2024-01-30 DIAGNOSIS — Z98.890 STATUS POST ARTHROSCOPY OF SHOULDER: Primary | ICD-10-CM

## 2024-01-30 PROCEDURE — 99024 POSTOP FOLLOW-UP VISIT: CPT | Performed by: ORTHOPAEDIC SURGERY

## 2024-01-30 NOTE — PROGRESS NOTES
Subjective   CHIEF COMPLAINT/REASON FOR VISIT  Callum Nielson is a 58 y.o. male who presents for 2 week post op follow-up after Repair Rotator Cuff  Arthroscopic - Right, Arthroscopic Subacromial Decompression - Right, and Arthroscopic Biceps Tenodesis - Right on 1/17/2024     HISTORY OF PRESENT ILLNESS  Overall, he feels things are going well and progressing appropriately. He has been following the post-operative rehabilitation regimen without difficultly. Currently, he is doing well without any specific concerns. He said the pain has mostly resolved at this time. He has not started therapy yet but is scheduled to start in 2 days.    Objective   PHYSICAL EXAMINATION  Right Shoulder  Surgical incisions are healed.   Elbow, wrist, and fingers ROM intact  Fingers warm and perfused  NVID    Assessment/Plan   1. Status Post Repair Rotator Cuff  Arthroscopic - Right, Arthroscopic Subacromial Decompression - Right, and Arthroscopic Biceps Tenodesis - Right    He is doing well after surgery and making appropriate progress. Sutures removed. Encouraged to work with PT on rehab protocol to help with recovery process. Advised he could wean himself out of the sling.    We will plan to see him back at the  3.5 month post-operative shelbi. If any issues, questions, or concerns arise between now and the next appointment, we have encouraged the patient contact our team.      Scribe Attestation      I,:  Jeremi Jefferson PA-C am acting as a scribe while in the presence of the attending physician.:       I,:  Waldo Chambers MD personally performed the services described in this documentation    as scribed in my presence.:

## 2024-02-01 ENCOUNTER — EVALUATION (OUTPATIENT)
Dept: PHYSICAL THERAPY | Facility: REHABILITATION | Age: 59
End: 2024-02-01
Payer: COMMERCIAL

## 2024-02-01 DIAGNOSIS — Z98.890 STATUS POST ROTATOR CUFF REPAIR: ICD-10-CM

## 2024-02-01 PROCEDURE — 97140 MANUAL THERAPY 1/> REGIONS: CPT | Performed by: PHYSICAL THERAPIST

## 2024-02-01 PROCEDURE — 97110 THERAPEUTIC EXERCISES: CPT | Performed by: PHYSICAL THERAPIST

## 2024-02-01 PROCEDURE — 97161 PT EVAL LOW COMPLEX 20 MIN: CPT | Performed by: PHYSICAL THERAPIST

## 2024-02-08 ENCOUNTER — OFFICE VISIT (OUTPATIENT)
Dept: PHYSICAL THERAPY | Facility: REHABILITATION | Age: 59
End: 2024-02-08
Payer: COMMERCIAL

## 2024-02-08 DIAGNOSIS — Z98.890 STATUS POST ROTATOR CUFF REPAIR: Primary | ICD-10-CM

## 2024-02-08 PROCEDURE — 97530 THERAPEUTIC ACTIVITIES: CPT

## 2024-02-08 PROCEDURE — 97140 MANUAL THERAPY 1/> REGIONS: CPT

## 2024-02-08 PROCEDURE — 97110 THERAPEUTIC EXERCISES: CPT

## 2024-02-08 NOTE — PROGRESS NOTES
Daily Note     Today's date: 2024  Patient name: Callum Nielson  : 1965  MRN: [id number]  Referring provider: Jeremi Jefferson PA*  Dx:   Encounter Diagnosis     ICD-10-CM    1. Status post rotator cuff repair  Z98.890           Start Time: 1000  Stop Time: 1045  Total time in clinic (min): 45 minutes    Subjective: Pt presents into clinic without sling. Patient reports last night he was sleeping and felt increased pain - he was waking up and rolled over on his right arm with increased ER of the shoulder. Patient reports significant pain 5/10, lasting a couple of minutes. Patient reports time reduced the pain. Patient reports a reduction in pain at this time.       Objective: See treatment diary below. Incisions healing well, no s/s of exudate or infection. No bruising noted.       Assessment: Tolerated treatment well. Patient would benefit from continued PT. Extensive education was provided to patient advising appropriate positioning for tissue healing. Per patients subjective comment from last night, advised patient to don the sling at night. Demonstrated to patient the importance of avoiding shoulder extension and ER at this time to optimize the integrity of the repair. Demonstrated to patient appropriate positioning for laying. Patient was also educated on not lifting at all with the right arm. Continued to educate patient on not using his right arm. Introduced scapular stabilization therex. Provided tactile cueing to assist with appropriate movements. Pt denies any discomfort during the time. PROM flex performed, no pain reported from patient. Maintained ER at neutral. Advised Pt to follow up with surgeon regarding sling compliance as protocol specifies sling usage until week 4.       Plan: Continue per plan of care.      POC expires Unit limit Auth Expiration date PT/OT/ST + Visit Limit?   25                              Visit/Unit Tracking  AUTH Status:  Date              Pending Used 1 2             Remaining                 Diagnosis: R subscap repair +    Precautions: RTC precautions   Insurance: [employer]    2/1 2/8         Patient Ed     FOTO      Precautions Educated Optimal tissue healing positions, donning of brace, activity modifications                                                                            Neuro Re-Ed                                                                                        Ther Ex           Aerobic conditioning           Elbow PROM 2 x 10 2x10         Wrist PROM 2 x 10 X10          Pendulums  6x20 seconds         Sidelying scapular clocks  5'' x 20 w/tactile cueing for scap dep+ret.         Sidelying scapular elevation and depression  5'' x 20 w/tactile cueing.                                Ther Activity  *See Patient education                               Manual           Shoulder ER PROM   2 x 10 PROM to neutral   2x10         Shoulder flexion PROM   3x PROM   5x5 ~60 deg  *towel placed posterior to shoulder to limit ext.                                           Modalities

## 2024-02-15 ENCOUNTER — OFFICE VISIT (OUTPATIENT)
Dept: PHYSICAL THERAPY | Facility: REHABILITATION | Age: 59
End: 2024-02-15
Payer: COMMERCIAL

## 2024-02-15 DIAGNOSIS — Z98.890 STATUS POST ROTATOR CUFF REPAIR: Primary | ICD-10-CM

## 2024-02-15 PROCEDURE — 97110 THERAPEUTIC EXERCISES: CPT | Performed by: PHYSICAL THERAPIST

## 2024-02-15 PROCEDURE — 97140 MANUAL THERAPY 1/> REGIONS: CPT | Performed by: PHYSICAL THERAPIST

## 2024-02-15 NOTE — PROGRESS NOTES
Daily Note     Today's date: 2/15/2024  Patient name: Callum Nielson  : 1965  MRN: [id number]  Referring provider: Jeremi Jefferson PA*  Dx:   Encounter Diagnosis     ICD-10-CM    1. Status post rotator cuff repair  Z98.890             Start Time: 1545  Stop Time: 1630  Total time in clinic (min): 45 minutes    Subjective: Doing well. Able to be without sling at home. Some pain when he wakes up in the morning. Exercises going well.       Objective: See treatment diary below. Incisions healing well, no s/s of exudate or infection. No bruising noted.       Assessment: Patient is 4 weeks s/p R subscapularis repair. Patient's ROM is appropriate for his stage of rehab. Recommend sleeping with sling at night given his pain in the morning. Progressed patient to PROM shoulder flexion and rows for HEP. Will continue to progress via protocol.      Plan: Continue per plan of care.      POC expires Unit limit Auth Expiration date PT/OT/ST + Visit Limit?   25                              Visit/Unit Tracking  AUTH Status:  Date 2/1 2/8 2/15           Approved - 60 visits Used 1 2 3            Remaining  59 58 57             Diagnosis: R subscap repair    Precautions: RTC precautions   Insurance: [employer]    2/1 2/8 2/15        Patient Ed     FOTO      Precautions Educated Optimal tissue healing positions, donning of brace, activity modifications  Sleep with sling        Fishing   Modifications - April open fish season  - Forward casting                   Neuro Re-Ed                                                       Ther Ex           Aerobic conditioning           Elbow PROM 2 x 10 2x10 2 x 10        Wrist PROM 2 x 10 X10          Pendulums  6x20 seconds         Table slides Stool slide  10x  Stool slide  3 x 10        PROM shoulder flexion   Holding R wrist  2 x 10        Rows   Magenta   3 x 15        Sidelying scapular clocks  5'' x 20 w/tactile cueing for scap dep+ret.         Sidelying scapular  elevation and depression  5'' x 20 w/tactile cueing.                                Ther Activity  *See Patient education                               Manual           Shoulder ER PROM   2 x 10 PROM to neutral   2x10 PROM to neutral   2x10        Shoulder flexion PROM   3x PROM   5x5 ~60 deg  *towel placed posterior to shoulder to limit ext.  PROM 8'                                         Modalities

## 2024-02-19 ENCOUNTER — TELEPHONE (OUTPATIENT)
Dept: OBGYN CLINIC | Facility: HOSPITAL | Age: 59
End: 2024-02-19

## 2024-02-19 NOTE — PROGRESS NOTES
Daily Note     Today's date: 2024  Patient name: Callum Nielson  : 1965  MRN: [id number]  Referring provider: Jeremi Jefferson PA*  Dx:   Encounter Diagnosis     ICD-10-CM    1. Status post rotator cuff repair  Z98.890           Start Time: 1230  Stop Time: 1315  Total time in clinic (min): 45 minutes    Subjective: Hasn't had pain waking up, hasn't slept with sling.       Objective: See treatment diary below.       Assessment: Patient is 4.5 weeks s/p R subscapularis repair. Patient's ROM is appropriate for his stage of rehab. Added shoulder ext and shoulder flexion AAROM to patient's HEP. Will continue to progress via protocol.      Plan: Continue per plan of care.      POC expires Unit limit Auth Expiration date PT/OT/ST + Visit Limit?   25                              Visit/Unit Tracking  AUTH Status:  Date 2/1 2/8 2/15 2/20          Approved - 60 visits Used 1 2 3 4           Remaining  59 58 57 56            Diagnosis: R subscap repair (24)   Precautions: RTC precautions   Insurance: [employer]    2/1 2/8 2/15 2/20       Patient Ed     FOTO      Precautions Educated Optimal tissue healing positions, donning of brace, activity modifications  Sleep with sling        Fishing   Modifications - April open fish season  - Forward casting                   Neuro Re-Ed                                                       Ther Ex           Aerobic conditioning           Elbow PROM 2 x 10 2x10 2 x 10 2 x 10       Wrist PROM 2 x 10 X10          Pendulums  6x20 seconds         Table slides Stool slide  10x  Stool slide  3 x 10 Stool slide  3 x 10       PROM shoulder flexion   Holding R wrist  2 x 10 Holding R wrist  3 x 10       Shoulder flexion AAROM    W/ dowel  3 x 10   <90* flexion       Rows   Magenta   3 x 15 Magenta   3 x 15       Sidelying scapular clocks  5'' x 20 w/tactile cueing for scap dep+ret.         Sidelying scapular elevation and depression  5'' x 20 w/tactile cueing.           Shoulder extension    Magenta  3 x 10-15                             Ther Activity  *See Patient education                               Manual           Shoulder ER PROM   2 x 10 PROM to neutral   2x10 PROM to neutral   2x10 PROM to 10*  4 x 10       Shoulder flexion PROM   3x PROM   5x5 ~60 deg  *towel placed posterior to shoulder to limit ext.  PROM 8'                                         Modalities

## 2024-02-19 NOTE — TELEPHONE ENCOUNTER
Called and spoke with pt who had a repair of the right rotator cuff on 1/17/24, an office visit on 1/30/24, pt was recently at PT and after reviewing notes, PT states the incision area was healing well. Pt is currently having swelling, redness and pus (was white but now a clear liquid) pt noticed 2 days ago. Pt is not experiencing any pain. PT is able to feel arm and into the fingers. Incision is currently open to air and nothing covering it. Denies fevers. Pt has been cleaning incision site states he showers everyday. Pt states incision area irritation seems to be around where the suture was.    Please see pt image.

## 2024-02-19 NOTE — TELEPHONE ENCOUNTER
Caller: Patient    Doctor: Reyes    Reason for call: Patient is experiencing swelling and redness around the suture area. There is also pus coming from the suture. Please advise.    Call back#: [phone]

## 2024-02-20 ENCOUNTER — OFFICE VISIT (OUTPATIENT)
Dept: PHYSICAL THERAPY | Facility: REHABILITATION | Age: 59
End: 2024-02-20
Payer: COMMERCIAL

## 2024-02-20 DIAGNOSIS — Z98.890 STATUS POST ROTATOR CUFF REPAIR: Primary | ICD-10-CM

## 2024-02-20 PROCEDURE — 97140 MANUAL THERAPY 1/> REGIONS: CPT | Performed by: PHYSICAL THERAPIST

## 2024-02-20 PROCEDURE — 97110 THERAPEUTIC EXERCISES: CPT | Performed by: PHYSICAL THERAPIST

## 2024-02-20 NOTE — TELEPHONE ENCOUNTER
CLM on vm to call back and schedule an appt to be seen.       If patient calls back please schedule him an appt, if there are no appts left for today please task the office staff and Jeremi Jefferson for patient to be scheduled accordingly.     Thank you!

## 2024-02-22 ENCOUNTER — OFFICE VISIT (OUTPATIENT)
Dept: PHYSICAL THERAPY | Facility: REHABILITATION | Age: 59
End: 2024-02-22
Payer: COMMERCIAL

## 2024-02-22 DIAGNOSIS — Z98.890 STATUS POST ROTATOR CUFF REPAIR: Primary | ICD-10-CM

## 2024-02-22 PROCEDURE — 97140 MANUAL THERAPY 1/> REGIONS: CPT | Performed by: PHYSICAL THERAPIST

## 2024-02-22 PROCEDURE — 97110 THERAPEUTIC EXERCISES: CPT | Performed by: PHYSICAL THERAPIST

## 2024-02-22 NOTE — PROGRESS NOTES
Daily Note     Today's date: 2024  Patient name: Callum Nielson  : 1965  MRN: [id number]  Referring provider: Jeremi Jefferson PA*  Dx:   Encounter Diagnosis     ICD-10-CM    1. Status post rotator cuff repair  Z98.890             Start Time: 1500  Stop Time: 1545  Total time in clinic (min): 45 minutes    Subjective: Feels that he is getting more range with his passive flexion exercise.        Objective: See treatment diary below.       Assessment: Patient is 5 weeks s/p R subscapularis repair. Patient's ROM is appropriate for his stage of rehab. Patient with some discomfort with AAROM flexion but able to complete with less symptoms when modifying ROM. Will continue to progress via protocol.      Plan: Continue per plan of care.      POC expires Unit limit Auth Expiration date PT/OT/ST + Visit Limit?   25                              Visit/Unit Tracking  AUTH Status:  Date 2/1 2/8 2/15 2/20 2/22         Approved - 60 visits Used 1 2 3 4 5          Remaining  59 58 57 56 55           Diagnosis: R subscap repair (24)   Precautions: RTC precautions   Insurance: [employer]    2/1 2/8 2/15 2/20 2/22      Patient Ed     FOTO      Precautions Educated Optimal tissue healing positions, donning of brace, activity modifications  Sleep with sling        Fishing   Modifications - April open fish season  - Forward casting                   Neuro Re-Ed                                                       Ther Ex           Aerobic conditioning           Wrist PROM 2 x 10 X10          Pendulums  6x20 seconds         Table slides Stool slide  10x  Stool slide  3 x 10 Stool slide  3 x 10 Stool slide  3 x 10      PROM shoulder flexion   Holding R wrist  2 x 10 Holding R wrist  3 x 10 Holding R wrist  3 x 10  <90* flexion      Shoulder flexion AAROM    W/ dowel  3 x 10   <90* flexion W/ dowel  3 x 15   <90* flexion      Rows   Magenta   3 x 15 Magenta   3 x 15 Red CO Band  3 x 15      Sidelying scapular  clocks  5'' x 20 w/tactile cueing for scap dep+ret.         Sidelying scapular elevation and depression  5'' x 20 w/tactile cueing.          Elbow extension    Magenta  3 x 10-15 Magenta  3 x 15                            Ther Activity  *See Patient education                               Manual           Shoulder ER PROM   2 x 10 PROM to neutral   2x10 PROM to neutral   2x10 PROM to 10* max  4 x 10 PROM to 10* max  4 x 10      Shoulder flexion PROM   3x PROM   5x5 ~60 deg  *towel placed posterior to shoulder to limit ext.  PROM 8'        Elbow flex ext 2 x 10 2 x 10 2 x 10 2 x 10 2 x 10                            Modalities

## 2024-02-23 ENCOUNTER — OFFICE VISIT (OUTPATIENT)
Dept: OBGYN CLINIC | Facility: CLINIC | Age: 59
End: 2024-02-23

## 2024-02-23 VITALS
SYSTOLIC BLOOD PRESSURE: 126 MMHG | DIASTOLIC BLOOD PRESSURE: 74 MMHG | WEIGHT: 241 LBS | HEART RATE: 71 BPM | HEIGHT: 71 IN | BODY MASS INDEX: 33.74 KG/M2

## 2024-02-23 DIAGNOSIS — Z98.890 STATUS POST ARTHROSCOPY OF SHOULDER: Primary | ICD-10-CM

## 2024-02-23 PROCEDURE — 99024 POSTOP FOLLOW-UP VISIT: CPT | Performed by: ORTHOPAEDIC SURGERY

## 2024-02-23 NOTE — PROGRESS NOTES
Subjective   CHIEF COMPLAINT/REASON FOR VISIT  Callum Nielson is a 58 y.o. male who presents for  5 week post op follow-up after Repair Rotator Cuff  Arthroscopic - Right, Arthroscopic Subacromial Decompression - Right, and Arthroscopic Biceps Tenodesis - Right on 1/17/2024     HISTORY OF PRESENT ILLNESS  Overall, he feels things are going well and progressing appropriately. Pain has been well controlled. He has been following the post-operative rehabilitation regimen without difficultly. Patient developed a suture abscess early this week and sent pictures.  He reports he had Cephalexin at home which he has been taking.  He reports  he popped it and since then it has cleared up.     Objective   PHYSICAL EXAMINATION  Right Shoulder  MUSCULOSKELETAL EXAMINATION:  Incision: Clean, dry, intact and healed  Surgery Site:  mild erythema portal site, no drainage  Range of Motion: As expected 65 FF, 65 abd  Neurovascular status: Neuro intact, good cap refill  Activity Restrictions: No restrictions        Assessment/Plan   1. Status Post Repair Rotator Cuff  Arthroscopic - Right, Arthroscopic Subacromial Decompression - Right, and Arthroscopic Biceps Tenodesis - Right    He is doing well after surgery and making appropriate progress.   Exam is consistent with a suture abscess vs folliculitis   Continue PT per protocol.  Progress under the guidance of PT  Finish antibiotics     We will plan to see him back as scheduled around the 14 week post-operative shelbi. If any issues, questions, or concerns arise between now and the next appointment, we have encouraged the patient contact our team.        Scribe Attestation      I,:  Tamiko Eid am acting as a scribe while in the presence of the attending physician.:       I,:  Waldo Chambers MD personally performed the services described in this documentation    as scribed in my presence.:

## 2024-02-27 ENCOUNTER — OFFICE VISIT (OUTPATIENT)
Dept: FAMILY MEDICINE CLINIC | Facility: CLINIC | Age: 59
End: 2024-02-27
Payer: COMMERCIAL

## 2024-02-27 ENCOUNTER — OFFICE VISIT (OUTPATIENT)
Dept: PHYSICAL THERAPY | Facility: REHABILITATION | Age: 59
End: 2024-02-27
Payer: COMMERCIAL

## 2024-02-27 VITALS
TEMPERATURE: 96.9 F | RESPIRATION RATE: 16 BRPM | OXYGEN SATURATION: 95 % | HEIGHT: 71 IN | DIASTOLIC BLOOD PRESSURE: 62 MMHG | BODY MASS INDEX: 33.6 KG/M2 | SYSTOLIC BLOOD PRESSURE: 116 MMHG | HEART RATE: 76 BPM | WEIGHT: 240 LBS

## 2024-02-27 DIAGNOSIS — I10 ESSENTIAL HYPERTENSION: ICD-10-CM

## 2024-02-27 DIAGNOSIS — I48.0 PAROXYSMAL ATRIAL FIBRILLATION (HCC): ICD-10-CM

## 2024-02-27 DIAGNOSIS — R35.0 URINARY FREQUENCY: Primary | ICD-10-CM

## 2024-02-27 DIAGNOSIS — Z87.898 HISTORY OF ALCOHOL USE DISORDER: ICD-10-CM

## 2024-02-27 DIAGNOSIS — R35.1 NOCTURIA: ICD-10-CM

## 2024-02-27 DIAGNOSIS — E11.65 TYPE 2 DIABETES MELLITUS WITH HYPERGLYCEMIA, WITHOUT LONG-TERM CURRENT USE OF INSULIN (HCC): ICD-10-CM

## 2024-02-27 DIAGNOSIS — Z98.890 STATUS POST ROTATOR CUFF REPAIR: Primary | ICD-10-CM

## 2024-02-27 LAB
CREAT UR-MCNC: 73.6 MG/DL
MICROALBUMIN UR-MCNC: <7 MG/L
MICROALBUMIN/CREAT 24H UR: <10 MG/G CREATININE (ref 0–30)
SL AMB POCT HEMOGLOBIN AIC: 7.1 (ref ?–6.5)

## 2024-02-27 PROCEDURE — 82043 UR ALBUMIN QUANTITATIVE: CPT | Performed by: NURSE PRACTITIONER

## 2024-02-27 PROCEDURE — 97140 MANUAL THERAPY 1/> REGIONS: CPT | Performed by: PHYSICAL THERAPIST

## 2024-02-27 PROCEDURE — 82570 ASSAY OF URINE CREATININE: CPT | Performed by: NURSE PRACTITIONER

## 2024-02-27 PROCEDURE — 97110 THERAPEUTIC EXERCISES: CPT | Performed by: PHYSICAL THERAPIST

## 2024-02-27 PROCEDURE — 83036 HEMOGLOBIN GLYCOSYLATED A1C: CPT | Performed by: NURSE PRACTITIONER

## 2024-02-27 PROCEDURE — 99214 OFFICE O/P EST MOD 30 MIN: CPT | Performed by: NURSE PRACTITIONER

## 2024-02-27 NOTE — PATIENT INSTRUCTIONS
Enlarged Prostate (BPH)   AMBULATORY CARE:   An enlarged prostate (BPH)  is a common condition in older adults. BPH develops because the number of prostate cells increases (hyperplasia) or the cells get bigger (hypertrophy). The prostate wraps around the urethra. An enlarged prostate can press on the urethra. This may cause problems with storing urine or emptying your bladder completely.       Common signs and symptoms:   Urinating 8 or more times each day    A feeling of not fully emptying your bladder when you urinate    An urgent need to urinate that you could not put off, or urinating again within 2 hours    Being woken from sleep because you needed to urinate    Trouble starting your urine flow, or a need to push or strain to get it to start    Urine that stops and starts several times when you urinate    A weak urine stream, or dribbling after you urinate    Call your doctor or urologist if:   You see blood in your urine.    You are not able to urinate.    Your bladder feels very full and painful.    You have new or worsening symptoms.    You have a fever.    You have questions or concerns about your condition or care.    Treatment  may include any of the following:  Watchful waiting  means you do not receive treatment right away. Your signs and symptoms will be monitored over time to see if they get worse. You may be asked to keep a record and bring it to follow-up visits. This record may include when you urinate, how easy or difficult it was, and any changes in urination.    Medicines  may be used to relax the muscles in your prostate and bladder. This may help you urinate more easily. You may also need medicine that helps shrink, or slow the growth of your prostate.    A procedure  may be used to place a stent into your urethra to hold it open. A stent is a short, tiny mesh tube. A prostatic urethral lift, or UroLift, may be used to hold the prostate away from the urethra. This makes the urethra wider so  urine can pass through more easily.    Surgery  may be used to relieve your symptoms if other treatments do not work. Extra tissue that is causing your symptoms may be destroyed. All or part of your prostate may be removed during another type of surgery.    What you can do to manage your symptoms:   Urinate on a regular schedule.  This will train your bladder to hold urine longer. A larger amount of urine may make it easier to urinate.    Drink less liquid during the day.  Do not have liquid for several hours before you go to bed at night. Do not drink large amounts of any liquid at one time.    Limit alcohol and caffeine.  These can irritate your bladder and make your symptoms worse.    Eat less salt.  Salt can cause fluid buildup and make it harder to urinate. Examples of salty foods are chips, cured meats, and canned soups. Do not use table salt.    Elevate your legs if you have swelling.  Elevate (raise) your legs above the level of your heart. This can relieve swelling caused by fluid buildup. You may not have to get up in the night to urinate.         Exercise regularly.  Exercise can help improve your symptoms. Ask your healthcare provider what a healthy weight for you is. Aim to get at least 30 minutes of exercise on most days of the week.       Follow up with your doctor or urologist as directed:  Write down your questions so you remember to ask them during your visits.  © Copyright [employer] 2023 Information is for End User's use only and may not be sold, redistributed or otherwise used for commercial purposes.  The above information is an  only. It is not intended as medical advice for individual conditions or treatments. Talk to your doctor, nurse or pharmacist before following any medical regimen to see if it is safe and effective for you.

## 2024-02-27 NOTE — ASSESSMENT & PLAN NOTE
Blood pressure 116/62 in the office today.  He continues on his losartan 50 mg daily.  He has been modifying his diet.  He is going to the gym.

## 2024-02-27 NOTE — ASSESSMENT & PLAN NOTE
Continues to follow with cardiology.  The patient is not taking his Eliquis as prescribed.  He did stop this on his own.  Cardiology is aware.  I did discuss the risks of not taking the Eliquis with the patient.

## 2024-02-27 NOTE — ASSESSMENT & PLAN NOTE
Lab Results   Component Value Date    HGBA1C 7.1 (A) 02/27/2024   Patient has been taking his metformin as prescribed twice daily for the past 4 to 5 weeks.  He has been trying to modify his diet.  He does continue with urinary frequency both during the day and nocturia at night.  Hemoglobin A1c today in the office is 7.1.  He is up-to-date with diabetic eye exams and diabetic foot exams.

## 2024-02-27 NOTE — ASSESSMENT & PLAN NOTE
The patient did quit drinking the beginning of the year.  He was drinking 1 bottle of christopher daily.

## 2024-02-27 NOTE — PROGRESS NOTES
Daily Note     Today's date: 2024  Patient name: Callum Nielson  : 1965  MRN: [id number]  Referring provider: Jeremi Jefferson PA*  Dx:   Encounter Diagnosis     ICD-10-CM    1. Status post rotator cuff repair  Z98.890                      Subjective: Pt reports that he is doing well. Notes that he was sore from making his spaghetti sauce with all the movements of the arm while cooking.       Objective: See treatment diary below      Assessment: Tolerated treatment well. Patient exhibited good technique with therapeutic exercises and would benefit from continued PT. Pt's ROM is progressing well within protocol.       Plan: Continue per plan of care.  Progress treament per protocol.      POC expires Unit limit Auth Expiration date PT/OT/ST + Visit Limit?   25                              Visit/Unit Tracking  AUTH Status:  Date 2/1 2/8 2/15 2/20 2/22 2/27        Approved - 60 visits Used 1 2 3 4 5 6         Remaining  59 58 57 56 [phone] 54          Diagnosis: R subscap repair (24)   Precautions: RTC precautions   Insurance: [employer]    2/1 2/8 2/15 2/20 2/22 2/27     Patient Ed     FOTO      Precautions Educated Optimal tissue healing positions, donning of brace, activity modifications  Sleep with sling        Fishing   Modifications - April open fish season  - Forward casting                   Neuro Re-Ed                                                       Ther Ex           Aerobic conditioning           Wrist PROM 2 x 10 X10          Pendulums  6x20 seconds         Table slides Stool slide  10x  Stool slide  3 x 10 Stool slide  3 x 10 Stool slide  3 x 10 Stool slide  3 x 10     PROM shoulder flexion   Holding R wrist  2 x 10 Holding R wrist  3 x 10 Holding R wrist  3 x 10  <90* flexion Holding R wrist  3 x 10  <90* flexion     Shoulder flexion AAROM    W/ dowel  3 x 10   <90* flexion W/ dowel  3 x 15   <90* flexion W/ dowel  3 x 15   <90* flexion     Rows   Magenta   3 x 15 Magenta   3 x  15 Red CO Band  3 x 15 Magenta   3 x 15     Sidelying scapular clocks  5'' x 20 w/tactile cueing for scap dep+ret.         Sidelying scapular elevation and depression  5'' x 20 w/tactile cueing.          Elbow extension    Magenta  3 x 10-15 Magenta  3 x 15 Magenta  3 x 15                           Ther Activity  *See Patient education                               Manual           Shoulder ER PROM   2 x 10 PROM to neutral   2x10 PROM to neutral   2x10 PROM to 10* max  4 x 10 PROM to 10* max  4 x 10      Shoulder flexion PROM   3x PROM   5x5 ~60 deg  *towel placed posterior to shoulder to limit ext.  PROM 8'  PROM 6'      Elbow flex ext 2 x 10 2 x 10 2 x 10 2 x 10 2 x 10                            Modalities

## 2024-02-27 NOTE — PROGRESS NOTES
[facility] ([facility] PRACTICE    NAME: Callum Nielson  AGE: 58 y.o. SEX: male  : 1965     DATE: 2024     Assessment and Plan:     Problem List Items Addressed This Visit        Endocrine    Type 2 diabetes mellitus with hyperglycemia, without long-term current use of insulin ([facility], located within [facility])       Lab Results   Component Value Date    HGBA1C 7.1 (A) 2024   Patient has been taking his metformin as prescribed twice daily for the past 4 to 5 weeks.  He has been trying to modify his diet.  He does continue with urinary frequency both during the day and nocturia at night.  Hemoglobin A1c today in the office is 7.1.  He is up-to-date with diabetic eye exams and diabetic foot exams.         Relevant Orders    POCT hemoglobin A1c (Completed)    Albumin / creatinine urine ratio       Cardiovascular and Mediastinum    Essential hypertension     Blood pressure 116/62 in the office today.  He continues on his losartan 50 mg daily.  He has been modifying his diet.  He is going to the gym.         Paroxysmal atrial fibrillation (HCC)     Continues to follow with cardiology.  The patient is not taking his Eliquis as prescribed.  He did stop this on his own.  Cardiology is aware.  I did discuss the risks of not taking the Eliquis with the patient.            Other    History of alcohol use disorder     The patient did quit drinking the beginning of the year.  He was drinking 1 bottle of christopher daily.        Other Visit Diagnoses     Urinary frequency    -  Primary    Relevant Orders    Ambulatory Referral to Urology    Nocturia        Relevant Orders    Ambulatory Referral to Urology              No follow-ups on file.     Chief Complaint:     Chief Complaint   Patient presents with   • Urinary Frequency   • pain in tailbone        History of Present Illness:   Patient with increase in urinary frequency and nocturia over the last six months. Urinating every 2-3 hours at night. Stopped drinking  alcohol about 6 weeks ago. Does have a cup of coffee around 4-5 pm daily. Stops drinking fluids around 7 pm. Goes to bed around 8:30 pm.  His last hemoglobin Hemoglobin A1c in January was 7.7.  He was not taking his metformin as directed at that time.  He has been compliant with metformin and has been going to gym and watching his diet.  Hemoglobin A1c in the office today is 7.1 which is improved. Continue with dietary modifications. Will refer to urology for further evaluation and management of his urinary symptoms       Review of Systems:     Review of Systems   Constitutional: Negative.  Negative for fatigue.   HENT: Negative.  Negative for congestion, postnasal drip, rhinorrhea and trouble swallowing.    Eyes: Negative.  Negative for visual disturbance.   Respiratory: Negative.  Negative for choking and shortness of breath.    Cardiovascular: Negative.  Negative for chest pain.   Gastrointestinal: Negative.    Endocrine: Negative.    Genitourinary:  Positive for frequency.   Musculoskeletal: Negative.  Negative for arthralgias, back pain, myalgias and neck pain.   Skin: Negative.    Neurological:  Negative for dizziness and headaches.   Psychiatric/Behavioral: Negative.          Problem List:     Patient Active Problem List   Diagnosis   • Acne   • COPD (chronic obstructive pulmonary disease) ([facility])   • Depression with anxiety   • Eczema   • Hyperlipidemia   • Essential hypertension   • Type 2 diabetes mellitus with hyperglycemia, without long-term current use of insulin ([facility])   • Insomnia   • Irritable bowel syndrome   • Nicotine dependence   • Obesity (BMI 30-39.9)   • Psoriasis   • Atrial flutter ([facility])   • History of alcohol use disorder   • Subacromial bursitis of right shoulder joint   • Adhesive capsulitis of right shoulder   • NAYE (obstructive sleep apnea)   • Leukocytosis   • Anticoagulation adequate   • Dysphagia   • Paroxysmal atrial fibrillation ([facility])        Objective:     /62 (BP Location: Left  "arm, Patient Position: Sitting, Cuff Size: Large)   Pulse 76   Temp (!) 96.9 °F (36.1 °C) (Tympanic)   Resp 16   Ht 5' 11\" (1.803 m)   Wt 109 kg (240 lb)   SpO2 95%   BMI 33.47 kg/m²     Current Outpatient Medications   Medication Sig Dispense Refill   • albuterol (PROVENTIL HFA,VENTOLIN HFA) 90 mcg/act inhaler INHALE 2 PUFFS EVERY 6 HOURS AS NEEDED FOR WHEEZING 18 g 2   • folic acid (FOLVITE) 1 mg tablet Take 1 tablet (1 mg total) by mouth in the morning.  0   • losartan (COZAAR) 50 mg tablet TAKE 1 TABLET BY MOUTH EVERY DAY 90 tablet 3   • metFORMIN (GLUCOPHAGE) 1000 MG tablet TAKE 1 TABLET BY MOUTH TWICE A DAY WITH FOOD 180 tablet 3   • metoprolol tartrate (LOPRESSOR) 50 mg tablet TAKE 1 TABLET BY MOUTH EVERY 12 HOURS 180 tablet 3   • multivitamin (THERAGRAN) TABS Take 1 tablet by mouth daily     • thiamine 100 MG tablet Take 1 tablet (100 mg total) by mouth in the morning.  0   • Eliquis 5 MG TAKE 1 TABLET BY MOUTH TWICE A DAY (Patient not taking: Reported on 1/24/2024) 60 tablet 11     No current facility-administered medications for this visit.       Physical Exam  Vitals reviewed.   Constitutional:       Appearance: Normal appearance. He is obese.   HENT:      Head: Normocephalic and atraumatic.      Nose: Nose normal.      Mouth/Throat:      Mouth: Mucous membranes are moist.   Eyes:      Extraocular Movements: Extraocular movements intact.      Pupils: Pupils are equal, round, and reactive to light.   Cardiovascular:      Rate and Rhythm: Normal rate and regular rhythm.      Pulses: Normal pulses.      Heart sounds: Normal heart sounds.   Pulmonary:      Effort: Pulmonary effort is normal.      Breath sounds: Normal breath sounds.   Musculoskeletal:         General: Normal range of motion.   Skin:     General: Skin is warm.   Neurological:      General: No focal deficit present.      Mental Status: He is alert and oriented to person, place, and time. Mental status is at baseline.   Psychiatric:       "   Mood and Affect: Mood normal.         Behavior: Behavior normal.         Thought Content: Thought content normal.         Judgment: Judgment normal.         LARA Larson  [facility]

## 2024-02-29 ENCOUNTER — OFFICE VISIT (OUTPATIENT)
Dept: PHYSICAL THERAPY | Facility: REHABILITATION | Age: 59
End: 2024-02-29
Payer: COMMERCIAL

## 2024-02-29 DIAGNOSIS — Z98.890 STATUS POST ROTATOR CUFF REPAIR: Primary | ICD-10-CM

## 2024-02-29 PROCEDURE — 97110 THERAPEUTIC EXERCISES: CPT | Performed by: PHYSICAL MEDICINE & REHABILITATION

## 2024-02-29 PROCEDURE — 97140 MANUAL THERAPY 1/> REGIONS: CPT | Performed by: PHYSICAL MEDICINE & REHABILITATION

## 2024-02-29 NOTE — PROGRESS NOTES
Daily Note     Today's date: 2024  Patient name: Callum Nielson  : 1965  MRN: [id number]  Referring provider: Jeremi Jefferson PA*  Dx:   Encounter Diagnosis     ICD-10-CM    1. Status post rotator cuff repair  Z98.890                      Subjective: Patient reports some increased soreness in R shoulder after making a turkey yesterday, feeling better today.    Objective: See treatment diary below    Assessment: Tolerated treatment well. Most challenged with AAROM flexion this date. Patient exhibited good technique with therapeutic exercises and would benefit from continued PT. Continue as able per patient tolerance and protocol.     Plan: Continue per plan of care.  Progress treament per protocol.      POC expires Unit limit Auth Expiration date PT/OT/ST + Visit Limit?   25                              Visit/Unit Tracking  AUTH Status:  Date 2/1 2/8 2/15 2/20 2/22 2/27 2/29       Approved - 60 visits Used 1 2 3 4 5 6 7        Remaining  59 58 57 56 55 54 53         Diagnosis: R subscap repair (24)   Precautions: RTC precautions   Insurance: [employer]    2/1 2/8 2/15 2/20 2/22 2/27 2/29    Patient Ed     FOTO      Precautions Educated Optimal tissue healing positions, donning of brace, activity modifications  Sleep with sling        Fishing   Modifications - April open fish season  - Forward casting                   Neuro Re-Ed                                                       Ther Ex           Aerobic conditioning           Wrist PROM 2 x 10 X10          Pendulums  6x20 seconds         Table slides Stool slide  10x  Stool slide  3 x 10 Stool slide  3 x 10 Stool slide  3 x 10 Stool slide  3 x 10 3x10 stool slide    PROM shoulder flexion   Holding R wrist  2 x 10 Holding R wrist  3 x 10 Holding R wrist  3 x 10  <90* flexion Holding R wrist  3 x 10  <90* flexion Holding R wrist  3 x 10    Shoulder flexion AAROM    W/ dowel  3 x 10   <90* flexion W/ dowel  3 x 15   <90* flexion W/  dowel  3 x 15   <90* flexion W/ dowel  3 x 15     Rows   Magenta   3 x 15 Magenta   3 x 15 Red CO Band  3 x 15 Magenta   3 x 15 3x15 magenta    Sidelying scapular clocks  5'' x 20 w/tactile cueing for scap dep+ret.         Sidelying scapular elevation and depression  5'' x 20 w/tactile cueing.          Elbow extension    Magenta  3 x 10-15 Magenta  3 x 15 Magenta  3 x 15 3x15 Magenta                          Ther Activity  *See Patient education                               Manual       2/29    Shoulder ER PROM   2 x 10 PROM to neutral   2x10 PROM to neutral   2x10 PROM to 10* max  4 x 10 PROM to 10* max  4 x 10  LH    Shoulder flexion PROM   3x PROM   5x5 ~60 deg  *towel placed posterior to shoulder to limit ext.  PROM 8'  PROM 6'  LH    Elbow flex ext 2 x 10 2 x 10 2 x 10 2 x 10 2 x 10                            Modalities

## 2024-03-05 ENCOUNTER — OFFICE VISIT (OUTPATIENT)
Dept: PHYSICAL THERAPY | Facility: REHABILITATION | Age: 59
End: 2024-03-05
Payer: COMMERCIAL

## 2024-03-05 DIAGNOSIS — Z98.890 STATUS POST ROTATOR CUFF REPAIR: Primary | ICD-10-CM

## 2024-03-05 PROCEDURE — 97140 MANUAL THERAPY 1/> REGIONS: CPT

## 2024-03-05 PROCEDURE — 97110 THERAPEUTIC EXERCISES: CPT

## 2024-03-05 NOTE — PROGRESS NOTES
Daily Note     Today's date: 3/5/2024  Patient name: Callum Nielson  : 1965  MRN: [id number]  Referring provider: Jeremi Jefferson PA*  Dx:   Encounter Diagnosis     ICD-10-CM    1. Status post rotator cuff repair  Z98.890           Start Time: 0800  Stop Time: 0845  Total time in clinic (min): 45 minutes    Subjective: Is more limited with sleeping w/ having to urinate, not as bothered with pain. Continues to be diligent with attending the gym and his HEP.     Objective: See treatment diary below    Assessment: Tolerated treatment well. Today was more challenged with ER during PROM. Worked well through his other exercises, added AAROM for ER with the dowel.  Patient exhibited good technique with therapeutic exercises and would benefit from continued PT. Continue as able per patient tolerance and protocol.     Plan: Continue per plan of care.  Progress treament per protocol.      POC expires Unit limit Auth Expiration date PT/OT/ST + Visit Limit?   25                              Visit/Unit Tracking  AUTH Status:  Date 2/1 2/8 2/15 2/20 2/22 2/27 2/29 3/5      Approved - 60 visits Used 1 2 3 4 5 6 7 8       Remaining  59 58 57 56 55 54 53 52        Diagnosis: R subscap repair (24)   Precautions: RTC precautions   Insurance: [employer]    2/1 2/8 2/15 2/20 2/22 2/27 2/29 3/5   Patient Ed     FOTO      Precautions Educated Optimal tissue healing positions, donning of brace, activity modifications  Sleep with sling        Fishing   Modifications - April open fish season  - Forward casting                   Neuro Re-Ed                                                       Ther Ex       2/29 3/5   Aerobic conditioning           Wrist PROM 2 x 10 X10          Pendulums  6x20 seconds         Table slides Stool slide  10x  Stool slide  3 x 10 Stool slide  3 x 10 Stool slide  3 x 10 Stool slide  3 x 10 3x10 stool slide 3x10 stool slide   PROM shoulder flexion   Holding R wrist  2 x 10 Holding R  wrist  3 x 10 Holding R wrist  3 x 10  <90* flexion Holding R wrist  3 x 10  <90* flexion Holding R wrist  3 x 10 Holding R wrist  3 x 10   Shoulder flexion AAROM    W/ dowel  3 x 10   <90* flexion W/ dowel  3 x 15   <90* flexion W/ dowel  3 x 15   <90* flexion W/ dowel  3 x 15  W/ dowel  3 x 15 + 10x for ER (only to 10 degrees)   Rows   Magenta   3 x 15 Magenta   3 x 15 Red CO Band  3 x 15 Magenta   3 x 15 3x15 magenta 3x15 YCO   Sidelying scapular clocks  5'' x 20 w/tactile cueing for scap dep+ret.         Sidelying scapular elevation and depression  5'' x 20 w/tactile cueing.          Elbow extension    Magenta  3 x 10-15 Magenta  3 x 15 Magenta  3 x 15 3x15 Magenta 3x10 YCO at [facility]                         Ther Activity  *See Patient education                               Manual       2/29    Shoulder ER PROM   2 x 10 PROM to neutral   2x10 PROM to neutral   2x10 PROM to 10* max  4 x 10 PROM to 10* max  4 x 10  LH PROM to 10 degrees max 4x10   Shoulder flexion PROM   3x PROM   5x5 ~60 deg  *towel placed posterior to shoulder to limit ext.  PROM 8'  PROM 6'  LH PROM 8'   Elbow flex ext 2 x 10 2 x 10 2 x 10 2 x 10 2 x 10                            Modalities

## 2024-03-07 ENCOUNTER — OFFICE VISIT (OUTPATIENT)
Dept: PHYSICAL THERAPY | Facility: REHABILITATION | Age: 59
End: 2024-03-07
Payer: COMMERCIAL

## 2024-03-07 DIAGNOSIS — Z98.890 STATUS POST ROTATOR CUFF REPAIR: Primary | ICD-10-CM

## 2024-03-07 PROCEDURE — 97110 THERAPEUTIC EXERCISES: CPT | Performed by: PHYSICAL MEDICINE & REHABILITATION

## 2024-03-07 PROCEDURE — 97140 MANUAL THERAPY 1/> REGIONS: CPT | Performed by: PHYSICAL MEDICINE & REHABILITATION

## 2024-03-07 PROCEDURE — 97112 NEUROMUSCULAR REEDUCATION: CPT | Performed by: PHYSICAL MEDICINE & REHABILITATION

## 2024-03-07 NOTE — PROGRESS NOTES
Daily Note     Today's date: 3/7/2024  Patient name: Callum Nielson  : 1965  MRN: [id number]  Referring provider: Jeremi Jefferson PA*  Dx:   Encounter Diagnosis     ICD-10-CM    1. Status post rotator cuff repair  Z98.890                      Subjective: Patient notes continued intermittent discomfort, mild in intensity. No issues or changes since last visit.     Objective: See treatment diary below    Assessment: Tolerated treatment well. Able to tolerate AAROM in standing vs. Supine this date. Patient exhibited good technique with therapeutic exercises and would benefit from continued PT. Continue as able per patient tolerance and protocol.     Plan: Continue per plan of care.  Progress treament per protocol.      POC expires Unit limit Auth Expiration date PT/OT/ST + Visit Limit?   25                              Visit/Unit Tracking  AUTH Status:  Date 2/1 2/8 2/15 2/20 2/22 2/27 2/29 3/5 3/7     Approved - 60 visits Used 1 2 3 4 5 6 7 8 9      Remaining  59 58 57 56 55 54 53 52 51       Diagnosis: R subscap repair (24)   Precautions: RTC precautions   Insurance: [employer]    3/7  2/15 2/20 2/22 2/27 2/29 3/5   Patient Ed     FOTO      Precautions   Sleep with sling        Fishing    - April open fish season  - Forward casting                   Neuro Re-Ed 3/7                                                      Ther Ex 3/7      2/29 3/5   Aerobic conditioning           Wrist PROM           Pendulums throughout          Table slides 3x10  Stool slide  3 x 10 Stool slide  3 x 10 Stool slide  3 x 10 Stool slide  3 x 10 3x10 stool slide 3x10 stool slide   PROM shoulder flexion Holding R wrist  3 x 10  Holding R wrist  2 x 10 Holding R wrist  3 x 10 Holding R wrist  3 x 10  <90* flexion Holding R wrist  3 x 10  <90* flexion Holding R wrist  3 x 10 Holding R wrist  3 x 10   Shoulder flexion AAROM Standing, 3x10 ea flex, ER lara to 10 deg   W/ dowel  3 x 10   <90* flexion W/  dowel  3 x 15   <90* flexion W/ dowel  3 x 15   <90* flexion W/ dowel  3 x 15  W/ dowel  3 x 15 + 10x for ER (only to 10 degrees)   Rows 3x15 YCO  Magenta   3 x 15 Magenta   3 x 15 Red CO Band  3 x 15 Magenta   3 x 15 3x15 magenta 3x15 YCO   Sidelying scapular clocks Serratus punch activation w/ PT support, 10x Trial seated with table support         Sidelying scapular elevation and depression           Elbow extension 3x15 YCO  at [facility]   Magenta  3 x 10-15 Magenta  3 x 15 Magenta  3 x 15 3x15 Magenta 3x10 YCO at [facility]                         Ther Activity                                 Manual 3/7      2/29    Shoulder ER LH  PROM to neutral   2x10 PROM to 10* max  4 x 10 PROM to 10* max  4 x 10  LH PROM to 10 degrees max 4x10   Shoulder flexion LH  PROM 8'  PROM 6'  LH PROM 8'   Elbow flex ext   2 x 10 2 x 10 2 x 10                            Modalities

## 2024-03-11 NOTE — PROGRESS NOTES
Daily Note     Today's date: 3/12/2024  Patient name: Callum Nielson  : 1965  MRN: [id number]  Referring provider: Jeremi Jefferson PA*  Dx:   Encounter Diagnosis     ICD-10-CM    1. Status post rotator cuff repair  Z98.890             Start Time: 0800  Stop Time: 0845  Total time in clinic (min): 45 minutes    Subjective: Thinks he over did his exercise yesterday, a little sore in his forearm and shoulder.      Objective: See treatment diary below      Assessment: Patient is 8 weeks s/p R subscapularis repair. Continuing gradually progress his shoulder mobility with his shoulder flexion currently <120* and ER PROM <30* which is in line with his post-op protocol. Added weighted bicep curls and chest press with low weights which patient was able to complete without pain. Patient exhibited good technique with therapeutic exercises and would benefit from continued PT. Continue as able per patient tolerance and protocol.     Plan: Continue per plan of care.  Progress treament per protocol.      POC expires Unit limit Auth Expiration date PT/OT/ST + Visit Limit?   25                              Visit/Unit Tracking  AUTH Status:  Date 2/1 2/8 2/15 2/20 2/22 2/27 2/29 3/5 3/7 3/12    Approved - 60 visits Used 1 2 3 4 5 6 7 8 9 10     Remaining  59 58 57 56 55 54 53 52 51 50      Diagnosis: R subscap repair (24)   Precautions: RTC precautions   Insurance: [employer]    3/7 3/12       Patient Ed         Precautions         Fishing                  Neuro Re-Ed                                             Ther Ex         Aerobic conditioning         Wrist PROM         Pendulums throughout        Table slides 3x10        PROM shoulder flexion Holding R wrist  3 x 10 Holding R wrist  2 x 10       Shoulder flexion AAROM Standing, 3x10 ea flex, ER lara to 10 deg Supine  3 x 10    Standing  Ball roll up plinth incline  2 x 15       Rows 3x15 YCO        Serratus punch Serratus punch activation w/ PT support,  10x Chest press  3#, w/ elbow propped  3 x 8, 80% full range       Sidelying scapular elevation and depression         Elbow extension 3x15 YCO  at [facility] RCO at [facility]  3 x 15       Bicep curls  4# 3 x 10-15                         Ther Activity                           Manual         Shoulder ER LH Max 20*  5'       Shoulder flexion LH        Elbow flex ext                           Modalities

## 2024-03-12 ENCOUNTER — OFFICE VISIT (OUTPATIENT)
Dept: PHYSICAL THERAPY | Facility: REHABILITATION | Age: 59
End: 2024-03-12
Payer: COMMERCIAL

## 2024-03-12 DIAGNOSIS — Z98.890 STATUS POST ROTATOR CUFF REPAIR: Primary | ICD-10-CM

## 2024-03-12 PROCEDURE — 97110 THERAPEUTIC EXERCISES: CPT | Performed by: PHYSICAL THERAPIST

## 2024-03-14 ENCOUNTER — OFFICE VISIT (OUTPATIENT)
Dept: PHYSICAL THERAPY | Facility: REHABILITATION | Age: 59
End: 2024-03-14
Payer: COMMERCIAL

## 2024-03-14 ENCOUNTER — REMOTE DEVICE CLINIC VISIT (OUTPATIENT)
Dept: CARDIOLOGY CLINIC | Facility: CLINIC | Age: 59
End: 2024-03-14
Payer: COMMERCIAL

## 2024-03-14 DIAGNOSIS — Z95.818 PRESENCE OF OTHER CARDIAC IMPLANTS AND GRAFTS: Primary | ICD-10-CM

## 2024-03-14 DIAGNOSIS — Z98.890 STATUS POST ROTATOR CUFF REPAIR: Primary | ICD-10-CM

## 2024-03-14 PROCEDURE — 97140 MANUAL THERAPY 1/> REGIONS: CPT | Performed by: PHYSICAL THERAPIST

## 2024-03-14 PROCEDURE — 93298 REM INTERROG DEV EVAL SCRMS: CPT | Performed by: INTERNAL MEDICINE

## 2024-03-14 PROCEDURE — 97110 THERAPEUTIC EXERCISES: CPT | Performed by: PHYSICAL THERAPIST

## 2024-03-14 NOTE — PROGRESS NOTES
Daily Note     Today's date: 3/14/2024  Patient name: Callum Nielson  : 1965  MRN: [id number]  Referring provider: Jeremi Jefferson PA*  Dx:   Encounter Diagnosis     ICD-10-CM    1. Status post rotator cuff repair  Z98.890             Start Time: 0845  Stop Time: 0930  Total time in clinic (min): 45 minutes    Subjective: He slept on his R shoulder and is sore this morning from it.       Objective: See treatment diary below      Assessment: Patient is 8 weeks s/p R subscapularis repair. Continuing gradually progress his shoulder mobility with his shoulder flexion currently <120* and ER PROM <30* which is in line with his post-op protocol. Did not progress program today due to his soreness from overnight. Recommend not over pushing his ROM due to his soreness at this time. Patient exhibited good technique with therapeutic exercises and would benefit from continued PT. Continue as able per patient tolerance and protocol.     Plan: Continue per plan of care.  Progress treament per protocol.      POC expires Unit limit Auth Expiration date PT/OT/ST + Visit Limit?   25                              Visit/Unit Tracking  AUTH Status:  Date 3/5 3/7 3/12 3/14     Approved - 60 visits Used 8 9 10 11      Remaining  52 51 50 49       Diagnosis: R subscap repair (24)   Precautions: RTC precautions   Insurance: [employer]    3/7 3/12 3/14      Patient Ed         Precautions         Fishing                  Neuro Re-Ed                                             Ther Ex         Aerobic conditioning         Wrist PROM         Pendulums throughout        Table slides 3x10        PROM shoulder flexion Holding R wrist  3 x 10 Holding R wrist  2 x 10 Holding R wrist  2 x 10      Shoulder flexion AAROM Standing, 3x10 ea flex, ER lara to 10 deg Supine  3 x 10    Standing  Ball roll up plinth incline  2 x 15 Supine  3 x 10    Standing  Ball roll up plinth incline  2 x 15      Rows 3x15 YCO  3 x 15 RCO      Serratus  punch Serratus punch activation w/ PT support, 10x Chest press  3#, w/ elbow propped  3 x 8, 80% full range       Sidelying scapular elevation and depression         Elbow extension 3x15 YCO  at [facility] RCO at [place]  3 x 15 RCO at [place]  3 x 15-20      Bicep curls  4# 3 x 10-15 4# 3 x 10                        Ther Activity                           Manual         Shoulder ER LH Max 20*  5' Max 20*  10'      Shoulder flexion LH        Elbow flex ext                           Modalities

## 2024-03-19 ENCOUNTER — OFFICE VISIT (OUTPATIENT)
Dept: PHYSICAL THERAPY | Facility: REHABILITATION | Age: 59
End: 2024-03-19
Payer: COMMERCIAL

## 2024-03-19 DIAGNOSIS — Z98.890 STATUS POST ROTATOR CUFF REPAIR: Primary | ICD-10-CM

## 2024-03-19 PROCEDURE — 97140 MANUAL THERAPY 1/> REGIONS: CPT | Performed by: PHYSICAL THERAPIST

## 2024-03-19 PROCEDURE — 97110 THERAPEUTIC EXERCISES: CPT | Performed by: PHYSICAL THERAPIST

## 2024-03-19 NOTE — PROGRESS NOTES
Daily Note     Today's date: 3/19/2024  Patient name: Callum Nielson  : 1965  MRN: [id number]  Referring provider: Jeremi Jefferson PA*  Dx:   Encounter Diagnosis     ICD-10-CM    1. Status post rotator cuff repair  Z98.890               Start Time: 0800  Stop Time: 0845  Total time in clinic (min): 45 minutes    Subjective: After last PT session, he had some diarrhea, stomachache, and fever. Took a day to pass and now he is ok.       Objective: See treatment diary below      Assessment: Patient is 9 weeks s/p R subscapularis repair. His shoulder mobility is improving, especially shoulder flexion which is approx 130*. Progressed height of ball roll and band resistance with rows. Patient exhibited good technique with therapeutic exercises and would benefit from continued PT. Continue as able per patient tolerance and protocol.     Plan: Continue per plan of care.  Progress treament per protocol.      POC expires Unit limit Auth Expiration date PT/OT/ST + Visit Limit?   25                              Visit/Unit Tracking  AUTH Status:  Date 3/5 3/7 3/12 3/14 3/19     Approved - 60 visits Used 8 9 10 11 12      Remaining  52 51 50 49 48       Diagnosis: R subscap repair (24)   Precautions: RTC precautions   Insurance: [employer]    3/7 3/12 3/14 3/19     Patient Ed         Precautions         Fishing                  Neuro Re-Ed                                             Ther Ex         Aerobic conditioning         Pendulums throughout        Table slides 3x10        PROM shoulder flexion Holding R wrist  3 x 10 Holding R wrist  2 x 10 Holding R wrist  2 x 10 Holding R wrist  3 x 10    Standing with dowel  PROM  3 x 10     Shoulder flexion AAROM Standing, 3x10 ea flex, ER lara to 10 deg Supine  3 x 10    Standing  Ball roll up plinth incline  2 x 15 Supine  3 x 10    Standing  Ball roll up plinth incline  2 x 15 Supine  3 x 10    Standing  Ball roll up plinth incline  2 x 15     Rows 3x15 YCO  3  x 15 RCO 3 x 10     Serratus punch Serratus punch activation w/ PT support, 10x Chest press  3#, w/ elbow propped  3 x 8, 80% full range  Chest press  3#, w/ elbow propped  3 x 8, 80% full range     Elbow extension 3x15 YCO  at [facility] RCO at [facility]  3 x 15 RCO at [facility]  3 x 15-20 RCO at [facility]  3 x 15     Bicep curls  4# 3 x 10-15 4# 3 x 10 4# 3 x 12                       Ther Activity                           Manual         Shoulder ER LH Max 20*  5' Max 20*  10' Max 20*  8'     Shoulder flexion LH        Elbow flex ext                           Modalities

## 2024-03-21 ENCOUNTER — OFFICE VISIT (OUTPATIENT)
Dept: PHYSICAL THERAPY | Facility: REHABILITATION | Age: 59
End: 2024-03-21
Payer: COMMERCIAL

## 2024-03-21 DIAGNOSIS — Z98.890 STATUS POST ROTATOR CUFF REPAIR: Primary | ICD-10-CM

## 2024-03-21 PROCEDURE — 97140 MANUAL THERAPY 1/> REGIONS: CPT | Performed by: PHYSICAL THERAPIST

## 2024-03-21 PROCEDURE — 97110 THERAPEUTIC EXERCISES: CPT | Performed by: PHYSICAL THERAPIST

## 2024-03-21 NOTE — PROGRESS NOTES
Daily Note     Today's date: 3/21/2024  Patient name: Callum Nielson  : 1965  MRN: [id number]  Referring provider: Jreemi Jefferson PA*  Dx:   Encounter Diagnosis     ICD-10-CM    1. Status post rotator cuff repair  Z98.890           Start Time: 0845  Stop Time: 0930  Total time in clinic (min): 45 minutes    Subjective: Had some throbbing pain in the morning but it went away.       Objective: See treatment diary below      Assessment: Patient is 9 weeks s/p R subscapularis repair. Progressed bicep curl weight without issue. Patient exhibited good technique with therapeutic exercises and would benefit from continued PT. Continue as able per patient tolerance and protocol.       Plan: Continue per plan of care.  Progress treament per protocol.      POC expires Unit limit Auth Expiration date PT/OT/ST + Visit Limit?   25                              Visit/Unit Tracking  AUTH Status:  Date 3/5 3/7 3/12 3/14 3/19 3/21    Approved - 60 visits Used 8 9 10 11 12 13     Remaining  52 51 50 49 48 47      Diagnosis: R subscap repair (24)   Precautions: RTC precautions   Insurance: [employer]    3/7 3/12 3/14 3/19 3/21    Patient Ed         Precautions         Fishing                  Neuro Re-Ed                                             Ther Ex         Aerobic conditioning         Pendulums throughout        Table slides 3x10        PROM shoulder flexion Holding R wrist  3 x 10 Holding R wrist  2 x 10 Holding R wrist  2 x 10 Holding R wrist  3 x 10    Standing with dowel  PROM  3 x 10 Holding R wrist  3 x 10    Standing with dowel  PROM  3 x 10    Shoulder flexion AAROM Standing, 3x10 ea flex, ER lara to 10 deg Supine  3 x 10    Standing  Ball roll up plinth incline  2 x 15 Supine  3 x 10    Standing  Ball roll up plinth incline  2 x 15 Supine  3 x 10    Standing  Ball roll up plinth incline  2 x 15 Supine  3 x 10    Standing  Ball roll up plinth incline  3 x 15    Rows 3x15 YCO  3 x 15 RCO 3 x 10 BCO  3 x 10 BCO    Serratus punch Serratus punch activation w/ PT support, 10x Chest press  3#, w/ elbow propped  3 x 8, 80% full range  Chest press  3#, w/ elbow propped  3 x 8, 80% full range Chest press  3#   3 x 10    Elbow extension 3x15 YCO  at reformer RCO at reformer  3 x 15 RCO at reformer  3 x 15-20 RCO at reformer  3 x 15     Bicep curls  4# 3 x 10-15 4# 3 x 10 4# 3 x 12 5# 3 x 10-12                      Ther Activity                           Manual         Shoulder ER LH Max 20*  5' Max 20*  10' Max 20*  8' Max 20-25*  8'    Shoulder flexion LH        Elbow flex ext                           Modalities

## 2024-03-26 ENCOUNTER — OFFICE VISIT (OUTPATIENT)
Dept: PHYSICAL THERAPY | Facility: REHABILITATION | Age: 59
End: 2024-03-26
Payer: COMMERCIAL

## 2024-03-26 DIAGNOSIS — Z98.890 STATUS POST ROTATOR CUFF REPAIR: Primary | ICD-10-CM

## 2024-03-26 PROCEDURE — 97140 MANUAL THERAPY 1/> REGIONS: CPT | Performed by: PHYSICAL THERAPIST

## 2024-03-26 PROCEDURE — 97110 THERAPEUTIC EXERCISES: CPT | Performed by: PHYSICAL THERAPIST

## 2024-03-26 NOTE — PROGRESS NOTES
Daily Note     Today's date: 3/26/2024  Patient name: Callum Nielson  : 1965  MRN: [id number]  Referring provider: Jeremi Jefferson PA*  Dx:   Encounter Diagnosis     ICD-10-CM    1. Status post rotator cuff repair  Z98.890             Start Time: 0800  Stop Time: 0845  Total time in clinic (min): 45 minutes    Subjective: Able to roll onto his contralateral side without pain now. R shoulder comes along.       Objective: See treatment diary below      Assessment: Patient is 10 weeks s/p R subscapularis repair. Progressed chest press weight without issue. Added passive shoulder abduction with pball supporting weight of his arm. Patient able to complete mobility exercises within pain free range. Patient exhibited good technique with therapeutic exercises and would benefit from continued PT. Continue as able per patient tolerance and protocol.       Plan: Continue per plan of care.  Progress treament per protocol.      POC expires Unit limit Auth Expiration date PT/OT/ST + Visit Limit?   25                              Visit/Unit Tracking  AUTH Status:  Date 3/5 3/7 3/12 3/14 3/19 3/21 3/26    Approved - 60 visits Used 8 9 10 11 12 13 14     Remaining  52 51 50 49 48 [phone] 46      Diagnosis: R subscap repair (24)   Precautions: RTC precautions   Insurance: [employer]    3/7 3/12 3/14 3/19 3/21 3/26    Patient Ed          Precautions          Fishing                    Neuro Re-Ed                                                  Ther Ex          Aerobic conditioning          Pendulums throughout         Table slides 3x10         PROM shoulder flexion Holding R wrist  3 x 10 Holding R wrist  2 x 10 Holding R wrist  2 x 10 Holding R wrist  3 x 10    Standing with dowel  PROM  3 x 10 Holding R wrist  3 x 10    Standing with dowel  PROM  3 x 10 Holding R wrist  3 x 10    Standing with dowel  PROM  3 x 10    Shoulder flexion AAROM Standing, 3x10 ea flex, ER lara to 10 deg Supine  3 x 10    Standing  Ball  roll up plinth incline  2 x 15 Supine  3 x 10    Standing  Ball roll up plinth incline  2 x 15 Supine  3 x 10    Standing  Ball roll up plinth incline  2 x 15 Supine  3 x 10    Standing  Ball roll up plinth incline  3 x 15 Supine  3 x 10    Standing  Ball roll up plinth incline  3 x 15    Shoulder abduction PROM      Red pball  3 x 10    Rows 3x15 YCO  3 x 15 RCO 3 x 10 BCO 3 x 10 BCO     Serratus punch Serratus punch activation w/ PT support, 10x Chest press  3#, w/ elbow propped  3 x 8, 80% full range  Chest press  3#, w/ elbow propped  3 x 8, 80% full range Chest press  3#   3 x 10 Chest press  5#   3 x 10    Elbow extension 3x15 YCO  at reformer RCO at reformer  3 x 15 RCO at reformer  3 x 15-20 RCO at reformer  3 x 15      Bicep curls  4# 3 x 10-15 4# 3 x 10 4# 3 x 12 5# 3 x 10-12 5# 3 x 10-12                        Ther Activity                              Manual          Shoulder ER LH Max 20*  5' Max 20*  10' Max 20*  8' Max 20-25*  8' Max 20-25*  8'    Shoulder flexion LH         Elbow flex ext                              Modalities

## 2024-03-27 ENCOUNTER — OFFICE VISIT (OUTPATIENT)
Dept: UROLOGY | Facility: CLINIC | Age: 59
End: 2024-03-27
Payer: COMMERCIAL

## 2024-03-27 ENCOUNTER — TELEPHONE (OUTPATIENT)
Dept: UROLOGY | Facility: CLINIC | Age: 59
End: 2024-03-27

## 2024-03-27 ENCOUNTER — APPOINTMENT (OUTPATIENT)
Dept: LAB | Facility: AMBULARY SURGERY CENTER | Age: 59
End: 2024-03-27
Payer: COMMERCIAL

## 2024-03-27 VITALS
BODY MASS INDEX: 33.77 KG/M2 | HEART RATE: 67 BPM | WEIGHT: 241.2 LBS | HEIGHT: 71 IN | SYSTOLIC BLOOD PRESSURE: 120 MMHG | OXYGEN SATURATION: 98 % | DIASTOLIC BLOOD PRESSURE: 68 MMHG

## 2024-03-27 DIAGNOSIS — Z12.5 PROSTATE CANCER SCREENING: Primary | ICD-10-CM

## 2024-03-27 DIAGNOSIS — E11.65 TYPE 2 DIABETES MELLITUS WITH HYPERGLYCEMIA, WITHOUT LONG-TERM CURRENT USE OF INSULIN (HCC): ICD-10-CM

## 2024-03-27 DIAGNOSIS — R35.1 NOCTURIA: ICD-10-CM

## 2024-03-27 DIAGNOSIS — Z12.5 PROSTATE CANCER SCREENING: ICD-10-CM

## 2024-03-27 DIAGNOSIS — R13.10 DYSPHAGIA, UNSPECIFIED TYPE: ICD-10-CM

## 2024-03-27 DIAGNOSIS — R35.0 URINARY FREQUENCY: Primary | ICD-10-CM

## 2024-03-27 LAB
EST. AVERAGE GLUCOSE BLD GHB EST-MCNC: 166 MG/DL
HBA1C MFR BLD: 7.4 %
PSA SERPL-MCNC: 1 NG/ML (ref 0–4)

## 2024-03-27 PROCEDURE — 3051F HG A1C>EQUAL 7.0%<8.0%: CPT | Performed by: UROLOGY

## 2024-03-27 PROCEDURE — G0103 PSA SCREENING: HCPCS

## 2024-03-27 PROCEDURE — 83036 HEMOGLOBIN GLYCOSYLATED A1C: CPT

## 2024-03-27 PROCEDURE — 99204 OFFICE O/P NEW MOD 45 MIN: CPT | Performed by: UROLOGY

## 2024-03-27 PROCEDURE — 36415 COLL VENOUS BLD VENIPUNCTURE: CPT

## 2024-03-27 RX ORDER — TAMSULOSIN HYDROCHLORIDE 0.4 MG/1
0.4 CAPSULE ORAL
Qty: 30 CAPSULE | Refills: 6 | Status: SHIPPED | OUTPATIENT
Start: 2024-03-27

## 2024-03-27 NOTE — PROGRESS NOTES
"Referring Physician: Lamont Guzmán MD  A copy of this note was sent to the referring physician.       Diagnoses and all orders for this visit:    Urinary frequency  -     Ambulatory Referral to Urology  -     tamsulosin (FLOMAX) 0.4 mg; Take 1 capsule (0.4 mg total) by mouth daily with dinner    Dysphagia, unspecified type  -     FL barium swallow ROUTINE esophagus    Nocturia  -     Ambulatory Referral to Urology    Prostate cancer screening  -     PSA, Total Screen; Future  -     PSA, Total Screen            Assessment and plan:       1.  Lower urinary tract symptoms  - bothersome frequency and nocturia (2-3 hours)  -No prior medications    2.  Prostate cancer screening  -Negative ROSSANA  - Prior PSA completed, order placed today     3.  Comorbidities:  - Atrial flutter, not anticoagulated  - COPD  - Obstructive sleep apnea  - Type 2 diabetes      Today, we discussed a stepwise approach in treating lower urinary tract symptoms associated with BPH.    Lower urinary tract symptoms (LUTS) can be sub-divided into those that result from failure of the bladder to store urine normally (\"storage symptoms\"), those that result from difficulties in emptying (\"voiding symptoms\"), or those that follow micturition (\"post-micturition symptoms\").    Obstructive symptoms such as hesitancy, weak stream, and pushing to urinate are classified as voiding symptoms. OAB is due to the inability of the bladder to store urine normally. The symptoms of frequency, urgency, nocturia, and urge incontinence are classified as storage symptoms. I discussed the mainstays of therapy for voiding symptoms including watchful waiting, conservative interventions, pharmacotherapy with alpha blockers, 5-alpha reductase inhibitors, and PDE5 inhibitors.  We also discussed surgical management including UroLift, TURP, laser/ablative procedures, and prostatectomy.  Pros and cons, expectations, lifestyle and sexual adverse effects of all modalities were " reviewed.    Tamsulosin prescribed  - I have ordered a PSA which the patient will complete prior to his next follow-up visit in 3 months      Herman Hernandez MD      Chief Complaint     BPH evaluation      History of Present Illness     Callum Nielson is a 58 y.o. referred for evaluation of lower urinary tract symptoms    Detailed Urologic History     - please refer to HPI    Review of Systems     Review of Systems   Constitutional: Negative for activity change and fatigue.   HENT: Negative for congestion.    Eyes: Negative for visual disturbance.   Respiratory: Negative for shortness of breath and wheezing.    Cardiovascular: Negative for chest pain and leg swelling.   Gastrointestinal: Negative for abdominal pain.   Endocrine: Negative for polyuria.   Genitourinary: Negative for dysuria, flank pain, hematuria and urgency.   Musculoskeletal: Negative for back pain.   Allergic/Immunologic: Negative for immunocompromised state.   Neurological: Negative for dizziness and numbness.   Psychiatric/Behavioral: Negative for dysphoric mood.   All other systems reviewed and are negative.      AUA SYMPTOM SCORE      Flowsheet Row Most Recent Value   AUA SYMPTOM SCORE    How often have you had a sensation of not emptying your bladder completely after you finished urinating? 1   How often have you had to urinate again less than two hours after you finished urinating? 4   How often have you found you stopped and started again several times when you urinate? 0   How often have you found it difficult to postpone urination? 3   How often have you had a weak urinary stream? 1   How often have you had to push or strain to begin urination? 0   How many times did you most typically get up to urinate from the time you went to bed at night until the time you got up in the morning? 3   Quality of Life: If you were to spend the rest of your life with your urinary condition just the way it is now, how would you feel about that? 3    AUA SYMPTOM SCORE 12              Allergies     Allergies   Allergen Reactions    Lisinopril Cough    Spiriva Respimat [Tiotropium Bromide Monohydrate] Anxiety       Physical Exam       Physical Exam  Constitutional:       General: He is not in acute distress.     Appearance: He is well-developed.   HENT:      Head: Normocephalic and atraumatic.   Cardiovascular:      Comments: Negative lower extremity edema  Pulmonary:      Effort: Pulmonary effort is normal.      Breath sounds: Normal breath sounds.   Abdominal:      Palpations: Abdomen is soft.   Musculoskeletal:         General: Normal range of motion.      Cervical back: Normal range of motion.   Skin:     General: Skin is warm.   Neurological:      Mental Status: He is alert and oriented to person, place, and time.   Psychiatric:         Behavior: Behavior normal.           Vital Signs  There were no vitals filed for this visit.      Current Medications       Current Outpatient Medications:     albuterol (PROVENTIL HFA,VENTOLIN HFA) 90 mcg/act inhaler, INHALE 2 PUFFS EVERY 6 HOURS AS NEEDED FOR WHEEZING, Disp: 18 g, Rfl: 2    Eliquis 5 MG, TAKE 1 TABLET BY MOUTH TWICE A DAY (Patient not taking: Reported on 1/24/2024), Disp: 60 tablet, Rfl: 11    folic acid (FOLVITE) 1 mg tablet, Take 1 tablet (1 mg total) by mouth in the morning., Disp: , Rfl: 0    losartan (COZAAR) 50 mg tablet, TAKE 1 TABLET BY MOUTH EVERY DAY, Disp: 90 tablet, Rfl: 3    metFORMIN (GLUCOPHAGE) 1000 MG tablet, TAKE 1 TABLET BY MOUTH TWICE A DAY WITH FOOD, Disp: 180 tablet, Rfl: 3    metoprolol tartrate (LOPRESSOR) 50 mg tablet, TAKE 1 TABLET BY MOUTH EVERY 12 HOURS, Disp: 180 tablet, Rfl: 3    multivitamin (THERAGRAN) TABS, Take 1 tablet by mouth daily, Disp: , Rfl:     thiamine 100 MG tablet, Take 1 tablet (100 mg total) by mouth in the morning., Disp: , Rfl: 0      Active Problems     Patient Active Problem List   Diagnosis    Acne    COPD (chronic obstructive pulmonary disease) ([facility])     Depression with anxiety    Eczema    Hyperlipidemia    Essential hypertension    Type 2 diabetes mellitus with hyperglycemia, without long-term current use of insulin ([facility])    Insomnia    Irritable bowel syndrome    Nicotine dependence    Obesity (BMI 30-39.9)    Psoriasis    Atrial flutter ([facility])    History of alcohol use disorder    Subacromial bursitis of right shoulder joint    Adhesive capsulitis of right shoulder    NAYE (obstructive sleep apnea)    Leukocytosis    Anticoagulation adequate    Dysphagia    Paroxysmal atrial fibrillation ([facility])         Past Medical History     Past Medical History:   Diagnosis Date    Adhesive capsulitis of right shoulder 05/17/2022    Anxiety     Last assessed 12/20/2013    Contusion of right wrist 01/30/2018    COPD (chronic obstructive pulmonary disease) ([facility]) 08/16/2016    COPD (chronic obstructive pulmonary disease) ([facility]) 08/16/2016    Depression with anxiety     Diverticulitis 2016    Eczema 02/24/2016    Elevated ALT measurement 08/16/2016    History of atrial fibrillation     Hypertension     Obesity 09/25/2012    Perianal abscess 03/05/2019    Type 2 diabetes mellitus with hyperglycemia, without long-term current use of insulin ([facility])          Surgical History     Past Surgical History:   Procedure Laterality Date    CARDIAC LOOP RECORDER  2001    COLONOSCOPY      KNEE SURGERY      staph infection    CA SURGICAL ARTHROSCOPY PALMER W/CORACOACRM LIGM RLS Right 1/17/2024    Procedure: ARTHROSCOPIC SUBACROMIAL DECOMPRESSION;  Surgeon: Waldo Chambers MD;  Location: [facility];  Service: Orthopedics    CA SURGICAL ARTHROSCOPY SHOULDER BICEPS TENODESIS Right 1/17/2024    Procedure: ARTHROSCOPIC BICEPS TENODESIS;  Surgeon: Waldo Chambers MD;  Location: [facility];  Service: Orthopedics    CA SURGICAL ARTHROSCOPY SHOULDER W/ROTATOR CUFF RPR Right 1/17/2024    Procedure: REPAIR ROTATOR CUFF  ARTHROSCOPIC;  Surgeon: Waldo Chambers MD;  Location: [facility];  Service: Orthopedics  "        Family History     Family History   Problem Relation Age of Onset    Cancer Mother         Lung Cancer    Cancer Father     Diabetes Father     Diabetes Sister          Social History     Social History     Social History     Tobacco Use   Smoking Status Former    Current packs/day: 0.00    Average packs/day: 1 pack/day for 42.0 years (42.0 ttl pk-yrs)    Types: Cigarettes    Start date:     Quit date:     Years since quittin.2   Smokeless Tobacco Never   Tobacco Comments    Quit 5 days ago         Pertinent Lab Values     Lab Results   Component Value Date    CREATININE 0.96 2024       No results found for: \"PSA\"    @RESULTRCNT(1H])@      Pertinent Imaging       Cardiac EP device report    Result Date: 3/14/2024  Narrative: MDT LNQ22/ ACTIVE SYSTEM IS MRI CONDITIONAL CARELINK TRANSMISSION: LOOP RECORDER. PRESENTING RHYTHM SB @ 54 BPM. BATTERY STATUS \"OK.\" NO PATIENT OR DEVICE ACTIVATED EPISODES. NORMAL DEVICE FUNCTION. DL         Portions of the record may have been created with voice recognition software.  Occasional wrong word or \"sound a like\" substitutions may have occurred due to the inherent limitations of voice recognition software.  In addition some of the content generated from this outpatient encounter includes information designed for patient education and/or communication back to the referring provider.  Read the chart carefully and recognize, using context, where substitutions have occurred.    "

## 2024-03-27 NOTE — TELEPHONE ENCOUNTER
Looking for pt appt with David. Should this be URG spot for next available is August.     Provider note:  Return in about 3 months (around 6/27/2024) for AP only, PSA prior, AUA symptom score.

## 2024-03-28 ENCOUNTER — OFFICE VISIT (OUTPATIENT)
Dept: PHYSICAL THERAPY | Facility: REHABILITATION | Age: 59
End: 2024-03-28
Payer: COMMERCIAL

## 2024-03-28 DIAGNOSIS — Z98.890 STATUS POST ROTATOR CUFF REPAIR: Primary | ICD-10-CM

## 2024-03-28 PROCEDURE — 97110 THERAPEUTIC EXERCISES: CPT

## 2024-03-28 PROCEDURE — 97140 MANUAL THERAPY 1/> REGIONS: CPT

## 2024-03-28 NOTE — PROGRESS NOTES
Daily Note     Today's date: 3/28/2024  Patient name: Callum Nielson  : 1965  MRN: [id number]  Referring provider: Jeremi Jefferson PA*  Dx:   Encounter Diagnosis     ICD-10-CM    1. Status post rotator cuff repair  Z98.890               Start Time: 0800  Stop Time: 0845  Total time in clinic (min): 45 minutes    Subjective: Was bouncing a p-ball at the gym to put it away and it bounced back and hit him on the back of the hand which caused shoulder pain.  It's better but still sore.      Objective: See treatment diary below      Assessment: Patient is 10 weeks s/p R subscapularis repair. Did well with today's exercises, no progressions made continues to be challenged. Patient exhibited good technique with therapeutic exercises and would benefit from continued PT. Continue as able per patient tolerance and protocol.       Plan: Continue per plan of care.  Progress treament per protocol.      POC expires Unit limit Auth Expiration date PT/OT/ST + Visit Limit?   25                              Visit/Unit Tracking  AUTH Status:  Date 3/5 3/7 3/12 3/14 3/19 3/21 3/26 3/28   Approved - 60 visits Used 8 9 10 11 12 13 14 15    Remaining  52 51 50 49 48 47 46 45     Diagnosis: R subscap repair (24)   Precautions: RTC precautions   Insurance: [employer]    3/7 3/12 3/14 3/19 3/21 3/26 3/28   Patient Ed          Precautions          Fishing                    Neuro Re-Ed                                                  Ther Ex          Aerobic conditioning          Pendulums throughout         Table slides 3x10         PROM shoulder flexion Holding R wrist  3 x 10 Holding R wrist  2 x 10 Holding R wrist  2 x 10 Holding R wrist  3 x 10    Standing with dowel  PROM  3 x 10 Holding R wrist  3 x 10    Standing with dowel  PROM  3 x 10 Holding R wrist  3 x 10    Standing with dowel  PROM  3 x 10 Holding R wrist  3 x 10    Standing with dowel  PROM  3 x 10   Shoulder flexion AAROM Standing, 3x10 ea flex, ER  lara to 10 deg Supine  3 x 10    Standing  Ball roll up plinth incline  2 x 15 Supine  3 x 10    Standing  Ball roll up plinth incline  2 x 15 Supine  3 x 10    Standing  Ball roll up plinth incline  2 x 15 Supine  3 x 10    Standing  Ball roll up plinth incline  3 x 15 Supine  3 x 10    Standing  Ball roll up plinth incline  3 x 15 Supine  3 x 10    Standing  Ball roll up plinth incline  3 x 15   Shoulder abduction PROM      Red pball  3 x 10 Red pball 3x10   Rows 3x15 YCO  3 x 15 RCO 3 x 10 BCO 3 x 10 BCO     Serratus punch Serratus punch activation w/ PT support, 10x Chest press  3#, w/ elbow propped  3 x 8, 80% full range  Chest press  3#, w/ elbow propped  3 x 8, 80% full range Chest press  3#   3 x 10 Chest press  5#   3 x 10 Chest press  5#   3 x 10   Elbow extension 3x15 YCO  at reformer RCO at reformer  3 x 15 RCO at reformer  3 x 15-20 RCO at reformer  3 x 15      Bicep curls  4# 3 x 10-15 4# 3 x 10 4# 3 x 12 5# 3 x 10-12 5# 3 x 10-12                        Ther Activity                              Manual          Shoulder ER LH Max 20*  5' Max 20*  10' Max 20*  8' Max 20-25*  8' Max 20-25*  8'    Shoulder flexion LH         Elbow flex ext                              Modalities

## 2024-04-01 NOTE — PROGRESS NOTES
Daily Note     Today's date: 2024  Patient name: Callum Nielson  : 1965  MRN: [id number]  Referring provider: Jeremi Jefferson PA*  Dx:   Encounter Diagnosis     ICD-10-CM    1. Status post rotator cuff repair  Z98.890                 Start Time: 0800  Stop Time: 0855  Total time in clinic (min): 55 minutes    Subjective: Shoulder doing fine. Saw urology for increased urinary frequency - interested in pelvic PT. Urinary frequency may be connected to raising A1c.       Objective: See treatment diary below      Assessment: Patient is 11 weeks s/p R subscapularis repair. Added pulleys to continue to progress AROM flexion. Patient unable to progress weight at this time, will consider next session. Patient exhibited good technique with therapeutic exercises and would benefit from continued PT. Continue as able per patient tolerance and protocol. Assisted in pelvic PT scheduling.       Plan: Continue per plan of care.  Progress treament per protocol.      POC expires Unit limit Auth Expiration date PT/OT/ST + Visit Limit?   25                              Visit/Unit Tracking  AUTH Status:  Date 3/12 3/14 3/19 3/21 3/26 3/28 4/2    Approved - 60 visits Used 10 11 12 13 14 15 16     Remaining  50 49 48 47 46 45 44      Diagnosis: R subscap repair (24)   Precautions: RTC precautions   Insurance: [employer]    3/19 3/21 3/26 3/28 4     Patient Ed          Nutrition     Savory breakfast vs sweet breakfast  Eat protein/veggies first prior to carbs    Can add more walk time at gym, add incline on treadmill                         Neuro Re-Ed                                                  Ther Ex          Aerobic conditioning          Pendulums          Table slides          PROM shoulder flexion Holding R wrist  3 x 10    Standing with dowel  PROM  3 x 10 Holding R wrist  3 x 10    Standing with dowel  PROM  3 x 10 Holding R wrist  3 x 10    Standing with dowel  PROM  3 x 10 Holding R wrist  3 x  10    Standing with dowel  PROM  3 x 10 Holding R wrist  3 x 10    Standing with dowel  PROM  3 x 10     Shoulder flexion AAROM Supine  3 x 10    Standing  Ball roll up plinth incline  2 x 15 Supine  3 x 10    Standing  Ball roll up plinth incline  3 x 15 Supine  3 x 10    Standing  Ball roll up plinth incline  3 x 15 Supine  3 x 10    Standing  Ball roll up plinth incline  3 x 15 Supine  3 x 10    Standing  Ball roll up plinth incline  3 x 15     Shoulder abduction PROM   Red pball  3 x 10 Red pball 3x10 Red pball 3x10     Pulleys     3 x 10     Rows 3 x 10 BCO 3 x 10 BCO        Chest press Chest press  3#, w/ elbow propped  3 x 8, 80% full range Chest press  3#   3 x 10 Chest press  5#   3 x 10 Chest press  5#   3 x 10 Chest press  5#  3 x 10     Elbow extension RCO at reformer  3 x 15         Bicep curls 4# 3 x 12 5# 3 x 10-12 5# 3 x 10-12                           Ther Activity                              Manual          Shoulder ER Max 20*  8' Max 20-25*  8' Max 20-25*  8'  10'     Shoulder flexion          Elbow flex ext                              Modalities

## 2024-04-02 ENCOUNTER — OFFICE VISIT (OUTPATIENT)
Dept: PHYSICAL THERAPY | Facility: REHABILITATION | Age: 59
End: 2024-04-02
Payer: COMMERCIAL

## 2024-04-02 DIAGNOSIS — Z98.890 STATUS POST ROTATOR CUFF REPAIR: Primary | ICD-10-CM

## 2024-04-02 PROCEDURE — 97110 THERAPEUTIC EXERCISES: CPT | Performed by: PHYSICAL THERAPIST

## 2024-04-02 PROCEDURE — 97140 MANUAL THERAPY 1/> REGIONS: CPT | Performed by: PHYSICAL THERAPIST

## 2024-04-04 ENCOUNTER — OFFICE VISIT (OUTPATIENT)
Dept: PHYSICAL THERAPY | Facility: REHABILITATION | Age: 59
End: 2024-04-04
Payer: COMMERCIAL

## 2024-04-04 DIAGNOSIS — Z98.890 STATUS POST ROTATOR CUFF REPAIR: Primary | ICD-10-CM

## 2024-04-04 PROCEDURE — 97110 THERAPEUTIC EXERCISES: CPT

## 2024-04-04 PROCEDURE — 97140 MANUAL THERAPY 1/> REGIONS: CPT

## 2024-04-04 NOTE — PROGRESS NOTES
Daily Note     Today's date: 2024  Patient name: Callum Nielson  : 1965  MRN: [id number]  Referring provider: Jeremi Jefferson PA*  Dx:   Encounter Diagnosis     ICD-10-CM    1. Status post rotator cuff repair  Z98.890                 Start Time: 0800  Stop Time: 0845  Total time in clinic (min): 45 minutes    Subjective: Continues to do well, looks forward to going back to work.      Objective: See treatment diary below      Assessment: Patient is 11 weeks s/p R subscapularis repair. Did well with today's session, no progression made. Progress weight next session if able. . Patient exhibited good technique with therapeutic exercises and would benefit from continued PT. Continue as able per patient tolerance and protocol.        Plan: Continue per plan of care.  Progress treament per protocol.      POC expires Unit limit Auth Expiration date PT/OT/ST + Visit Limit?   25                              Visit/Unit Tracking  AUTH Status:  Date 3/12 3/14 3/19 3/21 3/26 3/28 4/2 4/4   Approved - 60 visits Used 10  12 13 14 15 16 17    Remaining  50 49 48 47 46 45 44 43     Diagnosis: R subscap repair (24)   Precautions: RTC precautions   Insurance: [employer]    3/19 3/21 3/26 3/28 4/2 4/4    Patient Ed          Nutrition     Savory breakfast vs sweet breakfast  Eat protein/veggies first prior to carbs    Can add more walk time at gym, add incline on treadmill                         Neuro Re-Ed                                                  Ther Ex          Aerobic conditioning          Pendulums          Table slides          PROM shoulder flexion Holding R wrist  3 x 10    Standing with dowel  PROM  3 x 10 Holding R wrist  3 x 10    Standing with dowel  PROM  3 x 10 Holding R wrist  3 x 10    Standing with dowel  PROM  3 x 10 Holding R wrist  3 x 10    Standing with dowel  PROM  3 x 10 Holding R wrist  3 x 10    Standing with dowel  PROM  3 x 10 Holding R wrist  3 x 10    Standing with  dowel  PROM  3 x 10    Shoulder flexion AAROM Supine  3 x 10    Standing  Ball roll up plinth incline  2 x 15 Supine  3 x 10    Standing  Ball roll up plinth incline  3 x 15 Supine  3 x 10    Standing  Ball roll up plinth incline  3 x 15 Supine  3 x 10    Standing  Ball roll up plinth incline  3 x 15 Supine  3 x 10    Standing  Ball roll up plinth incline  3 x 15 Supine  3 x 10    Standing  Ball roll up plinth incline  3 x 15    Shoulder abduction PROM   Red pball  3 x 10 Red pball 3x10 Red pball 3x10 Red pball 3x10       Pulleys     3 x 10 3'    Rows 3 x 10 BCO 3 x 10 BCO        Chest press Chest press  3#, w/ elbow propped  3 x 8, 80% full range Chest press  3#   3 x 10 Chest press  5#   3 x 10 Chest press  5#   3 x 10 Chest press  5#  3 x 10 Chest press  5#  3 x 10    Elbow extension RCO at reformer  3 x 15         Bicep curls 4# 3 x 12 5# 3 x 10-12 5# 3 x 10-12   5# 3x10                        Ther Activity                              Manual          Shoulder ER Max 20*  8' Max 20-25*  8' Max 20-25*  8'  10' 10'    Shoulder flexion          Elbow flex ext                              Modalities

## 2024-04-08 NOTE — PROGRESS NOTES
Daily Note     Today's date: 2024  Patient name: Callum Nielson  : 1965  MRN: [id number]  Referring provider: Jeremi Jefferson PA*  Dx:   Encounter Diagnosis     ICD-10-CM    1. Status post rotator cuff repair  Z98.890           Start Time: 0800  Stop Time: 0845  Total time in clinic (min): 45 minutes    Subjective: Thinks he can make a pulley at the gym. He went fishing yesterday which felt ok. Was out for 1.5 hr and was a little sore after. Caught two trouts.       Objective: See treatment diary below  Shoulder flexion PROM: 140*  Shoulder ER PROM: 30*      Assessment: Patient is 12 weeks s/p R subscapularis repair. Patient able to progress to higher weights with chest press and bicep curls. He is demonstrating increased AAROM flexion in standing. Patient exhibited good technique with therapeutic exercises and would benefit from continued PT. Continue as able per patient tolerance and protocol.        Plan: Progress note during next visit.      POC expires Unit limit Auth Expiration date PT/OT/ST + Visit Limit?   25                              Visit/Unit Tracking  AUTH Status:  Date 3/12 3/14 3/19 3/21 3/26 3/28 4/2 4/4 4/9    Approved - 60 visits Used 10 11 12 13 14 15 16 17 18     Remaining  50 49 48 47 46 45 44 43 42      Diagnosis: R subscap repair (24)   Precautions: RTC precautions   Insurance: [employer]    3/19 3/21 3/26 3/28 4/2 4/4 4/9   Patient Ed          Nutrition     Savory breakfast vs sweet breakfast  Eat protein/veggies first prior to carbs    Can add more walk time at gym, add incline on treadmill                         Neuro Re-Ed                                                  Ther Ex          Aerobic conditioning          Pendulums          Table slides          PROM shoulder flexion Holding R wrist  3 x 10    Standing with dowel  PROM  3 x 10 Holding R wrist  3 x 10    Standing with dowel  PROM  3 x 10 Holding R wrist  3 x 10    Standing with dowel  PROM  3 x 10  Holding R wrist  3 x 10    Standing with dowel  PROM  3 x 10 Holding R wrist  3 x 10    Standing with dowel  PROM  3 x 10 Holding R wrist  3 x 10    Standing with dowel  PROM  3 x 10 Holding R wrist  3 x 10    Standing with dowel  PROM  3 x 10   Shoulder flexion AAROM Supine  3 x 10    Standing  Ball roll up plinth incline  2 x 15 Supine  3 x 10    Standing  Ball roll up plinth incline  3 x 15 Supine  3 x 10    Standing  Ball roll up plinth incline  3 x 15 Supine  3 x 10    Standing  Ball roll up plinth incline  3 x 15 Supine  3 x 10    Standing  Ball roll up plinth incline  3 x 15 Supine  3 x 10    Standing  Ball roll up plinth incline  3 x 15 Supine 45*  Dowel  2 x 12       Shoulder abduction PROM   Red pball  3 x 10 Red pball 3x10 Red pball 3x10 Red pball 3x10    Red pball 3x10   Pulleys     3 x 10 3' 2'   Rows 3 x 10 BCO 3 x 10 BCO        Chest press Chest press  3#, w/ elbow propped  3 x 8, 80% full range Chest press  3#   3 x 10 Chest press  5#   3 x 10 Chest press  5#   3 x 10 Chest press  5#  3 x 10 Chest press  5#  3 x 10 Chest press  6#  3 x 10   Elbow extension RCO at reformer  3 x 15         Bicep curls 4# 3 x 12 5# 3 x 10-12 5# 3 x 10-12   5# 3x10 7# 3 x 10  8# 10x                       Ther Activity                              Manual          Shoulder ER Max 20*  8' Max 20-25*  8' Max 20-25*  8'  10' 10' 8', Max 30*   Shoulder flexion          Elbow flex ext                              Modalities

## 2024-04-09 ENCOUNTER — OFFICE VISIT (OUTPATIENT)
Dept: PHYSICAL THERAPY | Facility: REHABILITATION | Age: 59
End: 2024-04-09
Payer: COMMERCIAL

## 2024-04-09 DIAGNOSIS — Z98.890 STATUS POST ROTATOR CUFF REPAIR: Primary | ICD-10-CM

## 2024-04-09 PROCEDURE — 97140 MANUAL THERAPY 1/> REGIONS: CPT | Performed by: PHYSICAL THERAPIST

## 2024-04-09 PROCEDURE — 97110 THERAPEUTIC EXERCISES: CPT | Performed by: PHYSICAL THERAPIST

## 2024-04-11 ENCOUNTER — EVALUATION (OUTPATIENT)
Dept: PHYSICAL THERAPY | Facility: REHABILITATION | Age: 59
End: 2024-04-11
Payer: COMMERCIAL

## 2024-04-11 DIAGNOSIS — Z98.890 STATUS POST ROTATOR CUFF REPAIR: Primary | ICD-10-CM

## 2024-04-11 PROCEDURE — 97110 THERAPEUTIC EXERCISES: CPT | Performed by: PHYSICAL THERAPIST

## 2024-04-11 PROCEDURE — 97164 PT RE-EVAL EST PLAN CARE: CPT | Performed by: PHYSICAL THERAPIST
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
## 2024-04-12 ENCOUNTER — OFFICE VISIT (OUTPATIENT)
Dept: PHYSICAL THERAPY | Facility: REHABILITATION | Age: 59
End: 2024-04-12
Payer: COMMERCIAL

## 2024-04-12 DIAGNOSIS — R35.0 URINARY FREQUENCY: Primary | ICD-10-CM

## 2024-04-12 DIAGNOSIS — R35.1 NOCTURIA: ICD-10-CM

## 2024-04-12 PROCEDURE — 97530 THERAPEUTIC ACTIVITIES: CPT | Performed by: PHYSICAL THERAPIST

## 2024-04-12 PROCEDURE — 97162 PT EVAL MOD COMPLEX 30 MIN: CPT | Performed by: PHYSICAL THERAPIST

## 2024-04-12 NOTE — PROGRESS NOTES
PT Evaluation     Today's date: 2024  Patient name: Callum Nielson  : 1965  MRN: [id number]  Referring provider: Cira Marin PT  Dx:   Encounter Diagnosis     ICD-10-CM    1. Urinary frequency  R35.0       2. Nocturia  R35.1         Assessment  Assessment details: Callum Nielson is a 58 y.o. male with complaints of urinary frequency and nocturia x8 months.  Patient's presentation is consistent with normal PF muscle function, but high volumes of caffeine intake, moderate diastasis rectus abdominis with the following impairments found on evaluation: good PFM coordination and strength. I suspect that his diabetes and potential sleep apnea are contributors to nocturia and daytime frequency, and have educated patient on further evaluation and management needs from other specialties. Ronald is agreeable to decrease or dilute caffeine intake, and use urge deferral strategies,  and follow up in 2 weeks.   Understanding of Dx/Px/POC: good   Prognosis: fair    Goals  1. Daytime void interval of 2-3 hours with minimal bothersome urge in 8 weeks.  2. Nocturia decrease to 1 time per night in 8 weeks.      Plan  Frequency: 2x month  Duration in weeks: 8  Plan of Care beginning date: 2024  Plan of Care expiration date: 2024  Treatment plan discussed with: patient      PT Pelvic Floor Subjective:   History of Present Illness:   Frequent urination and nocturia. Daytime void interval = every 1.5-2 hours, except worse when exercising (TM walking 2.2 mph @ 2% incline triggers after 30 minutes). Nocturia = every 2 hours. Symptoms started 8 months ago. Notes that he was diagnosed with diabetes 1.5-2 years ago.  Drinks 2 cups of coffee in the morning, 1L water at the gym in the morning, and the 4-5 glasses of water. Afternoon coffee (2 cups). Drinks 1/2 cup water multiple times per night, approx 2 glasses total.    Denies dysuria, hesitancy, weak stream, or post-void UI. Denies UI.  Bowel: daily BM; denies straining,  "pain, or difficulty.  Ortho: tailbone pain- worsening, intermittent avg 3/10 with STS after prolonged STS, lasting a few seconds, also long motor cycle rides. Worsening in the last 1 year. Reports a fall when a child that has persisted with pain. H/o R hip bursitis well healed. Denies radicular symptoms.  Sexually active- no difficulty with Erection or Ejaculation.  Other PMHx: COPD- uses inhaler once per day; Afib - well controlled; HTN; R RTC repair.    Pain:     Current pain ratin    At best pain ratin    At worst pain rating:  3  Patient Goals:     Patient goals for therapy:  Improved bladder or bowel function and improved quality of life      Objective       Abdominal Assessment:      Abdominal Assessment: Nontender, nondistended. Fair diaphragmatic breathing  Cough: diffuse bulge    Diastatis   Diastasis recti present: yes  3\" above umbilicus (# fingers): 4  Umbilicus (# fingers): 2  3\" below umbilicus (# fingers): 0  Connective tissue integrity at linea alba: firm  no tenderness at linea alba  unable to engage transverse abdominis       General Perineum Exam:   perineum intact.   Negative for swelling, rectal irritation, hemorrhoids and perianal erythema    Visual Inspection of Perineum:   Excursion of perineal body in cephalad direction with contraction of pelvic floor muscles (PFM): good  Excursion of perineal body in caudal direction with relaxation of pelvic floor muscles (PFM): good   Involuntary contraction with coughing: yes  Involuntary relaxation with bearing down: yes  Sensation: intact    Pelvic Organ Prolapse   no pelvic organ prolapse  Perineal body inspection: within normal limits        Pelvic Floor Muscle Exam:     Muscle Contraction: well isolated   Breathing pattern with contraction: within normal limits         PERFECT Score   Power right: 4/5   Power left: 4/5   Endurance (seconds to max): 5   Repetitions (before fatigue): 6       Rectal Pelvic Floor Muscle Exam  External anal " sphincter: WNL  External anal sphincter: wink reflex intactno hemorrhoids   Internal anal sphincter: WNL   pelvic floor exam consent given by patient    Pelvic exam completed: rectally              Precautions: standard      Manuals 4/12          PFM exam completed                                                      Patient education           Healthy bladder habits 5'          Fluid intake 5'          Urge deferral 5' handout provided, practiced                                                                 Ther Activity

## 2024-04-16 ENCOUNTER — OFFICE VISIT (OUTPATIENT)
Dept: OBGYN CLINIC | Facility: CLINIC | Age: 59
End: 2024-04-16
Payer: COMMERCIAL

## 2024-04-16 ENCOUNTER — OFFICE VISIT (OUTPATIENT)
Dept: PHYSICAL THERAPY | Facility: REHABILITATION | Age: 59
End: 2024-04-16
Payer: COMMERCIAL

## 2024-04-16 VITALS
BODY MASS INDEX: 32.9 KG/M2 | HEART RATE: 68 BPM | DIASTOLIC BLOOD PRESSURE: 81 MMHG | WEIGHT: 235 LBS | HEIGHT: 71 IN | SYSTOLIC BLOOD PRESSURE: 125 MMHG

## 2024-04-16 DIAGNOSIS — Z98.890 STATUS POST ARTHROSCOPY OF SHOULDER: Primary | ICD-10-CM

## 2024-04-16 DIAGNOSIS — Z98.890 STATUS POST ROTATOR CUFF REPAIR: Primary | ICD-10-CM

## 2024-04-16 PROCEDURE — 99213 OFFICE O/P EST LOW 20 MIN: CPT | Performed by: ORTHOPAEDIC SURGERY

## 2024-04-16 PROCEDURE — 97110 THERAPEUTIC EXERCISES: CPT | Performed by: PHYSICAL THERAPIST

## 2024-04-16 PROCEDURE — 97112 NEUROMUSCULAR REEDUCATION: CPT | Performed by: PHYSICAL THERAPIST

## 2024-04-16 NOTE — PROGRESS NOTES
Subjective   CHIEF COMPLAINT/REASON FOR VISIT  Callum Nielson is a 58 y.o. male who presents for 3 month post op follow-up after Repair Rotator Cuff  Arthroscopic - Right, Arthroscopic Subacromial Decompression - Right, and Arthroscopic Biceps Tenodesis - Right on 1/17/2024     HISTORY OF PRESENT ILLNESS  Overall, he feels things are going well and progressing appropriately. He has been following the post-operative rehabilitation regimen without difficultly. Currently, he is doing well at this time without any specific concerns.    Objective   PHYSICAL EXAMINATION  right Shoulder  Shoulder abduction 90 / 160 (passive 0-120)  Shoulder forward flexion 90 / 160 (passive 0-120)  Shoulder internal rotation : back pocket  Supraspinatus/ABD 2/5   Infraspinatus/ER 4/5   Negative Belly Press/SS  Skin is intact with no erythema, warmth or drainage  Motor strength intact distally  Sensation to light touch is normal in the axillary, radial, ulnar, and median nerve distributions.  Fingers warm and perfused    Assessment/Plan   1. Status Post Repair Rotator Cuff  Arthroscopic - Right, Arthroscopic Subacromial Decompression - Right, and Arthroscopic Biceps Tenodesis - Right    He is doing well after surgery and making appropriate progress. Recommend gradual strengthening at PT. Encouraged to continue to follow with PT with rotator cuff rehab protocol.    We will plan to see him back at the 6 month post-operative shelbi. If any issues, questions, or concerns arise between now and the next appointment, we have encouraged the patient contact our team.    Scribe Attestation      I,:  Jeremi Jefferson PA-C am acting as a scribe while in the presence of the attending physician.:       I,:  Waldo Chambers MD personally performed the services described in this documentation    as scribed in my presence.:

## 2024-04-16 NOTE — PROGRESS NOTES
Daily Note     Today's date: 2024  Patient name: Callum Nielson  : 1965  MRN: [id number]  Referring provider: Jeremi Jefferson PA*  Dx:   Encounter Diagnosis     ICD-10-CM    1. Status post rotator cuff repair  Z98.890           Start Time: 1015  Stop Time: 1100  Total time in clinic (min): 45 minutes    Subjective: Surgeon said he looks good today, begin gradual strengthening.       Objective: See treatment diary below      Assessment: Patient is 12 weeks s/p R subscapularis repair. Progressed to RTC isometrics and more difficult AROM flexion with wall slides. Patient progressing appropriately per POC. 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       Plan: Continue per plan of care.      POC expires Unit limit Auth Expiration date PT/OT/ST + Visit Limit?   24                              Visit/Unit Tracking  AUTH Status:  Date             Approved - 60 visits Used 21             Remaining  39              Diagnosis: R subscap repair (24)   Precautions: RTC precautions   Insurance: [employer]    3/26 3/28 4/2 4/4 4/9 4/11 4/16   Patient Ed      Re-Eval    Nutrition   Savory breakfast vs sweet breakfast  Eat protein/veggies first prior to carbs    Can add more walk time at gym, add incline on treadmill                           Neuro Re-Ed                                                  Ther Ex          Aerobic conditioning          Pendulums          Table slides          PROM shoulder flexion Holding R wrist  3 x 10    Standing with dowel  PROM  3 x 10 Holding R wrist  3 x 10    Standing with dowel  PROM  3 x 10 Holding R wrist  3 x 10    Standing with dowel  PROM  3 x 10 Holding R wrist  3 x 10    Standing with dowel  PROM  3 x 10 Holding R wrist  3 x 10    Standing with dowel  PROM  3 x 10 Holding R wrist  3 x 10    Standing with dowel  PROM  3 x 10 Holding R wrist  3 x 10    Standing  with dowel  PROM  3 x 10   Shoulder flexion AAROM Supine  3 x 10    Standing  Ball roll up plinth incline  3 x 15 Supine  3 x 10    Standing  Ball roll up plinth incline  3 x 15 Supine  3 x 10    Standing  Ball roll up plinth incline  3 x 15 Supine  3 x 10    Standing  Ball roll up plinth incline  3 x 15 Supine 45*  Dowel  2 x 12     Supine 45*  Dowel  2 x 12    Shoulder abduction PROM Red pball  3 x 10 Red pball 3x10 Red pball 3x10 Red pball 3x10    Red pball 3x10 Red pball 3x10    Pulleys   3 x 10 3' 2' 3 x 2' 2 x 3'   Rows          Chest press Chest press  5#   3 x 10 Chest press  5#   3 x 10 Chest press  5#  3 x 10 Chest press  5#  3 x 10 Chest press  6#  3 x 10     Elbow extension          Bicep curls 5# 3 x 10-12   5# 3x10 7# 3 x 10  8# 10x     RTC isometrics       ER  2 x 10 x 5s hold    IR  2 x 10 x 5s hold   Wall slides       W/ towel  10x             Ther Activity                              Manual          Shoulder ER Max 20-25*  8'  10' 10' 8', Max 30* 8', reaching 40* 8'   Shoulder flexion          Elbow flex ext                              Modalities

## 2024-04-16 NOTE — LETTER
April 16, 2024     Patient: Callum Nielson  YOB: 1965  Date of Visit: 4/16/2024      To Whom it May Concern:    Callum Nielson is under my professional care. Callum was seen in my office on 4/16/2024. Callum should avoid overhead activities and physical use of the RUE for the next 6-8 weeks.    If you have any questions or concerns, please don't hesitate to call.         Sincerely,          Waldo Chambers MD        CC: No Recipients

## 2024-04-17 ENCOUNTER — APPOINTMENT (OUTPATIENT)
Dept: PHYSICAL THERAPY | Facility: REHABILITATION | Age: 59
End: 2024-04-17
Payer: COMMERCIAL

## 2024-04-18 ENCOUNTER — OFFICE VISIT (OUTPATIENT)
Dept: PHYSICAL THERAPY | Facility: REHABILITATION | Age: 59
End: 2024-04-18
Payer: COMMERCIAL

## 2024-04-18 DIAGNOSIS — Z98.890 STATUS POST ROTATOR CUFF REPAIR: Primary | ICD-10-CM

## 2024-04-18 PROCEDURE — 97112 NEUROMUSCULAR REEDUCATION: CPT | Performed by: PHYSICAL THERAPIST

## 2024-04-18 PROCEDURE — 97110 THERAPEUTIC EXERCISES: CPT | Performed by: PHYSICAL THERAPIST

## 2024-04-18 NOTE — PROGRESS NOTES
"Daily Note     Today's date: 2024  Patient name: Callum Nielson  : 1965  MRN: [id number]  Referring provider: Jeremi Jefferson PA*  Dx:   Encounter Diagnosis     ICD-10-CM    1. Status post rotator cuff repair  Z98.890             Start Time: 0800  Stop Time: 0845  Total time in clinic (min): 45 minutes    Subjective: Isometrics going ok. Shoulder IR a bit uncomfortable.       Objective: See treatment diary below      Assessment: Patient is 12 weeks s/p R subscapularis repair. Shoulder IR isometric produces mild \"twinge.\" Recommend patient trial for another day and if no improvement, will wait until next session to reassess sx with this exercise. Patient demonstrating improvement in his shoulder flexion with AAROM exercises. 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       Plan: Continue per plan of care.      POC expires Unit limit Auth Expiration date PT/OT/ST + Visit Limit?   24                              Visit/Unit Tracking  AUTH Status:  Date            Approved - 60 visits Used 21 22            Remaining  39 38             Diagnosis: R subscap repair (24)   Precautions: RTC precautions   Insurance: [employer]          Patient Ed  Re-Eval        Nutrition                              Neuro Re-Ed                                                  Ther Ex          Aerobic conditioning    UBE Lvl 1  FW 4'  BW 4'      Pendulums          Table slides          PROM shoulder flexion Holding R wrist  3 x 10    Standing with dowel  PROM  3 x 10 Holding R wrist  3 x 10    Standing with dowel  PROM  3 x 10 Holding R wrist  3 x 10    Standing with dowel  PROM  3 x 10 Holding R wrist  3 x 10    Standing with dowel  PROM  3 x 10      Shoulder flexion AAROM Supine 45*  Dowel  2 x 12     Supine 45*  Dowel  2 x 12  Standing  2 x 10      Shoulder abduction PROM Red pball 3x10 Red " pball 3x10        Pulleys 2' 3 x 2' 2 x 3' 2 x 3'      Rows          Chest press Chest press  6#  3 x 10         Elbow extension          Bicep curls 7# 3 x 10  8# 10x         RTC isometrics   ER  2 x 10 x 5s hold    IR  2 x 10 x 5s hold       Wall slides   W/ towel  10x 3 x 10                Ther Activity                              Manual          Shoulder ER 8', Max 30* 8', reaching 40* 8' 8'      Shoulder flexion          Elbow flex ext          RTC isometrics    ER  10 x 5s hold    IR  10 x 5s hold                Modalities

## 2024-04-22 NOTE — PROGRESS NOTES
Daily Note     Today's date: 2024  Patient name: Callum Nielson  : 1965  MRN: [id number]  Referring provider: Jeremi Jefferson PA*  Dx:   Encounter Diagnosis     ICD-10-CM    1. Status post rotator cuff repair  Z98.890           Start Time: 0800  Stop Time: 0845  Total time in clinic (min): 45 minutes    Subjective: Cleaned out coolers and used his RUE a lot. A little twingey from that.       Objective: See treatment diary below      Assessment: Patient is 13 weeks s/p R subscapularis repair. Shoulder ER mobility more limited today due to his shoulder sx. Continues to have some mild twinge with isometric shoulder IR. Progressed shoulder ER with resistance which challenged him but did not cause any discomfort. Will gradually progress shoulder IR strengthening as tolerated. 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       Plan: Continue per plan of care.     Access Code: [email or web address]  URL: [email or web address]  Date: 2024  Prepared by: Cira Marin    Exercises  - Standing shoulder flexion wall slides  - 2 x daily - 7 x weekly - 10 reps  - Seated Shoulder Flexion AAROM with Pulley Behind  - 2 x daily - 7 x weekly - 3 sets - 60 hold  - Supine Shoulder Flexion AAROM with Hands Clasped  - 2 x daily - 7 x weekly - 3 sets - 10 reps  - Shoulder Flexion Overhead with Dowel  - 2 x daily - 7 x weekly - 3 sets - 10 reps  - Shoulder External Rotation with Anchored Resistance  - 1 x daily - 7 x weekly - 3 sets - 10 reps  - Standing Isometric Shoulder Internal Rotation at Doorway  - 1 x daily - 7 x weekly - 3 sets - 10 reps  - Standing Row with Anchored Resistance  - 1 x daily - 4 x weekly - 3 sets - 10 reps  - Standing Bicep Curls Supinated with Dumbbells  - 1 x daily - 4 x weekly - 3 sets - 10 reps  - Standing Tricep Extensions with Resistance  - 1 x daily - 4 x weekly - 3 sets - 10 reps     POC expires  Unit limit Auth Expiration date PT/OT/ST + Visit Limit?   6/6 12/31/24                              Visit/Unit Tracking  AUTH Status:  Date 4/16 4/18 4/23          Approved - 60 visits Used 21 22 23           Remaining  39 38 37            Diagnosis: R subscap repair (1/17/24)   Precautions: RTC precautions   Insurance: [employer]    4/9 4/11 4/16 4/18 4/23     Patient Ed  Re-Eval        Nutrition                              Neuro Re-Ed                                                  Ther Ex          Aerobic conditioning    UBE Lvl 1  FW 4'  BW 4' UBE Lvl 4  FW 4'  BW 4'     Pendulums          Table slides          PROM shoulder flexion Holding R wrist  3 x 10    Standing with dowel  PROM  3 x 10 Holding R wrist  3 x 10    Standing with dowel  PROM  3 x 10 Holding R wrist  3 x 10    Standing with dowel  PROM  3 x 10 Holding R wrist  3 x 10    Standing with dowel  PROM  3 x 10 Holding R wrist  3 x 10    Standing with dowel  PROM  3 x 10     Shoulder flexion AAROM Supine 45*  Dowel  2 x 12     Supine 45*  Dowel  2 x 12  Standing  2 x 10      Shoulder abduction PROM Red pball 3x10 Red pball 3x10        Pulleys 2' 3 x 2' 2 x 3' 2 x 3' 2 x 2'     Rows     At gym     Chest press Chest press  6#  3 x 10    At gym     Elbow extension     At gym     Bicep curls 7# 3 x 10  8# 10x    At gym     RTC isometrics   ER  2 x 10 x 5s hold    IR  2 x 10 x 5s hold  Shoulder ER  Real TB  3 x 10     Wall slides   W/ towel  10x 3 x 10 3 x 10               Ther Activity                              Manual          Shoulder ER 8', Max 30* 8', reaching 40* 8' 8' 8'     Shoulder flexion          Elbow flex ext          RTC isometrics    ER  10 x 5s hold    IR  10 x 5s hold ER  2 x 10 x 5s hold    IR  2 x 10 x 5s hold               Modalities

## 2024-04-23 ENCOUNTER — OFFICE VISIT (OUTPATIENT)
Dept: PHYSICAL THERAPY | Facility: REHABILITATION | Age: 59
End: 2024-04-23
Payer: COMMERCIAL

## 2024-04-23 DIAGNOSIS — Z98.890 STATUS POST ROTATOR CUFF REPAIR: Primary | ICD-10-CM

## 2024-04-23 PROCEDURE — 97140 MANUAL THERAPY 1/> REGIONS: CPT | Performed by: PHYSICAL THERAPIST

## 2024-04-23 PROCEDURE — 97110 THERAPEUTIC EXERCISES: CPT | Performed by: PHYSICAL THERAPIST

## 2024-04-24 ENCOUNTER — APPOINTMENT (OUTPATIENT)
Dept: PHYSICAL THERAPY | Facility: REHABILITATION | Age: 59
End: 2024-04-24
Payer: COMMERCIAL

## 2024-04-25 ENCOUNTER — OFFICE VISIT (OUTPATIENT)
Dept: PHYSICAL THERAPY | Facility: REHABILITATION | Age: 59
End: 2024-04-25
Payer: COMMERCIAL

## 2024-04-25 DIAGNOSIS — Z98.890 STATUS POST ROTATOR CUFF REPAIR: Primary | ICD-10-CM

## 2024-04-25 PROCEDURE — 97140 MANUAL THERAPY 1/> REGIONS: CPT | Performed by: PHYSICAL THERAPIST

## 2024-04-25 PROCEDURE — 97110 THERAPEUTIC EXERCISES: CPT | Performed by: PHYSICAL THERAPIST

## 2024-04-25 NOTE — PROGRESS NOTES
Daily Note     Today's date: 2024  Patient name: Callum Nielson  : 1965  MRN: [id number]  Referring provider: Jeremi Jefferson PA*  Dx:   Encounter Diagnosis     ICD-10-CM    1. Status post rotator cuff repair  Z98.890             Start Time: 0800  Stop Time: 0845  Total time in clinic (min): 45 minutes    Subjective: Doing well with exercises at home.       Objective: See treatment diary below      Assessment: Patient is 13 weeks s/p R subscapularis repair. Shoulder ER mobility improved from last session. Shoulder IR isometric improved with strength and less symptoms. Will gradually progress shoulder IR strengthening as tolerated. 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       Plan: Continue per plan of care.     Access Code: [email or web address]  URL: [email or web address]  Date: 2024  Prepared by: Cira Marin    Exercises  - Standing shoulder flexion wall slides  - 2 x daily - 7 x weekly - 10 reps  - Seated Shoulder Flexion AAROM with Pulley Behind  - 2 x daily - 7 x weekly - 3 sets - 60 hold  - Supine Shoulder Flexion AAROM with Hands Clasped  - 2 x daily - 7 x weekly - 3 sets - 10 reps  - Shoulder Flexion Overhead with Dowel  - 2 x daily - 7 x weekly - 3 sets - 10 reps  - Shoulder External Rotation with Anchored Resistance  - 1 x daily - 7 x weekly - 3 sets - 10 reps  - Standing Isometric Shoulder Internal Rotation at Doorway  - 1 x daily - 7 x weekly - 3 sets - 10 reps  - Standing Row with Anchored Resistance  - 1 x daily - 4 x weekly - 3 sets - 10 reps  - Standing Bicep Curls Supinated with Dumbbells  - 1 x daily - 4 x weekly - 3 sets - 10 reps  - Standing Tricep Extensions with Resistance  - 1 x daily - 4 x weekly - 3 sets - 10 reps     POC expires Unit limit Auth Expiration date PT/OT/ST + Visit Limit?   24                              Visit/Unit Tracking  AUTH Status:  Date  4/16 4/18 4/23 4/25         Approved - 60 visits Used 21 22 23 24          Remaining  39 38 37 36           Diagnosis: R subscap repair (1/17/24)   Precautions: RTC precautions   Insurance: [employer]na    4/9 4/11 4/16 4/18 4/23 4/25    Patient Ed  Re-Eval        Nutrition                              Neuro Re-Ed                                                  Ther Ex          Aerobic conditioning    UBE Lvl 1  FW 4'  BW 4' UBE Lvl 4  FW 4'  BW 4' UBE Lvl 6  FW 4'  BW 4'    Pendulums          Table slides          PROM shoulder flexion Holding R wrist  3 x 10    Standing with dowel  PROM  3 x 10 Holding R wrist  3 x 10    Standing with dowel  PROM  3 x 10 Holding R wrist  3 x 10    Standing with dowel  PROM  3 x 10 Holding R wrist  3 x 10    Standing with dowel  PROM  3 x 10 Holding R wrist  3 x 10    Standing with dowel  PROM  3 x 10     Shoulder flexion AAROM Supine 45*  Dowel  2 x 12     Supine 45*  Dowel  2 x 12  Standing  2 x 10  Standing  2 x 10    Shoulder abduction PROM Red pball 3x10 Red pball 3x10        Pulleys 2' 3 x 2' 2 x 3' 2 x 3' 2 x 2' 2 x 2'    Rows     At gym     Chest press Chest press  6#  3 x 10    At gym     Elbow extension     At gym     Bicep curls 7# 3 x 10  8# 10x    At gym     RTC isometrics   ER  2 x 10 x 5s hold    IR  2 x 10 x 5s hold  Shoulder ER  [place] TB  3 x 10 Shoulder ER  [place] TB  3 x 10-12    Wall slides   W/ towel  10x 3 x 10 3 x 10 3 x 10              Ther Activity                              Manual          Shoulder ER 8', Max 30* 8', reaching 40* 8' 8' 8' 8'    Shoulder flexion          Elbow flex ext          RTC isometrics    ER  10 x 5s hold    IR  10 x 5s hold ER  2 x 10 x 5s hold    IR  2 x 10 x 5s hold IR  2 x 10 x 5s hold              Modalities

## 2024-04-26 DIAGNOSIS — J44.1 COPD EXACERBATION (HCC): ICD-10-CM

## 2024-04-27 RX ORDER — ALBUTEROL SULFATE 90 UG/1
AEROSOL, METERED RESPIRATORY (INHALATION)
Qty: 18 G | Refills: 5 | Status: SHIPPED | OUTPATIENT
Start: 2024-04-27

## 2024-04-30 ENCOUNTER — OFFICE VISIT (OUTPATIENT)
Dept: PHYSICAL THERAPY | Facility: REHABILITATION | Age: 59
End: 2024-04-30
Payer: COMMERCIAL

## 2024-04-30 DIAGNOSIS — Z98.890 STATUS POST ROTATOR CUFF REPAIR: Primary | ICD-10-CM

## 2024-04-30 PROCEDURE — 97110 THERAPEUTIC EXERCISES: CPT | Performed by: PHYSICAL THERAPIST

## 2024-04-30 PROCEDURE — 97140 MANUAL THERAPY 1/> REGIONS: CPT | Performed by: PHYSICAL THERAPIST

## 2024-04-30 NOTE — PROGRESS NOTES
Daily Note     Today's date: 2024  Patient name: Callum Nielson  : 1965  MRN: [id number]  Referring provider: Jeremi Jefferson PA*  Dx:   Encounter Diagnosis     ICD-10-CM    1. Status post rotator cuff repair  Z98.890             Start Time: 0800  Stop Time: 0845  Total time in clinic (min): 45 minutes    Subjective: Feel a good exercise burn with exercises.      Objective: See treatment diary below      Assessment: Patient is 14 weeks s/p R subscapularis repair. Patient's flexion mobility is improving with AROM. Able to progress to shoulder IR with band resistance. 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       Plan: Continue per plan of care.     Access Code: [email or web address]  URL: [email or web address]  Date: 2024  Prepared by: Cira Marin    Exercises  - Standing shoulder flexion wall slides  - 2 x daily - 7 x weekly - 10 reps  - Seated Shoulder Flexion AAROM with Pulley Behind  - 2 x daily - 7 x weekly - 3 sets - 60 hold  - Supine Shoulder Flexion AAROM with Hands Clasped  - 2 x daily - 7 x weekly - 3 sets - 10 reps  - Shoulder Flexion Overhead with Dowel  - 2 x daily - 7 x weekly - 3 sets - 10 reps  - Shoulder External Rotation with Anchored Resistance  - 1 x daily - 7 x weekly - 3 sets - 10 reps  - Standing Isometric Shoulder Internal Rotation at Doorway  - 1 x daily - 7 x weekly - 3 sets - 10 reps  - Standing Row with Anchored Resistance  - 1 x daily - 4 x weekly - 3 sets - 10 reps  - Standing Bicep Curls Supinated with Dumbbells  - 1 x daily - 4 x weekly - 3 sets - 10 reps  - Standing Tricep Extensions with Resistance  - 1 x daily - 4 x weekly - 3 sets - 10 reps     POC expires Unit limit Auth Expiration date PT/OT/ST + Visit Limit?   24                              Visit/Unit Tracking  AUTH Status:  Date         Approved - 60 visits Used  23  24 25         Remaining  39 38 37 36 35          Diagnosis: R subscap repair (1/17/24)   Precautions: RTC precautions   Insurance: Aetna    4/9 4/11 4/16 4/18 4/23 4/25 4/30   Patient Ed  Re-Eval        Nutrition                              Neuro Re-Ed                                                  Ther Ex          Aerobic conditioning    UBE Lvl 1  FW 4'  BW 4' UBE Lvl 4  FW 4'  BW 4' UBE Lvl 6  FW 4'  BW 4' UBE Lvl 6  FW 4'  BW 4'   Pendulums          Table slides          PROM shoulder flexion Holding R wrist  3 x 10    Standing with dowel  PROM  3 x 10 Holding R wrist  3 x 10    Standing with dowel  PROM  3 x 10 Holding R wrist  3 x 10    Standing with dowel  PROM  3 x 10 Holding R wrist  3 x 10    Standing with dowel  PROM  3 x 10 Holding R wrist  3 x 10    Standing with dowel  PROM  3 x 10  Holding R wrist  3 x 10   Shoulder flexion AAROM Supine 45*  Dowel  2 x 12     Supine 45*  Dowel  2 x 12  Standing  2 x 10  Standing  2 x 10 Standing  3 x 10   Shoulder abduction PROM Red pball 3x10 Red pball 3x10        Pulleys 2' 3 x 2' 2 x 3' 2 x 3' 2 x 2' 2 x 2' 2 x 2'   Rows     At gym     Chest press Chest press  6#  3 x 10    At gym  7# 3 x 10   Elbow extension     At gym     Bicep curls 7# 3 x 10  8# 10x    At gym     RTC isometrics   ER  2 x 10 x 5s hold    IR  2 x 10 x 5s hold  Shoulder ER  [place] TB  3 x 10 Shoulder ER  [place] TB  3 x 10-12 Shoulder ER  [place] TB  3 x 10    Shoulder IR  [place] TB  3 x 10   Wall slides   W/ towel  10x 3 x 10 3 x 10 3 x 10 3 x 15             Ther Activity                              Manual          Shoulder ER 8', Max 30* 8', reaching 40* 8' 8' 8' 8' 8'   Shoulder flexion          Elbow flex ext          RTC isometrics    ER  10 x 5s hold    IR  10 x 5s hold ER  2 x 10 x 5s hold    IR  2 x 10 x 5s hold IR  2 x 10 x 5s hold IR  10 x 5s hold             Modalities

## 2024-05-01 ENCOUNTER — OFFICE VISIT (OUTPATIENT)
Dept: PHYSICAL THERAPY | Facility: REHABILITATION | Age: 59
End: 2024-05-01
Payer: COMMERCIAL

## 2024-05-01 DIAGNOSIS — R35.0 URINARY FREQUENCY: Primary | ICD-10-CM

## 2024-05-01 DIAGNOSIS — R35.1 NOCTURIA: ICD-10-CM

## 2024-05-01 PROCEDURE — 97530 THERAPEUTIC ACTIVITIES: CPT | Performed by: PHYSICAL THERAPIST

## 2024-05-01 NOTE — PROGRESS NOTES
Daily Note     Today's date: 2024  Patient name: Callum Nielson  : 1965  MRN: [id number]  Referring provider: Jeremi Jefferson PA*  Dx:   Encounter Diagnosis     ICD-10-CM    1. Urinary frequency  R35.0       2. Nocturia  R35.1                      Subjective: Decreased 1 cup of coffee in the afternoon, and drinking it at 3pm instead of 5pm. Notes nocturia is every 2.5 hours at night, which is some progress. Has not utilized urge delay regularly.  Also reports urge consistently when getting off the treadmill at the gym.     Objective: See treatment diary below    Assessment: Tolerated treatment well. Reviewed urge delay and utilizing it consistently overnight and when at the gym post-treadmill. Reviewed principles of neurocognitive urge changes requiring time/repetitions. Patient to f/u via telephone in 1 week.    Plan: Continue per plan of care.        Manuals      PFM exam completed                                  Patient education       Healthy bladder habits 5' 5'     Fluid intake 5' 5'     Urge deferral 5' handout provided, practiced 20'                                        Ther Activity

## 2024-05-02 ENCOUNTER — OFFICE VISIT (OUTPATIENT)
Dept: PHYSICAL THERAPY | Facility: REHABILITATION | Age: 59
End: 2024-05-02
Payer: COMMERCIAL

## 2024-05-02 DIAGNOSIS — Z98.890 STATUS POST ROTATOR CUFF REPAIR: Primary | ICD-10-CM

## 2024-05-02 PROCEDURE — 97110 THERAPEUTIC EXERCISES: CPT | Performed by: PHYSICAL THERAPIST

## 2024-05-02 NOTE — PROGRESS NOTES
Daily Note     Today's date: 2024  Patient name: Callum Nielson  : 1965  MRN: [id number]  Referring provider: Jeremi Jefferson PA*  Dx:   Encounter Diagnosis     ICD-10-CM    1. Status post rotator cuff repair  Z98.890             Start Time: 0800  Stop Time: 0845  Total time in clinic (min): 45 minutes    Subjective: A little sore today.       Objective: See treatment diary below      Assessment: Patient is 14 weeks s/p R subscapularis repair. Continued to progress shoulder AROM with addition of blaze pods for overhead reach. Fatigued with this exercise but manageable sx. 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       Plan: Continue per plan of care.     Access Code: [email or web address]  URL: [email or web address]  Date: 2024  Prepared by: Cira Marin    Exercises  - Standing shoulder flexion wall slides  - 2 x daily - 7 x weekly - 10 reps  - Seated Shoulder Flexion AAROM with Pulley Behind  - 2 x daily - 7 x weekly - 3 sets - 60 hold  - Supine Shoulder Flexion AAROM with Hands Clasped  - 2 x daily - 7 x weekly - 3 sets - 10 reps  - Shoulder Flexion Overhead with Dowel  - 2 x daily - 7 x weekly - 3 sets - 10 reps  - Shoulder External Rotation with Anchored Resistance  - 1 x daily - 7 x weekly - 3 sets - 10 reps  - Standing Isometric Shoulder Internal Rotation at Doorway  - 1 x daily - 7 x weekly - 3 sets - 10 reps  - Standing Row with Anchored Resistance  - 1 x daily - 4 x weekly - 3 sets - 10 reps  - Standing Bicep Curls Supinated with Dumbbells  - 1 x daily - 4 x weekly - 3 sets - 10 reps  - Standing Tricep Extensions with Resistance  - 1 x daily - 4 x weekly - 3 sets - 10 reps     POC expires Unit limit Auth Expiration date PT/OT/ST + Visit Limit?   24                              Visit/Unit Tracking  AUTH Status:  Date  5       Approved - 60 visits Used 21  22 23 24 25 26        Remaining  39 38 37 36 35 34         Diagnosis: R subscap repair (1/17/24)   Precautions: RTC precautions   Insurance: Aetna    4/18 4/23 4/25 4/30 5/2    Patient Ed         Nutrition                           Neuro Re-Ed                                             Ther Ex         Aerobic conditioning UBE Lvl 1  FW 4'  BW 4' UBE Lvl 4  FW 4'  BW 4' UBE Lvl 6  FW 4'  BW 4' UBE Lvl 6  FW 4'  BW 4' UBE Lvl 6  FW 4'  BW 4'    Pendulums         Table slides         PROM shoulder flexion Holding R wrist  3 x 10    Standing with dowel  PROM  3 x 10 Holding R wrist  3 x 10    Standing with dowel  PROM  3 x 10  Holding R wrist  3 x 10 Holding R wrist  3 x 10    Shoulder flexion AAROM Standing  2 x 10  Standing  2 x 10 Standing  3 x 10     Shoulder abduction PROM         Pulleys 2 x 3' 2 x 2' 2 x 2' 2 x 2' 3 x 1.5'    Rows  At gym       Chest press  At gym  7# 3 x 10 7# 3 x 10    Elbow extension  At gym       Bicep curls  At gym       RTC isometrics  Shoulder ER  [place] TB  3 x 10 Shoulder ER  [place] TB  3 x 10-12 Shoulder ER  [place] TB  3 x 10    Shoulder IR  [place] TB  3 x 10 Shoulder ER  [place] TB  3 x 10-12    Shoulder IR  [place] TB  3 x 10-12    Wall slides 3 x 10 3 x 10 3 x 10 3 x 15 3 x 15    Blaze pods     Overhead reach  30s 16 hits  30s 17 hits  30s 17 hits             Ther Activity                           Manual         Shoulder ER 8' 8' 8' 8' 5'    Shoulder flexion         Elbow flex ext         RTC isometrics ER  10 x 5s hold    IR  10 x 5s hold ER  2 x 10 x 5s hold    IR  2 x 10 x 5s hold IR  2 x 10 x 5s hold IR  10 x 5s hold              Modalities

## 2024-05-06 NOTE — PROGRESS NOTES
Daily Note     Today's date: 2024  Patient name: Callum Nielson  : 1965  MRN: [id number]  Referring provider: Jeremi Jefferson PA*  Dx:   Encounter Diagnosis     ICD-10-CM    1. Status post rotator cuff repair  Z98.890               Start Time: 0800  Stop Time: 0845  Total time in clinic (min): 45 minutes    Subjective: Bumped up bicep curls 12# and chest press 10#. He paid for it and was sore.       Objective: See treatment diary below      Assessment: Patient is 15 weeks s/p R subscapularis repair. Recommend decreasing weight for biceps and chest press based on his response at this time. Resistance band RTC strength was challenging with increasing dosage. With blaze pods, increased height which fatigued him. Added blaze pods at waist level to work on reaction time and speed. 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       Plan: Continue per plan of care.     Access Code: [email or web address]  URL: [email or web address]  Date: 2024  Prepared by: Cira Marin    Exercises  - Standing shoulder flexion wall slides  - 2 x daily - 7 x weekly - 10 reps  - Seated Shoulder Flexion AAROM with Pulley Behind  - 2 x daily - 7 x weekly - 3 sets - 60 hold  - Supine Shoulder Flexion AAROM with Hands Clasped  - 2 x daily - 7 x weekly - 3 sets - 10 reps  - Shoulder Flexion Overhead with Dowel  - 2 x daily - 7 x weekly - 3 sets - 10 reps  - Shoulder External Rotation with Anchored Resistance  - 1 x daily - 7 x weekly - 3 sets - 10 reps  - Standing Isometric Shoulder Internal Rotation at Doorway  - 1 x daily - 7 x weekly - 3 sets - 10 reps  - Standing Row with Anchored Resistance  - 1 x daily - 4 x weekly - 3 sets - 10 reps  - Standing Bicep Curls Supinated with Dumbbells  - 1 x daily - 4 x weekly - 3 sets - 10 reps  - Standing Tricep Extensions with Resistance  - 1 x daily - 4 x weekly - 3 sets - 10 reps     POC  expires Unit limit Auth Expiration date PT/OT/ST + Visit Limit?   6/6 12/31/24                              Visit/Unit Tracking  AUTH Status:  Date 4/16 4/18 4/23 4/25 4/30 5/2 5/7      Approved - 60 visits Used 21 22 23 24 25 26 27       Remaining  39 38 37 36 35 34 33        Diagnosis: R subscap repair (1/17/24)   Precautions: RTC precautions   Insurance: Aetna    4/18 4/23 4/25 4/30 5/2 5/7   Patient Ed         Nutrition                           Neuro Re-Ed                                             Ther Ex         Aerobic conditioning UBE Lvl 1  FW 4'  BW 4' UBE Lvl 4  FW 4'  BW 4' UBE Lvl 6  FW 4'  BW 4' UBE Lvl 6  FW 4'  BW 4' UBE Lvl 6  FW 4'  BW 4' UBE Lvl 5  FW 4'  BW 4'   Pendulums         Table slides         PROM shoulder flexion Holding R wrist  3 x 10    Standing with dowel  PROM  3 x 10 Holding R wrist  3 x 10    Standing with dowel  PROM  3 x 10  Holding R wrist  3 x 10 Holding R wrist  3 x 10 Holding R wrist  3 x 10    Standing with dowel  PROM  15x   Shoulder flexion AAROM Standing  2 x 10  Standing  2 x 10 Standing  3 x 10  Standing  2 x 15   Shoulder abduction PROM         Pulleys 2 x 3' 2 x 2' 2 x 2' 2 x 2' 3 x 1.5' 3 x 1'   Rows  At gym       Chest press  At gym  7# 3 x 10 7# 3 x 10    Elbow extension  At gym       Bicep curls  At gym       RTC isometrics  Shoulder ER  [place] TB  3 x 10 Shoulder ER  [place] TB  3 x 10-12 Shoulder ER  [place] TB  3 x 10    Shoulder IR  [place] TB  3 x 10 Shoulder ER  [place] TB  3 x 10-12    Shoulder IR  [place] TB  3 x 10-12 Shoulder ER  [place] TB  3 x 12    Shoulder IR  [place] TB  3 x 10-15   Wall slides 3 x 10 3 x 10 3 x 10 3 x 15 3 x 15 2 x 15   Blaze pods     Overhead reach  30s 16 hits  30s 17 hits  30s 17 hits Overhead reach  30s 24 hits  30s 20 hits    Speed on table   4 pods  60s 50 hits  60s 51 hits            Ther Activity                           Manual         Shoulder ER 8' 8' 8' 8' 5' 8'   Shoulder flexion         Elbow flex ext         RTC isometrics  ER  10 x 5s hold    IR  10 x 5s hold ER  2 x 10 x 5s hold    IR  2 x 10 x 5s hold IR  2 x 10 x 5s hold IR  10 x 5s hold              Modalities

## 2024-05-07 ENCOUNTER — OFFICE VISIT (OUTPATIENT)
Dept: PHYSICAL THERAPY | Facility: REHABILITATION | Age: 59
End: 2024-05-07
Payer: COMMERCIAL

## 2024-05-07 DIAGNOSIS — Z98.890 STATUS POST ROTATOR CUFF REPAIR: Primary | ICD-10-CM

## 2024-05-07 PROCEDURE — 97110 THERAPEUTIC EXERCISES: CPT | Performed by: PHYSICAL THERAPIST

## 2024-05-07 PROCEDURE — 97140 MANUAL THERAPY 1/> REGIONS: CPT | Performed by: PHYSICAL THERAPIST

## 2024-05-07 PROCEDURE — 97112 NEUROMUSCULAR REEDUCATION: CPT | Performed by: PHYSICAL THERAPIST

## 2024-05-08 NOTE — PROGRESS NOTES
Daily Note     Today's date: 2024  Patient name: Callum Nielson  : 1965  MRN: [id number]  Referring provider: Jeremi Jefferson PA*  Dx:   Encounter Diagnosis     ICD-10-CM    1. Status post rotator cuff repair  Z98.890       2. Urinary frequency  R35.0       3. Nocturia  R35.1               Start Time: 0800  Stop Time: 0845  Total time in clinic (min): 45 minutes    Subjective: Patient reports no new complaints since last session. Overall he is doing well. Would matt to go back to work in the next month.            Objective: See treatment diary below      Assessment: Patient is 15 weeks s/p R subscapularis repair. Session initiated on UBE for ROM, posture and to promote blood flow circulation in preparation for therapy today. PROM-  limitations in all planes, greatest in IR, ER and ABD. Patient is also challenged w functional IR- limited in reach behind back. Continued w blaze pods for reaction time/speed and to increase mobility and endurance- patient is challenged due to limited range and lack of endurance. Patient would benefit from continued PT to improve ROM, strength and flexibility to optimize return to prior functional mobility. 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       Plan: Continue per plan of care.     Access Code: [email or web address]  URL: [email or web address]  Date: 2024  Prepared by: Cira Marin    Exercises  - Standing shoulder flexion wall slides  - 2 x daily - 7 x weekly - 10 reps  - Seated Shoulder Flexion AAROM with Pulley Behind  - 2 x daily - 7 x weekly - 3 sets - 60 hold  - Supine Shoulder Flexion AAROM with Hands Clasped  - 2 x daily - 7 x weekly - 3 sets - 10 reps  - Shoulder Flexion Overhead with Dowel  - 2 x daily - 7 x weekly - 3 sets - 10 reps  - Shoulder External Rotation with Anchored Resistance  - 1 x daily - 7 x weekly - 3 sets - 10 reps  - Standing Isometric Shoulder Internal Rotation at Doorway  - 1 x daily - 7 x weekly - 3 sets - 10 reps  -  Standing Row with Anchored Resistance  - 1 x daily - 4 x weekly - 3 sets - 10 reps  - Standing Bicep Curls Supinated with Dumbbells  - 1 x daily - 4 x weekly - 3 sets - 10 reps  - Standing Tricep Extensions with Resistance  - 1 x daily - 4 x weekly - 3 sets - 10 reps     POC expires Unit limit Auth Expiration date PT/OT/ST + Visit Limit?   6/6 12/31/24                              Visit/Unit Tracking  AUTH Status:  Date 4/16 4/18 4/23 4/25 4/30 5/2 5/7 5/9     Approved - 60 visits Used 21 22 23 24 25 26 27 28      Remaining  39 38 37 36 35 34 33 32       Diagnosis: R subscap repair (1/17/24)    Precautions: RTC precautions    Insurance: [employer]     4/18 4/23 4/25 4/30 5/2 5/7 5/9   Patient Ed          Nutrition                              Neuro Re-Ed                                                  Ther Ex          Aerobic conditioning UBE Lvl 1  FW 4'  BW 4' UBE Lvl 4  FW 4'  BW 4' UBE Lvl 6  FW 4'  BW 4' UBE Lvl 6  FW 4'  BW 4' UBE Lvl 6  FW 4'  BW 4' UBE Lvl 5  FW 4'  BW 4' UBE Lvl 5  FW 4'  BW 4'   Pendulums          Table slides          PROM shoulder flexion Holding R wrist  3 x 10    Standing with dowel  PROM  3 x 10 Holding R wrist  3 x 10    Standing with dowel  PROM  3 x 10  Holding R wrist  3 x 10 Holding R wrist  3 x 10 Holding R wrist  3 x 10    Standing with dowel  PROM  15x Holding R wrist  3 x 10   Shoulder flexion AAROM Standing  2 x 10  Standing  2 x 10 Standing  3 x 10  Standing  2 x 15 Standing  2 x 15   Shoulder abduction PROM          Pulleys 2 x 3' 2 x 2' 2 x 2' 2 x 2' 3 x 1.5' 3 x 1' 3x 1'     Rows  At gym        Chest press  At gym  7# 3 x 10 7# 3 x 10     Elbow extension  At gym        Bicep curls  At gym        RTC isometrics  Shoulder ER  [place] [facility]  3 x 10 Shoulder ER  [place] [facility]  3 x 10-12 Shoulder ER  [place] [facility]  3 x 10    Shoulder IR  [place] [facility]  3 x 10 Shoulder ER  [place] [facility]  3 x 10-12    Shoulder IR  [place] [facility]  3 x 10-12 Shoulder ER  [place] [facility]  3 x 12    Shoulder IR  [place] [facility]  3 x 10-15  Shoulder ER  [place] [facility]  3 x 12    Shoulder IR  [place] [facility]  3 x 10-15   Wall slides 3 x 10 3 x 10 3 x 10 3 x 15 3 x 15 2 x 15 2 x15   Blaze pods     Overhead reach  30s 16 hits  30s 17 hits  30s 17 hits Overhead reach  30s 24 hits  30s 20 hits    Speed on table   4 pods  60s 50 hits  60s 51 hits Overhead reach  30s 24 hits  30s 20 hits             Ther Activity                              Manual          Shoulder ER 8' 8' 8' 8' 5' 8' 8'  PROM    AROM  Side lying   W towel ER x20   Shoulder flexion          Elbow flex ext          RTC isometrics ER  10 x 5s hold    IR  10 x 5s hold ER  2 x 10 x 5s hold    IR  2 x 10 x 5s hold IR  2 x 10 x 5s hold IR  10 x 5s hold                Modalities

## 2024-05-09 ENCOUNTER — OFFICE VISIT (OUTPATIENT)
Dept: PHYSICAL THERAPY | Facility: REHABILITATION | Age: 59
End: 2024-05-09
Payer: COMMERCIAL

## 2024-05-09 DIAGNOSIS — R35.0 URINARY FREQUENCY: ICD-10-CM

## 2024-05-09 DIAGNOSIS — R35.1 NOCTURIA: ICD-10-CM

## 2024-05-09 DIAGNOSIS — Z98.890 STATUS POST ROTATOR CUFF REPAIR: Primary | ICD-10-CM

## 2024-05-09 PROCEDURE — 97112 NEUROMUSCULAR REEDUCATION: CPT

## 2024-05-09 PROCEDURE — 97140 MANUAL THERAPY 1/> REGIONS: CPT

## 2024-05-09 PROCEDURE — 97110 THERAPEUTIC EXERCISES: CPT

## 2024-05-13 NOTE — PROGRESS NOTES
Daily Note     Today's date: 2024  Patient name: Callum Nielson  : 1965  MRN: [id number]  Referring provider: Jeremi Jefferson PA*  Dx:   Encounter Diagnosis     ICD-10-CM    1. Status post rotator cuff repair  Z98.890           Start Time: 0800  Stop Time: 0845  Total time in clinic (min): 45 minutes    Subjective: Doing well overall.       Objective: See treatment diary below      Assessment: Patient is 16 weeks s/p R subscapularis repair. Patient continuing with appropriate level of restriction into end range flexion. Progressed to higher band resistance and to shoulder abduction with RTC strengthening. Added overhead endurance challenge with ball circles. Patient would benefit from continued PT to improve ROM, strength and flexibility to optimize return to prior functional mobility. 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       Plan: Continue per plan of care.     Access Code: [email or web address]  URL: [email or web address]  Date: 2024  Prepared by: Cira Marin    Exercises  - Standing shoulder flexion wall slides  - 2 x daily - 7 x weekly - 10 reps  - Seated Shoulder Flexion AAROM with Pulley Behind  - 2 x daily - 7 x weekly - 3 sets - 60 hold  - Supine Shoulder Flexion AAROM with Hands Clasped  - 2 x daily - 7 x weekly - 3 sets - 10 reps  - Shoulder Flexion Overhead with Dowel  - 2 x daily - 7 x weekly - 3 sets - 10 reps  - Shoulder External Rotation with Anchored Resistance  - 1 x daily - 7 x weekly - 3 sets - 10 reps  - Standing Isometric Shoulder Internal Rotation at Doorway  - 1 x daily - 7 x weekly - 3 sets - 10 reps  - Standing Row with Anchored Resistance  - 1 x daily - 4 x weekly - 3 sets - 10 reps  - Standing Bicep Curls Supinated with Dumbbells  - 1 x daily - 4 x weekly - 3 sets - 10 reps  - Standing Tricep Extensions with Resistance  - 1 x daily - 4 x weekly - 3 sets - 10 reps     POC expires Unit limit Auth Expiration date PT/OT/ST + Visit Limit?   24                               Visit/Unit Tracking  AUTH Status:  Date 5/14            Approved - 60 visits Used 30             Remaining  30              Diagnosis: R subscap repair (1/17/24)   Precautions: RTC precautions   Insurance: [employer]    4/30 5/2 5/7 5/9 5/14     Patient Ed          Nutrition                              Neuro Re-Ed          Ball roll     Yellow  CW 3 x 15s  CCW 3 x 15s                                   Ther Ex          Aerobic conditioning UBE Lvl 6  FW 4'  BW 4' UBE Lvl 6  FW 4'  BW 4' UBE Lvl 5  FW 4'  BW 4' UBE Lvl 5  FW 4'  BW 4' UBE Lvl 5  FW 4'  BW 4'     PROM shoulder flexion Holding R wrist  3 x 10 Holding R wrist  3 x 10 Holding R wrist  3 x 10    Standing with dowel  PROM  15x Holding R wrist  3 x 10 Holding R wrist  3 x 10     Shoulder flexion AAROM Standing  3 x 10  Standing  2 x 15 Standing  2 x 15      Pulleys 2 x 2' 3 x 1.5' 3 x 1' 3x 1'        Rows          Chest press 7# 3 x 10 7# 3 x 10   8#   3 x 12-15      Elbow extension          Bicep curls     10#  3 x 12-15      RTC isometrics Shoulder ER  [place] [facility]  3 x 10    Shoulder IR  [place] [facility]  3 x 10 Shoulder ER  [place] [facility]  3 x 10-12    Shoulder IR  [place] [facility]  3 x 10-12 Shoulder ER  [place] [facility]  3 x 12    Shoulder IR  [place] [facility]  3 x 10-15 Shoulder ER  [place] [facility]  3 x 12    Shoulder IR  [place] [facility]  3 x 10-15 Shoulder ER  [place] [facility]  15x  [place] [facility]  2 x 15    Shoulder IR  [place] [facility]  15x  [place] [facility]  2 x 15    Shoulder abd  [place] [facility]  3 x 15     Wall slides 3 x 15 3 x 15 2 x 15 2 x15 3 x 15     Blaze pods  Overhead reach  30s 16 hits  30s 17 hits  30s 17 hits Overhead reach  30s 24 hits  30s 20 hits    Speed on table   4 pods  60s 50 hits  60s 51 hits Overhead reach  30s 24 hits  30s 20 hits                Ther Activity                              Manual          Shoulder ER 8' 5' 8' 8'  PROM    AROM  Side lying   W towel ER x20 8'  Shoulder ER PROM arm by the side  ER 90/90     Shoulder flexion          Elbow flex ext          RTC isometrics IR  10 x 5s  hold                   Modalities

## 2024-05-14 ENCOUNTER — OFFICE VISIT (OUTPATIENT)
Dept: PHYSICAL THERAPY | Facility: REHABILITATION | Age: 59
End: 2024-05-14
Payer: COMMERCIAL

## 2024-05-14 DIAGNOSIS — Z98.890 STATUS POST ROTATOR CUFF REPAIR: Primary | ICD-10-CM

## 2024-05-14 PROCEDURE — 97110 THERAPEUTIC EXERCISES: CPT | Performed by: PHYSICAL THERAPIST

## 2024-05-14 PROCEDURE — 97140 MANUAL THERAPY 1/> REGIONS: CPT | Performed by: PHYSICAL THERAPIST

## 2024-05-14 NOTE — PROGRESS NOTES
Daily Note     Today's date: 2024  Patient name: Callum Nielson  : 1965  MRN: [id number]  Referring provider: Jeremi Jefferson PA*  Dx:   Encounter Diagnosis     ICD-10-CM    1. Status post rotator cuff repair  Z98.890             Start Time: 0800  Stop Time: 0845  Total time in clinic (min): 45 minutes    Subjective: Bought small ball to use for the overhead exercise.       Objective: See treatment diary below      Assessment: Patient is 16 weeks s/p R subscapularis repair. Improved overhead endurance with ball circles. Fatigued and challenged with overhead ball roll up. Patient appropriately challenged based on his rehab timeline. Patient would benefit from continued PT to improve ROM, strength and flexibility to optimize return to prior functional mobility. 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       Plan: Continue per plan of care.     Access Code: [email or web address]  URL: [email or web address]  Date: 2024  Prepared by: Cira Marin    Exercises  - Standing shoulder flexion wall slides  - 2 x daily - 7 x weekly - 10 reps  - Seated Shoulder Flexion AAROM with Pulley Behind  - 2 x daily - 7 x weekly - 3 sets - 60 hold  - Supine Shoulder Flexion AAROM with Hands Clasped  - 2 x daily - 7 x weekly - 3 sets - 10 reps  - Shoulder Flexion Overhead with Dowel  - 2 x daily - 7 x weekly - 3 sets - 10 reps  - Shoulder External Rotation with Anchored Resistance  - 1 x daily - 7 x weekly - 3 sets - 10 reps  - Standing Isometric Shoulder Internal Rotation at Doorway  - 1 x daily - 7 x weekly - 3 sets - 10 reps  - Standing Row with Anchored Resistance  - 1 x daily - 4 x weekly - 3 sets - 10 reps  - Standing Bicep Curls Supinated with Dumbbells  - 1 x daily - 4 x weekly - 3 sets - 10 reps  - Standing Tricep Extensions with Resistance  - 1 x daily - 4 x weekly - 3 sets - 10 reps     POC expires Unit limit Auth Expiration date PT/OT/ST + Visit Limit?   24                               Visit/Unit Tracking  AUTH Status:  Date 5/14 5/16           Approved - 60 visits Used 30 31            Remaining  30 29             Diagnosis: R subscap repair (1/17/24)   Precautions: RTC precautions   Insurance: [employer]    4/30 5/2 5/7 5/9 5/14 5/16    Patient Ed          Nutrition                              Neuro Re-Ed          Ball roll     Yellow  CW 3 x 15s  CCW 3 x 15s Yellow  CW 3 x 15s  CCW 3 x 15s    Ball roll up  2 x 5                                  Ther Ex          Aerobic conditioning UBE Lvl 6  FW 4'  BW 4' UBE Lvl 6  FW 4'  BW 4' UBE Lvl 5  FW 4'  BW 4' UBE Lvl 5  FW 4'  BW 4' UBE Lvl 5  FW 4'  BW 4' UBE Lvl 5.5  FW 4'  BW 4'    PROM shoulder flexion Holding R wrist  3 x 10 Holding R wrist  3 x 10 Holding R wrist  3 x 10    Standing with dowel  PROM  15x Holding R wrist  3 x 10 Holding R wrist  3 x 10 Holding R wrist  3 x 10    Standing with dowel  PROM  3 x 10    Shoulder flexion AAROM Standing  3 x 10  Standing  2 x 15 Standing  2 x 15  Standing  3 x 10    Pulleys 2 x 2' 3 x 1.5' 3 x 1' 3x 1'        Rows          Chest press 7# 3 x 10 7# 3 x 10   8#   3 x 12-15      Elbow extension          Bicep curls     10#  3 x 12-15      RTC isometrics Shoulder ER  [place] [facility]  3 x 10    Shoulder IR  [place] [facility]  3 x 10 Shoulder ER  [place] [facility]  3 x 10-12    Shoulder IR  [place] [facility]  3 x 10-12 Shoulder ER  [place] [facility]  3 x 12    Shoulder IR  [place] [facility]  3 x 10-15 Shoulder ER  [place] [facility]  3 x 12    Shoulder IR  [place] [facility]  3 x 10-15 Shoulder ER  [place] [facility]  15x  [place] [facility]  2 x 15    Shoulder IR  [place] [facility]  15x  [place] [facility]  2 x 15    Shoulder abd  [place] [facility]  3 x 15 Shoulder IR  [place] [facility]  3 x 10-15  [place] [facility]  3 x 10-15    Shoulder abd  [place] [facility]  3 x 10-15    Wall slides 3 x 15 3 x 15 2 x 15 2 x15 3 x 15     Blaze pods  Overhead reach  30s 16 hits  30s 17 hits  30s 17 hits Overhead reach  30s 24 hits  30s 20 hits    Speed on table   4 pods  60s 50 hits  60s 51 hits Overhead reach  30s 24 hits  30s 20 hits                Ther  Activity                              Manual          Shoulder ER 8' 5' 8' 8'  PROM    AROM  Side lying   W towel ER x20 8'  Shoulder ER PROM arm by the side  ER 90/90 8'  Shoulder ER PROM arm by the side  ER 90/90    Shoulder flexion          Elbow flex ext          RTC isometrics IR  10 x 5s hold                   Modalities

## 2024-05-16 ENCOUNTER — OFFICE VISIT (OUTPATIENT)
Dept: PHYSICAL THERAPY | Facility: REHABILITATION | Age: 59
End: 2024-05-16
Payer: COMMERCIAL

## 2024-05-16 DIAGNOSIS — Z98.890 STATUS POST ROTATOR CUFF REPAIR: Primary | ICD-10-CM

## 2024-05-16 PROCEDURE — 97140 MANUAL THERAPY 1/> REGIONS: CPT | Performed by: PHYSICAL THERAPIST

## 2024-05-16 PROCEDURE — 97110 THERAPEUTIC EXERCISES: CPT | Performed by: PHYSICAL THERAPIST

## 2024-05-18 ENCOUNTER — OFFICE VISIT (OUTPATIENT)
Dept: URGENT CARE | Age: 59
End: 2024-05-18
Payer: COMMERCIAL

## 2024-05-18 VITALS
BODY MASS INDEX: 32.92 KG/M2 | DIASTOLIC BLOOD PRESSURE: 67 MMHG | WEIGHT: 236 LBS | TEMPERATURE: 97.6 F | SYSTOLIC BLOOD PRESSURE: 142 MMHG | OXYGEN SATURATION: 98 % | RESPIRATION RATE: 20 BRPM | HEART RATE: 76 BPM

## 2024-05-18 DIAGNOSIS — F17.219 CIGARETTE NICOTINE DEPENDENCE WITH NICOTINE-INDUCED DISORDER: ICD-10-CM

## 2024-05-18 DIAGNOSIS — J40 BRONCHITIS: Primary | ICD-10-CM

## 2024-05-18 PROCEDURE — G0382 LEV 3 HOSP TYPE B ED VISIT: HCPCS | Performed by: FAMILY MEDICINE

## 2024-05-18 RX ORDER — CEFDINIR 300 MG/1
300 CAPSULE ORAL EVERY 12 HOURS SCHEDULED
Qty: 14 CAPSULE | Refills: 0 | Status: SHIPPED | OUTPATIENT
Start: 2024-05-18 | End: 2024-05-25

## 2024-05-18 RX ORDER — PREDNISONE 20 MG/1
20 TABLET ORAL 2 TIMES DAILY WITH MEALS
Qty: 10 TABLET | Refills: 0 | Status: SHIPPED | OUTPATIENT
Start: 2024-05-18 | End: 2024-05-23

## 2024-05-18 NOTE — PATIENT INSTRUCTIONS
Chronic Bronchitis   AMBULATORY CARE:   Chronic bronchitis  is a long-term swelling and irritation of the airways in your lungs. The irritation may damage your lungs. The lung damage often gets worse over time, and is usually permanent. Chronic bronchitis is part of a group of lung diseases called chronic obstructive pulmonary disease (COPD).       Common signs and symptoms:   A productive cough that lasts 3 months or longer    Chest pain    Tiredness    Shortness of breath or wheezing    Grey or blue skin, nail beds, or lips    Call your local emergency number (911 in the [place]) if:   You have new or worsening chest pain or tightness.    You become confused, dizzy, or feel like you may faint.    Seek care immediately if:   You have a new or increased gray or blue tint to your nail beds, fingers, or lips.    You cough up blood.    You have new or increased swelling in your legs, ankles, or abdomen.    The amount or color of your sputum changes, or your sputum becomes too hard to cough up.    Call your doctor or pulmonologist if:   You have a fever.    You use your inhalers more often than usual.    You run out of breath easily when you talk or do your usual exercise or activities.    You have questions or concerns about your condition or care.    Treatment for chronic bronchitis  may include any of the following:  Bronchodilators  help you breathe easier and cough less. You may be given your medicine in a mist form so that you can breathe it into your lungs. Your medicine may be delivered through an inhaler or through a mask attached to oxygen. Ask your healthcare provider to show you how to use your inhaler correctly.    Steroids  help decrease inflammation in your airway so that you can breathe easier.    Antibiotics  treat a bacterial infection.    Extra oxygen  may be needed if your blood oxygen level is lower than it should be.    How to use an inhaler:       Shake the inhaler well to make sure you get the correct  amount of medicine per puff. Remove the cover from your inhaler's mouthpiece. If you use a spacer, connect your inhaler to the flat end of the spacer.         Breathe out as much air from your lungs as you can. Put the mouthpiece in your mouth past your front teeth and rest it on the top of your tongue. Do not block the mouthpiece opening with your tongue.    Breathe in through your mouth at a slow and steady rate. As you do this, press the inhaler to release the puff of medicine. Finish breathing in slowly and deeply as you inhale the medicine. When your lungs are full, hold your breath for 10 seconds. Then breathe out slowly through puckered lips or through your nose.    If you need to take more puffs, wait at least 1 minute between each puff.    Rinse your mouth with water after you use the inhaler. This may keep you from getting a mouth infection or irritation.    Follow the instructions that come with your inhaler to clean it. You should clean your inhaler at least 1 time each week.  Ways to help you breathe better:   Take deep breaths and cough  10 times each hour. This will decrease your risk for a lung infection. Take a deep breath and hold it for as long as you can. Let the air out and then cough strongly. Deep breaths help open your airway. You may be given an incentive spirometer to help you take deep breaths. Put the plastic piece in your mouth and take a slow, deep breath. Then let the air out and cough. Repeat these steps 10 times every hour.         Pursed-lip breathing  can be used any time you feel short of breath. Pursed-lip breathing can be especially helpful before you start an activity:    Breathe in through your nose. Use the muscles in your abdomen to help fill your lungs with air.    Slowly breathe out through your mouth with your lips slightly puckered. You should make a quiet hissing sound as you breathe out.    Try to take twice as long to breathe out as it did to breathe in. This helps  you get rid of as much air from your lungs as possible.    Repeat this exercise several times. When you are used to doing pursed-lip breathing, you can do it any time you need more air.       Diaphragmatic breathing  can help strengthen some of the muscles you use to breathe:    Place one hand on your stomach just below your ribs. Place your other hand in the middle of your chest over your breastbone.    Breathe in slowly through your nose, as deeply as you can.    Breathe out slowly through pursed lips. As you breathe out, tighten the muscles in your stomach. Use your hand to gently push in and up while tightening the muscles.    Diaphragmatic breathing takes practice. You may need to practice this many times a day. Slowly increase the amount of time you spend during each practice session.    Manage chronic bronchitis:   Check your oxygen level, if directed.  Your healthcare provider may recommend you use a pulse oximeter (pulse ox). A pulse ox is a device that measures the percentage of oxygen in your blood. You may be given a percentage target for when you are at rest and another target for activity. Your provider may want you to keep a record of your oxygen levels. Bring the record to follow-up visits as directed.         Sleep with your upper body raised.  This will help you breathe easier. You can use foam wedges or elevate the head of your bed. Many devices are available to help raise your upper body while in bed. Use a device that will tilt your whole body, or bend your body at the waist. The device should not bend your body at the upper back or neck.         Prevent the spread of germs:      Wash your hands often with soap and water. Carry germ-killing gel with you. You can use the gel to clean your hands when soap and water are not available.         Do not touch your eyes, nose, or mouth unless you have washed your hands first.    Always cover your mouth when you cough. Cough into a tissue or your  shirtsleeve so you do not spread germs from your hands.    Try to avoid people who have a cold or the flu. If you are sick, stay away from others as much as possible.    Ask about vaccines. Get a flu vaccine every year as soon as recommended, usually in September or October. Get recommended COVID-19 vaccine doses and boosters. Ask about other vaccines you may need, and when to get them.       Do not smoke.  Nicotine and other substances can cause lung damage. Do not use e-cigarettes or smokeless tobacco without first talking to your healthcare provider. They still contain nicotine. Ask your healthcare provider for information if you currently smoke and need help to quit.    Avoid lung irritants.  Stay out of high altitudes and places with high humidity. Stay inside, or cover your mouth and nose with a scarf when you are outside during cold weather. Stay inside on days when air pollution or pollen counts are high. Do not use aerosol sprays such as deodorant, bug spray, and hair spray.    Go to pulmonary rehabilitation (rehab), as directed.  Pulmonary rehab is a program to help you improve your lung function. You will learn exercises to help clear mucus from your airways and strengthen your lungs.    Drink more liquids.  This will help to keep your air passages moist and help you cough up mucus. Ask how much liquid to drink each day and which liquids are best for you.    Follow up with your doctor or pulmonologist as directed:  Write down your questions so you remember to ask them during your visits.  © Copyright [employer] 2023 Information is for End User's use only and may not be sold, redistributed or otherwise used for commercial purposes.  The above information is an  only. It is not intended as medical advice for individual conditions or treatments. Talk to your doctor, nurse or pharmacist before following any medical regimen to see if it is safe and effective for you.

## 2024-05-18 NOTE — PROGRESS NOTES
[facility]        NAME: Callum Nielson is a 59 y.o. male  : 1965    MRN: [id number]  DATE: May 18, 2024  TIME: 2:21 PM    Assessment and Plan   Bronchitis [J40]  1. Bronchitis  cefdinir (OMNICEF) 300 mg capsule    predniSONE 20 mg tablet      2. Cigarette nicotine dependence with nicotine-induced disorder  Ambulatory referral to Smoking Cessation Program        Start Antibiotics and oral steroids course  May use otc Mucinex and cough Supressant prn  Increase use of Albuterol to ever 4 hours prn  Discussed careful glucose monitoring while on steroids    Advised smoking cessation  Referred to Tobacco Cessation Program    Patient Instructions       Follow up with PCP in 3-5 days.  Proceed to  ER if symptoms worsen.    If tests have been performed at [facility], our office will contact you with results if changes need to be made to the care plan discussed with you at the visit.  [facility]    Chief Complaint     Chief Complaint   Patient presents with   • Cough     Reports symptoms over the past 3 weeks. No fevers. Cough is productive whitish in color. Not taking any otc medications for cough at this time. Intermittent headaches / sinus pressure. Denies sob   • Nasal Congestion     History of Present Illness     Callum Nielson is a 59 y.o. male  who presented to [facility] today with a h/o of three weeks of chest congestion, and increased cough with sputum production.  The sputum is off white in color.  Also he has nasal congestion.  + wheezing, use Albuterol prn once daily or every other day.  Denies any fever, chills, nausea, vomiting, chest pain, shortness of breath    Pt is smoking about 1/2 PPD.  Pt has been trying to quit and is motivated.  Wife smokes as well.    Cough  Associated symptoms include wheezing. Pertinent negatives include no chest pain, chills, fever, headaches, rash, rhinorrhea, sore throat or shortness of breath.       Review  of Systems   Review of Systems   Constitutional:  Negative for chills and fever.   HENT:  Positive for congestion. Negative for rhinorrhea and sore throat.    Respiratory:  Positive for cough, chest tightness and wheezing. Negative for shortness of breath.    Cardiovascular:  Negative for chest pain.   Gastrointestinal:  Negative for diarrhea, nausea and vomiting.   Skin:  Negative for rash.   Neurological:  Negative for dizziness and headaches.         Current Medications       Current Outpatient Medications:   •  albuterol (PROVENTIL HFA,VENTOLIN HFA) 90 mcg/act inhaler, TAKE 2 PUFFS BY MOUTH EVERY 6 HOURS AS NEEDED FOR WHEEZE, Disp: 18 g, Rfl: 5  •  cefdinir (OMNICEF) 300 mg capsule, Take 1 capsule (300 mg total) by mouth every 12 (twelve) hours for 7 days, Disp: 14 capsule, Rfl: 0  •  Eliquis 5 MG, TAKE 1 TABLET BY MOUTH TWICE A DAY, Disp: 60 tablet, Rfl: 11  •  folic acid (FOLVITE) 1 mg tablet, Take 1 tablet (1 mg total) by mouth in the morning., Disp: , Rfl: 0  •  losartan (COZAAR) 50 mg tablet, TAKE 1 TABLET BY MOUTH EVERY DAY, Disp: 90 tablet, Rfl: 3  •  metFORMIN (GLUCOPHAGE) 1000 MG tablet, TAKE 1 TABLET BY MOUTH TWICE A DAY WITH FOOD, Disp: 180 tablet, Rfl: 3  •  metoprolol tartrate (LOPRESSOR) 50 mg tablet, TAKE 1 TABLET BY MOUTH EVERY 12 HOURS, Disp: 180 tablet, Rfl: 3  •  multivitamin (THERAGRAN) TABS, Take 1 tablet by mouth daily, Disp: , Rfl:   •  predniSONE 20 mg tablet, Take 1 tablet (20 mg total) by mouth 2 (two) times a day with meals for 5 days, Disp: 10 tablet, Rfl: 0  •  thiamine 100 MG tablet, Take 1 tablet (100 mg total) by mouth in the morning., Disp: , Rfl: 0  •  tamsulosin (FLOMAX) 0.4 mg, Take 1 capsule (0.4 mg total) by mouth daily with dinner (Patient not taking: Reported on 5/18/2024), Disp: 30 capsule, Rfl: 6    Current Allergies     Allergies as of 05/18/2024 - Reviewed 05/18/2024   Allergen Reaction Noted   • Lisinopril Cough 12/07/2020   • Spiriva respimat [tiotropium bromide  monohydrate] Anxiety 08/02/2022            The following portions of the patient's history were reviewed and updated as appropriate: allergies, current medications, past family history, past medical history, past social history, past surgical history and problem list.     Past Medical History:   Diagnosis Date   • Adhesive capsulitis of right shoulder 05/17/2022   • Anxiety     Last assessed 12/20/2013   • Contusion of right wrist 01/30/2018   • COPD (chronic obstructive pulmonary disease) ([facility]) 08/16/2016   • COPD (chronic obstructive pulmonary disease) ([facility]) 08/16/2016   • Depression with anxiety    • Diverticulitis 2016   • Eczema 02/24/2016   • Elevated ALT measurement 08/16/2016   • History of atrial fibrillation    • Hypertension    • Obesity 09/25/2012   • Perianal abscess 03/05/2019   • Type 2 diabetes mellitus with hyperglycemia, without long-term current use of insulin ([facility])        Past Surgical History:   Procedure Laterality Date   • CARDIAC LOOP RECORDER  2001   • COLONOSCOPY     • KNEE SURGERY      staph infection   • ND SURGICAL ARTHROSCOPY PALMER W/CORACOACRM LIGM RLS Right 1/17/2024    Procedure: ARTHROSCOPIC SUBACROMIAL DECOMPRESSION;  Surgeon: Waldo Chambers MD;  [facility];  Service: Orthopedics   • ND SURGICAL ARTHROSCOPY SHOULDER BICEPS TENODESIS Right 1/17/2024    Procedure: ARTHROSCOPIC BICEPS TENODESIS;  Surgeon: Waldo Chambers MD;  [facility];  Service: Orthopedics   • ND SURGICAL ARTHROSCOPY SHOULDER W/ROTATOR CUFF RPR Right 1/17/2024    Procedure: REPAIR ROTATOR CUFF  ARTHROSCOPIC;  Surgeon: Waldo Chambers MD;  Location:  MAIN OR;  Service: Orthopedics       Family History   Problem Relation Age of Onset   • Cancer Mother         Lung Cancer   • Cancer Father    • Diabetes Father    • Diabetes Sister      Medications have been verified.    Objective   /67   Pulse 76   Temp 97.6 °F (36.4 °C) (Temporal)   Resp 20   Wt 107 kg (236 lb)   SpO2 98%   BMI 32.92  kg/m²   No LMP for male patient.       Physical Exam     Physical Exam  Vitals and nursing note reviewed.   Constitutional:       Appearance: He is well-developed.   HENT:      Head: Normocephalic and atraumatic.      Right Ear: Tympanic membrane, ear canal and external ear normal.      Left Ear: Tympanic membrane, ear canal and external ear normal.      Nose: Congestion present.      Mouth/Throat:      Mouth: Mucous membranes are moist.   Eyes:      Conjunctiva/sclera: Conjunctivae normal.   Cardiovascular:      Rate and Rhythm: Normal rate and regular rhythm.      Heart sounds: Normal heart sounds. No murmur heard.  Pulmonary:      Effort: Pulmonary effort is normal. Prolonged expiration present. No respiratory distress.      Breath sounds: Examination of the right-upper field reveals wheezing and rhonchi. Examination of the left-upper field reveals wheezing and rhonchi. Examination of the right-middle field reveals wheezing and rhonchi. Examination of the left-middle field reveals wheezing and rhonchi. Examination of the right-lower field reveals wheezing and rhonchi. Examination of the left-lower field reveals wheezing and rhonchi. Wheezing and rhonchi present.   Musculoskeletal:      Right lower leg: No edema.      Left lower leg: No edema.   Neurological:      Mental Status: He is alert and oriented to person, place, and time.   Psychiatric:         Mood and Affect: Mood normal.         Behavior: Behavior normal.

## 2024-05-20 ENCOUNTER — PATIENT OUTREACH (OUTPATIENT)
Dept: OTHER | Facility: CLINIC | Age: 59
End: 2024-05-20
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
## 2024-05-21 ENCOUNTER — EVALUATION (OUTPATIENT)
Dept: PHYSICAL THERAPY | Facility: REHABILITATION | Age: 59
End: 2024-05-21
Payer: COMMERCIAL

## 2024-05-21 DIAGNOSIS — Z98.890 STATUS POST ROTATOR CUFF REPAIR: Primary | ICD-10-CM

## 2024-05-21 PROCEDURE — 97110 THERAPEUTIC EXERCISES: CPT | Performed by: PHYSICAL THERAPIST

## 2024-05-21 PROCEDURE — 97140 MANUAL THERAPY 1/> REGIONS: CPT | Performed by: PHYSICAL THERAPIST

## 2024-05-21 PROCEDURE — 97164 PT RE-EVAL EST PLAN CARE: CPT | Performed by: PHYSICAL THERAPIST

## 2024-05-23 ENCOUNTER — OFFICE VISIT (OUTPATIENT)
Dept: PHYSICAL THERAPY | Facility: REHABILITATION | Age: 59
End: 2024-05-23
Payer: COMMERCIAL

## 2024-05-23 DIAGNOSIS — Z98.890 STATUS POST ROTATOR CUFF REPAIR: Primary | ICD-10-CM

## 2024-05-23 PROCEDURE — 97110 THERAPEUTIC EXERCISES: CPT | Performed by: PHYSICAL THERAPIST

## 2024-05-23 PROCEDURE — 97140 MANUAL THERAPY 1/> REGIONS: CPT | Performed by: PHYSICAL THERAPIST

## 2024-05-23 NOTE — PROGRESS NOTES
Daily Note     Today's date: 2024  Patient name: Callum Nielson  : 1965  MRN: [id number]  Referring provider: Jeremi Jefferson PA*  Dx:   Encounter Diagnosis     ICD-10-CM    1. Status post rotator cuff repair  Z98.890           Start Time: 0800  Stop Time: 0845  Total time in clinic (min): 45 minutes    Subjective: Was able to was his car with both hands yesterday which felt good. Reaching overhead isn't as bad.       Objective: See treatment diary below      Assessment: Patient is 17 weeks s/p R subscapularis repair. Patient able to progress to overhead press with light weight. Able to complete with proper form but was challenged. Will add this to HEP. 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       Plan: Continue per plan of care.      POC expires Unit limit Auth Expiration date PT/OT/ST + Visit Limit?   24                              Visit/Unit Tracking  AUTH Status:  Date          Approved - 60 visits Used 30 31 32 33          Remaining  30 29 28 27           Diagnosis: R subscap repair (24)   Precautions: RTC precautions   Insurance: [employer]             Patient Ed          Nutrition                              Neuro Re-Ed          Ball roll Yellow  CW 3 x 30s  CCW 3 x 30s    Ball roll up  2 x 10                                       Ther Ex          Aerobic conditioning UBE Lvl 6  FW 4'  BW 4'         PROM shoulder flexion Holding R wrist  3 x 10         Shoulder flexion AAROM Standing  3 x 10         Shoulder IR          Pulleys          Rows          Chest press          Shoulder press 3# 3 x 10 b/l         Bicep curls          RTC strengthening Shoulder ER  Green TB  3 x 15    Shoulder IR  Green TB  3 x 10-15    Shoulder abd  [place] TB  3 x 10-15         Wall slides 3 x 15         Blaze pods                    Ther  Activity                              Manual          Shoulder ER 8'  Shoulder ER PROM arm by the side  ER 90/90         Shoulder flexion                                        Modalities

## 2024-05-28 ENCOUNTER — APPOINTMENT (OUTPATIENT)
Dept: PHYSICAL THERAPY | Facility: REHABILITATION | Age: 59
End: 2024-05-28
Payer: COMMERCIAL

## 2024-05-30 ENCOUNTER — APPOINTMENT (OUTPATIENT)
Dept: PHYSICAL THERAPY | Facility: REHABILITATION | Age: 59
End: 2024-05-30
Payer: COMMERCIAL

## 2024-06-03 NOTE — PROGRESS NOTES
Daily Note     Today's date: 2024  Patient name: Callum Nielson  : 1965  MRN: [id number]  Referring provider: Jeremi Jefferson PA*  Dx:   Encounter Diagnosis     ICD-10-CM    1. Status post rotator cuff repair  Z98.890             Start Time: 1715  Stop Time: 1800  Total time in clinic (min): 45 minutes    Subjective: Has returned to work last week. Has felt good and been able to do more than he thought.       Objective: See treatment diary below      Assessment: Patient is 4.5 months s/p R subscapularis repair. Patient has been able to progress back to work with modified duties. Progressed resistance and weight with multiple exercises including overhead ball circles, shoulder press, and RTC strengthening. Transitioned to functional strengthening with squats which was not an issue. 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       Plan: Continue per plan of care.      POC expires Unit limit Auth Expiration date PT/OT/ST + Visit Limit?   24                              Visit/Unit Tracking  AUTH Status:  Date         Approved - 60 visits Used 30 31 32 33 34         Remaining  30 29 28 27 26          Diagnosis: R subscap repair (24)   Precautions: RTC precautions   Insurance: [employer]            Patient Ed          Nutrition                              Neuro Re-Ed          Ball roll Yellow  CW 3 x 30s  CCW 3 x 30s    Ball roll up  2 x 10                                       Ther Ex          Aerobic conditioning UBE Lvl 6  FW 4'  BW 4' UBE Lvl 6  FW 4'  BW 4'        PROM shoulder flexion Holding R wrist  3 x 10 Holding R wrist  2 x 10        Shoulder flexion AAROM Standing  3 x 10         Shoulder IR          Pulleys          Rows          Chest press          Shoulder press 3# 3 x 10 b/l 5# 3 x 8-10 b/l        Bicep curls           RTC strengthening Shoulder ER  Green TB  3 x 15    Shoulder IR  Green TB  3 x 10-15    Shoulder abd  [place] TB  3 x 10-15 Shoulder ER  Green TB  3 x 15    Shoulder IR  Green TB  3 x 10-15    Shoulder abd  [place] TB  15x  Green TB  2 x 15 Phase 2       Wall slides 3 x 15         Blaze pods          Squats  20# kettlebell   3 x 10                  Ther Activity                              Manual          Shoulder ER 8'  Shoulder ER PROM arm by the side  ER 90/90 8'  Shoulder ER PROM arm by the side  ER 90/90        Shoulder flexion                                        Modalities

## 2024-06-04 ENCOUNTER — OFFICE VISIT (OUTPATIENT)
Dept: PHYSICAL THERAPY | Facility: REHABILITATION | Age: 59
End: 2024-06-04
Payer: COMMERCIAL

## 2024-06-04 DIAGNOSIS — Z98.890 STATUS POST ROTATOR CUFF REPAIR: Primary | ICD-10-CM

## 2024-06-04 PROCEDURE — 97140 MANUAL THERAPY 1/> REGIONS: CPT | Performed by: PHYSICAL THERAPIST

## 2024-06-04 PROCEDURE — 97110 THERAPEUTIC EXERCISES: CPT | Performed by: PHYSICAL THERAPIST

## 2024-06-06 ENCOUNTER — OFFICE VISIT (OUTPATIENT)
Dept: PHYSICAL THERAPY | Facility: REHABILITATION | Age: 59
End: 2024-06-06
Payer: COMMERCIAL

## 2024-06-06 DIAGNOSIS — Z98.890 STATUS POST ROTATOR CUFF REPAIR: Primary | ICD-10-CM

## 2024-06-06 PROCEDURE — 97140 MANUAL THERAPY 1/> REGIONS: CPT

## 2024-06-06 PROCEDURE — 97110 THERAPEUTIC EXERCISES: CPT

## 2024-06-06 NOTE — PROGRESS NOTES
Daily Note     Today's date: 2024  Patient name: Callum Nielson  : 1965  MRN: [id number]  Referring provider: Jeremi Jefferson PA*  Dx:   Encounter Diagnosis     ICD-10-CM    1. Status post rotator cuff repair  Z98.890               Start Time: 1800  Stop Time: 1845  Total time in clinic (min): 45 minutes    Subjective: Continues to feel he's getting stronger. Was bothered with stairs after doing squats.       Objective: See treatment diary below      Assessment: Patient is 4.5 months s/p R subscapularis repair. Good tolerance to today's session.  Fatigues with shoulder abduction exercise but no pain reproduced.  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       Plan: Continue per plan of care.      POC expires Unit limit Auth Expiration date PT/OT/ST + Visit Limit?   24                              Visit/Unit Tracking  AUTH Status:  Date        Approved - 60 visits Used 30 31 32 33 34 35        Remaining  30 29 28 27 26 25         Diagnosis: R subscap repair (24)   Precautions: RTC precautions   Insurance: [employer]           Patient Ed          Nutrition                              Neuro Re-Ed          Ball roll Yellow  CW 3 x 30s  CCW 3 x 30s    Ball roll up  2 x 10                                       Ther Ex          Aerobic conditioning UBE Lvl 6  FW 4'  BW 4' UBE Lvl 6  FW 4'  BW 4' UBE   FW 4  BW 4'       PROM shoulder flexion Holding R wrist  3 x 10 Holding R wrist  2 x 10 Holding R wrist  2 x 10       Shoulder flexion AAROM Standing  3 x 10         Shoulder IR          Pulleys          Rows          Chest press          Shoulder press 3# 3 x 10 b/l 5# 3 x 8-10 b/l 10# 3x10 (what he uses @ home)       Bicep curls   10#3x10       RTC strengthening Shoulder ER  Green TB  3 x 15    Shoulder IR  Green TB  3 x  10-15    Shoulder abd  [place] TB  3 x 10-15 Shoulder ER  Green TB  3 x 15    Shoulder IR  Green TB  3 x 10-15    Shoulder abd  [place] TB  15x  Green TB  2 x 15 Shoulder ER green TB  3x15    Shoulder IR  Green TB  3 x 10-15    Shoulder abd  [place] TB  15x  Green TB  3x 15 Phase 2      Wall slides 3 x 15         Blaze pods          Squats  20# kettlebell   3 x 10 20# KB 3x10 w/ cueing                  Ther Activity                              Manual          Shoulder ER 8'  Shoulder ER PROM arm by the side  ER 90/90 8'  Shoulder ER PROM arm by the side  ER 90/90 8'  Shoulder ER PROM arm by the side  ER 90/90       Shoulder flexion                                        Modalities

## 2024-06-06 NOTE — PROGRESS NOTES
Daily Note     Today's date: 2024  Patient name: Callum Nielson  : 1965  MRN: [id number]  Referring provider: Jeremi Jefferson PA*  Dx:   No diagnosis found.                   Subjective: Has returned to work last week. Has felt good and been able to do more than he thought.       Objective: See treatment diary below      Assessment: Patient is 4.5 months s/p R subscapularis repair. Patient has been able to progress back to work with modified duties. Progressed resistance and weight with multiple exercises including overhead ball circles, shoulder press, and RTC strengthening. Transitioned to functional strengthening with squats which was not an issue. 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       Plan: Continue per plan of care.      POC expires Unit limit Auth Expiration date PT/OT/ST + Visit Limit?   24                              Visit/Unit Tracking  AUTH Status:  Date         Approved - 60 visits Used 30 31 32 33 34         Remaining  30 29 28 27 26          Diagnosis: R subscap repair (24)   Precautions: RTC precautions   Insurance: [employer]            Patient Ed          Nutrition                              Neuro Re-Ed          Ball roll Yellow  CW 3 x 30s  CCW 3 x 30s    Ball roll up  2 x 10                                       Ther Ex          Aerobic conditioning UBE Lvl 6  FW 4'  BW 4' UBE Lvl 6  FW 4'  BW 4'        PROM shoulder flexion Holding R wrist  3 x 10 Holding R wrist  2 x 10        Shoulder flexion AAROM Standing  3 x 10         Shoulder IR          Pulleys          Rows          Chest press          Shoulder press 3# 3 x 10 b/l 5# 3 x 8-10 b/l        Bicep curls          RTC strengthening Shoulder ER  Green TB  3 x 15    Shoulder IR  Green TB  3 x 10-15    Shoulder abd  [place] TB  3 x 10-15  Shoulder ER  Green TB  3 x 15    Shoulder IR  Green TB  3 x 10-15    Shoulder abd  [place] TB  15x  Green TB  2 x 15 Phase 2       Wall slides 3 x 15         Blaze pods          Squats  20# kettlebell   3 x 10                  Ther Activity                              Manual          Shoulder ER 8'  Shoulder ER PROM arm by the side  ER 90/90 8'  Shoulder ER PROM arm by the side  ER 90/90        Shoulder flexion                                        Modalities

## 2024-06-10 NOTE — PROGRESS NOTES
Daily Note     Today's date: 2024  Patient name: Callum Nielson  : 1965  MRN: [id number]  Referring provider: Jeremi Jefferson PA*  Dx:   Encounter Diagnosis     ICD-10-CM    1. 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 He did a lot of gardening/yard work over the weekend.       Objective: See treatment diary below      Assessment: Patient is 4.5 months s/p R subscapularis repair. Reinforced stretching into 90/90 shoulder ER and IR behind back which is limiting for him. Progressed to phase 2 shoulder ER and IR exercises which challenged him appropriately. Added wall push ups and body blade which was fatiguing but not pain provoking. 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       Plan: Continue per plan of care.      POC expires Unit limit Auth Expiration date PT/OT/ST + Visit Limit?   24                              Visit/Unit Tracking  AUTH Status:  Date       Approved - 60 visits Used 30 31 32 33 34 35 36       Remaining  30 29 28 27 26 25 24        Diagnosis: R subscap repair (24)   Precautions: RTC precautions   Insurance: [employer]          Patient Ed          Nutrition                              Neuro Re-Ed          Ball roll Yellow  CW 3 x 30s  CCW 3 x 30s    Ball roll up  2 x 10   8# ball  30s x 3 CW  30s x 3 CCW      Body blade    R vertical  3 x 30s      UE weight bearing    Plinth push up  3 x 10                Ther Ex          Aerobic conditioning UBE Lvl 6  FW 4'  BW 4' UBE Lvl 6  FW 4'  BW 4' UBE   FW 4  BW 4' UBE lvl 6  FW 4  BW 4'      PROM shoulder flexion Holding R wrist  3 x 10 Holding R wrist  2 x 10 Holding  R wrist  2 x 10       Shoulder flexion AAROM Standing  3 x 10         Shoulder IR    W/ strap  5 x 15s      Shoulder ER    90/90 b/l dowel  10 x 5s hold      Rows          Chest press          Shoulder press 3# 3 x 10 b/l 5# 3 x 8-10 b/l 10# 3x10 (what he uses @ home)       Bicep curls   10#3x10       RTC strengthening Shoulder ER  Green TB  3 x 15    Shoulder IR  Green TB  3 x 10-15    Shoulder abd  [place] TB  3 x 10-15 Shoulder ER  Green TB  3 x 15    Shoulder IR  Green TB  3 x 10-15    Shoulder abd  [place] TB  15x  Green TB  2 x 15 Shoulder ER green TB  3x15    Shoulder IR  Green TB  3 x 10-15    Shoulder abd  [place] TB  15x  Green TB  3x 15 Phase 2  Shoulder ER  [place] TB  3 x 10  Shoulder IR  GTB  3 x 10        Wall slides 3 x 15         Blaze pods          Squats  20# kettlebell   3 x 10 20# KB 3x10 w/ cueing                  Ther Activity                              Manual          Shoulder ER 8'  Shoulder ER PROM arm by the side  ER 90/90 8'  Shoulder ER PROM arm by the side  ER 90/90 8'  Shoulder ER PROM arm by the side  ER 90/90 10'  Shoulder ER PROM arm by the side  ER 90/90      Shoulder flexion                                        Modalities

## 2024-06-11 ENCOUNTER — OFFICE VISIT (OUTPATIENT)
Dept: PHYSICAL THERAPY | Facility: REHABILITATION | Age: 59
End: 2024-06-11
Payer: COMMERCIAL

## 2024-06-11 DIAGNOSIS — Z98.890 STATUS POST ROTATOR CUFF REPAIR: Primary | ICD-10-CM

## 2024-06-11 PROCEDURE — 97110 THERAPEUTIC EXERCISES: CPT | Performed by: PHYSICAL THERAPIST

## 2024-06-11 PROCEDURE — 97140 MANUAL THERAPY 1/> REGIONS: CPT | Performed by: PHYSICAL THERAPIST

## 2024-06-13 ENCOUNTER — REMOTE DEVICE CLINIC VISIT (OUTPATIENT)
Dept: CARDIOLOGY CLINIC | Facility: CLINIC | Age: 59
End: 2024-06-13
Payer: COMMERCIAL

## 2024-06-13 ENCOUNTER — OFFICE VISIT (OUTPATIENT)
Dept: PHYSICAL THERAPY | Facility: REHABILITATION | Age: 59
End: 2024-06-13
Payer: COMMERCIAL

## 2024-06-13 DIAGNOSIS — I48.92 ATRIAL FLUTTER, UNSPECIFIED TYPE (HCC): ICD-10-CM

## 2024-06-13 DIAGNOSIS — Z98.890 STATUS POST ROTATOR CUFF REPAIR: Primary | ICD-10-CM

## 2024-06-13 DIAGNOSIS — I48.0 PAROXYSMAL ATRIAL FIBRILLATION (HCC): Primary | ICD-10-CM

## 2024-06-13 PROCEDURE — 97110 THERAPEUTIC EXERCISES: CPT | Performed by: PHYSICAL THERAPIST

## 2024-06-13 PROCEDURE — 93298 REM INTERROG DEV EVAL SCRMS: CPT | Performed by: STUDENT IN AN ORGANIZED HEALTH CARE EDUCATION/TRAINING PROGRAM

## 2024-06-13 PROCEDURE — 97140 MANUAL THERAPY 1/> REGIONS: CPT | Performed by: PHYSICAL THERAPIST

## 2024-06-13 NOTE — PROGRESS NOTES
Daily Note     Today's date: 2024  Patient name: Callum Nielson  : 1965  MRN: [id number]  Referring provider: Jeremi Jefferson PA*  Dx:   Encounter Diagnosis     ICD-10-CM    1. Status post rotator cuff repair  Z98.890           Start Time: 1750  Stop Time: 1840  Total time in clinic (min): 50 minutes    Subjective: Weights felt good at the gym yesterday.      Objective: See treatment diary below      Assessment: Patient is 5 months s/p R subscapularis repair. Reviewed  shoulder ER stretch for proper form. Adjusted wall push up to counter/plinth. Added b/l horizontal body blade which was fatiguing. 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       Plan: Continue per plan of care.      POC expires Unit limit Auth Expiration date PT/OT/ST + Visit Limit?   24                              Visit/Unit Tracking  AUTH Status:  Date       Approved - 60 visits Used 30 31 32 33 34 35 36       Remaining  30 29 28 27 26 25 24        Diagnosis: R subscap repair (24)   Precautions: RTC precautions   Insurance: [employer]         Patient Ed          Nutrition                              Neuro Re-Ed          Ball roll Yellow  CW 3 x 30s  CCW 3 x 30s    Ball roll up  2 x 10   8# ball  30s x 3 CW  30s x 3 CCW 8# ball  30s x 3 CW  30s x 3 CCW     Body blade    R vertical  3 x 30s R vertical  3 x 30s    B/l horizontal  3 x 30s     UE weight bearing    Plinth push up  3 x 10 Counter push up  3 x 10               Ther Ex          Aerobic conditioning UBE Lvl 6  FW 4'  BW 4' UBE Lvl 6  FW 4'  BW 4' UBE   FW 4  BW 4' UBE lvl 6  FW 4  BW 4' UBE lvl 4  FW 4  BW 4'     PROM shoulder flexion Holding R wrist  3 x 10 Holding R wrist  2 x 10 Holding R wrist  2 x 10       Shoulder flexion AAROM Standing  3 x 10          Shoulder IR    W/ strap  5 x 15s W/ strap  3 x 30s     Shoulder ER    90/90 b/l dowel  10 x 5s hold 90/90 b/l dowel  10 x 5s hold     Rows          Chest press          Shoulder press 3# 3 x 10 b/l 5# 3 x 8-10 b/l 10# 3x10 (what he uses @ home)       Bicep curls   10#3x10       RTC strengthening Shoulder ER  Green TB  3 x 15    Shoulder IR  Green TB  3 x 10-15    Shoulder abd  [place] TB  3 x 10-15 Shoulder ER  Green TB  3 x 15    Shoulder IR  Green TB  3 x 10-15    Shoulder abd  [place] TB  15x  Green TB  2 x 15 Shoulder ER green TB  3x15    Shoulder IR  Green TB  3 x 10-15    Shoulder abd  [place] TB  15x  Green TB  3x 15 Phase 2  Shoulder ER  [place] TB  3 x 10  Shoulder IR  GTB  3 x 10   Phase 2  Shoulder ER  [place] TB  3 x 10  Shoulder IR  GTB  3 x 10     Wall slides 3 x 15         Blaze pods          Squats  20# kettlebell   3 x 10 20# KB 3x10 w/ cueing                  Ther Activity                              Manual          Shoulder ER 8'  Shoulder ER PROM arm by the side  ER 90/90 8'  Shoulder ER PROM arm by the side  ER 90/90 8'  Shoulder ER PROM arm by the side  ER 90/90 10'  Shoulder ER PROM arm by the side  ER 90/90 8'  Shoulder ER PROM arm by the side  ER 90/90     Shoulder flexion                                        Modalities

## 2024-06-13 NOTE — PROGRESS NOTES
"MDT LNQ22/ ACTIVE SYSTEM IS MRI CONDITIONAL   CARELINK TRANSMISSION: LOOP RECORDER. PRESENTING RHYTHM ST @ 114 BPM. BATTERY STATUS \"OK.\" NO PATIENT OR DEVICE ACTIVATED EPISODES. NORMAL DEVICE FUNCTION. DL   "

## 2024-06-14 ENCOUNTER — HOSPITAL ENCOUNTER (OUTPATIENT)
Dept: SLEEP CENTER | Facility: CLINIC | Age: 59
Discharge: HOME | End: 2024-06-14

## 2024-06-14 DIAGNOSIS — R06.83 SNORES: ICD-10-CM

## 2024-06-15 NOTE — PROGRESS NOTES
Home Sleep Study Documentation    HOME STUDY DEVICE: Noxturnal no                                           Diana G3 yes      Pre-Sleep Home Study:    Set-up and instructions performed by: NOAM    Technician performed demonstration for Patient: yes    Return demonstration performed by Patient: yes    Written instructions provided to Patient: yes    Patient signed consent form: yes        Post-Sleep Home Study:    Failed HST - Only a few minutes of data recorded.    Home Sleep Study Failed:yes:     Failure reason: Inadequate data device     Reported or Detected: detected    Scored by: NAIN Huynh

## 2024-06-17 NOTE — PROGRESS NOTES
Daily Note     Today's date: 2024  Patient name: Callum Nielson  : 1965  MRN: [id number]  Referring provider: Jeremi Jefferson PA*  Dx:   Encounter Diagnosis     ICD-10-CM    1. Status post rotator cuff repair  Z98.890             Start Time: 1715  Stop Time: 1800  Total time in clinic (min): 45 minutes    Subjective: Push ups caused some soreness but that's ok. Reaching for things at home feels better.      Objective: See treatment diary below      Assessment: Patient is 5 months s/p R subscapularis repair. Reviewed  shoulder ER stretch for proper form. Added prone W's to supermans and blaze pods. Patient fatigued with blaze pods as it challenges his speed with his RUE and overhead motion. 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       Plan: Continue per plan of care.      POC expires Unit limit Auth Expiration date PT/OT/ST + Visit Limit?   24                              Visit/Unit Tracking  AUTH Status:  Date     Approved - 60 visits Used 30 31 32 33 34 35 [phone]     Remaining  30 29 28 27 26 25 24 23 22      Diagnosis: R subscap repair (24)   Precautions: RTC precaution   Insurance: [employer]        Patient Ed          Nutrition                              Neuro Re-Ed          Ball roll Yellow  CW 3 x 30s  CCW 3 x 30s    Ball roll up  2 x 10   8# ball  30s x 3 CW  30s x 3 CCW 8# ball  30s x 3 CW  30s x 3 CCW     Body blade    R vertical  3 x 30s R vertical  3 x 30s    B/l horizontal  3 x 30s B/l horizontal  3 x 30s    UE weight bearing    Plinth push up  3 x 10 Counter push up  3 x 10     Blaze pods      RUE   30s 23 hits  30s 19 hits  30s 19 hits  30s 22 hits                Ther Ex          Aerobic conditioning UBE Lvl 6  FW 4'  BW 4' UBE Lvl 6  FW 4'  BW  4' UBE   FW 4  BW 4' UBE lvl 6  FW 4  BW 4' UBE lvl 4  FW 4  BW 4' UBE lvl 6  FW 4  BW 4'    PROM shoulder flexion Holding R wrist  3 x 10 Holding R wrist  2 x 10 Holding R wrist  2 x 10       Shoulder flexion AAROM Standing  3 x 10         Shoulder IR    W/ strap  5 x 15s W/ strap  3 x 30s W/ strap  4 x 30s    Shoulder ER    90/90 b/l dowel  10 x 5s hold 90/90 b/l dowel  10 x 5s hold 90/90 b/l dowel  20 x 5s hold    Rows          Prone      W's into superman  3 x 10-15    Shoulder press 3# 3 x 10 b/l 5# 3 x 8-10 b/l 10# 3x10 (what he uses @ home)       Bicep curls   10#3x10       RTC strengthening Shoulder ER  Green TB  3 x 15    Shoulder IR  Green TB  3 x 10-15    Shoulder abd  [place] TB  3 x 10-15 Shoulder ER  Green TB  3 x 15    Shoulder IR  Green TB  3 x 10-15    Shoulder abd  [place] TB  15x  Green TB  2 x 15 Shoulder ER green TB  3x15    Shoulder IR  Green TB  3 x 10-15    Shoulder abd  [place] TB  15x  Green TB  3x 15 Phase 2  Shoulder ER  [place] TB  3 x 10  Shoulder IR  GTB  3 x 10   Phase 2  Shoulder ER  [place] TB  3 x 10  Shoulder IR  GTB  3 x 10 Phase 2  Shoulder ER  [place] TB  3 x 10  Shoulder IR  Blue TB  3 x 10    Wall slides 3 x 15         Blaze pods          Squats  20# kettlebell   3 x 10 20# KB 3x10 w/ cueing                  Ther Activity                              Manual          Shoulder ER 8'  Shoulder ER PROM arm by the side  ER 90/90 8'  Shoulder ER PROM arm by the side  ER 90/90 8'  Shoulder ER PROM arm by the side  ER 90/90 10'  Shoulder ER PROM arm by the side  ER 90/90 8'  Shoulder ER PROM arm by the side  ER 90/90 8'  Shoulder ER PROM arm by the side  ER 90/90    Shoulder flexion                                        Modalities

## 2024-06-18 ENCOUNTER — OFFICE VISIT (OUTPATIENT)
Dept: PHYSICAL THERAPY | Facility: REHABILITATION | Age: 59
End: 2024-06-18
Payer: COMMERCIAL

## 2024-06-18 DIAGNOSIS — Z98.890 STATUS POST ROTATOR CUFF REPAIR: Primary | ICD-10-CM

## 2024-06-18 PROCEDURE — 97110 THERAPEUTIC EXERCISES: CPT | Performed by: PHYSICAL THERAPIST

## 2024-06-18 PROCEDURE — 97140 MANUAL THERAPY 1/> REGIONS: CPT | Performed by: PHYSICAL THERAPIST

## 2024-06-19 ENCOUNTER — TELEPHONE (OUTPATIENT)
Dept: SLEEP CENTER | Facility: CLINIC | Age: 59
End: 2024-06-19

## 2024-06-19 NOTE — TELEPHONE ENCOUNTER
Spoke with patient about failed HST. Referral expires 6/21/24. Reached out to office about new referral and rescheduling patient.

## 2024-06-20 ENCOUNTER — OFFICE VISIT (OUTPATIENT)
Dept: PHYSICAL THERAPY | Facility: REHABILITATION | Age: 59
End: 2024-06-20
Payer: COMMERCIAL

## 2024-06-20 DIAGNOSIS — Z98.890 STATUS POST ROTATOR CUFF REPAIR: Primary | ICD-10-CM

## 2024-06-20 PROCEDURE — 97110 THERAPEUTIC EXERCISES: CPT

## 2024-06-20 PROCEDURE — 97140 MANUAL THERAPY 1/> REGIONS: CPT

## 2024-06-20 NOTE — PROGRESS NOTES
Daily Note     Today's date: 2024  Patient name: Callum Nielson  : 1965  MRN: [id number]  Referring provider: Jeremi Jefferson PA*  Dx:   Encounter Diagnosis     ICD-10-CM    1. Status post rotator cuff repair  Z98.890             Start Time: 1800  Stop Time: 1845  Total time in clinic (min): 45 minutes    Subjective: Was able to find a position on a box at the gym to perform his prone exercise.     Objective: See treatment diary below      Assessment: Patient is 5 months s/p R subscapularis repair. Able to increase speed of reaction with blaze pods.  Good tolerance to balance of exercises. 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       Plan: Continue per plan of care.      POC expires Unit limit Auth Expiration date PT/OT/ST + Visit Limit?   24                              Visit/Unit Tracking  AUTH Status:  Date 5/14 5/16 5/21 5/23 6/4 6/6 6/11 6/13 6/18 6/20   Approved - 60 visits Used 30 31 32 33 34 35 36 37 38 39    Remaining  30 29 28 27 26 25 24 23 22 21     Diagnosis: R subscap repair (24)   Precautions: RTC precaution   Insurance: [employer]       Patient Ed          Nutrition                              Neuro Re-Ed          Ball roll Yellow  CW 3 x 30s  CCW 3 x 30s    Ball roll up  2 x 10   8# ball  30s x 3 CW  30s x 3 CCW 8# ball  30s x 3 CW  30s x 3 CCW     Body blade    R vertical  3 x 30s R vertical  3 x 30s    B/l horizontal  3 x 30s B/l horizontal  3 x 30s    UE weight bearing    Plinth push up  3 x 10 Counter push up  3 x 10     Blaze pods      RUE   30s 23 hits  30s 19 hits  30s 19 hits  30s 22 hits   RUE   30s 26 hits  30s 29 hits  30s 34 hits  30s 34 hits             Ther Ex          Aerobic conditioning UBE Lvl 6  FW 4'  BW 4' UBE Lvl 6  FW 4'  BW 4' UBE   FW 4  BW 4' UBE lvl 6  FW  "4  BW 4' UBE lvl 4  FW 4  BW 4' UBE lvl 6  FW 4  BW 4' UBE lvl 6  FW 4  BW 4'   PROM shoulder flexion Holding R wrist  3 x 10 Holding R wrist  2 x 10 Holding R wrist  2 x 10       Shoulder flexion AAROM Standing  3 x 10         Shoulder IR    W/ strap  5 x 15s W/ strap  3 x 30s W/ strap  4 x 30s W/ strap 4x30\"   Shoulder ER    90/90 b/l dowel  10 x 5s hold 90/90 b/l dowel  10 x 5s hold 90/90 b/l dowel  20 x 5s hold 90/90 b/l dowel  20 x 5s hold   Rows          Prone      W's into superman  3 x 10-15    Shoulder press 3# 3 x 10 b/l 5# 3 x 8-10 b/l 10# 3x10 (what he uses @ home)       Bicep curls   10#3x10       RTC strengthening Shoulder ER  Green TB  3 x 15    Shoulder IR  Green TB  3 x 10-15    Shoulder abd  [place] TB  3 x 10-15 Shoulder ER  Green TB  3 x 15    Shoulder IR  Green TB  3 x 10-15    Shoulder abd  [place] TB  15x  Green TB  2 x 15 Shoulder ER green TB  3x15    Shoulder IR  Green TB  3 x 10-15    Shoulder abd  [place] TB  15x  [place] TB  3x 15 Phase 2  Shoulder ER  [place] TB  3 x 10  Shoulder IR  GTB  3 x 10   Phase 2  Shoulder ER  [place] TB  3 x 10  Shoulder IR  GTB  3 x 10 Phase 2  Shoulder ER  [place] TB  3 x 10  Shoulder IR  [place] TB  3 x 10 Phase 2  Shoulder ER  [place] TB  3 x 10  Shoulder IR  [place] TB  3 x 10   Wall slides 3 x 15         Blaze pods          Squats  20# kettlebell   3 x 10 20# KB 3x10 w/ cueing                  Ther Activity                              Manual          Shoulder ER 8'  Shoulder ER PROM arm by the side  ER 90/90 8'  Shoulder ER PROM arm by the side  ER 90/90 8'  Shoulder ER PROM arm by the side  ER 90/90 10'  Shoulder ER PROM arm by the side  ER 90/90 8'  Shoulder ER PROM arm by the side  ER 90/90 8'  Shoulder ER PROM arm by the side  ER 90/90 8'  Shoulder ER PROM arm by the side  ER 90/90      Shoulder flexion                                        Modalities                                          "

## 2024-06-21 NOTE — PROGRESS NOTES
Daily Note     Today's date: 2024  Patient name: Callum Nielson  : 1965  MRN: [id number]  Referring provider: Jeremi Jefferson PA*  Dx:   Encounter Diagnosis     ICD-10-CM    1. Status post rotator cuff repair  Z98.890           Start Time: 1750  Stop Time: 1835  Total time in clinic (min): 45 minutes    Subjective: Feels a little stiff today because he did not get to do his stretches. Was able to reach in the back of the truck for something without issue.       Objective: See treatment diary below      Assessment: Patient is 5 months s/p R subscapularis repair. Focused on manual therapy today as patient was presenting with more mobility deficits. Patient fatigued with blaze pods but has become more comfortable with increasing speed of shoulder movement. 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       Plan: Continue per plan of care.      POC expires Unit limit Auth Expiration date PT/OT/ST + Visit Limit?   24                              Visit/Unit Tracking  AUTH Status:  Date             Approved - 60 visits Used 40             Remaining  20              Diagnosis: R subscap repair (24)   Precautions: RTC precaution   Insurance: [employer]          Patient Ed          Nutrition                              Neuro Re-Ed          Ball roll 8# ball  30s x 3 CW  30s x 3 CCW         Body blade R vertical  3 x 30s    B/l horizontal  3 x 30s B/l horizontal  3 x 30s        UE weight bearing Counter push up  3 x 10         Blaze pods  RUE   30s 23 hits  30s 19 hits  30s 19 hits  30s 22 hits   RUE   30s 26 hits  30s 29 hits  30s 34 hits  30s 34 hits RUE   30s 34 hits  30s 31 hits  30s 25 hits                Ther Ex          Aerobic conditioning UBE lvl 4  FW 4  BW 4' UBE lvl 6  FW 4  BW 4' UBE lvl 6  FW 4'  BW 4' UBE lvl  "6  FW 5'  BW 5'      PROM shoulder flexion    4 x 10      Shoulder flexion AAROM    Wall slide w/ pball  3 x 15      Shoulder IR W/ strap  3 x 30s W/ strap  4 x 30s W/ strap 4x30\"       Shoulder ER 90/90 b/l dowel  10 x 5s hold 90/90 b/l dowel  20 x 5s hold 90/90 b/l dowel  20 x 5s hold       Rows          Prone  W's into superman  3 x 10-15        Shoulder press          Bicep curls          RTC strengthening Phase 2  Shoulder ER  [place] TB  3 x 10  Shoulder IR  GTB  3 x 10 Phase 2  Shoulder ER  [place] TB  3 x 10  Shoulder IR  [place] TB  3 x 10 Phase 2  Shoulder ER  [place] TB  3 x 10  Shoulder IR  [place] TB  3 x 10       Wall slides          Blaze pods          Squats                    Ther Activity                              Manual          Shoulder ER 8'  Shoulder ER PROM arm by the side  ER 90/90 8'  Shoulder ER PROM arm by the side  ER 90/90 8'  Shoulder ER PROM arm by the side  ER 90/90    25'  Shoulder ER PROM arm by the side  ER 90/90      Shoulder flexion                                        Modalities                                          "

## 2024-06-24 ENCOUNTER — RA CDI HCC (OUTPATIENT)
Dept: OTHER | Facility: HOSPITAL | Age: 59
End: 2024-06-24

## 2024-06-25 ENCOUNTER — OFFICE VISIT (OUTPATIENT)
Dept: PHYSICAL THERAPY | Facility: REHABILITATION | Age: 59
End: 2024-06-25
Payer: COMMERCIAL

## 2024-06-25 DIAGNOSIS — Z98.890 STATUS POST ROTATOR CUFF REPAIR: Primary | ICD-10-CM

## 2024-06-25 PROCEDURE — 97140 MANUAL THERAPY 1/> REGIONS: CPT | Performed by: PHYSICAL THERAPIST

## 2024-06-25 PROCEDURE — 97110 THERAPEUTIC EXERCISES: CPT | Performed by: PHYSICAL THERAPIST

## 2024-06-27 ENCOUNTER — OFFICE VISIT (OUTPATIENT)
Dept: PHYSICAL THERAPY | Facility: REHABILITATION | Age: 59
End: 2024-06-27
Payer: COMMERCIAL

## 2024-06-27 DIAGNOSIS — Z98.890 STATUS POST ROTATOR CUFF REPAIR: Primary | ICD-10-CM

## 2024-06-27 PROCEDURE — 97110 THERAPEUTIC EXERCISES: CPT | Performed by: PHYSICAL THERAPIST

## 2024-06-27 NOTE — PROGRESS NOTES
Daily Note     Today's date: 2024  Patient name: Callum Nielson  : 1965  MRN: [id number]  Referring provider: Jeremi Jefferson PA*  Dx:   Encounter Diagnosis     ICD-10-CM    1. Status post rotator cuff repair  Z98.890             Start Time: 1800  Stop Time: 1845  Total time in clinic (min): 45 minutes    Subjective: Feels a little stiff today because he did not get to do his stretches. Was able to reach in the back of the truck for something without issue.       Objective: See treatment diary below      Assessment: Patient is 5 months s/p R subscapularis repair. Challenged him with UE weight bearing exercises. Patient challenged with plank position but able to complete variations in quadruped. Progressed body blade to overhead unilateral oscillations which was fatiguing. 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 Patient progressing well and will be ready to be discharged.      Plan: Continue per plan of care.      POC expires Unit limit Auth Expiration date PT/OT/ST + Visit Limit?   24                              Visit/Unit Tracking  AUTH Status:  Date            Approved - 60 visits Used 40 41            Remaining  20 19             Diagnosis: R subscap repair (24)   Precautions: RTC precaution   Insurance: [employer]         Patient Ed          Nutrition                              Neuro Re-Ed          Ball roll 8# ball  30s x 3 CW  30s x 3 CCW         Body blade R vertical  3 x 30s    B/l horizontal  3 x 30s B/l horizontal  3 x 30s   B/l horizontal  2 x 45s    R vertical   3 x 20s     UE weight bearing Counter push up  3 x 10    Plank position  3 x 15s    Quadruped shoulder taps  3 x 30s R     Blaze pods  RUE   30s 23 hits  30s 19 hits  30s 19 hits  30s 22 hits   RUE   30s 26 hits  30s 29 hits  30s  "34 hits  30s 34 hits RUE   30s 34 hits  30s 31 hits  30s 25 hits Quadruped  6 pods  45s 45 hits  60s 64 hits  60s 71 hits               Ther Ex          Aerobic conditioning UBE lvl 4  FW 4  BW 4' UBE lvl 6  FW 4  BW 4' UBE lvl 6  FW 4'  BW 4' UBE lvl 6  FW 5'  BW 5' UBE lvl 6  FW 5'  BW 5'     PROM shoulder flexion    4 x 10      Shoulder flexion AAROM    Wall slide w/ pball  3 x 15      Shoulder IR W/ strap  3 x 30s W/ strap  4 x 30s W/ strap 4x30\"       Shoulder ER 90/90 b/l dowel  10 x 5s hold 90/90 b/l dowel  20 x 5s hold 90/90 b/l dowel  20 x 5s hold       Rows          Prone  W's into superman  3 x 10-15   Prone Y's  3 x 10-15     Shoulder press          Bicep curls          RTC strengthening Phase 2  Shoulder ER  [place] TB  3 x 10  Shoulder IR  GTB  3 x 10 Phase 2  Shoulder ER  [place] TB  3 x 10  Shoulder IR  [place] TB  3 x 10 Phase 2  Shoulder ER  [place] TB  3 x 10  Shoulder IR  [place] TB  3 x 10       Wall slides          Blaze pods          Squats                    Ther Activity                              Manual          Shoulder ER 8'  Shoulder ER PROM arm by the side  ER 90/90 8'  Shoulder ER PROM arm by the side  ER 90/90 8'  Shoulder ER PROM arm by the side  ER 90/90    25'  Shoulder ER PROM arm by the side  ER 90/90      Shoulder flexion                                        Modalities                                          "

## 2024-07-01 ENCOUNTER — OFFICE VISIT (OUTPATIENT)
Dept: PHYSICAL THERAPY | Facility: REHABILITATION | Age: 59
End: 2024-07-01
Payer: COMMERCIAL

## 2024-07-01 DIAGNOSIS — Z98.890 STATUS POST ROTATOR CUFF REPAIR: Primary | ICD-10-CM

## 2024-07-01 PROCEDURE — 97110 THERAPEUTIC EXERCISES: CPT | Performed by: PHYSICAL THERAPIST

## 2024-07-01 NOTE — PROGRESS NOTES
Daily Note     Today's date: 2024  Patient name: Callum Nielson  : 1965  MRN: [id number]  Referring provider: Jeremi Jefferson PA*  Dx:   Encounter Diagnosis     ICD-10-CM    1. Status post rotator cuff repair  Z98.890             Start Time: 1715  Stop Time: 1800  Total time in clinic (min): 45 minutes    Subjective: Was able to do repetitive wiping on a wall at work today without issue.       Objective: See treatment diary below      Assessment: Patient is 5 months s/p R subscapularis repair. Patient with fatigue with UE weight bearing exercises, however overall improving in tolerance. 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 Patient progressing well and will be ready to be discharged.      Plan: Continue per plan of care.      POC expires Unit limit Auth Expiration date PT/OT/ST + Visit Limit?   24                              Visit/Unit Tracking  AUTH Status:  Date           Approved - 60 visits Used 40 41 42           Remaining  20 19 18            Diagnosis: R subscap repair (24)   Precautions: RTC precaution   Insurance: [employer]        Patient Ed          Nutrition                              Neuro Re-Ed          Ball roll 8# ball  30s x 3 CW  30s x 3 CCW         Body blade R vertical  3 x 30s    B/l horizontal  3 x 30s B/l horizontal  3 x 30s   B/l horizontal  2 x 45s    R vertical   3 x 20s B/l horizontal  3 x 30s    R vertical   3 x 20s    UE weight bearing Counter push up  3 x 10    Plank position  3 x 15s    Quadruped shoulder taps  3 x 30s R Quadruped shoulder taps  2 x 30s R    Quadruped clock slider  3 x 4 each    Plank position  3 x 15s    Blaze pods  RUE   30s 23 hits  30s 19 hits  30s 19 hits  30s 22 hits   RUE   30s 26 hits  30s 29 hits  30s 34 hits  30s 34 hits RUE   30s 34  "hits  30s 31 hits  30s 25 hits Quadruped  6 pods  45s 45 hits  60s 64 hits  60s 71 hits               Ther Ex          Aerobic conditioning UBE lvl 4  FW 4  BW 4' UBE lvl 6  FW 4  BW 4' UBE lvl 6  FW 4'  BW 4' UBE lvl 6  FW 5'  BW 5' UBE lvl 6  FW 5'  BW 5' UBE lvl 6  FW 5'  BW 5'    PROM shoulder flexion    4 x 10      Shoulder flexion AAROM    Wall slide w/ pball  3 x 15      Shoulder IR W/ strap  3 x 30s W/ strap  4 x 30s W/ strap 4x30\"   W/ strap 4x30\"    Shoulder ER 90/90 b/l dowel  10 x 5s hold 90/90 b/l dowel  20 x 5s hold 90/90 b/l dowel  20 x 5s hold       Rows          Prone  W's into superman  3 x 10-15   Prone Y's  3 x 10-15     Shoulder press          Bicep curls          RTC strengthening Phase 2  Shoulder ER  [place] TB  3 x 10  Shoulder IR  GTB  3 x 10 Phase 2  Shoulder ER  [place] TB  3 x 10  Shoulder IR  Blue TB  3 x 10 Phase 2  Shoulder ER  [place] TB  3 x 10  Shoulder IR  Blue TB  3 x 10   Phase 2  Shoulder ER  [place] TB  3 x 10  Shoulder IR  [employer]  3 x 10    Wall slides          Blaze pods          Squats                    Ther Activity                              Manual          Shoulder ER 8'  Shoulder ER PROM arm by the side  ER 90/90 8'  Shoulder ER PROM arm by the side  ER 90/90 8'  Shoulder ER PROM arm by the side  ER 90/90    25'  Shoulder ER PROM arm by the side  ER 90/90      Shoulder flexion                                        Modalities                                          "

## 2024-07-02 DIAGNOSIS — R35.0 URINARY FREQUENCY: ICD-10-CM

## 2024-07-02 RX ORDER — TAMSULOSIN HYDROCHLORIDE 0.4 MG/1
0.4 CAPSULE ORAL
Qty: 30 CAPSULE | Refills: 5 | Status: SHIPPED | OUTPATIENT
Start: 2024-07-02

## 2024-07-02 NOTE — TELEPHONE ENCOUNTER
[facility] FINXI SCRIPT SCANNED INTO THE CHART UNDER MEDIA IF NEEDED FOR REVIEW

## 2024-07-03 ENCOUNTER — OFFICE VISIT (OUTPATIENT)
Dept: PHYSICAL THERAPY | Facility: REHABILITATION | Age: 59
End: 2024-07-03
Payer: COMMERCIAL

## 2024-07-03 DIAGNOSIS — Z98.890 STATUS POST ROTATOR CUFF REPAIR: Primary | ICD-10-CM

## 2024-07-03 PROCEDURE — 97110 THERAPEUTIC EXERCISES: CPT

## 2024-07-03 PROCEDURE — 97112 NEUROMUSCULAR REEDUCATION: CPT

## 2024-07-03 PROCEDURE — 97140 MANUAL THERAPY 1/> REGIONS: CPT

## 2024-07-03 NOTE — PROGRESS NOTES
Daily Note     Today's date: 7/3/2024  Patient name: Callum Nielson  : 1965  MRN: [id number]  Referring provider: Jeremi Jefferson PA*  Dx:   Encounter Diagnosis     ICD-10-CM    1. Status post rotator cuff repair  Z98.890             Start Time: 1730  Stop Time: 1815  Total time in clinic (min): 45 minutes    Subjective: Remains very consistent with his exercises, splitting it between work and home. Feels he sometimes still limits himself from reaching to grab something but overall feels good.     Objective: See treatment diary below      Assessment: Patient is 5 months s/p R subscapularis repair. Fatigues more with q-ped exercises & body blade but no pain reproduced.  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 Patient progressing well and will be ready to be discharged.      Plan: Continue per plan of care.      POC expires Unit limit Auth Expiration date PT/OT/ST + Visit Limit?   24                              Visit/Unit Tracking  AUTH Status:  Date 6/25 6/27 7/1 7/3         Approved - 60 visits Used 40 41 42 43          Remaining  20 19 18 17           Diagnosis: R subscap repair (24)   Precautions: RTC precaution   Insurance: [employer]    6/13 6/18 6/20 6/25 6/27 7/1 7/3   Patient Ed          Nutrition                              Neuro Re-Ed          Ball roll 8# ball  30s x 3 CW  30s x 3 CCW         Body blade R vertical  3 x 30s    B/l horizontal  3 x 30s B/l horizontal  3 x 30s   B/l horizontal  2 x 45s    R vertical   3 x 20s B/l horizontal  3 x 30s    R vertical   3 x 20s B/l horizontal  3 x 30s    R vertical   3 x 20s   UE weight bearing Counter push up  3 x 10    Plank position  3 x 15s    Quadruped shoulder taps  3 x 30s R Quadruped shoulder taps  2 x 30s R    Quadruped clock slider  3 x 4 each    Plank position  3 x 15s  "Quadruped shoulder taps  2 x 30s R    Quadruped clock slider  3 x 30 seconds each    Plank position  3 x 15s   Blaze pods  RUE   30s 23 hits  30s 19 hits  30s 19 hits  30s 22 hits   RUE   30s 26 hits  30s 29 hits  30s 34 hits  30s 34 hits RUE   30s 34 hits  30s 31 hits  30s 25 hits Quadruped  6 pods  45s 45 hits  60s 64 hits  60s 71 hits               Ther Ex          Aerobic conditioning UBE lvl 4  FW 4  BW 4' UBE lvl 6  FW 4  BW 4' UBE lvl 6  FW 4'  BW 4' UBE lvl 6  FW 5'  BW 5' UBE lvl 6  FW 5'  BW 5' UBE lvl 6  FW 5'  BW 5' UBE lvl 6  FW 5'  BW 5'   PROM shoulder flexion    4 x 10      Shoulder flexion AAROM    Wall slide w/ pball  3 x 15      Shoulder IR W/ strap  3 x 30s W/ strap  4 x 30s W/ strap 4x30\"   W/ strap 4x30\" W/ strap 4x30\"   Shoulder ER 90/90 b/l dowel  10 x 5s hold 90/90 b/l dowel  20 x 5s hold 90/90 b/l dowel  20 x 5s hold       Rows          Prone  W's into superman  3 x 10-15   Prone Y's  3 x 10-15     Shoulder press          Bicep curls          RTC strengthening Phase 2  Shoulder ER  [place] TB  3 x 10  Shoulder IR  GTB  3 x 10 Phase 2  Shoulder ER  [place]t TB  3 x 10  Shoulder IR  Blue TB  3 x 10 Phase 2  Shoulder ER  [place]t TB  3 x 10  Shoulder IR  Blue TB  3 x 10   Phase 2  Shoulder ER  Routt TB  3 x 10  Shoulder IR  [employer]  3 x 10 Phase 2  Shoulder ER  Routt TB  3 x 10  Shoulder IR  [employer]  3 x 10      Wall slides          Blaze pods          Squats                    Ther Activity                              Manual          Shoulder ER 8'  Shoulder ER PROM arm by the side  ER 90/90 8'  Shoulder ER PROM arm by the side  ER 90/90 8'  Shoulder ER PROM arm by the side  ER 90/90    25'  Shoulder ER PROM arm by the side  ER 90/90   10'   Shoulder flexion                                        Modalities                                          "
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
## 2024-07-09 ENCOUNTER — EVALUATION (OUTPATIENT)
Dept: PHYSICAL THERAPY | Facility: REHABILITATION | Age: 59
End: 2024-07-09
Payer: COMMERCIAL

## 2024-07-09 DIAGNOSIS — Z98.890 STATUS POST ROTATOR CUFF REPAIR: Primary | ICD-10-CM

## 2024-07-09 PROCEDURE — 97110 THERAPEUTIC EXERCISES: CPT | Performed by: PHYSICAL THERAPIST

## 2024-07-09 PROCEDURE — 97164 PT RE-EVAL EST PLAN CARE: CPT | Performed by: PHYSICAL THERAPIST

## 2024-07-11 ENCOUNTER — OFFICE VISIT (OUTPATIENT)
Dept: PHYSICAL THERAPY | Facility: REHABILITATION | Age: 59
End: 2024-07-11
Payer: COMMERCIAL

## 2024-07-11 DIAGNOSIS — Z98.890 STATUS POST ROTATOR CUFF REPAIR: Primary | ICD-10-CM

## 2024-07-11 PROCEDURE — 97110 THERAPEUTIC EXERCISES: CPT

## 2024-07-11 NOTE — PROGRESS NOTES
Daily Note     Today's date: 2024  Patient name: Callum Nielson  : 1965  MRN: [id number]  Referring provider: Jeremi Jefferson PA*  Dx:   Encounter Diagnosis     ICD-10-CM    1. Status post rotator cuff repair  Z98.890           Start Time: 1700  Stop Time: 1745  Total time in clinic (min): 45 minutes    Subjective: Pt reports still being sore on his L side due to injuring it when pulling himself up.  No issues with sleeping but feel limited with wall slides and pulleys.      Objective: See treatment diary below      Assessment: Tolerated treatment well. Patient would benefit from continued PT. Does well with his program, more challenged with the newly added flies. Scheduled to be discharged next session.  Encouraged to ice his L shoulder.      Plan: Continue per plan of care.      POC expires Unit limit Auth Expiration date PT/OT/ST + Visit Limit?   24                              Visit/Unit Tracking  AUTH Status:  Date 6/25 6/27 7/1 7/3 7/9 7/11       Approved - 60 visits Used 40 41 42 43 44 45        Remaining  20 19 18 17 16 15         Diagnosis: R subscap repair (24)   Precautions: RTC precaution   Insurance: [employer]    6/25 6/27 7/1 7/3 7/9 7/11    Patient Ed            Nutrition                              Neuro Re-Ed          Ball roll          Body blade  B/l horizontal  2 x 45s    R vertical   3 x 20s B/l horizontal  3 x 30s    R vertical   3 x 20s B/l horizontal  3 x 30s    R vertical   3 x 20s B/l horizontal  3 x 30s    R vertical   3 x 20s B/l horizontal  3 x 30s    R vertical   3 x 20s    UE weight bearing  Plank position  3 x 15s    Quadruped shoulder taps  3 x 30s R Quadruped shoulder taps  2 x 30s R    Quadruped clock slider  3 x 4 each    Plank position  3 x 15s Quadruped shoulder taps  2 x 30s R    Quadruped clock slider  3 x 30 seconds each    Plank position  3 x 15s  Quadruped shoulder taps  2 x 30s R    Quadruped clock slider  3 x 30 seconds each    Plank  "position  3 x 15s    Blaze pods RUE   30s 34 hits  30s 31 hits  30s 25 hits Quadruped  6 pods  45s 45 hits  60s 64 hits  60s 71 hits                  Ther Ex          Aerobic conditioning UBE lvl 6  FW 5'  BW 5' UBE lvl 6  FW 5'  BW 5' UBE lvl 6  FW 5'  BW 5' UBE lvl 6  FW 5'  BW 5' UBE lvl 6  FW 3'  BW 2' UBE L6 3'3'    PROM shoulder flexion 4 x 10         Shoulder flexion AAROM Wall slide w/ pball  3 x 15         Shoulder IR   W/ strap 4x30\" W/ strap 4x30\" W/ strap 4x30\" W/ strap 4x30\"    Shoulder ER          Rows          Prone  Prone Y's  3 x 10-15        Shoulder press          Bicep curls          RTC strengthening   Phase 2  Shoulder ER  [place] TB  3 x 10  Shoulder IR  [employer]  3 x 10 Phase 2  Shoulder ER  [place] TB  3 x 10  Shoulder IR  [employer]  3 x 10         Flies     Supine  5#  3 x 8-10 Supine  5#  3 x 8-10    Lateral raises     Elbows flexed  5#  3 x 10-15 Elbows flexed  5#  3 x 10-15    Squats                    Ther Activity                              Manual          Shoulder ER 25'  Shoulder ER PROM arm by the side  ER 90/90   10'      Shoulder flexion                                        Modalities                                            "

## 2024-07-16 ENCOUNTER — OFFICE VISIT (OUTPATIENT)
Dept: PHYSICAL THERAPY | Facility: REHABILITATION | Age: 59
End: 2024-07-16
Payer: COMMERCIAL

## 2024-07-16 ENCOUNTER — OFFICE VISIT (OUTPATIENT)
Dept: OBGYN CLINIC | Facility: CLINIC | Age: 59
End: 2024-07-16
Payer: COMMERCIAL

## 2024-07-16 VITALS — HEIGHT: 71 IN | BODY MASS INDEX: 33.04 KG/M2 | WEIGHT: 236 LBS

## 2024-07-16 DIAGNOSIS — Z98.890 STATUS POST ROTATOR CUFF REPAIR: Primary | ICD-10-CM

## 2024-07-16 DIAGNOSIS — M24.812 INTERNAL DERANGEMENT OF LEFT SHOULDER: Primary | ICD-10-CM

## 2024-07-16 PROCEDURE — 97164 PT RE-EVAL EST PLAN CARE: CPT | Performed by: PHYSICAL THERAPIST

## 2024-07-16 PROCEDURE — 99213 OFFICE O/P EST LOW 20 MIN: CPT | Performed by: ORTHOPAEDIC SURGERY
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
## 2024-07-16 NOTE — PROGRESS NOTES
Subjective   CHIEF COMPLAINT/REASON FOR VISIT  Callum Nielson is a 59 y.o. male who presents for 6 month post op follow-up after Repair Rotator Cuff  Arthroscopic - Right, Arthroscopic Subacromial Decompression - Right, and Arthroscopic Biceps Tenodesis - Right on 1/17/2024.     HISTORY OF PRESENT ILLNESS  Overall, he feels things are going well and progressing appropriately. Pain has been well controlled. He has been following the post-operative rehabilitation regimen without difficultly. Currently, he feeling great overall. Patient is experiencing slight deficit with shoulder internal rotation.     Objective   PHYSICAL EXAMINATION  Right Shoulder  Surgical incisions are healed.     General:   Well-appearing  No acute distress  Appears stated age    Cervical Spine  No tenderness to palpation over the cervical spine in the midline or of the paraspinal muscles  Full range of motion without pain  Negative spurlings   Negative Lhermitte test    right Shoulder  Negative tenderness to palpation over the acromioclavicular joint  Negative tenderness to palpation over the long head of the biceps tendon  Shoulder effusion none present  Shoulder abduction 180/180  Shoulder forward flexion 180/180  Shoulder external rotation 50/50  Shoulder internal rotation T7/T7  Supraspinatus/ABD 5/5   Infraspinatus/ER 5/5   Negative Belly Press/SS  Not examined Neer  Not examined Chilel  Not examined O'briens  Negative Apprehension  Negative Relocation  Not examined Posterior Jerk  Not examined Sulcus  Skin is intact with no erythema, warmth or drainage  Motor strength intact distally  Sensation to light touch is normal in the axillary, radial, ulnar, and median nerve distributions.  Fingers warm and perfused    Assessment & Plan   1. Status Post Repair Rotator Cuff  Arthroscopic - Right, Arthroscopic Subacromial Decompression - Right, and Arthroscopic Biceps Tenodesis - Right    He is doing well after surgery and making appropriate  progress.     We will plan to see him back at the as needed post-operative shelbi. If any issues, questions, or concerns arise between now and the next appointment, we have encouraged the patient contact our team.    Patient is doing great s/p 6 month rotator cuff surgery  Please return if any concerns arise  Allowed to go back to normal Activity of daily living  Patient is experiencing left shoulder pain; MRI ordered to evaluate the internal derangement of the left shoulder.         Scribe Attestation      I,:  Zeferino Hernandez am acting as a scribe while in the presence of the attending physician.:       I,:  Waldo Chambers MD personally performed the services described in this documentation    as scribed in my presence.:

## 2024-07-24 ENCOUNTER — TELEPHONE (OUTPATIENT)
Dept: OBGYN CLINIC | Facility: MEDICAL CENTER | Age: 59
End: 2024-07-24

## 2024-07-24 NOTE — TELEPHONE ENCOUNTER
Caller:  Ronald    Doctor: Dr Chambers     Reason for call: The patient is calling due to having a MRI on 8/3. He is asking for something to be called in for anxiety please.  He is claustrophobic and has had problems in the past with MRI's    [id number] - [place], [place] - [address], [place] [address]  Phone: [phone]  Fax: [phone]  BRONWYN #: [id number]     Thank you     Call back#: [phone]

## 2024-07-25 DIAGNOSIS — F41.9 ANXIETY: Primary | ICD-10-CM

## 2024-07-25 RX ORDER — ALPRAZOLAM 0.5 MG/1
0.5 TABLET ORAL AS NEEDED
Qty: 2 TABLET | Refills: 0 | Status: SHIPPED | OUTPATIENT
Start: 2024-07-25

## 2024-08-03 ENCOUNTER — HOSPITAL ENCOUNTER (OUTPATIENT)
Dept: RADIOLOGY | Facility: HOSPITAL | Age: 59
Discharge: HOME | End: 2024-08-03
Attending: ORTHOPAEDIC SURGERY
Payer: COMMERCIAL

## 2024-08-03 DIAGNOSIS — M24.812 INTERNAL DERANGEMENT OF LEFT SHOULDER: ICD-10-CM

## 2024-08-03 PROCEDURE — 73221 MRI JOINT UPR EXTREM W/O DYE: CPT

## 2024-08-07 ENCOUNTER — TELEPHONE (OUTPATIENT)
Dept: OBGYN CLINIC | Facility: HOSPITAL | Age: 59
End: 2024-08-07

## 2024-08-07 NOTE — TELEPHONE ENCOUNTER
Caller: Patient    Doctor: Reyes    Reason for call: Patient called stated he received his MRI results on my chart and would like a call back. I offered patient an appt and he declined.     Call back#: [phone]

## 2024-08-12 ENCOUNTER — TELEMEDICINE (OUTPATIENT)
Dept: OBGYN CLINIC | Facility: CLINIC | Age: 59
End: 2024-08-12
Payer: COMMERCIAL

## 2024-08-12 DIAGNOSIS — S46.012A TRAUMATIC COMPLETE TEAR OF LEFT ROTATOR CUFF, INITIAL ENCOUNTER: Primary | ICD-10-CM

## 2024-08-12 PROCEDURE — 99212 OFFICE O/P EST SF 10 MIN: CPT | Performed by: ORTHOPAEDIC SURGERY

## 2024-08-12 NOTE — PROGRESS NOTES
This was a phone only visit review of imaging studies conducted through epic embedded platform. The visit length was 10 minutes.        CHIEF COMPLAINT / REASON FOR VISIT  Callum Nielson is a 59 y.o. who is following up today to discuss the results of his recent imaging study.    HISTORY OF PRESENT ILLNESS  Patient reports they are doing about the same from when we last saw them.       DIAGNOSTIC IMAGING:  Procedure: MRI shoulder left wo contrast    Result Date: 8/6/2024  Narrative: MRI LEFT SHOULDER INDICATION:   M24.812: Other specific joint derangements of left shoulder, not elsewhere classified. COMPARISON:  None. TECHNIQUE:   Multiplanar/multisequence MR of the left shoulder was performed. FINDINGS: SUBCUTANEOUS TISSUES: Normal JOINT EFFUSION: None. ACROMION PROCESS: Normal. ROTATOR CUFF: -Supraspinatus: Supraspinatus tendinosis with tear as described below: -Series/image: Series 6 image 19 series 5 image 12. -Thickness: Near full thickness articular surface tear. -AP Location: anterior -AP extent (in cm): 0.9 cm. -Proximal/distal location: Insertional. -Tendon retraction: None. -Muscle atrophy: None. -infraspinatus: Mild tendinosis without tear. -Subscapularis: Mild tendinosis without tear. -Teres minor: Intact. SUBACROMIAL/SUBDELTOID BURSA: There is mild subacromial/subdeltoid bursitis. LONG HEAD OF BICEPS TENDON: Mild intra-articular tendinosis without tear. Small volume tendon sheath fluid, likely secondary to communication with the joint. GLENOID LABRUM: Mild degenerative changes of the glenoid labrum without discrete tear. GLENOHUMERAL JOINT: Intact. ACROMIOCLAVICULAR JOINT: There is moderate osteoarthritis. BONES: No acute or suspicious osseous abnormality. Small cystic changes in the greater tuberosity, likely secondary to rotator cuff pathology.     Impression: 1. Supraspinatus tendinosis with small near full-thickness tear anteriorly at the footprint spanning 0.9 cm anteroposterior. No tendon  retraction or muscle atrophy. 2. Mild subscapularis and infraspinatus tendinosis without tear. 3. Mild subacromial/subdeltoid bursitis. 4. Mild intra-articular long head of biceps tendinosis without tear. Workstation performed: [id number]      ASSESSMENT/PLAN:  Left shoulder full-thickness tear of the supraspinatus tendon, bicipital tendinitis    Today we reviewed the patient's MRI over the phone and discussed treatment options.  We discussed both surgical and nonsurgical treatment options.  At this time he would like to return to clinic for a steroid injection to the left shoulder.  Will set this up for him in the near future.  All questions answered today.

## 2024-09-06 DIAGNOSIS — I48.91 ATRIAL FIBRILLATION WITH RAPID VENTRICULAR RESPONSE (HCC): ICD-10-CM

## 2024-09-06 RX ORDER — METOPROLOL TARTRATE 50 MG
TABLET ORAL
Qty: 60 TABLET | Refills: 5 | Status: SHIPPED | OUTPATIENT
Start: 2024-09-06

## 2024-09-25 ENCOUNTER — REMOTE DEVICE CLINIC VISIT (OUTPATIENT)
Dept: CARDIOLOGY CLINIC | Facility: CLINIC | Age: 59
End: 2024-09-25
Payer: COMMERCIAL

## 2024-09-25 DIAGNOSIS — I48.92 ATRIAL FLUTTER, UNSPECIFIED TYPE (HCC): Primary | ICD-10-CM

## 2024-09-25 PROCEDURE — 93298 REM INTERROG DEV EVAL SCRMS: CPT | Performed by: INTERNAL MEDICINE

## 2024-09-25 NOTE — PROGRESS NOTES
"MDT LNQ22/ ACTIVE SYSTEM IS MRI CONDITIONAL   CARELINK TRANSMISSION: LOOP RECORDER. PRESENTING RHYTHM ST W/ PAC @ 100 BPM. BATTERY STATUS \"OK.\" NO PATIENT OR DEVICE ACTIVATED EPISODES. NORMAL DEVICE FUNCTION. DL   "

## 2024-09-29 DIAGNOSIS — I48.91 ATRIAL FIBRILLATION WITH RAPID VENTRICULAR RESPONSE (HCC): ICD-10-CM

## 2024-09-30 RX ORDER — METOPROLOL TARTRATE 50 MG
TABLET ORAL
Qty: 180 TABLET | Refills: 1 | Status: SHIPPED | OUTPATIENT
Start: 2024-09-30

## 2024-10-03 DIAGNOSIS — E11.65 TYPE 2 DIABETES MELLITUS WITH HYPERGLYCEMIA, WITHOUT LONG-TERM CURRENT USE OF INSULIN (HCC): ICD-10-CM

## 2024-10-03 DIAGNOSIS — I10 ESSENTIAL HYPERTENSION: ICD-10-CM

## 2024-10-03 RX ORDER — LOSARTAN POTASSIUM 50 MG/1
TABLET ORAL
Qty: 30 TABLET | Refills: 5 | Status: SHIPPED | OUTPATIENT
Start: 2024-10-03

## 2024-10-31 DIAGNOSIS — E11.65 TYPE 2 DIABETES MELLITUS WITH HYPERGLYCEMIA, WITHOUT LONG-TERM CURRENT USE OF INSULIN (HCC): ICD-10-CM

## 2024-10-31 DIAGNOSIS — I10 ESSENTIAL HYPERTENSION: ICD-10-CM

## 2024-10-31 RX ORDER — LOSARTAN POTASSIUM 50 MG/1
TABLET ORAL
Qty: 90 TABLET | Refills: 1 | Status: SHIPPED | OUTPATIENT
Start: 2024-10-31

## 2024-10-31 NOTE — TELEPHONE ENCOUNTER
Patient needs updated blood work. Please place orders. A courtesy refill was provided.   A1C  Patient needs an appointment. Please contact the patient to schedule an appointment. Last office visit: 02/27/24 patient needs a 6 month appointment.

## 2024-11-05 DIAGNOSIS — J44.1 COPD EXACERBATION (HCC): ICD-10-CM

## 2024-11-06 RX ORDER — ALBUTEROL SULFATE 90 UG/1
INHALANT RESPIRATORY (INHALATION)
Qty: 18 G | Refills: 5 | Status: SHIPPED | OUTPATIENT
Start: 2024-11-06

## 2024-11-16 ENCOUNTER — OFFICE VISIT (OUTPATIENT)
Dept: URGENT CARE | Age: 59
End: 2024-11-16
Payer: COMMERCIAL

## 2024-11-16 ENCOUNTER — APPOINTMENT (OUTPATIENT)
Dept: RADIOLOGY | Age: 59
End: 2024-11-16
Payer: COMMERCIAL

## 2024-11-16 VITALS
DIASTOLIC BLOOD PRESSURE: 74 MMHG | HEART RATE: 80 BPM | SYSTOLIC BLOOD PRESSURE: 138 MMHG | RESPIRATION RATE: 16 BRPM | WEIGHT: 245 LBS | TEMPERATURE: 98.6 F | BODY MASS INDEX: 34.17 KG/M2 | OXYGEN SATURATION: 91 %

## 2024-11-16 DIAGNOSIS — J44.1 COPD EXACERBATION (HCC): Primary | ICD-10-CM

## 2024-11-16 DIAGNOSIS — R05.1 ACUTE COUGH: ICD-10-CM

## 2024-11-16 PROCEDURE — G0383 LEV 4 HOSP TYPE B ED VISIT: HCPCS

## 2024-11-16 PROCEDURE — 71046 X-RAY EXAM CHEST 2 VIEWS: CPT

## 2024-11-16 RX ORDER — PREDNISONE 20 MG/1
40 TABLET ORAL DAILY
Qty: 10 TABLET | Refills: 0 | Status: SHIPPED | OUTPATIENT
Start: 2024-11-16 | End: 2024-11-21

## 2024-11-16 RX ORDER — DOXYCYCLINE 100 MG/1
100 CAPSULE ORAL 2 TIMES DAILY
Qty: 10 CAPSULE | Refills: 0 | Status: SHIPPED | OUTPATIENT
Start: 2024-11-16 | End: 2024-11-21

## 2024-11-16 RX ORDER — IPRATROPIUM BROMIDE AND ALBUTEROL SULFATE 2.5; .5 MG/3ML; MG/3ML
3 SOLUTION RESPIRATORY (INHALATION) ONCE
Status: COMPLETED | OUTPATIENT
Start: 2024-11-16 | End: 2024-11-16

## 2024-11-16 RX ADMIN — IPRATROPIUM BROMIDE AND ALBUTEROL SULFATE 3 ML: 2.5; .5 SOLUTION RESPIRATORY (INHALATION) at 15:05

## 2024-11-16 NOTE — PATIENT INSTRUCTIONS
I do not see pneumonia on my initial read of your chest x-ray, the radiologist will also read your chest x-ray, we will give you call with any changes plan, you can also follow your results on your MyChart.  You are being treated for COPD exacerbation with a course of steroids and an antibiotic that would also cover for skin infection.  You are given a nebulizer breathing treatment in the office today.  We discussed that you should schedule a follow-up check with your PCP as you have not seen them in a long period time and have required multiple treatments for wheezing.  They can help you to better manage her COPD so that you are having less flares.  I recommend applying Vaseline or Aquaphor to sore in your nose.  If you are developing any severe or worsening symptoms such as increasing shortness of breath, please go to the ER.

## 2024-11-16 NOTE — PROGRESS NOTES
[facility]        NAME: Callum Nielson is a 59 y.o. male  : 1965    MRN: [id number]  DATE: 2024  TIME: 3:35 PM    Assessment and Plan   COPD exacerbation (HCC) [J44.1]  1. COPD exacerbation (HCC)  ipratropium-albuterol (DUO-NEB) 0.5-2.5 mg/3 mL inhalation solution 3 mL    doxycycline monohydrate (MONODOX) 100 mg capsule    predniSONE 20 mg tablet      2. Acute cough  XR chest pa and lateral      Wheezing and air movement improved on recheck after DuoNeb.  Pulse ox was in the high 80s to low 90s.  Stable at 91% after walking up and down the hallway.  Rx doxycycline and prednisone.  Encourage PCP follow-up.  Reviewed ER precautions.      Patient Instructions   I do not see pneumonia on my initial read of your chest x-ray, the radiologist will also read your chest x-ray, we will give you call with any changes plan, you can also follow your results on your MyChart.  You are being treated for COPD exacerbation with a course of steroids and an antibiotic that would also cover for skin infection.  You are given a nebulizer breathing treatment in the office today.  We discussed that you should schedule a follow-up check with your PCP as you have not seen them in a long period time and have required multiple treatments for wheezing.  They can help you to better manage her COPD so that you are having less flares.  I recommend applying Vaseline or Aquaphor to sore in your nose.  If you are developing any severe or worsening symptoms such as increasing shortness of breath, please go to the ER.    Follow up with PCP in 3-5 days.  Proceed to  ER if symptoms worsen.    If tests have been performed at [facility], our office will contact you with results if changes need to be made to the care plan discussed with you at the visit.  You can review your full results on [facility] MyChart.    Chief Complaint     Chief Complaint   Patient presents with    Cough     Cough and wheezing x 1 month   Left  nostril pain x 1 week          History of Present Illness       Patient presents for 2 concerns, he has been having a flare of his COPD symptoms including 1 month of wheezing, cough with increased white mucus.  Typically uses albuterol inhaler, has not seen his PCP since our last visit for COPD exacerbation.  He does not have a maintenance inhaler.  Has been using his albuterol at least twice per day, not getting sufficient relief from it.  No fevers, chills.  Additionally about a week or so ago, he started to notice a sore inside of his left nare.  He is unsure if perhaps he scratched by accident.  He is concerned for possible infection in the area and feels that it is not fully healing.        Review of Systems   Review of Systems   All other systems reviewed and are negative.        Current Medications       Current Outpatient Medications:     doxycycline monohydrate (MONODOX) 100 mg capsule, Take 1 capsule (100 mg total) by mouth 2 (two) times a day for 5 days, Disp: 10 capsule, Rfl: 0    predniSONE 20 mg tablet, Take 2 tablets (40 mg total) by mouth daily for 5 days, Disp: 10 tablet, Rfl: 0    albuterol (PROVENTIL HFA,VENTOLIN HFA) 90 mcg/act inhaler, INHALE 2 PUFFS BY MOUTH EVERY 6 HOURS AS NEEDED FOR WHEEZE, Disp: 18 g, Rfl: 5    ALPRAZolam (XANAX) 0.5 mg tablet, Take 1 tablet (0.5 mg total) by mouth if needed for anxiety (Take 1 tablet 45 minutes prior to procedure prn, take a second tablet 15 minutes before procedure prn), Disp: 2 tablet, Rfl: 0    Eliquis 5 MG, TAKE 1 TABLET BY MOUTH TWICE A DAY (Patient not taking: Reported on 7/16/2024), Disp: 60 tablet, Rfl: 11    folic acid (FOLVITE) 1 mg tablet, Take 1 tablet (1 mg total) by mouth in the morning., Disp: , Rfl: 0    losartan (COZAAR) 50 mg tablet, TAKE 1 TABLET BY MOUTH EVERY DAY, Disp: 90 tablet, Rfl: 1    metFORMIN (GLUCOPHAGE) 1000 MG tablet, TAKE 1 TABLET BY MOUTH TWICE A DAY WITH FOOD, Disp: 180 tablet, Rfl: 0    metoprolol tartrate (LOPRESSOR) 50  mg tablet, TAKE 1 TABLET BY MOUTH EVERY 12 HOURS, Disp: 180 tablet, Rfl: 1    multivitamin (THERAGRAN) TABS, Take 1 tablet by mouth daily, Disp: , Rfl:     tamsulosin (FLOMAX) 0.4 mg, Take 1 capsule (0.4 mg total) by mouth daily with dinner, Disp: 30 capsule, Rfl: 5    thiamine 100 MG tablet, Take 1 tablet (100 mg total) by mouth in the morning., Disp: , Rfl: 0  No current facility-administered medications for this visit.    Current Allergies     Allergies as of 11/16/2024 - Reviewed 11/16/2024   Allergen Reaction Noted    Lisinopril Cough 12/07/2020    Spiriva respimat [tiotropium bromide monohydrate] Anxiety 08/02/2022            The following portions of the patient's history were reviewed and updated as appropriate: allergies, current medications, past family history, past medical history, past social history, past surgical history and problem list.     Past Medical History:   Diagnosis Date    Adhesive capsulitis of right shoulder 05/17/2022    Anxiety     Last assessed 12/20/2013    Contusion of right wrist 01/30/2018    COPD (chronic obstructive pulmonary disease) ([facility]) 08/16/2016    COPD (chronic obstructive pulmonary disease) ([facility]) 08/16/2016    Depression with anxiety     Diverticulitis 2016    Eczema 02/24/2016    Elevated ALT measurement 08/16/2016    History of atrial fibrillation     Hypertension     Obesity 09/25/2012    Perianal abscess 03/05/2019    Type 2 diabetes mellitus with hyperglycemia, without long-term current use of insulin ([facility])        Past Surgical History:   Procedure Laterality Date    CARDIAC LOOP RECORDER  2001    COLONOSCOPY      KNEE SURGERY      staph infection    VT SURGICAL ARTHROSCOPY PALMER W/CORACOACRM LIGM RLS Right 1/17/2024    Procedure: ARTHROSCOPIC SUBACROMIAL DECOMPRESSION;  Surgeon: Waldo Chambers MD;  Location: [facility];  Service: Orthopedics    VT SURGICAL ARTHROSCOPY SHOULDER BICEPS TENODESIS Right 1/17/2024    Procedure: ARTHROSCOPIC BICEPS TENODESIS;  Surgeon:  Waldo Chambers MD;  Location: [facility];  Service: Orthopedics    OR SURGICAL ARTHROSCOPY SHOULDER W/ROTATOR CUFF RPR Right 1/17/2024    Procedure: REPAIR ROTATOR CUFF  ARTHROSCOPIC;  Surgeon: Waldo Chambers MD;  Location: [facility];  Service: Orthopedics       Family History   Problem Relation Age of Onset    Cancer Mother         Lung Cancer    Cancer Father     Diabetes Father     Diabetes Sister          Medications have been verified.        Objective   /74   Pulse 80   Temp 98.6 °F (37 °C) (Tympanic)   Resp 16   Wt 111 kg (245 lb)   SpO2 91%   BMI 34.17 kg/m²   No LMP for male patient.       Physical Exam     Physical Exam  Vitals and nursing note reviewed.   Constitutional:       General: He is not in acute distress.     Appearance: Normal appearance. He is not ill-appearing or toxic-appearing.   HENT:      Head: Normocephalic and atraumatic.      Right Ear: Tympanic membrane, ear canal and external ear normal.      Left Ear: Tympanic membrane, ear canal and external ear normal.      Nose: Nose normal.      Comments: At anterior nare, there is a proximately 3 mm superficial abrasion, no drainage, no surrounding erythema, no significant crusting visualized     Mouth/Throat:      Mouth: Mucous membranes are moist.      Pharynx: No oropharyngeal exudate or posterior oropharyngeal erythema.   Eyes:      Extraocular Movements: Extraocular movements intact.   Cardiovascular:      Rate and Rhythm: Normal rate and regular rhythm.      Heart sounds: Normal heart sounds.   Pulmonary:      Effort: Pulmonary effort is normal. No respiratory distress.      Breath sounds: No stridor. Wheezing present. No rhonchi or rales.      Comments: Tight breath sounds with bilateral inspiratory and expiratory wheeze  Skin:     General: Skin is warm and dry.      Capillary Refill: Capillary refill takes less than 2 seconds.      Findings: No rash.   Neurological:      Mental Status: He is alert.      Gait: Gait normal.    Psychiatric:         Behavior: Behavior normal.

## 2024-12-26 ENCOUNTER — REMOTE DEVICE CLINIC VISIT (OUTPATIENT)
Dept: CARDIOLOGY CLINIC | Facility: CLINIC | Age: 59
End: 2024-12-26
Payer: COMMERCIAL

## 2024-12-26 ENCOUNTER — RESULTS FOLLOW-UP (OUTPATIENT)
Dept: CARDIOLOGY CLINIC | Facility: CLINIC | Age: 59
End: 2024-12-26

## 2024-12-26 DIAGNOSIS — I48.92 ATRIAL FLUTTER, UNSPECIFIED TYPE (HCC): Primary | ICD-10-CM

## 2024-12-26 PROCEDURE — 93298 REM INTERROG DEV EVAL SCRMS: CPT | Performed by: INTERNAL MEDICINE

## 2024-12-26 NOTE — PROGRESS NOTES
"MDT LNQ22/ ACTIVE SYSTEM IS MRI CONDITIONAL   CARELINK TRANSMISSION: LOOP RECORDER. PRESENTING RHYTHM ST @ 100 BPM. BATTERY STATUS \"OK.\" NO PATIENT OR DEVICE ACTIVATED EPISODES. NORMAL DEVICE FUNCTION. DL   "

## 2025-01-03 DIAGNOSIS — R35.0 URINARY FREQUENCY: ICD-10-CM

## 2025-01-03 RX ORDER — TAMSULOSIN HYDROCHLORIDE 0.4 MG/1
0.4 CAPSULE ORAL
Qty: 30 CAPSULE | Refills: 5 | Status: SHIPPED | OUTPATIENT
Start: 2025-01-03

## 2025-01-25 ENCOUNTER — HOSPITAL ENCOUNTER (OUTPATIENT)
Facility: HOSPITAL | Age: 60
Setting detail: OBSERVATION
Discharge: HOME | End: 2025-01-27
Attending: EMERGENCY MEDICINE | Admitting: INTERNAL MEDICINE
Payer: COMMERCIAL

## 2025-01-25 DIAGNOSIS — J44.1 COPD EXACERBATION (HCC): Primary | ICD-10-CM

## 2025-01-25 DIAGNOSIS — F41.9 ANXIETY: ICD-10-CM

## 2025-01-25 DIAGNOSIS — Z87.898 HISTORY OF ALCOHOL USE DISORDER: ICD-10-CM

## 2025-01-25 PROCEDURE — 93005 ELECTROCARDIOGRAM TRACING: CPT

## 2025-01-25 PROCEDURE — 99285 EMERGENCY DEPT VISIT HI MDM: CPT

## 2025-01-26 ENCOUNTER — APPOINTMENT (EMERGENCY)
Dept: RADIOLOGY | Facility: HOSPITAL | Age: 60
End: 2025-01-26
Payer: COMMERCIAL

## 2025-01-26 LAB
2HR DELTA HS TROPONIN: -4 NG/L
ALBUMIN SERPL BCG-MCNC: 4.6 G/DL (ref 3.5–5)
ALP SERPL-CCNC: 78 U/L (ref 34–104)
ALT SERPL W P-5'-P-CCNC: 31 U/L (ref 7–52)
ANION GAP SERPL CALCULATED.3IONS-SCNC: 7 MMOL/L (ref 4–13)
AST SERPL W P-5'-P-CCNC: 31 U/L (ref 13–39)
ATRIAL RATE: 87 BPM
BASOPHILS # BLD AUTO: 0.08 THOUSANDS/ΜL (ref 0–0.1)
BASOPHILS NFR BLD AUTO: 1 % (ref 0–1)
BILIRUB SERPL-MCNC: 0.59 MG/DL (ref 0.2–1)
BNP SERPL-MCNC: 21 PG/ML (ref 0–100)
BUN SERPL-MCNC: 18 MG/DL (ref 5–25)
CALCIUM SERPL-MCNC: 10.4 MG/DL (ref 8.4–10.2)
CARDIAC TROPONIN I PNL SERPL HS: 10 NG/L (ref ?–50)
CARDIAC TROPONIN I PNL SERPL HS: 6 NG/L (ref ?–50)
CHLORIDE SERPL-SCNC: 98 MMOL/L (ref 96–108)
CO2 SERPL-SCNC: 30 MMOL/L (ref 21–32)
CREAT SERPL-MCNC: 0.99 MG/DL (ref 0.6–1.3)
EOSINOPHIL # BLD AUTO: 0.43 THOUSAND/ΜL (ref 0–0.61)
EOSINOPHIL NFR BLD AUTO: 5 % (ref 0–6)
ERYTHROCYTE [DISTWIDTH] IN BLOOD BY AUTOMATED COUNT: 13.7 % (ref 11.6–15.1)
EST. AVERAGE GLUCOSE BLD GHB EST-MCNC: 157 MG/DL
FLUAV AG UPPER RESP QL IA.RAPID: NEGATIVE
FLUAV RNA RESP QL NAA+PROBE: NEGATIVE
FLUBV AG UPPER RESP QL IA.RAPID: NEGATIVE
FLUBV RNA RESP QL NAA+PROBE: NEGATIVE
GFR SERPL CREATININE-BSD FRML MDRD: 82 ML/MIN/1.73SQ M
GLUCOSE SERPL-MCNC: 149 MG/DL (ref 65–140)
GLUCOSE SERPL-MCNC: 199 MG/DL (ref 65–140)
GLUCOSE SERPL-MCNC: 298 MG/DL (ref 65–140)
GLUCOSE SERPL-MCNC: 423 MG/DL (ref 65–140)
HBA1C MFR BLD: 7.1 %
HCT VFR BLD AUTO: 49.7 % (ref 36.5–49.3)
HGB BLD-MCNC: 16.6 G/DL (ref 12–17)
IMM GRANULOCYTES # BLD AUTO: 0.04 THOUSAND/UL (ref 0–0.2)
IMM GRANULOCYTES NFR BLD AUTO: 0 % (ref 0–2)
LYMPHOCYTES # BLD AUTO: 2.71 THOUSANDS/ΜL (ref 0.6–4.47)
LYMPHOCYTES NFR BLD AUTO: 30 % (ref 14–44)
MCH RBC QN AUTO: 32.4 PG (ref 26.8–34.3)
MCHC RBC AUTO-ENTMCNC: 33.4 G/DL (ref 31.4–37.4)
MCV RBC AUTO: 97 FL (ref 82–98)
MONOCYTES # BLD AUTO: 0.94 THOUSAND/ΜL (ref 0.17–1.22)
MONOCYTES NFR BLD AUTO: 10 % (ref 4–12)
NEUTROPHILS # BLD AUTO: 4.93 THOUSANDS/ΜL (ref 1.85–7.62)
NEUTS SEG NFR BLD AUTO: 54 % (ref 43–75)
NRBC BLD AUTO-RTO: 0 /100 WBCS
P AXIS: 64 DEGREES
PLATELET # BLD AUTO: 261 THOUSANDS/UL (ref 149–390)
PMV BLD AUTO: 9.2 FL (ref 8.9–12.7)
POTASSIUM SERPL-SCNC: 4.6 MMOL/L (ref 3.5–5.3)
PR INTERVAL: 148 MS
PROCALCITONIN SERPL-MCNC: <0.05 NG/ML
PROT SERPL-MCNC: 7.4 G/DL (ref 6.4–8.4)
QRS AXIS: -11 DEGREES
QRSD INTERVAL: 86 MS
QT INTERVAL: 334 MS
QTC INTERVAL: 402 MS
RBC # BLD AUTO: 5.12 MILLION/UL (ref 3.88–5.62)
RSV RNA RESP QL NAA+PROBE: NEGATIVE
SARS-COV+SARS-COV-2 AG RESP QL IA.RAPID: NEGATIVE
SARS-COV-2 RNA RESP QL NAA+PROBE: NEGATIVE
SODIUM SERPL-SCNC: 135 MMOL/L (ref 135–147)
T WAVE AXIS: 32 DEGREES
VENTRICULAR RATE: 87 BPM
WBC # BLD AUTO: 9.13 THOUSAND/UL (ref 4.31–10.16)

## 2025-01-26 PROCEDURE — 99223 1ST HOSP IP/OBS HIGH 75: CPT | Performed by: INTERNAL MEDICINE

## 2025-01-26 PROCEDURE — 94640 AIRWAY INHALATION TREATMENT: CPT

## 2025-01-26 PROCEDURE — 85025 COMPLETE CBC W/AUTO DIFF WBC: CPT

## 2025-01-26 PROCEDURE — RECHECK: Performed by: PHYSICIAN ASSISTANT

## 2025-01-26 PROCEDURE — 87811 SARS-COV-2 COVID19 W/OPTIC: CPT

## 2025-01-26 PROCEDURE — 94664 DEMO&/EVAL PT USE INHALER: CPT

## 2025-01-26 PROCEDURE — 94760 N-INVAS EAR/PLS OXIMETRY 1: CPT

## 2025-01-26 PROCEDURE — 96374 THER/PROPH/DIAG INJ IV PUSH: CPT

## 2025-01-26 PROCEDURE — 0241U HB NFCT DS VIR RESP RNA 4 TRGT: CPT | Performed by: PHYSICIAN ASSISTANT

## 2025-01-26 PROCEDURE — 84145 PROCALCITONIN (PCT): CPT | Performed by: INTERNAL MEDICINE

## 2025-01-26 PROCEDURE — 99291 CRITICAL CARE FIRST HOUR: CPT | Performed by: EMERGENCY MEDICINE

## 2025-01-26 PROCEDURE — 84484 ASSAY OF TROPONIN QUANT: CPT

## 2025-01-26 PROCEDURE — 80053 COMPREHEN METABOLIC PANEL: CPT

## 2025-01-26 PROCEDURE — 71046 X-RAY EXAM CHEST 2 VIEWS: CPT

## 2025-01-26 PROCEDURE — 82948 REAGENT STRIP/BLOOD GLUCOSE: CPT

## 2025-01-26 PROCEDURE — 87804 INFLUENZA ASSAY W/OPTIC: CPT

## 2025-01-26 PROCEDURE — 36415 COLL VENOUS BLD VENIPUNCTURE: CPT

## 2025-01-26 PROCEDURE — 93010 ELECTROCARDIOGRAM REPORT: CPT | Performed by: INTERNAL MEDICINE

## 2025-01-26 PROCEDURE — 83036 HEMOGLOBIN GLYCOSYLATED A1C: CPT

## 2025-01-26 PROCEDURE — 83880 ASSAY OF NATRIURETIC PEPTIDE: CPT | Performed by: EMERGENCY MEDICINE

## 2025-01-26 RX ORDER — FOLIC ACID 1 MG/1
1 TABLET ORAL DAILY
Status: DISCONTINUED | OUTPATIENT
Start: 2025-01-26 | End: 2025-01-26 | Stop reason: SDUPTHER

## 2025-01-26 RX ORDER — ENOXAPARIN SODIUM 100 MG/ML
40 INJECTION SUBCUTANEOUS DAILY
Status: DISCONTINUED | OUTPATIENT
Start: 2025-01-26 | End: 2025-01-26

## 2025-01-26 RX ORDER — AZITHROMYCIN 250 MG/1
500 TABLET, FILM COATED ORAL EVERY 24 HOURS
Status: DISCONTINUED | OUTPATIENT
Start: 2025-01-26 | End: 2025-01-27 | Stop reason: HOSPADM

## 2025-01-26 RX ORDER — INSULIN LISPRO 100 [IU]/ML
1-6 INJECTION, SOLUTION INTRAVENOUS; SUBCUTANEOUS
Status: DISCONTINUED | OUTPATIENT
Start: 2025-01-26 | End: 2025-01-27 | Stop reason: HOSPADM

## 2025-01-26 RX ORDER — ALBUTEROL SULFATE 0.83 MG/ML
5 SOLUTION RESPIRATORY (INHALATION) ONCE
Status: COMPLETED | OUTPATIENT
Start: 2025-01-26 | End: 2025-01-26

## 2025-01-26 RX ORDER — ACETAMINOPHEN 325 MG/1
650 TABLET ORAL EVERY 6 HOURS PRN
Status: DISCONTINUED | OUTPATIENT
Start: 2025-01-26 | End: 2025-01-27 | Stop reason: HOSPADM

## 2025-01-26 RX ORDER — LANOLIN ALCOHOL/MO/W.PET/CERES
100 CREAM (GRAM) TOPICAL DAILY
Status: DISCONTINUED | OUTPATIENT
Start: 2025-01-26 | End: 2025-01-27 | Stop reason: HOSPADM

## 2025-01-26 RX ORDER — METOPROLOL TARTRATE 1 MG/ML
5 INJECTION, SOLUTION INTRAVENOUS EVERY 6 HOURS PRN
Status: DISCONTINUED | OUTPATIENT
Start: 2025-01-26 | End: 2025-01-27 | Stop reason: HOSPADM

## 2025-01-26 RX ORDER — METHYLPREDNISOLONE SODIUM SUCCINATE 125 MG/2ML
125 INJECTION, POWDER, LYOPHILIZED, FOR SOLUTION INTRAMUSCULAR; INTRAVENOUS ONCE
Status: COMPLETED | OUTPATIENT
Start: 2025-01-26 | End: 2025-01-26

## 2025-01-26 RX ORDER — LANOLIN ALCOHOL/MO/W.PET/CERES
100 CREAM (GRAM) TOPICAL DAILY
Status: DISCONTINUED | OUTPATIENT
Start: 2025-01-26 | End: 2025-01-27

## 2025-01-26 RX ORDER — HYDROXYZINE HYDROCHLORIDE 25 MG/1
25 TABLET, FILM COATED ORAL ONCE
Status: COMPLETED | OUTPATIENT
Start: 2025-01-26 | End: 2025-01-26

## 2025-01-26 RX ORDER — BUDESONIDE 0.5 MG/2ML
0.5 INHALANT ORAL
Status: DISCONTINUED | OUTPATIENT
Start: 2025-01-26 | End: 2025-01-27 | Stop reason: HOSPADM

## 2025-01-26 RX ORDER — INSULIN LISPRO 100 [IU]/ML
1-5 INJECTION, SOLUTION INTRAVENOUS; SUBCUTANEOUS
Status: DISCONTINUED | OUTPATIENT
Start: 2025-01-26 | End: 2025-01-27 | Stop reason: HOSPADM

## 2025-01-26 RX ORDER — SODIUM CHLORIDE FOR INHALATION 0.9 %
3 VIAL, NEBULIZER (ML) INHALATION
Status: DISCONTINUED | OUTPATIENT
Start: 2025-01-26 | End: 2025-01-26

## 2025-01-26 RX ORDER — FOLIC ACID 1 MG/1
1 TABLET ORAL DAILY
Status: DISCONTINUED | OUTPATIENT
Start: 2025-01-26 | End: 2025-01-27 | Stop reason: HOSPADM

## 2025-01-26 RX ORDER — LOSARTAN POTASSIUM 50 MG/1
50 TABLET ORAL DAILY
Status: DISCONTINUED | OUTPATIENT
Start: 2025-01-26 | End: 2025-01-27 | Stop reason: HOSPADM

## 2025-01-26 RX ORDER — TAMSULOSIN HYDROCHLORIDE 0.4 MG/1
0.4 CAPSULE ORAL
Status: DISCONTINUED | OUTPATIENT
Start: 2025-01-26 | End: 2025-01-27 | Stop reason: HOSPADM

## 2025-01-26 RX ORDER — NICOTINE 21 MG/24HR
1 PATCH, TRANSDERMAL 24 HOURS TRANSDERMAL DAILY
Status: DISCONTINUED | OUTPATIENT
Start: 2025-01-26 | End: 2025-01-27 | Stop reason: HOSPADM

## 2025-01-26 RX ORDER — LEVALBUTEROL INHALATION SOLUTION 1.25 MG/3ML
1.25 SOLUTION RESPIRATORY (INHALATION)
Status: DISCONTINUED | OUTPATIENT
Start: 2025-01-26 | End: 2025-01-27 | Stop reason: HOSPADM

## 2025-01-26 RX ORDER — METHYLPREDNISOLONE SODIUM SUCCINATE 40 MG/ML
40 INJECTION, POWDER, LYOPHILIZED, FOR SOLUTION INTRAMUSCULAR; INTRAVENOUS EVERY 12 HOURS SCHEDULED
Status: DISCONTINUED | OUTPATIENT
Start: 2025-01-26 | End: 2025-01-27

## 2025-01-26 RX ORDER — ALBUTEROL SULFATE 5 MG/ML
5 SOLUTION RESPIRATORY (INHALATION) ONCE
Status: COMPLETED | OUTPATIENT
Start: 2025-01-26 | End: 2025-01-26

## 2025-01-26 RX ORDER — METOPROLOL TARTRATE 50 MG
50 TABLET ORAL EVERY 12 HOURS
Status: DISCONTINUED | OUTPATIENT
Start: 2025-01-26 | End: 2025-01-27 | Stop reason: HOSPADM

## 2025-01-26 RX ADMIN — IPRATROPIUM BROMIDE 0.5 MG: 0.5 SOLUTION RESPIRATORY (INHALATION) at 00:39

## 2025-01-26 RX ADMIN — BUDESONIDE 0.5 MG: 0.5 INHALANT RESPIRATORY (INHALATION) at 19:15

## 2025-01-26 RX ADMIN — AZITHROMYCIN DIHYDRATE 500 MG: 250 TABLET ORAL at 09:54

## 2025-01-26 RX ADMIN — LEVALBUTEROL HYDROCHLORIDE 1.25 MG: 1.25 SOLUTION RESPIRATORY (INHALATION) at 13:08

## 2025-01-26 RX ADMIN — IPRATROPIUM BROMIDE 0.5 MG: 0.5 SOLUTION RESPIRATORY (INHALATION) at 13:08

## 2025-01-26 RX ADMIN — ALBUTEROL SULFATE 5 MG: 2.5 SOLUTION RESPIRATORY (INHALATION) at 01:49

## 2025-01-26 RX ADMIN — INSULIN LISPRO 6 UNITS: 100 INJECTION, SOLUTION INTRAVENOUS; SUBCUTANEOUS at 13:15

## 2025-01-26 RX ADMIN — METHYLPREDNISOLONE SODIUM SUCCINATE 40 MG: 40 INJECTION, POWDER, FOR SOLUTION INTRAMUSCULAR; INTRAVENOUS at 21:36

## 2025-01-26 RX ADMIN — Medication 3 MG: at 22:12

## 2025-01-26 RX ADMIN — LOSARTAN POTASSIUM 50 MG: 50 TABLET, FILM COATED ORAL at 09:58

## 2025-01-26 RX ADMIN — METOPROLOL TARTRATE 50 MG: 50 TABLET, FILM COATED ORAL at 10:07

## 2025-01-26 RX ADMIN — INSULIN LISPRO 4 UNITS: 100 INJECTION, SOLUTION INTRAVENOUS; SUBCUTANEOUS at 18:00

## 2025-01-26 RX ADMIN — METHYLPREDNISOLONE SODIUM SUCCINATE 40 MG: 40 INJECTION, POWDER, FOR SOLUTION INTRAMUSCULAR; INTRAVENOUS at 09:54

## 2025-01-26 RX ADMIN — FOLIC ACID 1 MG: 1 TABLET ORAL at 09:54

## 2025-01-26 RX ADMIN — ALBUTEROL SULFATE 5 MG: 2.5 SOLUTION RESPIRATORY (INHALATION) at 00:39

## 2025-01-26 RX ADMIN — IPRATROPIUM BROMIDE 0.5 MG: 0.5 SOLUTION RESPIRATORY (INHALATION) at 06:59

## 2025-01-26 RX ADMIN — METHYLPREDNISOLONE SODIUM SUCCINATE 125 MG: 125 INJECTION, POWDER, FOR SOLUTION INTRAMUSCULAR; INTRAVENOUS at 00:38

## 2025-01-26 RX ADMIN — ALBUTEROL SULFATE 5 MG: 2.5 SOLUTION RESPIRATORY (INHALATION) at 06:58

## 2025-01-26 RX ADMIN — IPRATROPIUM BROMIDE 0.5 MG: 0.5 SOLUTION RESPIRATORY (INHALATION) at 19:15

## 2025-01-26 RX ADMIN — Medication 1 TABLET: at 09:54

## 2025-01-26 RX ADMIN — APIXABAN 5 MG: 5 TABLET, FILM COATED ORAL at 18:15

## 2025-01-26 RX ADMIN — METOPROLOL TARTRATE 50 MG: 50 TABLET, FILM COATED ORAL at 19:15

## 2025-01-26 RX ADMIN — INSULIN LISPRO 1 UNITS: 100 INJECTION, SOLUTION INTRAVENOUS; SUBCUTANEOUS at 21:40

## 2025-01-26 RX ADMIN — HYDROXYZINE HYDROCHLORIDE 25 MG: 25 TABLET, FILM COATED ORAL at 00:38

## 2025-01-26 RX ADMIN — THIAMINE HCL TAB 100 MG 100 MG: 100 TAB at 09:54

## 2025-01-26 RX ADMIN — LEVALBUTEROL HYDROCHLORIDE 1.25 MG: 1.25 SOLUTION RESPIRATORY (INHALATION) at 19:15

## 2025-01-26 RX ADMIN — IPRATROPIUM BROMIDE 0.5 MG: 0.5 SOLUTION RESPIRATORY (INHALATION) at 01:49

## 2025-01-26 RX ADMIN — APIXABAN 5 MG: 5 TABLET, FILM COATED ORAL at 10:07

## 2025-01-26 NOTE — ASSESSMENT & PLAN NOTE
Known history of atrial fibrillation/flutter, did note some palpitations/anxiety on arrival  Heart rate during ED evaluation initially controlled however HR now 120's  Will restart home metoprolol tartrate 50 mg twice daily and assess response  Monitor on telemetry   Patient previously stopped his Eliquis for anticoagulation however states he resumed over the past 3 days.  Continue Eliquis

## 2025-01-26 NOTE — ASSESSMENT & PLAN NOTE
Known history of atrial fibrillation/flutter, did note some palpitations/anxiety on arrival  Heart rate during ED evaluation initially controlled however HR now 100-110  Will restart home metoprolol tartrate 50 mg twice daily and assess response, if remains tachycardic initiate telemetry and further management as appropriate  Patient previously stopped his Eliquis for anticoagulation however states he resumed over the past 3 days.  Continue Eliquis

## 2025-01-26 NOTE — ED ATTENDING ATTESTATION
1/25/2025  IEse MD, saw and evaluated the patient. I have discussed the patient with the resident/non-physician practitioner and agree with the resident's/non-physician practitioner's findings, Plan of Care, and MDM as documented in the resident's/non-physician practitioner's note, except where noted. All available labs and Radiology studies were reviewed.  I was present for key portions of any procedure(s) performed by the resident/non-physician practitioner and I was immediately available to provide assistance.       At this point I agree with the current assessment done in the Emergency Department.  I have conducted an independent evaluation of this patient a history and physical is as follows:    OA: 58 y/o m with h/o COPD, HTN, DM, atrial fibrillation s/p MDT loop recorder and EtOH abuse who presents to the ED with SOB. Pt states that he has noted worsening shortness of breath over the past week.  Awakens at night feeling short of breath and anxious.  Denies overt chest pain but does note tightness when the symptoms occur.  Has been coughing times months productive of white sputum.  No fevers no chills.  No nausea no vomiting.  No abdominal or back pain.  Notes that he has no relief with at home inhalers.  Is not normally on home oxygen and does not use CPAP at night.  Patient also is a daily drinker.  States that he drank approximately half a bottle of liquor earlier today.  Also was a smoker.  On exam patient is nontoxic no acute respiratory distress at this time.  He is mildly hypoxic with sats in the low 90s.  HEENT is normocephalic and atraumatic.  Moist mucous membranes.  Neck is supple full range of motion.  Heart is regular rate.  Lungs with diffuse expiratory wheezing.  Is able to speak in complete sentences.  Abdomen is obese but soft with positive bowel sounds nontender.  No edema.  No calf tenderness palpation.  Intact pulses.  Awake alert oriented and appropriate.  Assessment and  plan 5-year-old male with known COPD who presents to the emergency department with worsening shortness of breath cough no relief with at home inhalers.  Also notes that he has been awakening at night with feelings of anxiety and shortness of breath.  Will do breathing treatment.  Dose of steroids.  Obtain basic labs including EKG troponin chest x-ray.  DuoNeb.  Reevaluate and treat accordingly.    ED Course     PT continues to have wheezing, 02 drops to 88-90% without activity. Placed on 2L my nursing. Will admit.         Critical Care Time  CriticalCare Time    Date/Time: 1/26/2025 11:48 AM    Performed by: Ese Menezes MD  Authorized by: Ese Menezes MD    Critical care provider statement:     Critical care time (minutes):  31    Critical care time was exclusive of:  Separately billable procedures and treating other patients and teaching time    Critical care was time spent personally by me on the following activities:  Review of old charts, re-evaluation of patient's condition, ordering and review of radiographic studies, ordering and review of laboratory studies, ordering and performing treatments and interventions, evaluation of patient's response to treatment, development of treatment plan with patient or surrogate, blood draw for specimens, obtaining history from patient or surrogate and examination of patient

## 2025-01-26 NOTE — PROGRESS NOTES
Progress Note - Hospitalist   Name: Callum Nielson 59 y.o. male I MRN: [id number]  Unit/Bed#: Z6HB I Date of Admission: 1/25/2025   Date of Service: 1/26/2025 I Hospital Day: 0    Assessment & Plan  Chronic obstructive pulmonary disease with acute exacerbation (HCC)  Presented worsening shortness of breath, additionally noted increased cough over the past 3 days.  Significant wheezing noted on examination  Initially responded to albuterol/ipratropium nebulizer and 125 mg IV Solu-Medrol however now with recurrent wheezing and is wearing 2L NC primarily for comfort  Labs otherwise without marked abnormality compared to prior, COVID-19/influenza swab negative, CXR without acute cardiopulmonary disease. Procalcitonin normal  Appears mild exacerbation of presumed COPD, patient reports apparent diagnosis of this however no PFTs readily available on admission  We will continue IV Solu-Medrol through today, hopefully can transition to oral prednisone for short course 1/27/2025  Continue nebulizers while admitted, start azithromycin x 3 days  Provide supplemental oxygen if needed to maintain SaO2 88-92%.  Perform ambulatory pulse oximetry prior to discharge  Respiratory protocol, incentive spirometry, pulmonary toileting  Needs outpatient PFTs/polysomnography once recovered, can be arranged by PCP as outpatient  Atrial flutter (HCC)  Known history of atrial fibrillation/flutter, did note some palpitations/anxiety on arrival  Heart rate during ED evaluation initially controlled however HR now 120's  Will restart home metoprolol tartrate 50 mg twice daily and assess response  Monitor on telemetry   Patient previously stopped his Eliquis for anticoagulation however states he resumed over the past 3 days.  Continue Eliquis  History of alcohol use disorder  Patient states he has been drinking about a half a bottle of Susan a day for the last 6 months. Last drink was yesterday. States he is feeling shaky and anxious  Start JESUS  "protocol, thiamine and folic acid  Essential hypertension  Resume PTA metoprolol tartrate 50 mg twice daily and losartan 50 mg daily with strict hold parameters.  Monitor blood pressure per protocol.  Type 2 diabetes mellitus with hyperglycemia, without long-term current use of insulin ([facility])  Lab Results   Component Value Date    HGBA1C 7.4 (H) 03/27/2024       No results for input(s): \"POCGLU\" in the last 72 hours.    Blood Sugar Average: Last 72 hrs:  Obtain updated A1c  Hold metformin while admitted and start correctional insulin coverage with Accu-Cheks, adjust inpatient insulin regimen as needed  Carb controlled diet  Initiate hypoglycemia protocol    Nicotine dependence  Counseled on need for cessation, provide nicotine patch while admitted  NAYE (obstructive sleep apnea)  Suspected, patient not currently on CPAP  Needs outpatient follow-up with PCP for sleep studies    VTE Pharmacologic Prophylaxis: VTE Score: 3 Moderate Risk (Score 3-4) - Pharmacological DVT Prophylaxis Ordered: apixaban (Eliquis).    Mobility:      -[facility] Goal achieved. Continue to encourage appropriate mobility.    Patient Centered Rounds: I performed bedside rounds with nursing staff today.       Education and Discussions with Family / Patient: Patient declined call to .     Current Length of Stay: 0 day(s)  Current Patient Status: Observation   Certification Statement: The patient will continue to require additional inpatient hospital stay due to IV steroids and alcohol withdrawal  Discharge Plan: Anticipate discharge tomorrow to home.    Code Status: Level 1 - Full Code    Subjective   Breathing has improved. Still with HR in the 120's. States he has been drinking heavily recently and is starting to feel shaky. Last drink of alcohol was yesterday.    Objective :  Temp:  [98.3 °F (36.8 °C)] 98.3 °F (36.8 °C)  HR:  [] 114  BP: (159-188)/() 169/78  Resp:  [18-22] 20  SpO2:  [90 %-92 %] 90 %  O2 Device: None (Room " air)  Nasal Cannula O2 Flow Rate (L/min):  [2 L/min] 2 L/min    There is no height or weight on file to calculate BMI.     Input and Output Summary (last 24 hours):   No intake or output data in the 24 hours ending 01/26/25 1117    Physical Exam  Constitutional:       General: He is not in acute distress.     Appearance: He is well-developed.   HENT:      Head: Normocephalic and atraumatic.   Cardiovascular:      Rate and Rhythm: Tachycardia present. Rhythm irregularly irregular.      Heart sounds: No murmur heard.  Pulmonary:      Effort: Pulmonary effort is normal. No respiratory distress.      Breath sounds: Wheezing (expiratory wheezes) present. No rales.   Abdominal:      General: Bowel sounds are normal. There is no distension.      Palpations: Abdomen is soft.   Musculoskeletal:      Cervical back: Normal range of motion and neck supple.   Skin:     General: Skin is warm and dry.      Findings: No rash.   Neurological:      Mental Status: He is alert and oriented to person, place, and time.      Cranial Nerves: No cranial nerve deficit.      Comments: tremor           Lines/Drains:              Lab Results: I have reviewed the following results:   Results from last 7 days   Lab Units 01/26/25  0040   WBC Thousand/uL 9.13   HEMOGLOBIN g/dL 16.6   HEMATOCRIT % 49.7*   PLATELETS Thousands/uL 261   SEGS PCT % 54   LYMPHO PCT % 30   MONO PCT % 10   EOS PCT % 5     Results from last 7 days   Lab Units 01/26/25  0040   SODIUM mmol/L 135   POTASSIUM mmol/L 4.6   CHLORIDE mmol/L 98   CO2 mmol/L 30   BUN mg/dL 18   CREATININE mg/dL 0.99   ANION GAP mmol/L 7   CALCIUM mg/dL 10.4*   ALBUMIN g/dL 4.6   TOTAL BILIRUBIN mg/dL 0.59   ALK PHOS U/L 78   ALT U/L 31   AST U/L 31   GLUCOSE RANDOM mg/dL 149*                 Results from last 7 days   Lab Units 01/26/25  0436   PROCALCITONIN ng/ml <0.05       Recent Cultures (last 7 days):         Imaging Results Review: I reviewed radiology reports from this admission including:  chest xray.      Last 24 Hours Medication List:     Current Facility-Administered Medications:     acetaminophen (TYLENOL) tablet 650 mg, Q6H PRN    apixaban (ELIQUIS) tablet 5 mg, BID    azithromycin (ZITHROMAX) tablet 500 mg, Q24H    budesonide (PULMICORT) inhalation solution 0.5 mg, Q12H    folic acid (FOLVITE) tablet 1 mg, Daily    insulin lispro (HumALOG/ADMELOG) 100 units/mL subcutaneous injection 1-5 Units, HS    insulin lispro (HumALOG/ADMELOG) 100 units/mL subcutaneous injection 1-6 Units, TID AC **AND** Fingerstick Glucose (POCT), TID AC    ipratropium (ATROVENT) 0.02 % inhalation solution 0.5 mg, TID    levalbuterol (XOPENEX) inhalation solution 1.25 mg, TID **AND** [DISCONTINUED] sodium chloride 0.9 % inhalation solution 3 mL, TID    losartan (COZAAR) tablet 50 mg, Daily    methylPREDNISolone sodium succinate (Solu-MEDROL) injection 40 mg, Q12H YESI    metoprolol (LOPRESSOR) injection 5 mg, Q6H PRN    metoprolol tartrate (LOPRESSOR) tablet 50 mg, Q12H    multivitamin-minerals (CENTRUM) tablet 1 tablet, Daily    nicotine (NICODERM CQ) 21 mg/24 hr TD 24 hr patch 1 patch, Daily    nicotine polacrilex (NICORETTE) gum 2 mg, Q2H PRN    tamsulosin (FLOMAX) capsule 0.4 mg, Daily With Dinner    thiamine tablet 100 mg, Daily    thiamine tablet 100 mg, Daily    Administrative Statements   Today, Patient Was Seen By: Cleo Crenshaw PA-C        **Please Note: This note may have been constructed using a voice recognition system.**

## 2025-01-26 NOTE — H&P
H&P - Hospitalist   Name: Callum Nielson 59 y.o. male I MRN: [id number]  Unit/Bed#: Z6HB I Date of Admission: 1/25/2025   Date of Service: 1/26/2025 I Hospital Day: 0     Assessment & Plan  Chronic obstructive pulmonary disease with acute exacerbation ([facility])  Presented worsening shortness of breath, additionally noted increased cough over the past 3 days.  Significant wheezing noted on examination  Initially responded to albuterol/ipratropium nebulizer and 125 mg IV Solu-Medrol however now with recurrent wheezing and is wearing 2L NC primarily for comfort  Labs otherwise without marked abnormality compared to prior, rapid COVID-19/influenza swab negative, CXR awaiting final read but wet read appears without acute cardiopulmonary disease visualized  Appears mild exacerbation of presumed COPD, patient reports apparent diagnosis of this however no PFTs readily available on admission  We will continue IV Solu-Medrol through today, hopefully can transition to oral prednisone for short course 1/27/2025  Continue nebulizers while admitted, start azithromycin x 3 days  Follow-up procalcitonin  Provide supplemental oxygen if needed to maintain SaO2 88-92%.  Perform ambulatory pulse oximetry prior to discharge  Respiratory protocol, incentive spirometry, pulmonary toileting  Needs outpatient PFTs/polysomnography once recovered, can be arranged by PCP as outpatient  Atrial flutter ([facility])  Known history of atrial fibrillation/flutter, did note some palpitations/anxiety on arrival  Heart rate during ED evaluation initially controlled however HR now 100-110  Will restart home metoprolol tartrate 50 mg twice daily and assess response, if remains tachycardic initiate telemetry and further management as appropriate  Patient previously stopped his Eliquis for anticoagulation however states he resumed over the past 3 days.  Continue Eliquis  Essential hypertension  Resume PTA metoprolol tartrate 50 mg twice daily and losartan 50 mg  "daily with strict hold parameters.  Monitor blood pressure per protocol.  Type 2 diabetes mellitus with hyperglycemia, without long-term current use of insulin ([facility])  Lab Results   Component Value Date    HGBA1C 7.4 (H) 03/27/2024       No results for input(s): \"POCGLU\" in the last 72 hours.    Blood Sugar Average: Last 72 hrs:  Obtain updated A1c  Hold metformin while admitted and start correctional insulin coverage with Accu-Cheks, adjust inpatient insulin regimen as needed  Carb controlled diet  Initiate hypoglycemia protocol    Nicotine dependence  Counseled on need for cessation, provide nicotine patch while admitted  NAYE (obstructive sleep apnea)  Suspected, patient not currently on CPAP  Needs outpatient follow-up with PCP for sleep studies      VTE Prophylaxis: Apixaban (Eliquis)  / sequential compression device   Code Status: Level 1 - Full Code   POLST: POLST form is not discussed and not completed at this time.    Anticipated Length of Stay:  Patient will be admitted on an Observation basis with an anticipated length of stay of less than 2 midnights.   Justification for Hospital Stay: Please see detailed plans noted above.    Chief Complaint:     Shortness of breath, anxiety  History of Present Illness:  Callum Nielson is a 59 y.o. male who has a past medical history significant for reported COPD, atrial fibrillation/flutter, nicotine dependence of approximate 1 pack/day, hypertension, type 2 diabetes on metformin who presented with increased shortness of breath and reported anxiety.  Patient states for the past several days he has noted some increasing shortness of breath along with increased frequency of nonproductive cough though denied fever/chills, chest pain/pressure, weight gain/edema, or other systemic symptoms.  This evening he noted a further increase in shortness of breath additionally was increased anxiety and some palpitations similar to episode where he was diagnosed with atrial flutter " which prompted presentation.    During ED evaluation he received albuterol/ipratropium nebulizer and 125 mg IV Solu-Medrol with initial improvement however did require 2L NC primarily for comfort.  His labs were noted without marked abnormality compared to prior, negative COVID-19/influenza rapid swab, and CXR which is awaiting final read however to wet read appears without acute cardiopulmonary disease visualized.  Despite after mentioned treatments he persisted with supplemental oxygen need and wheezing is admitted as observation for further management of presumed COPD exacerbation.  Currently he is lying in bed in no acute distress and comfortable appearing, noting improvement in his his respiratory status however does note rapid heart rate on monitor though denies any overt symptoms.    Review of Systems:    Constitutional:  Denies fever or chills   Eyes:  Denies change in visual acuity   HENT:  Denies nasal congestion or sore throat   Respiratory: Reported cough and shortness of breath  Cardiovascular:  Denies chest pain or edema   GI:  Denies abdominal pain, nausea, vomiting, bloody stools or diarrhea   :  Denies dysuria   Musculoskeletal:  Denies back pain or joint pain   Integument:  Denies rash   Neurologic:  Denies headache, focal weakness or sensory changes   Endocrine:  Denies polyuria or polydipsia   Lymphatic:  Denies swollen glands   Psychiatric:  Denies depression or anxiety     Past Medical and Surgical History:   Past Medical History:   Diagnosis Date    Adhesive capsulitis of right shoulder 05/17/2022    Anxiety     Last assessed 12/20/2013    Contusion of right wrist 01/30/2018    COPD (chronic obstructive pulmonary disease) ([facility]) 08/16/2016    COPD (chronic obstructive pulmonary disease) ([facility]) 08/16/2016    Depression with anxiety     Diverticulitis 2016    Eczema 02/24/2016    Elevated ALT measurement 08/16/2016    History of atrial fibrillation     Hypertension     Obesity 09/25/2012     Perianal abscess 03/05/2019    Type 2 diabetes mellitus with hyperglycemia, without long-term current use of insulin (HCC)      Past Surgical History:   Procedure Laterality Date    CARDIAC LOOP RECORDER  2001    COLONOSCOPY      KNEE SURGERY      staph infection    NH SURGICAL ARTHROSCOPY PALMER W/CORACOACRM LIGM RLS Right 1/17/2024    Procedure: ARTHROSCOPIC SUBACROMIAL DECOMPRESSION;  Surgeon: Waldo Chambers MD;  Location: [facility];  Service: Orthopedics    NH SURGICAL ARTHROSCOPY SHOULDER BICEPS TENODESIS Right 1/17/2024    Procedure: ARTHROSCOPIC BICEPS TENODESIS;  Surgeon: Waldo Chambers MD;  Location: [facility];  Service: Orthopedics    NH SURGICAL ARTHROSCOPY SHOULDER W/ROTATOR CUFF RPR Right 1/17/2024    Procedure: REPAIR ROTATOR CUFF  ARTHROSCOPIC;  Surgeon: Waldo Chambers MD;  Location: [facility];  Service: Orthopedics       Meds/Allergies:  Current Outpatient Medications   Medication Instructions    albuterol (PROVENTIL HFA,VENTOLIN HFA) 90 mcg/act inhaler INHALE 2 PUFFS BY MOUTH EVERY 6 HOURS AS NEEDED FOR WHEEZE    ALPRAZolam (XANAX) 0.5 mg, Oral, As needed    Eliquis 5 MG TAKE 1 TABLET BY MOUTH TWICE A DAY    folic acid (FOLVITE) 1 mg, Oral, Daily    losartan (COZAAR) 50 mg tablet TAKE 1 TABLET BY MOUTH EVERY DAY    metFORMIN (GLUCOPHAGE) 1,000 mg, Oral, 2 times daily with meals    metoprolol tartrate (LOPRESSOR) 50 mg tablet TAKE 1 TABLET BY MOUTH EVERY 12 HOURS    multivitamin (THERAGRAN) TABS 1 tablet, Oral, Daily    tamsulosin (FLOMAX) 0.4 mg, Oral, Daily with dinner    thiamine 100 mg, Oral, Daily         Allergies:   Allergies   Allergen Reactions    Lisinopril Cough    Spiriva Respimat [Tiotropium Bromide Monohydrate] Anxiety     History:  Marital Status: /Civil Union     Substance Use History:   Social History     Substance and Sexual Activity   Alcohol Use Yes    Alcohol/week: 70.0 standard drinks of alcohol    Types: 70 Shots of liquor per week    Comment: occ     Social History      Tobacco Use   Smoking Status Every Day    Current packs/day: 0.00    Average packs/day: 1 pack/day for 42.0 years (42.0 ttl pk-yrs)    Types: Cigarettes    Start date:     Last attempt to quit:     Years since quittin.0   Smokeless Tobacco Never   Tobacco Comments    Quit 5 days ago     Social History     Substance and Sexual Activity   Drug Use No       Family History:  Family History   Problem Relation Age of Onset    Cancer Mother         Lung Cancer    Cancer Father     Diabetes Father     Diabetes Sister        Physical Exam:     Vitals:   Blood Pressure: 159/65 (25)  Pulse: 100 (25)  Temperature: 98.3 °F (36.8 °C) (25)  Temp Source: Temporal (25)  Respirations: 18 (25)  SpO2: 92 % (25)    Constitutional:  Well developed, well nourished, no acute distress, non-toxic appearance   Eyes:  PERRL, conjunctiva normal   HENT:  Atraumatic, external ears normal, nose normal, oropharynx moist, no pharyngeal exudates. Neck- normal range of motion, no tenderness, supple   Respiratory:  No respiratory distress, diminished breath sounds with diffuse expiratory wheezing bilaterally, no rales  Cardiovascular: Tachycardic rate, normal rhythm, no murmurs, no gallops, no rubs   GI:  Soft, nondistended, normal bowel sounds, nontender, no organomegaly, no mass, no rebound, no guarding   :  No costovertebral angle tenderness   Musculoskeletal:  No edema, no tenderness, no deformities. Back- no tenderness  Integument:  Well hydrated, no rash   Lymphatic:  No lymphadenopathy noted   Neurologic:  Alert &awake, communicative, CN 2-12 normal, normal motor function, normal sensory function, no focal deficits noted   Psychiatric:  Speech and behavior appropriate       Lab Results: I have reviewed laboratory reports/results from this admission  Results from last 7 days   Lab Units 25  0040   WBC Thousand/uL 9.13   HEMOGLOBIN g/dL 16.6   HEMATOCRIT %  49.7*   PLATELETS Thousands/uL 261   SEGS PCT % 54   LYMPHO PCT % 30   MONO PCT % 10   EOS PCT % 5     Results from last 7 days   Lab Units 01/26/25  0040   SODIUM mmol/L 135   POTASSIUM mmol/L 4.6   CHLORIDE mmol/L 98   CO2 mmol/L 30   BUN mg/dL 18   CREATININE mg/dL 0.99   ANION GAP mmol/L 7   CALCIUM mg/dL 10.4*   ALBUMIN g/dL 4.6   TOTAL BILIRUBIN mg/dL 0.59   ALK PHOS U/L 78   ALT U/L 31   AST U/L 31   GLUCOSE RANDOM mg/dL 149*                       EKG: Personally reviewed, normal sinus rhythm HR 87    Imaging: Results Review Statement: I personally reviewed the following image studies in PACS and associated radiology reports: chest xray. My interpretation of the radiology images/reports is: No acute cardiopulmonary disease visualized.  Final radiologist read is pending..    ** Please Note: Dragon 360 Dictation voice to text software was used in the creation of this document. **      ** Please Note: This note has been constructed using a voice recognition system. **

## 2025-01-26 NOTE — ASSESSMENT & PLAN NOTE
"Lab Results   Component Value Date    HGBA1C 7.4 (H) 03/27/2024       No results for input(s): \"POCGLU\" in the last 72 hours.    Blood Sugar Average: Last 72 hrs:  Obtain updated A1c  Hold metformin while admitted and start correctional insulin coverage with Accu-Cheks, adjust inpatient insulin regimen as needed  Carb controlled diet  Initiate hypoglycemia protocol    "

## 2025-01-26 NOTE — ASSESSMENT & PLAN NOTE
Patient states he has been drinking about a half a bottle of Susan a day for the last 6 months. Last drink was yesterday. States he is feeling shaky and anxious  Start CIWA protocol, thiamine and folic acid

## 2025-01-26 NOTE — ASSESSMENT & PLAN NOTE
Presented worsening shortness of breath, additionally noted increased cough over the past 3 days.  Significant wheezing noted on examination  Initially responded to albuterol/ipratropium nebulizer and 125 mg IV Solu-Medrol however now with recurrent wheezing and is wearing 2L NC primarily for comfort  Labs otherwise without marked abnormality compared to prior, rapid COVID-19/influenza swab negative, CXR awaiting final read but wet read appears without acute cardiopulmonary disease visualized  Appears mild exacerbation of presumed COPD, patient reports apparent diagnosis of this however no PFTs readily available on admission  We will continue IV Solu-Medrol through today, hopefully can transition to oral prednisone for short course 1/27/2025  Continue nebulizers while admitted, start azithromycin x 3 days  Follow-up procalcitonin  Provide supplemental oxygen if needed to maintain SaO2 88-92%.  Perform ambulatory pulse oximetry prior to discharge  Respiratory protocol, incentive spirometry, pulmonary toileting  Needs outpatient PFTs/polysomnography once recovered, can be arranged by PCP as outpatient

## 2025-01-26 NOTE — ASSESSMENT & PLAN NOTE
Presented worsening shortness of breath, additionally noted increased cough over the past 3 days.  Significant wheezing noted on examination  Initially responded to albuterol/ipratropium nebulizer and 125 mg IV Solu-Medrol however now with recurrent wheezing and is wearing 2L NC primarily for comfort  Labs otherwise without marked abnormality compared to prior, COVID-19/influenza swab negative, CXR without acute cardiopulmonary disease. Procalcitonin normal  Appears mild exacerbation of presumed COPD, patient reports apparent diagnosis of this however no PFTs readily available on admission  We will continue IV Solu-Medrol through today, hopefully can transition to oral prednisone for short course 1/27/2025  Continue nebulizers while admitted, start azithromycin x 3 days  Provide supplemental oxygen if needed to maintain SaO2 88-92%.  Perform ambulatory pulse oximetry prior to discharge  Respiratory protocol, incentive spirometry, pulmonary toileting  Needs outpatient PFTs/polysomnography once recovered, can be arranged by PCP as outpatient

## 2025-01-26 NOTE — ASSESSMENT & PLAN NOTE
Resume PTA metoprolol tartrate 50 mg twice daily and losartan 50 mg daily with strict hold parameters.  Monitor blood pressure per protocol.

## 2025-01-26 NOTE — ED PROVIDER NOTES
Time reflects when diagnosis was documented in both MDM as applicable and the Disposition within this note       Time User Action Codes Description Comment    1/26/2025  1:46 AM Chepe Plunkett [J44.1] COPD exacerbation (HCC)     1/26/2025  1:46 AM Chepe Plunkett [F41.9] Anxiety           ED Disposition       ED Disposition   Admit    Condition   Stable    Date/Time   Sun Jan 26, 2025  6:39 AM    Comment   Case was discussed with ESDRAS and the patient's admission status was agreed to be Admission Status: observation status to the service of Dr. Hamilton.               Assessment & Plan       Medical Decision Making  Patient is a 59 y.o. male with PMH of COPD, hypertension, atrial fibrillation, diabetes type 2 who presents to the ED with shortness of breath, productive cough.    Vital signs stable, afebrile initially normoxic, however dips down to mid 80s when asleep. On exam diffuse wheezing.    History and physical exam most consistent with COPD exacerbation. However, differential diagnosis included but not limited to ACS, electrolyte abnormality, dysrhythmia, alcohol abuse.     Plan: Nebulized medications including albuterol and ipratropium.  Chest x-ray.  EKG.  Troponin, CBC, CMP, ambulatory pulse ox    View ED course above for further discussion on patient workup.     On review of previous records reviewed previous medical records.    All labs reviewed and utilized in the medical decision making process  All radiology studies independently viewed by me and interpreted by the radiologist.  I reviewed all testing with the patient.     Upon re-evaluation patient continuing to wheeze with total of 10 mg albuterol, 1 mg ipratropium.  Chest x-ray negative for infiltrates.  Patient tolerated ambulatory pulse ox test, however then after sat down, and desaturated to 88%, necessitating admission for COPD exacerbation.  Ordered additional albuterol and ipratropium treatment    Disposition, admission for  "observation    Portions of the record may have been created with voice recognition software. Occasional wrong word or \"sound a like\" substitutions may have occurred due to the inherent limitations of voice recognition software. Read the chart carefully and recognize, using context, where substitutions have occurred.      Amount and/or Complexity of Data Reviewed  Labs: ordered. Decision-making details documented in ED Course.  Radiology: ordered and independent interpretation performed.  ECG/medicine tests:  Decision-making details documented in ED Course.    Risk  Prescription drug management.  Decision regarding hospitalization.        ED Course as of 01/26/25 0826   Sun Jan 26, 2025   0041 ECG 12 lead  Normal sinus rhythm at 87 bpm.  Normal intervals.  Normal axis.  Nonspecific ST abnormalities   0116 hs TnI 0hr: 10  Indeterminate troponin, will obtain second troponin   0116 CBC and differential(!)  Within normal limits   0153 FLU/COVID Rapid Antigen (30 min. TAT) - Preferred screening test in ED  Flu COVID-negative   0153 BNP: 21   0153 Comprehensive metabolic panel(!)  Within normal limits       Medications   folic acid (FOLVITE) tablet 1 mg (has no administration in time range)   losartan (COZAAR) tablet 50 mg (has no administration in time range)   metoprolol tartrate (LOPRESSOR) tablet 50 mg (has no administration in time range)   multivitamin-minerals (CENTRUM) tablet 1 tablet (has no administration in time range)   tamsulosin (FLOMAX) capsule 0.4 mg (has no administration in time range)   thiamine tablet 100 mg (has no administration in time range)   nicotine (NICODERM CQ) 21 mg/24 hr TD 24 hr patch 1 patch (has no administration in time range)   acetaminophen (TYLENOL) tablet 650 mg (has no administration in time range)   azithromycin (ZITHROMAX) tablet 500 mg (has no administration in time range)   methylPREDNISolone sodium succinate (Solu-MEDROL) injection 40 mg (has no administration in time range) "   levalbuterol (XOPENEX) inhalation solution 1.25 mg (has no administration in time range)     And   sodium chloride 0.9 % inhalation solution 3 mL (has no administration in time range)   ipratropium (ATROVENT) 0.02 % inhalation solution 0.5 mg (has no administration in time range)   budesonide (PULMICORT) inhalation solution 0.5 mg (has no administration in time range)   insulin lispro (HumALOG/ADMELOG) 100 units/mL subcutaneous injection 1-6 Units (has no administration in time range)   insulin lispro (HumALOG/ADMELOG) 100 units/mL subcutaneous injection 1-5 Units (has no administration in time range)   apixaban (ELIQUIS) tablet 5 mg (has no administration in time range)   ipratropium (ATROVENT) 0.02 % inhalation solution 0.5 mg (0.5 mg Nebulization Given 1/26/25 0039)   albuterol inhalation solution 5 mg (5 mg Nebulization Given 1/26/25 0039)   methylPREDNISolone sodium succinate (Solu-MEDROL) injection 125 mg (125 mg Intravenous Given 1/26/25 0038)   hydrOXYzine HCL (ATARAX) tablet 25 mg (25 mg Oral Given 1/26/25 0038)   albuterol inhalation solution 5 mg (5 mg Nebulization Given 1/26/25 0149)   ipratropium (ATROVENT) 0.02 % inhalation solution 0.5 mg (0.5 mg Nebulization Given 1/26/25 0149)   albuterol inhalation solution 5 mg (5 mg Nebulization Given 1/26/25 0658)   ipratropium (ATROVENT) 0.02 % inhalation solution 0.5 mg (0.5 mg Nebulization Given 1/26/25 0659)       ED Risk Strat Scores                          SBIRT 22yo+      Flowsheet Row Most Recent Value   Initial Alcohol Screen: US AUDIT-C     1. How often do you have a drink containing alcohol? 6 Filed at: 01/25/2025 2245   2. How many drinks containing alcohol do you have on a typical day you are drinking?  6 Filed at: 01/25/2025 2245   3a. Male UNDER 65: How often do you have five or more drinks on one occasion? 6 Filed at: 01/25/2025 2245   Audit-C Score 18 Filed at: 01/25/2025 3417                            History of Present Illness       Chief  Complaint   Patient presents with    Shortness of Breath     Pt feels as though he is having difficulty breathing.  Also having anxiety attacks thinking he is not breathing while sleeping.       Past Medical History:   Diagnosis Date    Adhesive capsulitis of right shoulder 2022    Anxiety     Last assessed 2013    Contusion of right wrist 2018    COPD (chronic obstructive pulmonary disease) ([facility]) 2016    COPD (chronic obstructive pulmonary disease) ([facility]) 2016    Depression with anxiety     Diverticulitis 2016    Eczema 2016    Elevated ALT measurement 2016    History of atrial fibrillation     Hypertension     Obesity 2012    Perianal abscess 2019    Type 2 diabetes mellitus with hyperglycemia, without long-term current use of insulin ([facility])       Past Surgical History:   Procedure Laterality Date    CARDIAC LOOP RECORDER      COLONOSCOPY      KNEE SURGERY      staph infection    LA SURGICAL ARTHROSCOPY PALMER W/CORACOACRM LIGM RLS Right 2024    Procedure: ARTHROSCOPIC SUBACROMIAL DECOMPRESSION;  Surgeon: Waldo Chambers MD;  [facility];  Service: Orthopedics    LA SURGICAL ARTHROSCOPY SHOULDER BICEPS TENODESIS Right 2024    Procedure: ARTHROSCOPIC BICEPS TENODESIS;  Surgeon: Waldo Chambers MD;  [facility];  Service: Orthopedics    LA SURGICAL ARTHROSCOPY SHOULDER W/ROTATOR CUFF RPR Right 2024    Procedure: REPAIR ROTATOR CUFF  ARTHROSCOPIC;  Surgeon: Waldo Chambers MD;  [facility];  Service: Orthopedics      Family History   Problem Relation Age of Onset    Cancer Mother         Lung Cancer    Cancer Father     Diabetes Father     Diabetes Sister       Social History     Tobacco Use    Smoking status: Every Day     Current packs/day: 0.00     Average packs/day: 1 pack/day for 42.0 years (42.0 ttl pk-yrs)     Types: Cigarettes     Start date:      Last attempt to quit:      Years since quittin.0     Smokeless tobacco: Never    Tobacco comments:     Quit 5 days ago   Vaping Use    Vaping status: Never Used   Substance Use Topics    Alcohol use: Yes     Alcohol/week: 70.0 standard drinks of alcohol     Types: 70 Shots of liquor per week     Comment: occ    Drug use: No      E-Cigarette/Vaping    E-Cigarette Use Never User       E-Cigarette/Vaping Substances    Nicotine No     THC No     CBD No     Flavoring No     Other No     Unknown No       I have reviewed and agree with the history as documented.     5-year-old female with history of hypertension, A-fib on Eliquis, DM type II, COPD presenting to emergency department for shortness of breath.  Patient reports baseline shortness of breath for several months, however acutely worsened over the past week.  Also reports productive cough for the last week.  Patient further reports that he is an every day drinker, drinking one half bottle of christopher.  Last drink at approximately 2 PM this morning.    Patient also reports feeling very anxious to go to sleep, as he thinks he is not getting enough oxygen while asleep.  Patient has been previously clinically diagnosed with obstructive sleep apnea, however has not completed a sleep study.  Denies fever, chills, chest pain, nausea, vomiting, abdominal pain      Shortness of Breath  Associated symptoms: cough and wheezing    Associated symptoms: no abdominal pain, no chest pain, no ear pain, no fever, no headaches, no rash, no sore throat and no vomiting        Review of Systems   Constitutional:  Negative for chills and fever.   HENT:  Negative for congestion, ear pain, rhinorrhea and sore throat.    Respiratory:  Positive for cough, shortness of breath and wheezing.    Cardiovascular:  Negative for chest pain, palpitations and leg swelling.   Gastrointestinal:  Negative for abdominal pain, constipation, diarrhea, nausea and vomiting.   Musculoskeletal:  Negative for gait problem.   Skin:  Negative for rash.   Neurological:   Negative for light-headedness and headaches.   All other systems reviewed and are negative.          Objective       ED Triage Vitals   Temperature Pulse Blood Pressure Respirations SpO2 Patient Position - Orthostatic VS   01/25/25 2246 01/25/25 2246 01/25/25 2246 01/25/25 2246 01/25/25 2246 01/26/25 0453   98.3 °F (36.8 °C) 89 (!) 188/101 22 92 % Lying      Temp Source Heart Rate Source BP Location FiO2 (%) Pain Score    01/25/25 2246 01/25/25 2246 01/26/25 0453 -- 01/25/25 2246    Temporal Monitor Right arm  No Pain      Vitals      Date and Time Temp Pulse SpO2 Resp BP Pain Score FACES Pain Rating User   01/26/25 0800 -- 114 90 % 20 169/78 -- -- EL   01/26/25 0453 -- 100 92 % 18 159/65 -- -- KS   01/25/25 2246 98.3 °F (36.8 °C) 89 92 % 22 188/101 No Pain -- AB            Physical Exam  Vitals and nursing note reviewed.   Constitutional:       General: He is not in acute distress.     Appearance: Normal appearance. He is not ill-appearing.   HENT:      Head: Normocephalic and atraumatic.      Mouth/Throat:      Mouth: Mucous membranes are moist.   Eyes:      Conjunctiva/sclera: Conjunctivae normal.   Cardiovascular:      Rate and Rhythm: Normal rate and regular rhythm.      Heart sounds: No murmur heard.     No friction rub. No gallop.   Pulmonary:      Effort: Pulmonary effort is normal. No respiratory distress.      Breath sounds: Wheezing present. No rhonchi or rales.   Abdominal:      General: There is no distension.      Palpations: Abdomen is soft.      Tenderness: There is no abdominal tenderness. There is no guarding or rebound.   Musculoskeletal:         General: Normal range of motion.   Skin:     General: Skin is dry.      Capillary Refill: Capillary refill takes less than 2 seconds.   Neurological:      General: No focal deficit present.      Mental Status: He is alert.   Psychiatric:         Mood and Affect: Mood normal.         Behavior: Behavior normal.         Results Reviewed       Procedure  Component Value Units Date/Time    Hemoglobin A1c w/EAG Estimation (Prechecked if no A1C within 90 days) [[id number]]     Lab Status: No result Specimen: Blood     Procalcitonin [[id number]]     Lab Status: No result Specimen: Blood     HS Troponin I 2hr [[id number]]  (Normal) Collected: 01/26/25 0436    Lab Status: Final result Specimen: Blood from Arm, Right Updated: 01/26/25 0503     hs TnI 2hr 6 ng/L      Delta 2hr hsTnI -4 ng/L     B-Type Natriuretic Peptide(BNP) [[id number]]  (Normal) Collected: 01/26/25 0040    Lab Status: Final result Specimen: Blood from Arm, Right Updated: 01/26/25 0151     BNP 21 pg/mL     FLU/COVID Rapid Antigen (30 min. TAT) - Preferred screening test in ED [[id number]]  (Normal) Collected: 01/26/25 0040    Lab Status: Final result Specimen: Nares from Nose Updated: 01/26/25 0128     SARS COV Rapid Antigen Negative     Influenza A Rapid Antigen Negative     Influenza B Rapid Antigen Negative    Narrative:      This test has been performed using the [employer] Meghana 2 FLU+SARS Antigen test under the Emergency Use Authorization (EUA). This test has been validated by the  and verified by the performing laboratory. The Meghana uses lateral flow immunofluorescent sandwich assay to detect SARS-COV, Influenza A and Influenza B Antigen.     The Quidel Meghana 2 SARS Antigen test does not differentiate between SARS-CoV and SARS-CoV-2.     Negative results are presumptive and may be confirmed with a molecular assay, if necessary, for patient management. Negative results do not rule out SARS-CoV-2 or influenza infection and should not be used as the sole basis for treatment or patient management decisions. A negative test result may occur if the level of antigen in a sample is below the limit of detection of this test.     Positive results are indicative of the presence of viral antigens, but do not rule out bacterial infection or co-infection with other viruses.     All test results should be used  as an adjunct to clinical observations and other information available to the provider.    FOR PEDIATRIC PATIENTS - copy/paste COVID Guidelines URL to browser: [email or web address]    Comprehensive metabolic panel [[id number]]  (Abnormal) Collected: 01/26/25 0040    Lab Status: Final result Specimen: Blood from Arm, Right Updated: 01/26/25 0119     Sodium 135 mmol/L      Potassium 4.6 mmol/L      Chloride 98 mmol/L      CO2 30 mmol/L      ANION GAP 7 mmol/L      BUN 18 mg/dL      Creatinine 0.99 mg/dL      Glucose 149 mg/dL      Calcium 10.4 mg/dL      AST 31 U/L      ALT 31 U/L      Alkaline Phosphatase 78 U/L      Total Protein 7.4 g/dL      Albumin 4.6 g/dL      Total Bilirubin 0.59 mg/dL      eGFR 82 ml/min/1.73sq m     Narrative:      National Kidney Disease Foundation guidelines for Chronic Kidney Disease (CKD):     Stage 1 with normal or high GFR (GFR > 90 mL/min/1.73 square meters)    Stage 2 Mild CKD (GFR = 60-89 mL/min/1.73 square meters)    Stage 3A Moderate CKD (GFR = 45-59 mL/min/1.73 square meters)    Stage 3B Moderate CKD (GFR = 30-44 mL/min/1.73 square meters)    Stage 4 Severe CKD (GFR = 15-29 mL/min/1.73 square meters)    Stage 5 End Stage CKD (GFR <15 mL/min/1.73 square meters)  Note: GFR calculation is accurate only with a steady state creatinine    HS Troponin 0hr (reflex protocol) [[id number]]  (Normal) Collected: 01/26/25 0040    Lab Status: Final result Specimen: Blood from Arm, Left Updated: 01/26/25 0115     hs TnI 0hr 10 ng/L     CBC and differential [[id number]]  (Abnormal) Collected: 01/26/25 0040    Lab Status: Final result Specimen: Blood from Arm, Right Updated: 01/26/25 0105     WBC 9.13 Thousand/uL      RBC 5.12 Million/uL      Hemoglobin 16.6 g/dL      Hematocrit 49.7 %      MCV 97 fL      MCH 32.4 pg      MCHC 33.4 g/dL      RDW 13.7 %      MPV 9.2 fL      Platelets 261 Thousands/uL      nRBC 0 /100 WBCs      Segmented % 54 %       Immature Grans % 0 %      Lymphocytes % 30 %      Monocytes % 10 %      Eosinophils Relative 5 %      Basophils Relative 1 %      Absolute Neutrophils 4.93 Thousands/µL      Absolute Immature Grans 0.04 Thousand/uL      Absolute Lymphocytes 2.71 Thousands/µL      Absolute Monocytes 0.94 Thousand/µL      Eosinophils Absolute 0.43 Thousand/µL      Basophils Absolute 0.08 Thousands/µL             XR chest 2 views   ED Interpretation by Chepe Plunkett DO (01/26 0154)   chest x-ray 2 views, large lung volumes, no obvious infiltrates, no effusion, no pneumothorax.      Final Interpretation by Malaika Henderson MD (01/26 0824)      No acute cardiopulmonary disease.               Workstation performed: [id number]             Procedures    ED Medication and Procedure Management   Prior to Admission Medications   Prescriptions Last Dose Informant Patient Reported? Taking?   ALPRAZolam (XANAX) 0.5 mg tablet   No No   Sig: Take 1 tablet (0.5 mg total) by mouth if needed for anxiety (Take 1 tablet 45 minutes prior to procedure prn, take a second tablet 15 minutes before procedure prn)   Eliquis 5 MG 1/25/2025 Self No Yes   Sig: TAKE 1 TABLET BY MOUTH TWICE A DAY   albuterol (PROVENTIL HFA,VENTOLIN HFA) 90 mcg/act inhaler   No No   Sig: INHALE 2 PUFFS BY MOUTH EVERY 6 HOURS AS NEEDED FOR WHEEZE   folic acid (FOLVITE) 1 mg tablet  Self No No   Sig: Take 1 tablet (1 mg total) by mouth in the morning.   losartan (COZAAR) 50 mg tablet   No No   Sig: TAKE 1 TABLET BY MOUTH EVERY DAY   metFORMIN (GLUCOPHAGE) 1000 MG tablet   No No   Sig: TAKE 1 TABLET BY MOUTH TWICE A DAY WITH FOOD   metoprolol tartrate (LOPRESSOR) 50 mg tablet   No No   Sig: TAKE 1 TABLET BY MOUTH EVERY 12 HOURS   multivitamin (THERAGRAN) TABS  Self Yes No   Sig: Take 1 tablet by mouth daily   tamsulosin (FLOMAX) 0.4 mg   No No   Sig: TAKE 1 CAPSULE BY MOUTH EVERY DAY WITH DINNER   thiamine 100 MG tablet  Self No No   Sig: Take 1 tablet (100 mg total) by  mouth in the morning.      Facility-Administered Medications: None     Patient's Medications   Discharge Prescriptions    No medications on file     No discharge procedures on file.  ED SEPSIS DOCUMENTATION   Time reflects when diagnosis was documented in both MDM as applicable and the Disposition within this note       Time User Action Codes Description Comment    1/26/2025  1:46 AM Chepe Plunkett [J44.1] COPD exacerbation (HCC)     1/26/2025  1:46 AM Chepe Plunkett [F41.9] Anxiety                  Chepe Plunkett DO  01/26/25 0827

## 2025-01-27 ENCOUNTER — TRANSITIONAL CARE MANAGEMENT (OUTPATIENT)
Dept: FAMILY MEDICINE CLINIC | Facility: CLINIC | Age: 60
End: 2025-01-27

## 2025-01-27 VITALS
OXYGEN SATURATION: 94 % | SYSTOLIC BLOOD PRESSURE: 138 MMHG | TEMPERATURE: 98.7 F | RESPIRATION RATE: 16 BRPM | HEART RATE: 82 BPM | DIASTOLIC BLOOD PRESSURE: 68 MMHG

## 2025-01-27 LAB
ANION GAP SERPL CALCULATED.3IONS-SCNC: 5 MMOL/L (ref 4–13)
BUN SERPL-MCNC: 25 MG/DL (ref 5–25)
CALCIUM SERPL-MCNC: 9.9 MG/DL (ref 8.4–10.2)
CHLORIDE SERPL-SCNC: 99 MMOL/L (ref 96–108)
CO2 SERPL-SCNC: 30 MMOL/L (ref 21–32)
CREAT SERPL-MCNC: 0.89 MG/DL (ref 0.6–1.3)
ERYTHROCYTE [DISTWIDTH] IN BLOOD BY AUTOMATED COUNT: 14 % (ref 11.6–15.1)
GFR SERPL CREATININE-BSD FRML MDRD: 92 ML/MIN/1.73SQ M
GLUCOSE SERPL-MCNC: 264 MG/DL (ref 65–140)
GLUCOSE SERPL-MCNC: 326 MG/DL (ref 65–140)
HCT VFR BLD AUTO: 47.5 % (ref 36.5–49.3)
HGB BLD-MCNC: 15.6 G/DL (ref 12–17)
MCH RBC QN AUTO: 32.3 PG (ref 26.8–34.3)
MCHC RBC AUTO-ENTMCNC: 32.8 G/DL (ref 31.4–37.4)
MCV RBC AUTO: 98 FL (ref 82–98)
PLATELET # BLD AUTO: 253 THOUSANDS/UL (ref 149–390)
PMV BLD AUTO: 9.4 FL (ref 8.9–12.7)
POTASSIUM SERPL-SCNC: 5.3 MMOL/L (ref 3.5–5.3)
PROCALCITONIN SERPL-MCNC: <0.05 NG/ML
RBC # BLD AUTO: 4.83 MILLION/UL (ref 3.88–5.62)
SODIUM SERPL-SCNC: 134 MMOL/L (ref 135–147)
WBC # BLD AUTO: 21.36 THOUSAND/UL (ref 4.31–10.16)

## 2025-01-27 PROCEDURE — 36415 COLL VENOUS BLD VENIPUNCTURE: CPT | Performed by: PHYSICIAN ASSISTANT

## 2025-01-27 PROCEDURE — 80048 BASIC METABOLIC PNL TOTAL CA: CPT | Performed by: PHYSICIAN ASSISTANT

## 2025-01-27 PROCEDURE — 85027 COMPLETE CBC AUTOMATED: CPT | Performed by: PHYSICIAN ASSISTANT

## 2025-01-27 PROCEDURE — 84145 PROCALCITONIN (PCT): CPT | Performed by: PHYSICIAN ASSISTANT

## 2025-01-27 PROCEDURE — 82948 REAGENT STRIP/BLOOD GLUCOSE: CPT

## 2025-01-27 PROCEDURE — 99239 HOSP IP/OBS DSCHRG MGMT >30: CPT | Performed by: PHYSICIAN ASSISTANT

## 2025-01-27 RX ORDER — PREDNISONE 10 MG/1
40 TABLET ORAL DAILY
Qty: 12 TABLET | Refills: 0 | Status: SHIPPED | OUTPATIENT
Start: 2025-01-28 | End: 2025-01-31

## 2025-01-27 RX ORDER — AZITHROMYCIN 500 MG/1
500 TABLET, FILM COATED ORAL EVERY 24 HOURS
Qty: 1 TABLET | Refills: 0 | Status: SHIPPED | OUTPATIENT
Start: 2025-01-28 | End: 2025-01-29

## 2025-01-27 RX ORDER — PREDNISONE 20 MG/1
40 TABLET ORAL DAILY
Status: DISCONTINUED | OUTPATIENT
Start: 2025-01-27 | End: 2025-01-27 | Stop reason: HOSPADM

## 2025-01-27 RX ADMIN — PREDNISONE 40 MG: 20 TABLET ORAL at 09:38

## 2025-01-27 RX ADMIN — THIAMINE HCL TAB 100 MG 100 MG: 100 TAB at 09:38

## 2025-01-27 RX ADMIN — AZITHROMYCIN DIHYDRATE 500 MG: 250 TABLET ORAL at 06:59

## 2025-01-27 RX ADMIN — IPRATROPIUM BROMIDE 0.5 MG: 0.5 SOLUTION RESPIRATORY (INHALATION) at 07:00

## 2025-01-27 RX ADMIN — LEVALBUTEROL HYDROCHLORIDE 1.25 MG: 1.25 SOLUTION RESPIRATORY (INHALATION) at 07:00

## 2025-01-27 RX ADMIN — NICOTINE 1 PATCH: 21 PATCH, EXTENDED RELEASE TRANSDERMAL at 09:40

## 2025-01-27 RX ADMIN — Medication 1 TABLET: at 09:38

## 2025-01-27 RX ADMIN — FOLIC ACID 1 MG: 1 TABLET ORAL at 09:38

## 2025-01-27 RX ADMIN — APIXABAN 5 MG: 5 TABLET, FILM COATED ORAL at 09:38

## 2025-01-27 RX ADMIN — METOPROLOL TARTRATE 50 MG: 50 TABLET, FILM COATED ORAL at 06:59

## 2025-01-27 RX ADMIN — INSULIN LISPRO 5 UNITS: 100 INJECTION, SOLUTION INTRAVENOUS; SUBCUTANEOUS at 06:59

## 2025-01-27 RX ADMIN — LOSARTAN POTASSIUM 50 MG: 50 TABLET, FILM COATED ORAL at 09:38

## 2025-01-27 RX ADMIN — BUDESONIDE 0.5 MG: 0.5 INHALANT RESPIRATORY (INHALATION) at 07:00

## 2025-01-27 NOTE — ASSESSMENT & PLAN NOTE
Patient states he has been drinking about a half a bottle of Susan a day for the last 6 months. Last drink was yesterday. States he is feeling shaky and anxious  Start CIWA protocol, thiamine and folic acid  At discharge, no withdrawal symptoms. Patient interested in outpatient rehab, will get a HOST referral

## 2025-01-27 NOTE — DISCHARGE INSTR - OTHER ORDERS
Halley Blackwell   Certified     [facility]  [place]  [phone]    [facility]  [address]  [place]  [address]  [phone]    [facility]  [address]  [place] [address]  [phone]    [place]   [address]  [place] [address]  [phone]    [facility]   [address]  [place] [address]  [phone]    [facility]  [address]  [place] [address]  [phone]     AA meeting guide   [email or web address]    AA Phone apps:   Meeting Guide  Everything AA  In the Rooms    AA/24 hour hotline   [phone]    [facility]   [email or web address]  [phone]

## 2025-01-27 NOTE — ASSESSMENT & PLAN NOTE
Presented worsening shortness of breath, additionally noted increased cough over the past 3 days.  Significant wheezing noted on examination  Initially responded to albuterol/ipratropium nebulizer and 125 mg IV Solu-Medrol however now with recurrent wheezing and is wearing 2L NC primarily for comfort  Labs otherwise without marked abnormality compared to prior, COVID-19/influenza swab negative, CXR without acute cardiopulmonary disease. Procalcitonin normal  Appears mild exacerbation of presumed COPD, patient reports apparent diagnosis of this however no PFTs readily available on admission  We will continue IV Solu-Medrol through 1/26, transition to oral prednisone for short course 1/27/2025  Continue nebulizers while admitted, start azithromycin x 3 days  Provide supplemental oxygen if needed to maintain SaO2 88-92%.  Perform ambulatory pulse oximetry prior to discharge - room air at discharge  Respiratory protocol, incentive spirometry, pulmonary toileting  Needs outpatient PFTs/polysomnography once recovered, can be arranged by PCP as outpatient. Pulm referral at discharge

## 2025-01-27 NOTE — DISCHARGE SUMMARY
Discharge Summary - Hospitalist   Name: Callum Nielson 59 y.o. male I MRN: [id number]  Unit/Bed#: ED 24 I Date of Admission: 1/25/2025   Date of Service: 1/27/2025 I Hospital Day: 0     Assessment & Plan  Chronic obstructive pulmonary disease with acute exacerbation (HCC)  Presented worsening shortness of breath, additionally noted increased cough over the past 3 days.  Significant wheezing noted on examination  Initially responded to albuterol/ipratropium nebulizer and 125 mg IV Solu-Medrol however now with recurrent wheezing and is wearing 2L NC primarily for comfort  Labs otherwise without marked abnormality compared to prior, COVID-19/influenza swab negative, CXR without acute cardiopulmonary disease. Procalcitonin normal  Appears mild exacerbation of presumed COPD, patient reports apparent diagnosis of this however no PFTs readily available on admission  We will continue IV Solu-Medrol through 1/26, transition to oral prednisone for short course 1/27/2025  Continue nebulizers while admitted, start azithromycin x 3 days  Provide supplemental oxygen if needed to maintain SaO2 88-92%.  Perform ambulatory pulse oximetry prior to discharge - room air at discharge  Respiratory protocol, incentive spirometry, pulmonary toileting  Needs outpatient PFTs/polysomnography once recovered, can be arranged by PCP as outpatient. Pulm referral at discharge  Atrial flutter (HCC)  Known history of atrial fibrillation/flutter, did note some palpitations/anxiety on arrival  Heart rate during ED evaluation initially controlled however HR now 120's  Will restart home metoprolol tartrate 50 mg twice daily and assess response - HR now in 80's  Patient previously stopped his Eliquis for anticoagulation however states he resumed over the past 3 days.  Continue Eliquis  History of alcohol use disorder  Patient states he has been drinking about a half a bottle of Susan a day for the last 6 months. Last drink was yesterday. States he is  feeling shaky and anxious  Start CIWA protocol, thiamine and folic acid  At discharge, no withdrawal symptoms. Patient interested in outpatient rehab, will get a HOST referral  Essential hypertension  Resume PTA metoprolol tartrate 50 mg twice daily and losartan 50 mg daily with strict hold parameters.  Monitor blood pressure per protocol.  Type 2 diabetes mellitus with hyperglycemia, without long-term current use of insulin ([facility])  Lab Results   Component Value Date    HGBA1C 7.1 (H) 01/26/2025       Recent Labs     01/26/25  1159 01/26/25  1730 01/26/25  2140 01/27/25  0655   POCGLU 423* 298* 199* 326*       Blood Sugar Average: Last 72 hrs:  (P) 311.5Obtain updated A1c  Hold metformin while admitted and start correctional insulin coverage with Accu-Cheks, adjust inpatient insulin regimen as needed  Carb controlled diet  Initiate hypoglycemia protocol  BS elevated in the setting of steroids    Nicotine dependence  Counseled on need for cessation, provide nicotine patch while admitted  NAYE (obstructive sleep apnea)  Suspected, patient not currently on CPAP  Needs outpatient follow-up with PCP for sleep studies     Medical Problems       Resolved Problems  Date Reviewed: 7/16/2024   None       Discharging Physician / Practitioner: Cleo Crenshaw PA-C  PCP: Lamont Guzmán MD  Admission Date:   Admission Orders (From admission, onward)       Ordered        01/26/25 0640  Place in Observation  Once                          Discharge Date: 01/27/25    Consultations During Hospital Stay:  none    Procedures Performed:     CXR  No acute cardiopulmonary disease.     Significant Findings / Test Results:   See above    Incidental Findings:   none     Test Results Pending at Discharge (will require follow up):   none     Outpatient Tests Requested:  PFT's and sleep study    Complications:  none    Reason for Admission: SOB    Hospital Course:   Callum Nielson is a 59 y.o. male patient who originally presented to the hospital on  1/25/2025 due to shortness of breath.  Patient with presumed COPD exacerbation though patient has not had formal PFTs.  He improved with IV Solu-Medrol.  He will be transition to oral prednisone for 3 more days at discharge.  Patient will 3-day course of oral azithromycin.  Procalcitonin was negative and chest x-ray was clear, suspect viral respiratory virus.  COVID/flu/influenza were negative.  Counseled patient on smoking cessation.  Patient also with a history of atrial flutter.  His rates were mildly elevated on admission, but improved with his home meds.  Patient also with a history of alcohol abuse.  Patient's last drink was 1/25.  He initially had some mild withdrawal symptoms of tremor and anxiety, but this resolved.  Patient was open to outpatient alcohol rehab.  A host referral will be made prior to his discharge.  Patient will be discharged home in stable condition.      Please see above list of diagnoses and related plan for additional information.     Condition at Discharge: good    Discharge Day Visit / Exam:   Subjective: Feels better today.  Denies shortness of breath.  Mild intermittent cough.  Tremor resolved.  Denies any palpitations.  Vitals: Blood Pressure: 136/77 (01/27/25 0659)  Pulse: 93 (01/27/25 0659)  Temperature: 98.3 °F (36.8 °C) (01/25/25 2246)  Temp Source: Temporal (01/25/25 2246)  Respirations: 20 (01/27/25 0430)  SpO2: 93 % (01/27/25 0430)  Physical Exam  Constitutional:       General: He is not in acute distress.     Appearance: He is well-developed.   HENT:      Head: Normocephalic and atraumatic.   Cardiovascular:      Rate and Rhythm: Normal rate and regular rhythm.      Heart sounds: No murmur heard.  Pulmonary:      Effort: Pulmonary effort is normal. No respiratory distress.      Breath sounds: Normal breath sounds. No wheezing or rales.   Abdominal:      General: Bowel sounds are normal. There is no distension.      Palpations: Abdomen is soft.   Musculoskeletal:       Cervical back: Normal range of motion and neck supple.   Skin:     General: Skin is warm and dry.      Findings: No rash.   Neurological:      Mental Status: He is alert and oriented to person, place, and time.      Cranial Nerves: No cranial nerve deficit.          Discussion with Family: Updated  (son) at bedside.    Discharge instructions/Information to patient and family:   See after visit summary for information provided to patient and family.      Provisions for Follow-Up Care:  See after visit summary for information related to follow-up care and any pertinent home health orders.      Mobility at time of Discharge:      HLM Goal achieved. Continue to encourage appropriate mobility.     Disposition:   Home    Planned Readmission: none    Discharge Medications:  See after visit summary for reconciled discharge medications provided to patient and/or family.      Administrative Statements   Discharge Statement:  I have spent a total time of 45 minutes in caring for this patient on the day of the visit/encounter. .    **Please Note: This note may have been constructed using a voice recognition system**

## 2025-01-27 NOTE — ASSESSMENT & PLAN NOTE
Lab Results   Component Value Date    HGBA1C 7.1 (H) 01/26/2025       Recent Labs     01/26/25  1159 01/26/25  1730 01/26/25  2140 01/27/25  0655   POCGLU 423* 298* 199* 326*       Blood Sugar Average: Last 72 hrs:  (P) 311.5Obtain updated A1c  Hold metformin while admitted and start correctional insulin coverage with Accu-Cheks, adjust inpatient insulin regimen as needed  Carb controlled diet  Initiate hypoglycemia protocol  BS elevated in the setting of steroids

## 2025-01-27 NOTE — UTILIZATION REVIEW
Initial Clinical Review    Admission: Date/Time/Statement:   Admission Orders (From admission, onward)       Ordered        01/26/25 0640  Place in Observation  Once                          Orders Placed This Encounter   Procedures    Place in Observation     Standing Status:   Standing     Number of Occurrences:   1     Level of Care:   Med Surg [16]     ED Arrival Information       Expected   -    Arrival   1/25/2025 22:42    Acuity   Urgent              Means of arrival   Walk-In    Escorted by   Self    Service   Hospitalist    Admission type   Emergency              Arrival complaint   Difficulty Breathing             Chief Complaint   Patient presents with    Shortness of Breath     Pt feels as though he is having difficulty breathing.  Also having anxiety attacks thinking he is not breathing while sleeping.       Initial Presentation: 59 y.o. male  who has a past medical history significant for reported COPD, atrial fibrillation/flutter, nicotine dependence of approximate 1 pack/day, hypertension, type 2 diabetes on metformin who presented with increased shortness of breath and reported anxiety.  Patient states for the past several days he has noted some increasing shortness of breath along with increased frequency of nonproductive cough though denied fever/chills, chest pain/pressure, weight gain/edema, or other systemic symptoms.  This evening he noted a further increase in shortness of breath additionally was increased anxiety and some palpitations similar to episode where he was diagnosed with atrial flutter which prompted presentation.     During ED evaluation he received albuterol/ipratropium nebulizer and 125 mg IV Solu-Medrol with initial improvement however did require 2L NC primarily for comfort.  His labs were noted without marked abnormality compared to prior, negative COVID-19/influenza rapid swab, and CXR which is awaiting final read however to wet read appears without acute cardiopulmonary  disease visualized.  Despite after mentioned treatments he persisted with supplemental oxygen need and wheezing is admitted as observation for further management of presumed COPD exacerbation.  Currently he is lying in bed in no acute distress and comfortable appearing, noting improvement in his his respiratory status however does note rapid heart rate on monitor though denies any overt symptoms.    ADMIT OBSERVATION STATUS    Anticipated Length of Stay/Certification Statement:      Date:     Day 2:      ED Treatment-Medication Administration from 01/25/2025 2242 to 01/27/2025 0800         Date/Time Order Dose Route Action     01/26/2025 0039 ipratropium (ATROVENT) 0.02 % inhalation solution 0.5 mg 0.5 mg Nebulization Given     01/26/2025 0039 albuterol inhalation solution 5 mg 5 mg Nebulization Given     01/26/2025 0038 methylPREDNISolone sodium succinate (Solu-MEDROL) injection 125 mg 125 mg Intravenous Given     01/26/2025 0038 hydrOXYzine HCL (ATARAX) tablet 25 mg 25 mg Oral Given     01/26/2025 0149 albuterol inhalation solution 5 mg 5 mg Nebulization Given     01/26/2025 0149 ipratropium (ATROVENT) 0.02 % inhalation solution 0.5 mg 0.5 mg Nebulization Given     01/26/2025 0658 albuterol inhalation solution 5 mg 5 mg Nebulization Given     01/26/2025 0659 ipratropium (ATROVENT) 0.02 % inhalation solution 0.5 mg 0.5 mg Nebulization Given     01/26/2025 0954 folic acid (FOLVITE) tablet 1 mg 1 mg Oral Given     01/26/2025 0958 losartan (COZAAR) tablet 50 mg 50 mg Oral Given     01/26/2025 1007 metoprolol tartrate (LOPRESSOR) tablet 50 mg 50 mg Oral Given     01/26/2025 1915 metoprolol tartrate (LOPRESSOR) tablet 50 mg 50 mg Oral Given     01/27/2025 0659 metoprolol tartrate (LOPRESSOR) tablet 50 mg 50 mg Oral Given     01/26/2025 0954 multivitamin-minerals (CENTRUM) tablet 1 tablet 1 tablet Oral Given     01/26/2025 0954 thiamine tablet 100 mg 100 mg Oral Given     01/26/2025 0954 azithromycin (ZITHROMAX) tablet  500 mg 500 mg Oral Given     01/27/2025 0659 azithromycin (ZITHROMAX) tablet 500 mg 500 mg Oral Given     01/26/2025 0954 methylPREDNISolone sodium succinate (Solu-MEDROL) injection 40 mg 40 mg Intravenous Given     01/26/2025 2136 methylPREDNISolone sodium succinate (Solu-MEDROL) injection 40 mg 40 mg Intravenous Given     01/26/2025 1308 levalbuterol (XOPENEX) inhalation solution 1.25 mg 1.25 mg Nebulization Given     01/26/2025 1915 levalbuterol (XOPENEX) inhalation solution 1.25 mg 1.25 mg Nebulization Given     01/27/2025 0700 levalbuterol (XOPENEX) inhalation solution 1.25 mg 1.25 mg Nebulization Given     01/26/2025 1308 ipratropium (ATROVENT) 0.02 % inhalation solution 0.5 mg 0.5 mg Nebulization Given     01/26/2025 1915 ipratropium (ATROVENT) 0.02 % inhalation solution 0.5 mg 0.5 mg Nebulization Given     01/27/2025 0700 ipratropium (ATROVENT) 0.02 % inhalation solution 0.5 mg 0.5 mg Nebulization Given     01/26/2025 1915 budesonide (PULMICORT) inhalation solution 0.5 mg 0.5 mg Nebulization Given     01/27/2025 0700 budesonide (PULMICORT) inhalation solution 0.5 mg 0.5 mg Nebulization Given     01/26/2025 1315 insulin lispro (HumALOG/ADMELOG) 100 units/mL subcutaneous injection 1-6 Units 6 Units Subcutaneous Given     01/26/2025 1800 insulin lispro (HumALOG/ADMELOG) 100 units/mL subcutaneous injection 1-6 Units 4 Units Subcutaneous Given     01/27/2025 0659 insulin lispro (HumALOG/ADMELOG) 100 units/mL subcutaneous injection 1-6 Units 5 Units Subcutaneous Given     01/26/2025 2140 insulin lispro (HumALOG/ADMELOG) 100 units/mL subcutaneous injection 1-5 Units 1 Units Subcutaneous Given     01/26/2025 1007 apixaban (ELIQUIS) tablet 5 mg 5 mg Oral Given     01/26/2025 1815 apixaban (ELIQUIS) tablet 5 mg 5 mg Oral Given     01/26/2025 2212 melatonin tablet 3 mg 3 mg Oral Given            Scheduled Medications:  apixaban, 5 mg, Oral, BID  azithromycin, 500 mg, Oral, Q24H  budesonide, 0.5 mg, Nebulization,  Q12H  folic acid, 1 mg, Oral, Daily  insulin lispro, 1-5 Units, Subcutaneous, HS  insulin lispro, 1-6 Units, Subcutaneous, TID AC  ipratropium, 0.5 mg, Nebulization, TID  levalbuterol, 1.25 mg, Nebulization, TID  losartan, 50 mg, Oral, Daily  melatonin, 3 mg, Oral, HS  methylPREDNISolone sodium succinate, 40 mg, Intravenous, Q12H YESI  metoprolol tartrate, 50 mg, Oral, Q12H  multivitamin-minerals, 1 tablet, Oral, Daily  nicotine, 1 patch, Transdermal, Daily  tamsulosin, 0.4 mg, Oral, Daily With Dinner  thiamine, 100 mg, Oral, Daily  thiamine, 100 mg, Oral, Daily      Continuous IV Infusions:     PRN Meds:  acetaminophen, 650 mg, Oral, Q6H PRN  metoprolol, 5 mg, Intravenous, Q6H PRN  nicotine polacrilex, 2 mg, Oral, Q2H PRN      ED Triage Vitals [01/25/25 2246]   Temperature Pulse Respirations Blood Pressure SpO2 Pain Score   98.3 °F (36.8 °C) 89 22 (!) 188/101 92 % No Pain     Weight (last 2 days)       None            Vital Signs (last 3 days)       Date/Time Temp Pulse Resp BP MAP (mmHg) SpO2 Calculated FIO2 (%) - Nasal Cannula Nasal Cannula O2 Flow Rate (L/min) O2 Device Patient Position - Orthostatic VS Jeny Coma Scale Score CIWA-Ar Total Pain    01/27/25 0659 -- 93 -- 136/77 -- -- -- -- -- -- -- -- --    01/27/25 0600 -- 88 -- -- -- -- -- -- -- -- -- 0 --    01/27/25 0430 -- 82 20 135/73 94 93 % 28 2 L/min Nasal cannula Lying -- -- --    01/27/25 0330 -- 74 18 -- -- 93 % 28 2 L/min Nasal cannula -- -- -- --    01/27/25 0245 -- 68 -- -- -- 95 % 28 2 L/min Nasal cannula -- -- -- --    01/27/25 0200 -- 76 -- 134/68 -- -- -- -- -- -- -- 0 --    01/26/25 2347 -- 70 22 -- -- 91 % 28 2 L/min Nasal cannula Lying -- -- --    01/26/25 2200 -- 88 -- 139/69 -- -- -- -- -- -- -- 1 --    01/26/25 1948 -- -- -- -- -- -- -- -- None (Room air) -- -- -- --    01/26/25 1919 -- 78 20 144/76 -- 98 % -- -- None (Room air) Lying -- -- --    01/26/25 1918 -- -- -- -- -- -- -- -- -- -- 15 -- --    01/26/25 1915 -- 90 -- 144/76 -- --  -- -- -- -- -- -- --    01/26/25 1800 -- -- -- -- -- -- -- -- -- -- -- 3 --    01/26/25 1400 -- -- -- -- -- -- -- -- -- -- -- 4 --    01/26/25 1309 -- -- -- -- -- 90 % -- -- -- -- -- -- --    01/26/25 1000 -- -- -- -- -- -- -- -- -- -- -- 4 --    01/26/25 0800 -- 114 20 169/78 -- 90 % -- -- None (Room air) Sitting -- -- --    01/26/25 0453 -- 100 18 159/65 93 92 % 28 2 L/min Nasal cannula Lying -- -- --    01/26/25 0051 -- -- -- -- -- -- -- -- None (Room air) -- -- -- --    01/25/25 2246 98.3 °F (36.8 °C) 89 22 188/101 119 92 % -- -- None (Room air) -- -- -- No Pain           CIWA-Ar Score       Row Name 01/27/25 0600 01/27/25 0200 01/26/25 2200       CIWA-Ar    BP -- 134/68 139/69    Pulse 88 76 88    Nausea and Vomiting 0 0 0    Tactile Disturbances 0 0 0    Tremor 0 0 1    Auditory Disturbances 0 0 0    Paroxysmal Sweats 0 0 0    Visual Disturbances 0 0 0    Anxiety 0 0 0    Headache, Fullness in Head 0 0 0    Agitation 0 0 0    Orientation and Clouding of Sensorium 0 0 0    CIWA-Ar Total 0 0 1      Row Name 01/26/25 1800 01/26/25 1400 01/26/25 1000       CIWA-Ar    Nausea and Vomiting 0 0 0    Tactile Disturbances 0 0 0    Tremor 2 4 4    Auditory Disturbances 0 0 0    Paroxysmal Sweats 0 0 0    Visual Disturbances 0 0 0    Anxiety 1 0 0    Headache, Fullness in Head 0 0 0    Agitation 0 0 0    Orientation and Clouding of Sensorium 0 0 0    CIWA-Ar Total 3 4 4                    Pertinent Labs/Diagnostic Test Results:   Radiology:  XR chest 2 views   ED Interpretation by Chepe Plunkett DO (01/26 0154)   chest x-ray 2 views, large lung volumes, no obvious infiltrates, no effusion, no pneumothorax.      Final Interpretation by Malaika Henderson MD (01/26 0824)      No acute cardiopulmonary disease.               Workstation performed: [id number]           Cardiology:  ECG 12 lead   Final Result by Ifeanyi Lazaro MD (01/26 0624)   Normal sinus rhythm   Normal ECG   When compared with ECG of 05-Jan-2024  "09:33,   No significant change was found   Confirmed by Ifeanyi Lazaro ([id number]) on 1/26/2025 6:24:29 AM        GI:  No orders to display       Results from last 7 days   Lab Units 01/26/25  0952   SARS-COV-2  Negative     Results from last 7 days   Lab Units 01/27/25  0426 01/26/25  0040   WBC Thousand/uL 21.36* 9.13   HEMOGLOBIN g/dL 15.6 16.6   HEMATOCRIT % 47.5 49.7*   PLATELETS Thousands/uL 253 261   TOTAL NEUT ABS Thousands/µL  --  4.93         Results from last 7 days   Lab Units 01/27/25  0426 01/26/25  0040   SODIUM mmol/L 134* 135   POTASSIUM mmol/L 5.3 4.6   CHLORIDE mmol/L 99 98   CO2 mmol/L 30 30   ANION GAP mmol/L 5 7   BUN mg/dL 25 18   CREATININE mg/dL 0.89 0.99   EGFR ml/min/1.73sq m 92 82   CALCIUM mg/dL 9.9 10.4*     Results from last 7 days   Lab Units 01/26/25  0040   AST U/L 31   ALT U/L 31   ALK PHOS U/L 78   TOTAL PROTEIN g/dL 7.4   ALBUMIN g/dL 4.6   TOTAL BILIRUBIN mg/dL 0.59     Results from last 7 days   Lab Units 01/27/25  0655 01/26/25  2140 01/26/25  1730 01/26/25  1159   POC GLUCOSE mg/dl 326* 199* 298* 423*     Results from last 7 days   Lab Units 01/27/25  0426 01/26/25  0040   GLUCOSE RANDOM mg/dL 264* 149*         Results from last 7 days   Lab Units 01/26/25 2021   HEMOGLOBIN A1C % 7.1*   EAG mg/dl 157     No results found for: \"BETA-HYDROXYBUTYRATE\"                   Results from last 7 days   Lab Units 01/26/25  0436 01/26/25  0040   HS TNI 0HR ng/L  --  10   HS TNI 2HR ng/L 6  --    HSTNI D2 ng/L -4  --                  Results from last 7 days   Lab Units 01/27/25  0426 01/26/25  0436   PROCALCITONIN ng/ml <0.05 <0.05                 Results from last 7 days   Lab Units 01/26/25  0040   BNP pg/mL 21                                         Results from last 7 days   Lab Units 01/26/25  0952   INFLUENZA A PCR  Negative   INFLUENZA B PCR  Negative   RSV PCR  Negative                                               Past Medical History:   Diagnosis Date    Adhesive capsulitis of right " shoulder 05/17/2022    Anxiety     Last assessed 12/20/2013    Contusion of right wrist 01/30/2018    COPD (chronic obstructive pulmonary disease) (HCC) 08/16/2016    COPD (chronic obstructive pulmonary disease) (HCC) 08/16/2016    Depression with anxiety     Diverticulitis 2016    Eczema 02/24/2016    Elevated ALT measurement 08/16/2016    History of atrial fibrillation     Hypertension     Obesity 09/25/2012    Perianal abscess 03/05/2019    Type 2 diabetes mellitus with hyperglycemia, without long-term current use of insulin (HCC)      Present on Admission:   Chronic obstructive pulmonary disease with acute exacerbation (HCC)   Atrial flutter (HCC)   Essential hypertension   Nicotine dependence   Type 2 diabetes mellitus with hyperglycemia, without long-term current use of insulin (HCC)   NAYE (obstructive sleep apnea)   History of alcohol use disorder      Admitting Diagnosis: No admission diagnoses are documented for this encounter.  Age/Sex: 59 y.o. male    Network Utilization Review Department  ATTENTION: Please call with any questions or concerns to [phone] and carefully listen to the prompts so that you are directed to the right person. All voicemails are confidential.   For Discharge needs, contact Care Management DC Support Team at [phone] opt. [phone]  Send all requests for admission clinical reviews, approved or denied determinations and any other requests to dedicated fax number below belonging to the campus where the patient is receiving treatment. List of dedicated fax numbers for the Facilities:  FACILITY NAME UR FAX NUMBER   ADMISSION DENIALS (Administrative/Medical Necessity) [phone]   DISCHARGE SUPPORT TEAM (NETWORK) [phone]   PARENT CHILD HEALTH (Maternity/NICU/Pediatrics) [phone]   [facility] [phone]   [facility] [phone]   [facility] [phone]   [facility]  [phone]   [facility] [phone]   [facility] [phone]   [facility] [phone]   [facility] [phone]   [facility] [phone]   [facility] [phone]   [facility] [phone]   [facility] [phone]

## 2025-01-27 NOTE — ASSESSMENT & PLAN NOTE
Known history of atrial fibrillation/flutter, did note some palpitations/anxiety on arrival  Heart rate during ED evaluation initially controlled however HR now 120's  Will restart home metoprolol tartrate 50 mg twice daily and assess response - HR now in 80's  Patient previously stopped his Eliquis for anticoagulation however states he resumed over the past 3 days.  Continue Eliquis

## 2025-01-27 NOTE — CASE MANAGEMENT
Case Management Discharge Planning Note    Patient name Callum Nielson  Location ED 24/ED 24 MRN [id number]  : 1965 Date 2025       Current Admission Date: 2025  Current Admission Diagnosis:Chronic obstructive pulmonary disease with acute exacerbation (HCC)   Patient Active Problem List    Diagnosis Date Noted Date Diagnosed    Urinary frequency 2024     Nocturia 2024     Paroxysmal atrial fibrillation (HCC) 2024     Dysphagia 2023     Anticoagulation adequate 2022     NAYE (obstructive sleep apnea) 2022     Leukocytosis 2022     Subacromial bursitis of right shoulder joint 2022     Adhesive capsulitis of right shoulder 2022     Atrial flutter (HCC) 2021     History of alcohol use disorder 2021     Chronic obstructive pulmonary disease with acute exacerbation (HCC) 2016     Nicotine dependence 2016     Eczema 2016     Acne 2014     Insomnia 2013     Irritable bowel syndrome 2013     Depression with anxiety 10/10/2013     Psoriasis 2013     Hyperlipidemia 2012     Essential hypertension 2012     Type 2 diabetes mellitus with hyperglycemia, without long-term current use of insulin (HCC) 2012     Obesity (BMI 30-39.9) 2012       LOS (days): 0  Geometric Mean LOS (GMLOS) (days):   Days to GMLOS:     OBJECTIVE:            Current admission status: Observation   Preferred Pharmacy:   [id number] - [place], [address] [address]  Phone: [phone] Fax: [phone]    Primary Care Provider: Lamont Guzmán MD    Primary Insurance: AETNA  Secondary Insurance:     DISCHARGE DETAILS:    Discharge planning discussed with:: Patient  Freedom of Choice: Yes  Comments - Freedom of Choice: Agreeable to HOST referral  CM contacted family/caregiver?: Yes (family at [place])  Were Treatment Team discharge recommendations reviewed with  patient/caregiver?: Yes  Did patient/caregiver verbalize understanding of patient care needs?: Yes  Were patient/caregiver advised of the risks associated with not following Treatment Team discharge recommendations?: Yes         Other Referral/Resources/Interventions Provided:  Interventions: Substance Abuse Treatment  Referral Comments: Pt agreeable to HOST referral for OP substance abuse options. Pt is not interested in IP programs at this time.         Treatment Team Recommendation: Home  Discharge Destination Plan:: Home  Transport at Discharge : Family      Additional Comments: CM met with pt at bedside to discuss HOST referral. Pt is agreeable to HOST referral for OP resources but is not interested in IP programs.  Pt signed release of information and it was placed in the medical records bin.  CM placed call to HOST ([phone]), waiting for a call back.    Update:     CM received call back from Eduarda at [facility].  CM faxed requested information to [facility] and they will call pt for intake.

## 2025-01-27 NOTE — CERTIFIED RECOVERY SPECIALIST
"   Certified  Note    Patient name: Callum Nielson  Location: ED 24/ED 24  Hostetter: [facility]  Attending:  Gregorio Roberson DO MRN [id number]  : 1965  Age: 59 y.o.    Sex: male Date 2025         Substance Use History:     Social History     Substance and Sexual Activity   Alcohol Use Yes    Alcohol/week: 70.0 standard drinks of alcohol    Types: 70 Shots of liquor per week    Comment: occ        Social History     Substance and Sexual Activity   Drug Use No      Time spent 15 mins   Encounter type: initial   Consult rcvd: Y  Patient seen in: ED  Stage of change: action     Substance used:alcohol   Frequency:daily 1/2 bottle christopher   Last used:2025    History of withdrawal seizure:na   Other Medical condition:no   Currently on ROSA- CRS educated patient on option on ROSA     CRS met with patient in ED. CRS provided introductions and explanation of service. CRS and patient engaged in conversation of hospitalization. Patient discussed needs his feels would be appropriate. CRS shared understanding.       Patient reports that he has been drinking too much and decided to stop abruptly. CRS educated patient of dangers while doing this and should always contact a medical professional. CRS and patient discussed progression of disease and consequences associated with it.     Patient was very open to OP resources and had discussion about \"AA\" meetings and services. CRS demonstrated how to add and utilize AA nicaonr.     Resources and contact information given         Halley Blackwell       "

## 2025-01-30 DIAGNOSIS — R35.0 URINARY FREQUENCY: ICD-10-CM

## 2025-01-30 RX ORDER — TAMSULOSIN HYDROCHLORIDE 0.4 MG/1
0.4 CAPSULE ORAL
Qty: 90 CAPSULE | Refills: 3 | Status: SHIPPED | OUTPATIENT
Start: 2025-01-30

## 2025-01-31 DIAGNOSIS — E11.65 TYPE 2 DIABETES MELLITUS WITH HYPERGLYCEMIA, WITHOUT LONG-TERM CURRENT USE OF INSULIN (HCC): ICD-10-CM

## 2025-02-03 ENCOUNTER — OFFICE VISIT (OUTPATIENT)
Dept: FAMILY MEDICINE CLINIC | Facility: CLINIC | Age: 60
End: 2025-02-03
Payer: COMMERCIAL

## 2025-02-03 VITALS
HEART RATE: 91 BPM | WEIGHT: 248.3 LBS | SYSTOLIC BLOOD PRESSURE: 140 MMHG | TEMPERATURE: 98 F | DIASTOLIC BLOOD PRESSURE: 78 MMHG | OXYGEN SATURATION: 94 % | RESPIRATION RATE: 18 BRPM | HEIGHT: 71 IN | BODY MASS INDEX: 34.76 KG/M2

## 2025-02-03 DIAGNOSIS — E78.00 PURE HYPERCHOLESTEROLEMIA: ICD-10-CM

## 2025-02-03 DIAGNOSIS — E11.65 TYPE 2 DIABETES MELLITUS WITH HYPERGLYCEMIA, WITHOUT LONG-TERM CURRENT USE OF INSULIN (HCC): ICD-10-CM

## 2025-02-03 DIAGNOSIS — Z09 HOSPITAL DISCHARGE FOLLOW-UP: Primary | ICD-10-CM

## 2025-02-03 DIAGNOSIS — I48.92 ATRIAL FLUTTER WITH RAPID VENTRICULAR RESPONSE (HCC): ICD-10-CM

## 2025-02-03 DIAGNOSIS — G47.33 OSA (OBSTRUCTIVE SLEEP APNEA): ICD-10-CM

## 2025-02-03 DIAGNOSIS — I10 ESSENTIAL HYPERTENSION: ICD-10-CM

## 2025-02-03 DIAGNOSIS — J44.1 CHRONIC OBSTRUCTIVE PULMONARY DISEASE WITH ACUTE EXACERBATION (HCC): ICD-10-CM

## 2025-02-03 PROCEDURE — 99496 TRANSJ CARE MGMT HIGH F2F 7D: CPT | Performed by: FAMILY MEDICINE

## 2025-02-03 NOTE — ASSESSMENT & PLAN NOTE
Orders:    Ambulatory Referral to Sleep Medicine; Future     Principal Discharge DX:	Cellulitis   1

## 2025-02-03 NOTE — ASSESSMENT & PLAN NOTE
Low fat diet. Pt refused statin now.   Orders:    Comprehensive metabolic panel; Future    Hemoglobin A1C; Future    Albumin / creatinine urine ratio; Future    Lipid Panel with Direct LDL reflex; Future

## 2025-02-03 NOTE — PROGRESS NOTES
Transition of Care Visit  Name: Callum Nielson      : 1965      MRN: [id number]  Encounter Provider: Lamont Guzmán MD  Encounter Date: 2/3/2025   Encounter department: [facility]    Assessment & Plan  Hospital discharge follow-up  Reviewed hospital records.        Chronic obstructive pulmonary disease with acute exacerbation (HCC)  FU pulmonary as scheduled.        NAYE (obstructive sleep apnea)    Orders:    Ambulatory Referral to Sleep Medicine; Future    Type 2 diabetes mellitus with hyperglycemia, without long-term current use of insulin (HCC)    Lab Results   Component Value Date    HGBA1C 7.1 (H) 2025     Low carb diet. Pt states he did not take metformin. Would like diet control.   Orders:    Comprehensive metabolic panel; Future    Hemoglobin A1C; Future    Albumin / creatinine urine ratio; Future    Lipid Panel with Direct LDL reflex; Future    Essential hypertension  DASH diet. Continue losartan 50mg QD and metoprolol 50mg bid.   Orders:    Comprehensive metabolic panel; Future    Hemoglobin A1C; Future    Albumin / creatinine urine ratio; Future    Lipid Panel with Direct LDL reflex; Future    Pure hypercholesterolemia  Low fat diet. Pt refused statin now.   Orders:    Comprehensive metabolic panel; Future    Hemoglobin A1C; Future    Albumin / creatinine urine ratio; Future    Lipid Panel with Direct LDL reflex; Future    Atrial flutter with rapid ventricular response (HCC)  Continue eliquis and metoprolol. FU cardiology.   Orders:    apixaban (Eliquis) 5 mg; Take 1 tablet (5 mg total) by mouth 2 (two) times a day         History of Present Illness     Transitional Care Management Review:   Callum Nielson is a 59 y.o. male here for TCM follow up.     During the TCM phone call patient stated:  TCM Call       Date and time call was made  2025  2:01 PM    Hospital care reviewed  Records reviewed    Patient was hospitialized at  [facility]    Date of  Admission  01/25/25    Date of discharge  01/27/25    Diagnosis  Shortness of Breath    Disposition  Home    Were the patients medications reviewed and updated  No    Current Symptoms  None    Cough Severity  Mild          TCM Call       Post hospital issues  None    Should patient be enrolled in anticoag monitoring?  Yes    Scheduled for follow up?  No    Patients specialists  Cardiologist    Did you obtain your prescribed medications  Yes    Do you need help managing your prescriptions or medications  No    Is transportation to your appointment needed  No    I have advised the patient to call PCP with any new or worsening symptoms  ALIA Caal    Living Arrangements  Family members    Support System  Family    The type of support provided  None    Do you have social support  Yes, some    Are you recieving any outpatient services  No    Are you recieving home care services  No    Are you using any community resources  No    Current waiver services  No    Have you fallen in the last 12 months  No    Interperter language line needed  No    Counseling  Patient    Counseling topics  instructions for management          HPI    Pt is here by himself.   Was in hospital 1/25-1/27/2025 for COPD exacerbation.   Got solu-medrol and zpack.   Got 2L NC for comfort.   CXR normal. Covid/flu negative.   Discharged home. Need PFT/polysomnography.   Feels much better today per pt.   Will see pulmonary next week.     Snoring/excessive daytime sleepiness. Need sleep study.     DM---1/2025 hgA1C 7.1  He did not take metformin 1000mg bid.     HTN---He is on losartan 50mg QD.   Denies headache, SOB or CP.     Afib---He should be on metoprolol 50mg bid and eliquis 5mg bid. Did not take eliquis for a while.   FU cardiology.     Urinary frequency---He did not use flomax 0.4mg qhs. FU urology.     Drink half christopher daily. NO drinking since hospitalization.   Smoke 1ppd for 35 years. No smoking since hospitalization. 7/2023 CT lung  "screen negative.      Denies depression. Feels sad. Younger brother passed away recently bc liver cancer.             Review of Systems   Constitutional:  Negative for appetite change, chills and fever.   HENT:  Negative for congestion, ear pain, sinus pain and sore throat.    Eyes:  Negative for discharge and itching.   Respiratory:  Negative for apnea, cough, chest tightness, shortness of breath and wheezing.    Cardiovascular:  Negative for chest pain, palpitations and leg swelling.   Gastrointestinal:  Negative for abdominal pain, anal bleeding, constipation, diarrhea, nausea and vomiting.   Endocrine: Negative for cold intolerance, heat intolerance and polyuria.   Genitourinary:  Negative for difficulty urinating and dysuria.   Musculoskeletal:  Negative for arthralgias, back pain and myalgias.   Skin:  Negative for rash.   Neurological:  Negative for dizziness and headaches.   Psychiatric/Behavioral:  Negative for agitation.      Objective   /78   Pulse 91   Temp 98 °F (36.7 °C) (Temporal)   Resp 18   Ht 5' 11\" (1.803 m)   Wt 113 kg (248 lb 4.8 oz)   SpO2 94%   BMI 34.63 kg/m²     Physical Exam  Constitutional:       Appearance: He is well-developed.   HENT:      Head: Normocephalic and atraumatic.   Eyes:      General:         Right eye: No discharge.         Left eye: No discharge.      Conjunctiva/sclera: Conjunctivae normal.   Cardiovascular:      Rate and Rhythm: Normal rate and regular rhythm.      Heart sounds: Normal heart sounds. No murmur heard.     No friction rub. No gallop.   Pulmonary:      Effort: Pulmonary effort is normal. No respiratory distress.      Breath sounds: Normal breath sounds. No wheezing or rales.   Abdominal:      General: Bowel sounds are normal. There is no distension.      Palpations: Abdomen is soft.      Tenderness: There is no abdominal tenderness. There is no guarding.   Musculoskeletal:         General: Normal range of motion.      Cervical back: Normal range " of motion and neck supple.      Right lower leg: No edema.      Left lower leg: No edema.   Neurological:      Mental Status: He is alert.       Medications have been reviewed by provider in current encounter

## 2025-02-03 NOTE — ASSESSMENT & PLAN NOTE
DASH diet. Continue losartan 50mg QD and metoprolol 50mg bid.   Orders:    Comprehensive metabolic panel; Future    Hemoglobin A1C; Future    Albumin / creatinine urine ratio; Future    Lipid Panel with Direct LDL reflex; Future

## 2025-02-04 ENCOUNTER — TELEPHONE (OUTPATIENT)
Dept: SLEEP CENTER | Facility: CLINIC | Age: 60
End: 2025-02-04

## 2025-02-04 ENCOUNTER — TRANSCRIBE ORDERS (OUTPATIENT)
Dept: SLEEP CENTER | Facility: CLINIC | Age: 60
End: 2025-02-04

## 2025-02-04 DIAGNOSIS — G47.33 OSA (OBSTRUCTIVE SLEEP APNEA): Primary | ICD-10-CM

## 2025-02-04 NOTE — TELEPHONE ENCOUNTER
Referral placed for a home sleep study. Note does not reflect discussion of symptoms listed on order.    Please addend note with discussion of snoring/excessive daytime sleepiness.     Ordering and co-signing providers notified.

## 2025-02-07 ENCOUNTER — TELEPHONE (OUTPATIENT)
Age: 60
End: 2025-02-07

## 2025-02-07 NOTE — TELEPHONE ENCOUNTER
Pt calling requesting coupons for Eliquis. Pt's PCP called in prescription, but the copay is too expensive.   Please contact patient at [phone]

## 2025-02-10 NOTE — TELEPHONE ENCOUNTER
Spoke with Ronald and relayed message that we can give him a two week supply of Eliquis samples and a copay card to try. Available to  at the . I also relayed that if the cost is still to much to please let us know and we will revaluate and see if there are any alternates. Ronald is aware and understood.

## 2025-02-11 ENCOUNTER — CONSULT (OUTPATIENT)
Dept: PULMONOLOGY | Facility: CLINIC | Age: 60
End: 2025-02-11
Payer: COMMERCIAL

## 2025-02-11 VITALS
HEART RATE: 83 BPM | BODY MASS INDEX: 35.28 KG/M2 | OXYGEN SATURATION: 98 % | DIASTOLIC BLOOD PRESSURE: 62 MMHG | SYSTOLIC BLOOD PRESSURE: 124 MMHG | HEIGHT: 71 IN | TEMPERATURE: 97.9 F | WEIGHT: 252 LBS

## 2025-02-11 DIAGNOSIS — Z92.89 HOSPITALIZATION WITHIN LAST 30 DAYS: ICD-10-CM

## 2025-02-11 DIAGNOSIS — J43.9 PULMONARY EMPHYSEMA, UNSPECIFIED EMPHYSEMA TYPE (HCC): Primary | ICD-10-CM

## 2025-02-11 DIAGNOSIS — J44.1 COPD EXACERBATION (HCC): ICD-10-CM

## 2025-02-11 DIAGNOSIS — D72.10 EOSINOPHILIA, UNSPECIFIED TYPE: ICD-10-CM

## 2025-02-11 DIAGNOSIS — F17.201 NICOTINE DEPENDENCE IN REMISSION, UNSPECIFIED NICOTINE PRODUCT TYPE: ICD-10-CM

## 2025-02-11 DIAGNOSIS — I48.92 ATRIAL FLUTTER, UNSPECIFIED TYPE (HCC): ICD-10-CM

## 2025-02-11 PROCEDURE — 94618 PULMONARY STRESS TESTING: CPT

## 2025-02-11 PROCEDURE — 99205 OFFICE O/P NEW HI 60 MIN: CPT | Performed by: INTERNAL MEDICINE

## 2025-02-11 RX ORDER — POLYETHYLENE GLYCOL 3350 17 G
2 POWDER IN PACKET (EA) ORAL AS NEEDED
Qty: 100 EACH | Refills: 0 | Status: SHIPPED | OUTPATIENT
Start: 2025-02-11

## 2025-02-11 RX ORDER — BUDESONIDE, GLYCOPYRROLATE, AND FORMOTEROL FUMARATE 160; 9; 4.8 UG/1; UG/1; UG/1
2 AEROSOL, METERED RESPIRATORY (INHALATION) 2 TIMES DAILY
Qty: 31.1 G | Refills: 3 | Status: SHIPPED | OUTPATIENT
Start: 2025-02-11

## 2025-02-11 RX ORDER — NICOTINE 21 MG/24HR
1 PATCH, TRANSDERMAL 24 HOURS TRANSDERMAL EVERY 24 HOURS
Qty: 28 PATCH | Refills: 0 | Status: SHIPPED | OUTPATIENT
Start: 2025-02-11

## 2025-02-11 RX ORDER — VARENICLINE TARTRATE 0.5 MG/1
TABLET, FILM COATED ORAL
Qty: 322 TABLET | Refills: 0 | Status: SHIPPED | OUTPATIENT
Start: 2025-02-11 | End: 2025-05-06

## 2025-02-11 NOTE — PROGRESS NOTES
Pulmonary Consultation  Name: Callum Nielson      : 1965      MRN: [id number]  Encounter Provider: Kieran Barbosa DO  Encounter Date: 2025   Encounter department: [facility] [place]  :  Reason for Consultation:  COPD exacerbation, 40py hx    Requested by:    Cleo Crenshaw PA-C  [address] [place] [address]  Assessment & Plan  Hospitalization within last 30 days  -       COPD exacerbation (HCC)  Suspected COPD, no previous spirometry.  Patient has had 3 COPD exacerbations in last 5 years  Oxygen titration 2025: HR 88-98bpm SpO2 95-98% over 6 minutes of walking without breaks. No dyspnea.    Pulmonary emphysema, unspecified emphysema type (HCC)      Nicotine dependence in remission, unspecified nicotine product type  40 py history  Quit ~ 2.5 weeks ago  LDCT : no lung nodules    Atrial flutter, unspecified type (HCC)  On Eliquis       Eosinophilia, unspecified type  If he continues to have COPD exacerbations, then may consider IL-5 vs IL4/13 biologics in future.         Plan:  Return to pulmonary clinic in 4 months  Obtain PFT and LDCT prior to next visit  Start Breztri 2 puffs BID  Albuterol PRN  Start Chantix  Start nicotine patches 21mg for 14 days  Cw nicotine lozenges 2mg   Oxygen titration study in office showing no desaturation    History of Present Illness     HPI:  Callum Nielson is a 59 y.o. male with PMHx of DM2, alcohol abuse, tobacco abuse (40py hx, no prior PFTs) who presents to the pulmonary clinic for after recent hospitalization for COPD exacerbation.  Patient notes that since discharge he has had no difficulty with shortness of breath or coughing.  He adds that this is his third exacerbation in 5 years.  Patient denies chest pain, lower extremity swelling, nausea, vomiting, diarrhea. Patient had a patient has not used any maintenance inhalers and rarely if ever used his albuterol inhaler.    Review of Systems   Constitutional:  Negative  for chills and fever.   HENT:  Negative for congestion.    Eyes:  Negative for itching.   Respiratory:  Negative for cough and shortness of breath.    Cardiovascular:  Negative for chest pain and palpitations.   Gastrointestinal:  Negative for abdominal distention and abdominal pain.   Endocrine: Negative for polyphagia.   Genitourinary:  Negative for difficulty urinating.   Musculoskeletal:  Negative for arthralgias and back pain.   Neurological:  Negative for dizziness and numbness.   Psychiatric/Behavioral:  Negative for agitation.      Preventive   Most Recent Immunizations   Administered Date(s) Administered    Influenza, recombinant, quadrivalent,injectable, preservative free 12/07/2020    Pneumococcal Conjugate Vaccine 20-valent (Pcv20), Polysace 08/02/2022    Pneumococcal Polysaccharide PPV23 12/07/2020    Td (adult), adsorbed 03/12/2014     Occupational History: works in construction, works with cement    Historical Information   Past Medical History:   Diagnosis Date    Adhesive capsulitis of right shoulder 05/17/2022    Anxiety     Last assessed 12/20/2013    Contusion of right wrist 01/30/2018    COPD (chronic obstructive pulmonary disease) ([facility]) 08/16/2016    COPD (chronic obstructive pulmonary disease) ([facility]) 08/16/2016    Depression with anxiety     Diverticulitis 2016    Eczema 02/24/2016    Elevated ALT measurement 08/16/2016    History of atrial fibrillation     Hypertension     Obesity 09/25/2012    Perianal abscess 03/05/2019    Type 2 diabetes mellitus with hyperglycemia, without long-term current use of insulin ([facility])      Past Surgical History:   Procedure Laterality Date    CARDIAC LOOP RECORDER  2001    COLONOSCOPY      KNEE SURGERY      staph infection    [place] SURGICAL ARTHROSCOPY PALMER W/CORACOACRM LIGM RLS Right 1/17/2024    Procedure: ARTHROSCOPIC SUBACROMIAL DECOMPRESSION;  Surgeon: Waldo Chambers MD;  Location: [facility];  Service: Orthopedics    [place] SURGICAL ARTHROSCOPY SHOULDER BICEPS  TENODESIS Right 2024    Procedure: ARTHROSCOPIC BICEPS TENODESIS;  Surgeon: Waldo Chambers MD;  Location: [facility];  Service: Orthopedics    [place] SURGICAL ARTHROSCOPY SHOULDER W/ROTATOR CUFF RPR Right 2024    Procedure: REPAIR ROTATOR CUFF  ARTHROSCOPIC;  Surgeon: Waldo Chambers MD;  Location: [facility];  Service: Orthopedics     Family History   Problem Relation Age of Onset    Cancer Mother         Lung Cancer    Cancer Father     Diabetes Father     Diabetes Sister        Social History:   Social History     Tobacco Use   Smoking Status Former    Current packs/day: 0.00    Average packs/day: 1 pack/day for 42.1 years (42.1 ttl pk-yrs)    Types: Cigarettes    Start date:     Quit date: 2024    Years since quittin.0    Passive exposure: Past   Smokeless Tobacco Never       Meds/Allergies     Current Outpatient Medications:     albuterol (PROVENTIL HFA,VENTOLIN HFA) 90 mcg/act inhaler, INHALE 2 PUFFS BY MOUTH EVERY 6 HOURS AS NEEDED FOR WHEEZE, Disp: 18 g, Rfl: 5    ALPRAZolam (XANAX) 0.5 mg tablet, Take 1 tablet (0.5 mg total) by mouth if needed for anxiety (Take 1 tablet 45 minutes prior to procedure prn, take a second tablet 15 minutes before procedure prn), Disp: 2 tablet, Rfl: 0    apixaban (Eliquis) 5 mg, Take 1 tablet (5 mg total) by mouth 2 (two) times a day, Disp: 60 tablet, Rfl: 0    Budeson-Glycopyrrol-Formoterol (Breztri Aerosphere) 160-9-4.8 MCG/ACT AERO, Inhale 2 puffs 2 (two) times a day Rinse mouth after use., Disp: 31.1 g, Rfl: 3    folic acid (FOLVITE) 1 mg tablet, Take 1 tablet (1 mg total) by mouth in the morning., Disp: , Rfl: 0    losartan (COZAAR) 50 mg tablet, TAKE 1 TABLET BY MOUTH EVERY DAY, Disp: 90 tablet, Rfl: 1    metFORMIN (GLUCOPHAGE) 1000 MG tablet, TAKE 1 TABLET BY MOUTH TWICE A DAY WITH FOOD, Disp: 60 tablet, Rfl: 0    metoprolol tartrate (LOPRESSOR) 50 mg tablet, TAKE 1 TABLET BY MOUTH EVERY 12 HOURS, Disp: 180 tablet, Rfl: 1    multivitamin (THERAGRAN)  "TABS, Take 1 tablet by mouth daily, Disp: , Rfl:     nicotine (NICODERM CQ) 21 mg/24 hr TD 24 hr patch, Place 1 patch on the skin over 24 hours every 24 hours, Disp: 28 patch, Rfl: 0    nicotine polacrilex (COMMIT) 2 MG lozenge, Apply 1 lozenge (2 mg total) to the mouth or throat as needed for smoking cessation, Disp: 100 each, Rfl: 0    tamsulosin (FLOMAX) 0.4 mg, Take 1 capsule (0.4 mg total) by mouth daily with dinner, Disp: 90 capsule, Rfl: 3    thiamine 100 MG tablet, Take 1 tablet (100 mg total) by mouth in the morning., Disp: , Rfl: 0    varenicline (CHANTIX) 0.5 mg tablet, Take 1 tablet (0.5 mg total) by mouth 2 (two) times a day for 3 days, THEN 1 tablet (0.5 mg total) 2 (two) times a day for 4 days, THEN 2 tablets (1 mg total) 2 (two) times a day., Disp: 322 tablet, Rfl: 0  Allergies   Allergen Reactions    Lisinopril Cough     cough    Spiriva Respimat [Tiotropium Bromide Monohydrate] Anxiety       Vitals: Blood pressure 124/62, pulse 83, temperature 97.9 °F (36.6 °C), temperature source Tympanic Core, height 5' 11\" (1.803 m), weight 114 kg (252 lb), SpO2 98%., Body mass index is 35.15 kg/m². Oxygen Therapy  SpO2: 98 %  Oxygen Therapy: None (Room air)    Physical Exam  Physical Exam  Constitutional:       Appearance: He is obese.   HENT:      Right Ear: Tympanic membrane normal. There is no impacted cerumen.      Left Ear: There is no impacted cerumen.      Nose: No congestion or rhinorrhea.      Mouth/Throat:      Mouth: Mucous membranes are moist.      Pharynx: No oropharyngeal exudate.   Eyes:      Conjunctiva/sclera: Conjunctivae normal.   Cardiovascular:      Rate and Rhythm: Normal rate and regular rhythm.      Pulses: Normal pulses.      Heart sounds: No murmur heard.     No friction rub. No gallop.   Pulmonary:      Effort: No respiratory distress.      Breath sounds: Normal breath sounds. No stridor. No wheezing or rhonchi.   Abdominal:      General: Abdomen is flat.   Musculoskeletal:      " "Cervical back: Normal range of motion.      Right lower leg: No edema.      Left lower leg: No edema.   Skin:     General: Skin is warm.      Capillary Refill: Capillary refill takes less than 2 seconds.   Neurological:      General: No focal deficit present.      Mental Status: He is alert and oriented to person, place, and time.   Psychiatric:         Mood and Affect: Mood normal.         Behavior: Behavior normal.         Labs: I have personally reviewed pertinent lab results.  Lab Results   Component Value Date    WBC 21.36 (H) 01/27/2025    HGB 15.6 01/27/2025    HCT 47.5 01/27/2025    MCV 98 01/27/2025     01/27/2025     Lab Results   Component Value Date    CALCIUM 9.9 01/27/2025     08/12/2016    K 5.3 01/27/2025    CO2 30 01/27/2025    CL 99 01/27/2025    BUN 25 01/27/2025    CREATININE 0.89 01/27/2025     No results found for: \"IGE\"  Lab Results   Component Value Date    ALT 31 01/26/2025    AST 31 01/26/2025    ALKPHOS 78 01/26/2025    BILITOT 0.3 08/12/2016       Imaging and other studies: Results Review Statement: I personally reviewed the following image studies/reports in PACS and discussed pertinent findings with Radiology: CT chest. My interpretation of the radiology images/reports is: as above.  XR chest 2 views  Result Date: 1/26/2025  Impression: No acute cardiopulmonary disease. Workstation performed: [id number]       Pulmonary function testing: no previous     EKG, Pathology, and Other Studies: Results Review Statement: I personally reviewed the following image studies/reports in PACS and discussed pertinent findings with Radiology: CT chest. My interpretation of the radiology images/reports is: as above. EKG: aflutter.    Disclaimer: Portions of the record may have been created with voice recognition software. Occasional wrong word or \"sound a like\" substitutions may have occurred due to the inherent limitations of voice recognition software. Careful consideration should be taken " to recognize, using context, where substitutions have occurred.    Kieran Mathews DO   Pulmonary & Critical Care Medicine Fellow PGY-V  [facility]

## 2025-02-11 NOTE — ASSESSMENT & PLAN NOTE
If he continues to have COPD exacerbations, then may consider IL-5 vs IL4/13 biologics in future.

## 2025-02-26 ENCOUNTER — HOSPITAL ENCOUNTER (OUTPATIENT)
Dept: RADIOLOGY | Facility: HOSPITAL | Age: 60
Discharge: HOME | End: 2025-02-26
Payer: COMMERCIAL

## 2025-02-26 ENCOUNTER — HOSPITAL ENCOUNTER (OUTPATIENT)
Dept: PULMONOLOGY | Facility: HOSPITAL | Age: 60
Discharge: HOME | End: 2025-02-26
Payer: COMMERCIAL

## 2025-02-26 DIAGNOSIS — J44.1 COPD EXACERBATION (HCC): ICD-10-CM

## 2025-02-26 PROCEDURE — 94060 EVALUATION OF WHEEZING: CPT

## 2025-02-26 PROCEDURE — 94729 DIFFUSING CAPACITY: CPT | Performed by: INTERNAL MEDICINE

## 2025-02-26 PROCEDURE — 94060 EVALUATION OF WHEEZING: CPT | Performed by: INTERNAL MEDICINE

## 2025-02-26 PROCEDURE — 94729 DIFFUSING CAPACITY: CPT

## 2025-02-26 PROCEDURE — 94726 PLETHYSMOGRAPHY LUNG VOLUMES: CPT | Performed by: INTERNAL MEDICINE

## 2025-02-26 PROCEDURE — 94760 N-INVAS EAR/PLS OXIMETRY 1: CPT

## 2025-02-26 PROCEDURE — 94726 PLETHYSMOGRAPHY LUNG VOLUMES: CPT

## 2025-02-26 RX ORDER — ALBUTEROL SULFATE 0.83 MG/ML
2.5 SOLUTION RESPIRATORY (INHALATION) ONCE
Status: COMPLETED | OUTPATIENT
Start: 2025-02-26 | End: 2025-02-26

## 2025-02-26 RX ADMIN — ALBUTEROL SULFATE 2.5 MG: 2.5 SOLUTION RESPIRATORY (INHALATION) at 17:22

## 2025-02-27 DIAGNOSIS — J43.9 PULMONARY EMPHYSEMA, UNSPECIFIED EMPHYSEMA TYPE (HCC): ICD-10-CM

## 2025-02-27 DIAGNOSIS — J44.1 COPD EXACERBATION (HCC): ICD-10-CM

## 2025-02-28 DIAGNOSIS — E11.65 TYPE 2 DIABETES MELLITUS WITH HYPERGLYCEMIA, WITHOUT LONG-TERM CURRENT USE OF INSULIN (HCC): ICD-10-CM

## 2025-02-28 RX ORDER — POLYETHYLENE GLYCOL 3350 17 G
2 POWDER IN PACKET (EA) ORAL AS NEEDED
Qty: 81 LOZENGE | Refills: 1 | Status: SHIPPED | OUTPATIENT
Start: 2025-02-28

## 2025-03-01 ENCOUNTER — TELEPHONE (OUTPATIENT)
Dept: PULMONOLOGY | Facility: CLINIC | Age: 60
End: 2025-03-01

## 2025-03-02 NOTE — TELEPHONE ENCOUNTER
Discussed PFT findings with the patient as patient has obstructive disease which is reversible. Likely COPD and Asthma given extensive smoking history in conjunction with peripheral eosinophilia and reversible post-bronchodilator response. Educated patient on importance of smoking cessation and starting breztri especially in setting of recent COPD exacerbation and peripheral eosinophilia. Patient will start breztri. Answered all questions regarding eosinophilia and prevention of COPD exacerbations.

## 2025-03-24 DIAGNOSIS — E11.65 TYPE 2 DIABETES MELLITUS WITH HYPERGLYCEMIA, WITHOUT LONG-TERM CURRENT USE OF INSULIN (HCC): ICD-10-CM

## 2025-03-28 DIAGNOSIS — I48.91 ATRIAL FIBRILLATION WITH RAPID VENTRICULAR RESPONSE (HCC): ICD-10-CM

## 2025-03-28 RX ORDER — METOPROLOL TARTRATE 50 MG
50 TABLET ORAL 2 TIMES DAILY
Qty: 60 TABLET | Refills: 5 | Status: SHIPPED | OUTPATIENT
Start: 2025-03-28

## 2025-04-10 ENCOUNTER — REMOTE DEVICE CLINIC VISIT (OUTPATIENT)
Dept: CARDIOLOGY CLINIC | Facility: CLINIC | Age: 60
End: 2025-04-10
Payer: COMMERCIAL

## 2025-04-10 DIAGNOSIS — I48.0 PAROXYSMAL ATRIAL FIBRILLATION (HCC): ICD-10-CM

## 2025-04-10 DIAGNOSIS — I48.92 ATRIAL FLUTTER, UNSPECIFIED TYPE (HCC): Primary | ICD-10-CM

## 2025-04-10 PROCEDURE — 93298 REM INTERROG DEV EVAL SCRMS: CPT | Performed by: INTERNAL MEDICINE

## 2025-04-10 NOTE — PROGRESS NOTES
"MDT LNQ22/ ACTIVE SYSTEM IS MRI CONDITIONAL   CARELINK TRANSMISSION: LOOP RECORDER. PRESENTING RHYTHM NSR W/ PACs @ 86 BPM. BATTERY STATUS \"OK.\" NO PATIENT OR DEVICE ACTIVATED EPISODES. NORMAL DEVICE FUNCTION. DL   "

## 2025-04-12 ENCOUNTER — RESULTS FOLLOW-UP (OUTPATIENT)
Dept: CARDIOLOGY CLINIC | Facility: CLINIC | Age: 60
End: 2025-04-12

## 2025-04-23 DIAGNOSIS — I48.91 ATRIAL FIBRILLATION WITH RAPID VENTRICULAR RESPONSE (HCC): ICD-10-CM

## 2025-04-23 RX ORDER — METOPROLOL TARTRATE 50 MG
50 TABLET ORAL 2 TIMES DAILY
Qty: 180 TABLET | Refills: 1 | Status: SHIPPED | OUTPATIENT
Start: 2025-04-23

## 2025-04-27 DIAGNOSIS — I10 ESSENTIAL HYPERTENSION: ICD-10-CM

## 2025-04-28 RX ORDER — LOSARTAN POTASSIUM 50 MG/1
50 TABLET ORAL DAILY
Qty: 30 TABLET | Refills: 5 | Status: SHIPPED | OUTPATIENT
Start: 2025-04-28

## 2025-05-10 ENCOUNTER — OFFICE VISIT (OUTPATIENT)
Dept: URGENT CARE | Age: 60
End: 2025-05-10
Payer: COMMERCIAL

## 2025-05-10 VITALS
BODY MASS INDEX: 32.78 KG/M2 | OXYGEN SATURATION: 95 % | SYSTOLIC BLOOD PRESSURE: 128 MMHG | WEIGHT: 235 LBS | DIASTOLIC BLOOD PRESSURE: 72 MMHG | RESPIRATION RATE: 18 BRPM | TEMPERATURE: 97.7 F | HEART RATE: 76 BPM

## 2025-05-10 DIAGNOSIS — L73.9 FOLLICULITIS: Primary | ICD-10-CM

## 2025-05-10 PROCEDURE — 87591 N.GONORRHOEAE DNA AMP PROB: CPT | Performed by: NURSE PRACTITIONER

## 2025-05-10 PROCEDURE — 87491 CHLMYD TRACH DNA AMP PROBE: CPT | Performed by: NURSE PRACTITIONER

## 2025-05-10 PROCEDURE — G0382 LEV 3 HOSP TYPE B ED VISIT: HCPCS | Performed by: NURSE PRACTITIONER

## 2025-05-10 RX ORDER — MUPIROCIN 20 MG/G
OINTMENT TOPICAL 3 TIMES DAILY
Qty: 22 G | Refills: 0 | Status: SHIPPED | OUTPATIENT
Start: 2025-05-10

## 2025-05-10 NOTE — PATIENT INSTRUCTIONS
Wash area with soap and water then keep area clean and dry  Begin mupirocin prescription and use for 7 days  Urine specimen sent.  Pending GC result.  Should be available in 24-48hrs    Follow up with PCP in 3-5 days.  Proceed to  ER if symptoms worsen.     If tests are performed, our office will contact you with results only if changes need to made to the care plan discussed with you at the visit. You can review your full results on [facility].

## 2025-05-10 NOTE — PROGRESS NOTES
Name: Callum Nielson      : 1965      MRN: [id number]  Encounter Provider: [facility]  Encounter Date: 5/10/2025   Encounter department: [facility]  :  Assessment & Plan  Folliculitis  Appears to be a folliculitis  Will treat with mupirocin TID x 7 days  Urine sent.  Pending GC result  Refrain from sex until results of testing are completed      Orders:    mupirocin (BACTROBAN) 2 % ointment; Apply topically 3 (three) times a day    Chlamydia/GC amplified DNA by PCR; Future        History of Present Illness     Callum Nielson is a 60 y.o. male who presents with a one and a half week history of a small, reddened non-tender lump on the shaft of his penis.  He states that Thursday it appeared to be getting bigger.  Patient states it is not painful but more so irritating.  Pt has not shaved the area prior to the lump appearing.  Pt is sexually active and agrees to STD testing.        Review of Systems   Constitutional:  Negative for chills and fever.   HENT:  Negative for ear pain and sore throat.    Eyes:  Negative for pain and visual disturbance.   Respiratory:  Negative for cough and shortness of breath.    Cardiovascular:  Negative for chest pain and palpitations.   Gastrointestinal:  Negative for abdominal pain and vomiting.   Genitourinary:  Negative for dysuria and hematuria.   Musculoskeletal:  Negative for arthralgias and back pain.   Skin:  Positive for wound. Negative for color change and rash.   Neurological:  Negative for seizures and syncope.   All other systems reviewed and are negative.         Objective   /72   Pulse 76   Temp 97.7 °F (36.5 °C)   Resp 18   Wt 107 kg (235 lb)   SpO2 95%   BMI 32.78 kg/m²      Physical Exam  Vitals and nursing note reviewed.   Constitutional:       General: He is not in acute distress.     Appearance: Normal appearance. He is well-developed.   HENT:      Head: Normocephalic and atraumatic.      Right Ear: Tympanic  membrane, ear canal and external ear normal.      Left Ear: Tympanic membrane, ear canal and external ear normal.      Nose: Nose normal.      Mouth/Throat:      Pharynx: Oropharyngeal exudate and posterior oropharyngeal erythema present.   Eyes:      Conjunctiva/sclera: Conjunctivae normal.   Cardiovascular:      Rate and Rhythm: Normal rate and regular rhythm.      Heart sounds: No murmur heard.  Pulmonary:      Effort: Pulmonary effort is normal. No respiratory distress.      Breath sounds: Normal breath sounds.   Abdominal:      Palpations: Abdomen is soft.      Tenderness: There is no abdominal tenderness.   Genitourinary:     Penis: Tenderness present. No phimosis, hypospadias or swelling.        Musculoskeletal:         General: No swelling.      Cervical back: Neck supple.   Skin:     General: Skin is warm and dry.      Capillary Refill: Capillary refill takes less than 2 seconds.   Neurological:      General: No focal deficit present.      Mental Status: He is alert. Mental status is at baseline.   Psychiatric:         Mood and Affect: Mood normal.         Behavior: Behavior normal.

## 2025-05-12 LAB
C TRACH DNA SPEC QL NAA+PROBE: NEGATIVE
N GONORRHOEA DNA SPEC QL NAA+PROBE: NEGATIVE

## 2025-05-16 ENCOUNTER — OFFICE VISIT (OUTPATIENT)
Dept: URGENT CARE | Age: 60
End: 2025-05-16
Payer: COMMERCIAL

## 2025-05-16 VITALS
RESPIRATION RATE: 16 BRPM | SYSTOLIC BLOOD PRESSURE: 135 MMHG | HEART RATE: 97 BPM | DIASTOLIC BLOOD PRESSURE: 67 MMHG | TEMPERATURE: 98.1 F | OXYGEN SATURATION: 96 %

## 2025-05-16 DIAGNOSIS — A09 INFECTIOUS DIARRHEA: Primary | ICD-10-CM

## 2025-05-16 PROCEDURE — G0382 LEV 3 HOSP TYPE B ED VISIT: HCPCS | Performed by: STUDENT IN AN ORGANIZED HEALTH CARE EDUCATION/TRAINING PROGRAM

## 2025-05-16 RX ORDER — AZITHROMYCIN 500 MG/1
500 TABLET, FILM COATED ORAL DAILY
Qty: 5 TABLET | Refills: 0 | Status: SHIPPED | OUTPATIENT
Start: 2025-05-16 | End: 2025-05-21

## 2025-05-16 NOTE — PROGRESS NOTES
[facility]        NAME: Callum Nielson is a 60 y.o. male  : 1965    MRN: [id number]  DATE: May 16, 2025  TIME: 2:29 PM    Assessment and Plan   Infectious diarrhea [A09]  1. Infectious diarrhea  azithromycin (ZITHROMAX) 500 MG tablet      Discussed viral versus bacterial cause.  Given his exposure and symptoms, will cover for infectious bacterial diarrhea with azithromycin, further patient instructions as below.      Patient Instructions   Take antibiotics as prescribed.  Continue to focus on good hydration with water, electrolyte beverages such as liquid IV as you lose a lot of electrolytes with the diarrhea.  You may wish to add a probiotic to help with the stools.    Follow-up with PCP for recheck.  If you have any severe worsening symptoms such as severe abdominal pain, not able to keep down fluids, severe weakness, please go to the ER.    Follow up with PCP in 3-5 days.  Proceed to  ER if symptoms worsen.    If tests have been performed at [facility], our office will contact you with results if changes need to be made to the care plan discussed with you at the visit.  You can review your full results on [facility] MyChart.    Chief Complaint     Chief Complaint   Patient presents with    Diarrhea     Started this morning. States having a bowel movement 8 times within 2 hrs. Ate raw oysters on Wednesday, believes the diarrhea came from them.     Abdominal Pain         History of Present Illness       Patient presents for diarrhea starting this morning.  On the evening of , he ate raw oysters, and no one else ate the same oysters as him.  Since then he has eaten the same meals as his wife.  This morning he woke up with frequent loose brown stools.  Estimates at least 12 episodes today already.  Has not noticed mucus nor blood.  Did feel some chills this morning, no measured fevers.  He was doing online search and became concerned due to possibility of pathogens through raw seafood.  Feeling  slight headache and overall upset stomach, no nausea, no vomiting, denies congestion, cough is at his baseline.    Diarrhea   Associated symptoms include abdominal pain.   Abdominal Pain  Associated symptoms include diarrhea.       Review of Systems   Review of Systems   Gastrointestinal:  Positive for abdominal pain and diarrhea.   All other systems reviewed and are negative.        Current Medications     Current Medications[1]    Current Allergies     Allergies as of 05/16/2025 - Reviewed 05/16/2025   Allergen Reaction Noted    Lisinopril Cough 12/07/2020    Spiriva respimat [tiotropium bromide monohydrate] Anxiety 08/02/2022            The following portions of the patient's history were reviewed and updated as appropriate: allergies, current medications, past family history, past medical history, past social history, past surgical history and problem list.     Past Medical History:   Diagnosis Date    Adhesive capsulitis of right shoulder 05/17/2022    Anxiety     Last assessed 12/20/2013    Contusion of right wrist 01/30/2018    COPD (chronic obstructive pulmonary disease) ([facility]) 08/16/2016    COPD (chronic obstructive pulmonary disease) ([facility]) 08/16/2016    Depression with anxiety     Diverticulitis 2016    Eczema 02/24/2016    Elevated ALT measurement 08/16/2016    History of atrial fibrillation     Hypertension     Obesity 09/25/2012    Perianal abscess 03/05/2019    Type 2 diabetes mellitus with hyperglycemia, without long-term current use of insulin ([facility])        Past Surgical History:   Procedure Laterality Date    CARDIAC LOOP RECORDER  2001    COLONOSCOPY      KNEE SURGERY      staph infection    [place] SURGICAL ARTHROSCOPY PALMER W/CORACOACRM LIGM RLS Right 1/17/2024    Procedure: ARTHROSCOPIC SUBACROMIAL DECOMPRESSION;  Surgeon: Waldo Chambers MD;  [facility]: [facility];  Service: Orthopedics    [place] SURGICAL ARTHROSCOPY SHOULDER BICEPS TENODESIS Right 1/17/2024    Procedure: ARTHROSCOPIC BICEPS TENODESIS;   Surgeon: Waldo Chambers MD;  [facility]: [facility];  Service: Orthopedics    [place] SURGICAL ARTHROSCOPY SHOULDER W/ROTATOR CUFF RPR Right 1/17/2024    Procedure: REPAIR ROTATOR CUFF  ARTHROSCOPIC;  Surgeon: Waldo Chambers MD;  [facility]: [facility];  Service: Orthopedics       Family History   Problem Relation Age of Onset    Cancer Mother         Lung Cancer    Cancer Father     Diabetes Father     Diabetes Sister          Medications have been verified.        Objective   /67 (BP Location: Left arm, Patient Position: Sitting, Cuff Size: Adult)   Pulse 97   Temp 98.1 °F (36.7 °C) (Tympanic)   Resp 16   SpO2 96%   No LMP for male patient.       Physical Exam     Physical Exam  Vitals and nursing note reviewed.   Constitutional:       General: He is not in acute distress.     Appearance: Normal appearance. He is not ill-appearing or toxic-appearing.   HENT:      Head: Normocephalic and atraumatic.      Right Ear: External ear normal.      Left Ear: External ear normal.      Nose: Nose normal.      Mouth/Throat:      Mouth: Mucous membranes are moist.     Eyes:      Extraocular Movements: Extraocular movements intact.       Cardiovascular:      Rate and Rhythm: Normal rate and regular rhythm.      Heart sounds: Normal heart sounds.   Pulmonary:      Effort: Pulmonary effort is normal. No respiratory distress.      Breath sounds: Normal breath sounds. No stridor. No wheezing, rhonchi or rales.   Abdominal:      General: Bowel sounds are increased.      Palpations: Abdomen is soft.      Tenderness: There is no abdominal tenderness. There is no guarding.     Skin:     General: Skin is warm and dry.      Capillary Refill: Capillary refill takes less than 2 seconds.      Findings: No rash.     Neurological:      Mental Status: He is alert.      Gait: Gait normal.     Psychiatric:         Behavior: Behavior normal.                          [1]   Current Outpatient Medications:     azithromycin (ZITHROMAX) 500 MG  tablet, Take 1 tablet (500 mg total) by mouth daily for 5 days, Disp: 5 tablet, Rfl: 0    albuterol (PROVENTIL HFA,VENTOLIN HFA) 90 mcg/act inhaler, INHALE 2 PUFFS BY MOUTH EVERY 6 HOURS AS NEEDED FOR WHEEZE, Disp: 18 g, Rfl: 5    ALPRAZolam (XANAX) 0.5 mg tablet, Take 1 tablet (0.5 mg total) by mouth if needed for anxiety (Take 1 tablet 45 minutes prior to procedure prn, take a second tablet 15 minutes before procedure prn), Disp: 2 tablet, Rfl: 0    apixaban (Eliquis) 5 mg, Take 1 tablet (5 mg total) by mouth 2 (two) times a day, Disp: 60 tablet, Rfl: 0    Budeson-Glycopyrrol-Formoterol (Breztri Aerosphere) 160-9-4.8 MCG/ACT AERO, Inhale 2 puffs 2 (two) times a day Rinse mouth after use., Disp: 31.1 g, Rfl: 3    folic acid (FOLVITE) 1 mg tablet, Take 1 tablet (1 mg total) by mouth in the morning., Disp: , Rfl: 0    losartan (COZAAR) 50 mg tablet, TAKE 1 TABLET BY MOUTH EVERY DAY, Disp: 30 tablet, Rfl: 5    metFORMIN (GLUCOPHAGE) 1000 MG tablet, TAKE 1 TABLET BY MOUTH TWICE A DAY WITH FOOD, Disp: 180 tablet, Rfl: 1    metoprolol tartrate (LOPRESSOR) 50 mg tablet, TAKE 1 TABLET BY MOUTH EVERY 12 HOURS, Disp: 180 tablet, Rfl: 1    multivitamin (THERAGRAN) TABS, Take 1 tablet by mouth daily, Disp: , Rfl:     mupirocin (BACTROBAN) 2 % ointment, Apply topically 3 (three) times a day, Disp: 22 g, Rfl: 0    nicotine (NICODERM CQ) 21 mg/24 hr TD 24 hr patch, Place 1 patch on the skin over 24 hours every 24 hours (Patient not taking: Reported on 5/10/2025), Disp: 28 patch, Rfl: 0    nicotine polacrilex (COMMIT) 2 MG lozenge, APPLY 1 LOZENGE (2 MG TOTAL) TO THE MOUTH OR THROAT AS NEEDED FOR SMOKING CESSATION, Disp: 81 lozenge, Rfl: 1    tamsulosin (FLOMAX) 0.4 mg, Take 1 capsule (0.4 mg total) by mouth daily with dinner (Patient not taking: Reported on 5/10/2025), Disp: 90 capsule, Rfl: 3    thiamine 100 MG tablet, Take 1 tablet (100 mg total) by mouth in the morning., Disp: , Rfl: 0    varenicline (CHANTIX) 0.5 mg tablet,  Take 1 tablet (0.5 mg total) by mouth 2 (two) times a day for 3 days, THEN 1 tablet (0.5 mg total) 2 (two) times a day for 4 days, THEN 2 tablets (1 mg total) 2 (two) times a day., Disp: 322 tablet, Rfl: 0

## 2025-05-16 NOTE — PATIENT INSTRUCTIONS
Take antibiotics as prescribed.  Continue to focus on good hydration with water, electrolyte beverages such as liquid IV as you lose a lot of electrolytes with the diarrhea.  You may wish to add a probiotic to help with the stools.    Follow-up with PCP for recheck.  If you have any severe worsening symptoms such as severe abdominal pain, not able to keep down fluids, severe weakness, please go to the ER.

## 2025-05-23 DIAGNOSIS — I10 ESSENTIAL HYPERTENSION: ICD-10-CM

## 2025-05-24 RX ORDER — LOSARTAN POTASSIUM 50 MG/1
50 TABLET ORAL DAILY
Qty: 90 TABLET | Refills: 2 | Status: SHIPPED | OUTPATIENT
Start: 2025-05-24

## 2025-06-27 ENCOUNTER — RA CDI HCC (OUTPATIENT)
Dept: OTHER | Facility: HOSPITAL | Age: 60
End: 2025-06-27

## 2025-06-27 NOTE — PROGRESS NOTES
HCC coding opportunities          Chart Reviewed number of suggestions sent to Provider: 1   I48.0  Patients Insurance        Commercial Insurance: [employer]

## 2025-07-01 ENCOUNTER — OFFICE VISIT (OUTPATIENT)
Dept: URGENT CARE | Age: 60
End: 2025-07-01
Payer: COMMERCIAL

## 2025-07-01 VITALS
HEART RATE: 87 BPM | SYSTOLIC BLOOD PRESSURE: 122 MMHG | DIASTOLIC BLOOD PRESSURE: 64 MMHG | TEMPERATURE: 97.4 F | RESPIRATION RATE: 18 BRPM | OXYGEN SATURATION: 96 %

## 2025-07-01 DIAGNOSIS — L03.312 CELLULITIS OF BACK EXCEPT BUTTOCK: Primary | ICD-10-CM

## 2025-07-01 PROCEDURE — G0383 LEV 4 HOSP TYPE B ED VISIT: HCPCS

## 2025-07-01 RX ORDER — CEPHALEXIN 500 MG/1
500 CAPSULE ORAL EVERY 8 HOURS SCHEDULED
Qty: 15 CAPSULE | Refills: 0 | Status: SHIPPED | OUTPATIENT
Start: 2025-07-01 | End: 2025-07-06

## 2025-07-01 NOTE — PATIENT INSTRUCTIONS
Monitor site for increased redness, change in rash, increased swelling, or drainage.     If this develops or you develop any fever, chills, aches, joint pain, swelling, headache, dizziness, numbness or any new or concerning symptoms please proceed to ER.    Take antibiotics as prescribed.   Take entire course of antibiotics.     Eat yogurt with live and active cultures and/or take a probiotic at least 3 hours before or after antibiotic dose.   Monitor stool for diarrhea and/or blood. If this occurs, contact primary care doctor ASAP.     Good hand hygiene  Avoid scratching area  Keep area clean and dry    Watch for signs of increased infection as previously discussed    If you develop any facial swelling, shortness of breath, difficulty breathing or any new or concerning symptoms please return or proceed ER.     A referral has been placed for Dermatology.  Please call to schedule an appointment today.   Follow up with PCP in 3-5 days.

## 2025-07-01 NOTE — ASSESSMENT & PLAN NOTE
Lab Results   Component Value Date    HGBA1C 7.1 (H) 01/26/2025     Low carb diet. Pt states he did not take metformin. Would like diet control.   Orders:    Comprehensive metabolic panel; Future    Hemoglobin A1C; Future    Albumin / creatinine urine ratio; Future    Lipid Panel with Direct LDL reflex; Future     Yes

## 2025-07-01 NOTE — PROGRESS NOTES
[facility]  Name: Callum Nielson      : 1965      MRN: [id number]  Encounter Provider: LARA Ledesma  Encounter Date: 2025   [facility]  :  Assessment & Plan  Cellulitis of back except buttock    Orders:  •  cephalexin (KEFLEX) 500 mg capsule; Take 1 capsule (500 mg total) by mouth every 8 (eight) hours for 5 days  •  Ambulatory Referral to Dermatology; Future    Abscessed like acne to area, open and draining.  No fever.  Will initiate abx therapy.    Dermatology referral.     Patient Instructions  Follow up with PCP in 3-5 days.  Proceed to  ER if symptoms worsen.    If tests are performed, our office will contact you with results only if changes need to made to the care plan discussed with you at the visit. You can review your full results on [facility] MyChart.    Chief Complaint:   Chief Complaint   Patient presents with   • Acne     Patient states that he has had back acne for 6 months and over the last two months it has gotten worse. He notes increase in amount and size of the pimples.      History of Present Illness {?Quick Links Encounters * My Last Note * Last Note in Specialty * Snapshot * Since Last Visit * History :50256}  Pt is a 60 year old presenting with acne outbreak to his back, worsening over the last 3 weeks.  He denies taking anything for her symptoms, or using any acne body washes/treatments for his symptoms. He reports pain to the pack with the forming and draining of the abscessed areas.  He denies fever or chills.            Review of Systems   Skin:  Positive for rash and wound.     Past Medical History   Past Medical History[1]  Past Surgical History[2]  Family History[3]  he reports that he quit smoking about 17 months ago. His smoking use included cigarettes. He started smoking about 43 years ago. He has a 42.1 pack-year smoking history. He has been exposed to tobacco smoke. He has never used smokeless tobacco. He  reports current alcohol use of about 70.0 standard drinks of alcohol per week. He reports that he does not use drugs.  Current Outpatient Medications   Medication Instructions   • albuterol (PROVENTIL HFA,VENTOLIN HFA) 90 mcg/act inhaler INHALE 2 PUFFS BY MOUTH EVERY 6 HOURS AS NEEDED FOR WHEEZE   • ALPRAZolam (XANAX) 0.5 mg, Oral, As needed   • apixaban (ELIQUIS) 5 mg, Oral, 2 times daily   • Budeson-Glycopyrrol-Formoterol (Breztri Aerosphere) 160-9-4.8 MCG/ACT AERO 2 puffs, Inhalation, 2 times daily, Rinse mouth after use.   • cephalexin (KEFLEX) 500 mg, Oral, Every 8 hours scheduled   • folic acid (FOLVITE) 1 mg, Oral, Daily   • losartan (COZAAR) 50 mg, Oral, Daily   • metFORMIN (GLUCOPHAGE) 1,000 mg, Oral, 2 times daily with meals   • metoprolol tartrate (LOPRESSOR) 50 mg, Oral, 2 times daily   • multivitamin (THERAGRAN) TABS 1 tablet, Daily   • mupirocin (BACTROBAN) 2 % ointment Topical, 3 times daily   • nicotine (NICODERM CQ) 21 mg/24 hr TD 24 hr patch 1 patch, Transdermal, Every 24 hours   • nicotine polacrilex (COMMIT) 2 mg, Mouth/Throat, As needed   • tamsulosin (FLOMAX) 0.4 mg, Oral, Daily with dinner   • thiamine 100 mg, Oral, Daily   • varenicline (CHANTIX) 0.5 mg tablet Take 1 tablet (0.5 mg total) by mouth 2 (two) times a day for 3 days, THEN 1 tablet (0.5 mg total) 2 (two) times a day for 4 days, THEN 2 tablets (1 mg total) 2 (two) times a day.   Allergies[4]     Objective {?Quick Links Trend Vitals * Enter New Vitals * Results Review * Timeline (Adult) * Labs * Imaging * Cardiology * Procedures * Lung Cancer Screening * Surgical eConsent :37165}  /64   Pulse 87   Temp (!) 97.4 °F (36.3 °C)   Resp 18   SpO2 96%      Physical Exam  Vitals and nursing note reviewed.   Constitutional:       General: He is not in acute distress.     Appearance: Normal appearance. He is normal weight. He is not ill-appearing.     Skin:     General: Skin is warm.      Findings: Rash present.           Comments:   "Multiple areas of abscesses to back, nickel to quartered sized. Draining with surrounding redness, erythema and tenderness.      Neurological:      Mental Status: He is alert.         Portions of the record may have been created with voice recognition software.  Occasional wrong word or \"sound a like\" substitutions may have occurred due to the inherent limitations of voice recognition software.  Read the chart carefully and recognize, using context, where substitutions have occurred.         [1]  Past Medical History:  Diagnosis Date   • Adhesive capsulitis of right shoulder 05/17/2022   • Anxiety     Last assessed 12/20/2013   • Contusion of right wrist 01/30/2018   • COPD (chronic obstructive pulmonary disease) ([facility]) 08/16/2016   • COPD (chronic obstructive pulmonary disease) ([facility]) 08/16/2016   • Depression with anxiety    • Diverticulitis 2016   • Eczema 02/24/2016   • Elevated ALT measurement 08/16/2016   • History of atrial fibrillation    • Hypertension    • Obesity 09/25/2012   • Perianal abscess 03/05/2019   • Type 2 diabetes mellitus with hyperglycemia, without long-term current use of insulin ([facility])    [2]  Past Surgical History:  Procedure Laterality Date   • CARDIAC LOOP RECORDER  2001   • COLONOSCOPY     • KNEE SURGERY      staph infection   • [place] SURGICAL ARTHROSCOPY PALMER W/CORACOACRM LIGM RLS Right 1/17/2024    Procedure: ARTHROSCOPIC SUBACROMIAL DECOMPRESSION;  Surgeon: Waldo Chambers MD;  [facility];  Service: Orthopedics   • [place] SURGICAL ARTHROSCOPY SHOULDER BICEPS TENODESIS Right 1/17/2024    Procedure: ARTHROSCOPIC BICEPS TENODESIS;  Surgeon: Waldo Chambers MD;  [facility];  Service: Orthopedics   • [place] SURGICAL ARTHROSCOPY SHOULDER W/ROTATOR CUFF RPR Right 1/17/2024    Procedure: REPAIR ROTATOR CUFF  ARTHROSCOPIC;  Surgeon: Waldo Chambers MD;  [facility];  Service: Orthopedics   [3]  Family History  Problem Relation Name Age of Onset   • Cancer Mother          Lung " Cancer   • Cancer Father     • Diabetes Father     • Diabetes Sister     [4]  Allergies  Allergen Reactions   • Lisinopril Cough     cough   • Spiriva Respimat [Tiotropium Bromide Monohydrate] Anxiety

## 2025-07-02 ENCOUNTER — OFFICE VISIT (OUTPATIENT)
Dept: FAMILY MEDICINE CLINIC | Facility: CLINIC | Age: 60
End: 2025-07-02
Payer: COMMERCIAL

## 2025-07-02 VITALS
DIASTOLIC BLOOD PRESSURE: 76 MMHG | HEART RATE: 83 BPM | TEMPERATURE: 97.9 F | HEIGHT: 71 IN | SYSTOLIC BLOOD PRESSURE: 120 MMHG | OXYGEN SATURATION: 96 % | RESPIRATION RATE: 18 BRPM | BODY MASS INDEX: 31.81 KG/M2 | WEIGHT: 227.2 LBS

## 2025-07-02 DIAGNOSIS — I48.0 PAROXYSMAL ATRIAL FIBRILLATION (HCC): ICD-10-CM

## 2025-07-02 DIAGNOSIS — G47.09 OTHER INSOMNIA: ICD-10-CM

## 2025-07-02 DIAGNOSIS — F41.9 ANXIETY: ICD-10-CM

## 2025-07-02 DIAGNOSIS — I10 ESSENTIAL HYPERTENSION: ICD-10-CM

## 2025-07-02 DIAGNOSIS — E11.65 TYPE 2 DIABETES MELLITUS WITH HYPERGLYCEMIA, WITHOUT LONG-TERM CURRENT USE OF INSULIN (HCC): Primary | ICD-10-CM

## 2025-07-02 DIAGNOSIS — Z12.5 SCREENING FOR PROSTATE CANCER: ICD-10-CM

## 2025-07-02 DIAGNOSIS — J42 CHRONIC BRONCHITIS, UNSPECIFIED CHRONIC BRONCHITIS TYPE (HCC): ICD-10-CM

## 2025-07-02 DIAGNOSIS — L73.9 FOLLICULITIS: ICD-10-CM

## 2025-07-02 LAB
CREAT UR-MCNC: 66.3 MG/DL
MICROALBUMIN UR-MCNC: <7 MG/L
SL AMB POCT HEMOGLOBIN AIC: 6.9 (ref ?–6.5)

## 2025-07-02 PROCEDURE — 82570 ASSAY OF URINE CREATININE: CPT | Performed by: FAMILY MEDICINE

## 2025-07-02 PROCEDURE — 99214 OFFICE O/P EST MOD 30 MIN: CPT | Performed by: FAMILY MEDICINE

## 2025-07-02 PROCEDURE — 82043 UR ALBUMIN QUANTITATIVE: CPT | Performed by: FAMILY MEDICINE

## 2025-07-02 PROCEDURE — 83036 HEMOGLOBIN GLYCOSYLATED A1C: CPT | Performed by: FAMILY MEDICINE

## 2025-07-02 RX ORDER — ALPRAZOLAM 0.5 MG
0.5 TABLET ORAL
Qty: 14 TABLET | Refills: 0 | Status: SHIPPED | OUTPATIENT
Start: 2025-07-02

## 2025-07-02 NOTE — PROGRESS NOTES
Name: Callum Nielson      : 1965      MRN: [id number]  Encounter Provider: Lamont Guzmán MD  Encounter Date: 2025   Encounter department: [facility]  :  Assessment & Plan  Type 2 diabetes mellitus with hyperglycemia, without long-term current use of insulin (HCC)    Lab Results   Component Value Date    HGBA1C 6.9 (A) 2025     Pt did not take metformin. Diet control.   Orders:    POCT hemoglobin A1c    Albumin / creatinine urine ratio    Anxiety  KESHIA-7 score 11 today. Stress in life.   Give xanax 0.5mg qhs prn. SE educated pt. Do not take it with alcohol.   Orders:    TSH, 3rd generation with Free T4 reflex; Future    ALPRAZolam (XANAX) 0.5 mg tablet; Take 1 tablet (0.5 mg total) by mouth daily at bedtime as needed for anxiety    Other insomnia    Orders:    ALPRAZolam (XANAX) 0.5 mg tablet; Take 1 tablet (0.5 mg total) by mouth daily at bedtime as needed for anxiety    Essential hypertension  Controlled. DASH diet. Continue metoprolol 50mg bid and losartan 50mg QD.        Paroxysmal atrial fibrillation (HCC)  [facility] cardiology.  The patient is not taking his Eliquis as prescribed.  He did stop this on his own. I did discuss the risks of not taking the Eliquis with the patient including stroke.        Screening for prostate cancer    Orders:    PSA, Total Screen; Future    Folliculitis  Back. Continue keflex 500mg tid.        Chronic bronchitis, unspecified chronic bronchitis type (HCC)  [facility] pulmonary.              Recommend flu shot.   Got PCV20 in .   Recommend shingrix.   PSA yearly. Give script today.   Colonoscopy 3/2021, recheck in 5 years.   RTO in 4 months.          History of Present Illness   HPI     Pt is here by himself.     DM---Today hgA1C 6.9 Diet control. Tried to eat healthy. Lost 21 lbs in last 4 months.   He did not take metformin 1000mg bid.   Denies neuropathy.   [facility] ophthalmology.   [facility] podiatry.      HTN---He is on losartan 50mg QD and metoprolol.   Denies  "headache, SOB or CP.      Afib---He should be on metoprolol 50mg bid and eliquis 5mg bid. Did not take eliquis for a while.   [facility] cardiology.     COPD---[facility] pulmonary. Got breztri but he did not use it.   Use albuterol prn.    Will do sleep study.      Urinary frequency---He did not use flomax 0.4mg qhs because SE. [facility] urology.     Cannot sleep well in last 2 weeks. May sleep 3 hours only.   Had marriage problems.   Feels anxious.      Drink 2-3 drinks every 3 days. Much less than before.   Smoke 1ppd for 35 years. Smoked less now, 1-2/day. 2/2025 CT lung screen negative.      Denies depression.      Review of Systems   Constitutional:  Negative for appetite change, chills and fever.   HENT:  Negative for congestion, ear pain, sinus pain and sore throat.    Eyes:  Negative for discharge and itching.   Respiratory:  Negative for apnea, cough, chest tightness, shortness of breath and wheezing.    Cardiovascular:  Negative for chest pain, palpitations and leg swelling.   Gastrointestinal:  Negative for abdominal pain, anal bleeding, constipation, diarrhea, nausea and vomiting.   Endocrine: Negative for cold intolerance, heat intolerance and polyuria.   Genitourinary:  Negative for difficulty urinating and dysuria.   Musculoskeletal:  Negative for arthralgias, back pain and myalgias.   Skin:  Negative for rash.   Neurological:  Negative for dizziness and headaches.   Psychiatric/Behavioral:  Positive for sleep disturbance. Negative for agitation, self-injury and suicidal ideas. The patient is nervous/anxious.        Objective   /76   Pulse 83   Temp 97.9 °F (36.6 °C) (Tympanic)   Resp 18   Ht 5' 11\" (1.803 m)   Wt 103 kg (227 lb 3.2 oz)   SpO2 96%   BMI 31.69 kg/m²      Physical Exam  Constitutional:       Appearance: He is well-developed.   HENT:      Head: Normocephalic and atraumatic.     Eyes:      General:         Right eye: No discharge.         Left eye: No discharge.      Conjunctiva/sclera: " Conjunctivae normal.       Cardiovascular:      Rate and Rhythm: Normal rate and regular rhythm.      Pulses: no weak pulses.           Dorsalis pedis pulses are 2+ on the right side and 2+ on the left side.      Heart sounds: Normal heart sounds. No murmur heard.     No friction rub. No gallop.   Pulmonary:      Effort: Pulmonary effort is normal. No respiratory distress.      Breath sounds: Normal breath sounds. No wheezing or rales.   Abdominal:      General: Bowel sounds are normal. There is no distension.      Palpations: Abdomen is soft.      Tenderness: There is no abdominal tenderness. There is no guarding.     Musculoskeletal:         General: Normal range of motion.      Cervical back: Normal range of motion and neck supple.      Right lower leg: No edema.      Left lower leg: No edema.   Feet:      Right foot:      Skin integrity: No ulcer, skin breakdown, erythema, warmth, callus or dry skin.      Left foot:      Skin integrity: No ulcer, skin breakdown, erythema, warmth, callus or dry skin.     Skin:     Findings: Rash present.      Comments: Folliculitis in back     Neurological:      Mental Status: He is alert.       Patient's shoes and socks removed.    Right Foot/Ankle   Right Foot Inspection  Skin Exam: skin normal and skin intact. No dry skin, no warmth, no callus, no erythema, no maceration, no abnormal color, no pre-ulcer, no ulcer and no callus.     Toe Exam: ROM and strength within normal limits.     Sensory   Monofilament testing: intact    Vascular  The right DP pulse is 2+.     Left Foot/Ankle  Left Foot Inspection  Skin Exam: skin normal and skin intact. No dry skin, no warmth, no erythema, no maceration, normal color, no pre-ulcer, no ulcer and no callus.     Toe Exam: ROM and strength within normal limits.     Sensory   Monofilament testing: intact    Vascular  The left DP pulse is 2+.     Assign Risk Category  No deformity present  No loss of protective sensation  No weak pulses  Risk:  0

## 2025-07-02 NOTE — ASSESSMENT & PLAN NOTE
[facility] cardiology.  The patient is not taking his Eliquis as prescribed.  He did stop this on his own. I did discuss the risks of not taking the Eliquis with the patient including stroke.

## 2025-07-02 NOTE — ASSESSMENT & PLAN NOTE
Orders:    ALPRAZolam (XANAX) 0.5 mg tablet; Take 1 tablet (0.5 mg total) by mouth daily at bedtime as needed for anxiety

## 2025-07-02 NOTE — ASSESSMENT & PLAN NOTE
Lab Results   Component Value Date    HGBA1C 6.9 (A) 07/02/2025     Pt did not take metformin. Diet control.   Orders:    POCT hemoglobin A1c    Albumin / creatinine urine ratio

## 2025-07-08 ENCOUNTER — REMOTE DEVICE CLINIC VISIT (OUTPATIENT)
Dept: CARDIOLOGY CLINIC | Facility: CLINIC | Age: 60
End: 2025-07-08
Payer: COMMERCIAL

## 2025-07-08 DIAGNOSIS — I48.92 ATRIAL FLUTTER, UNSPECIFIED TYPE (HCC): Primary | ICD-10-CM

## 2025-07-08 PROCEDURE — 93298 REM INTERROG DEV EVAL SCRMS: CPT | Performed by: INTERNAL MEDICINE

## 2025-07-10 ENCOUNTER — TELEPHONE (OUTPATIENT)
Dept: CARDIOLOGY CLINIC | Facility: CLINIC | Age: 60
End: 2025-07-10

## 2025-07-10 NOTE — TELEPHONE ENCOUNTER
Called patient to reschedule 7/23/25 appointment with Dr Nichols (1 year follow up). Patient asked to be called tomorrow, unable to r/s at time of call.

## 2025-07-18 ENCOUNTER — OFFICE VISIT (OUTPATIENT)
Dept: URGENT CARE | Age: 60
End: 2025-07-18
Payer: COMMERCIAL

## 2025-07-18 VITALS
OXYGEN SATURATION: 97 % | TEMPERATURE: 97.5 F | RESPIRATION RATE: 16 BRPM | WEIGHT: 230 LBS | BODY MASS INDEX: 32.2 KG/M2 | HEART RATE: 88 BPM | HEIGHT: 71 IN | DIASTOLIC BLOOD PRESSURE: 82 MMHG | SYSTOLIC BLOOD PRESSURE: 144 MMHG

## 2025-07-18 DIAGNOSIS — R21 RASH OF GENITAL AREA: Primary | ICD-10-CM

## 2025-07-18 PROCEDURE — 87529 HSV DNA AMP PROBE: CPT

## 2025-07-18 PROCEDURE — G0382 LEV 3 HOSP TYPE B ED VISIT: HCPCS

## 2025-07-18 RX ORDER — MUPIROCIN 2 %
OINTMENT (GRAM) TOPICAL 3 TIMES DAILY
Qty: 50 G | Refills: 0 | Status: SHIPPED | OUTPATIENT
Start: 2025-07-18

## 2025-07-18 NOTE — PROGRESS NOTES
[facility]  Name: Callum Nielson      : 1965      MRN: [id number]  Encounter Provider: LARA Leavitt  Encounter Date: 2025   [facility]  :  HSV cultures pending.   Will treat empirically for folliculitis with Bactroban, as patient responded well earlier this year.   Please refrain from sexual intercourse while test results pending.   Follow up with PCP if no relief within one week.  Assessment & Plan  Rash of genital area    Orders:    HSV TYPE 1,2 DNA PCR [facility] SWAB ONLY; Future    mupirocin (BACTROBAN) 2 % ointment; Apply topically 3 (three) times a day        Patient Instructions  Patient Education     Skin Rash ED   General Information   You came to the Emergency Department (ED) for a skin rash. The medical name for this is dermatitis. Many things can cause your rash. You may have a rash if something is irritating your skin. An allergy can cause a rash and so can plants, soaps, and some kinds of metal. The doctors may not know what is causing your rash.  What care is needed at home?   Call your regular doctor to let them know you were in the ED. Make a follow-up appointment if you were told to.  Use an unscented cream or lotion to keep your skin moist.  Drink plenty of fluids to keep your body hydrated.  Bathe with cool or warm water. Do not use hot water. Pat yourself dry with a clean, thick, soft towel. Use mild and unscented soap, moisturizers, and deodorants.  At-home care to help with scratching:  Wear gloves to protect skin on your hands. Try wearing cotton gloves under plastic gloves. Remove both sets of gloves from time to time to prevent sweating.  Keep nails short and clean.  If you scratch in your sleep, wear white cotton gloves to bed.  Try using cool compresses on the skin. They may help with swelling and itching. Dip a cloth in cold water and put it right on your itchy skin.  When do I need to get emergency help?   Call for an  ambulance right away if:   You start to have severe trouble breathing or swallowing (for example, you cannot speak in full sentences).  Return to the ED if:   The rash spreads over large parts of your body and most of your skin becomes red.  It is becoming hard to breathe, but you can still talk in full sentences.  When do I need to call the doctor?   You have a fever of 100.4°F (38°C) or higher or chills.  You have signs of a wound infection like swelling, redness, warmth, pain, or drainage from the wound.  You have new or worsening symptoms.  Last Reviewed Date   2021-09-09  Consumer Information Use and Disclaimer   This generalized information is a limited summary of diagnosis, treatment, and/or medication information. It is not meant to be comprehensive and should be used as a tool to help the user understand and/or assess potential diagnostic and treatment options. It does NOT include all information about conditions, treatments, medications, side effects, or risks that may apply to a specific patient. It is not intended to be medical advice or a substitute for the medical advice, diagnosis, or treatment of a health care provider based on the health care provider's examination and assessment of a patient’s specific and unique circumstances. Patients must speak with a health care provider for complete information about their health, medical questions, and treatment options, including any risks or benefits regarding use of medications. This information does not endorse any treatments or medications as safe, effective, or approved for treating a specific patient. [employer] and its affiliates disclaim any warranty or liability relating to this information or the use thereof. The use of this information is governed by the Terms of Use, available at https://www.wol[employer]wer.com/en/know/clinical-effectiveness-terms   Copyright   Follow up with PCP in 3-5 days.  Proceed to  ER if symptoms worsen.    If tests are  "performed, our office will contact you with results only if changes need to made to the care plan discussed with you at the visit. You can review your full results on [facility] MyChart.    Chief Complaint:   Chief Complaint   Patient presents with    Rash     Pt presents with rash of genitals that has been present for past three days. Has applied abx ointment and powder.      History of Present Illness   60 year old male presents for evaluation of pruritic rash over the past three days. He reports that he is sexually active with \"mostly\" one person, occasionally uses condoms. Denies: dysuria, penile discharge, urinary frequency/urgency.       Rash  This is a new problem. The current episode started in the past 7 days (x 3 days). The affected locations include the genitalia (shaft of penis). The problem is mild. The rash is characterized by itchiness and redness. It is unknown if there was an exposure to a precipitant. The rash first occurred at home. Associated symptoms include itching. Pertinent negatives include no anorexia, congestion, cough, decreased physical activity, decreased responsiveness, decreased sleep, drinking less, diarrhea, facial edema, fatigue, fever, joint pain, rhinorrhea, shortness of breath, sore throat or vomiting. Past treatments include nothing. The treatment provided no relief. There is no history of allergies, asthma, eczema or varicella.         Review of Systems   Constitutional:  Negative for decreased responsiveness, fatigue and fever.   HENT:  Negative for congestion, ear discharge, ear pain, postnasal drip, rhinorrhea, sinus pressure, sinus pain, sneezing and sore throat.    Eyes: Negative.  Negative for pain, discharge, redness and itching.   Respiratory: Negative.  Negative for apnea, cough, choking, chest tightness, shortness of breath, wheezing and stridor.    Cardiovascular: Negative.  Negative for chest pain and palpitations.   Gastrointestinal: Negative.  Negative for " anorexia, diarrhea, nausea and vomiting.   Endocrine: Negative.  Negative for polydipsia, polyphagia and polyuria.   Genitourinary: Negative.  Negative for decreased urine volume, difficulty urinating, dysuria, enuresis, flank pain, frequency, genital sores, hematuria, penile discharge, penile pain, penile swelling, scrotal swelling, testicular pain and urgency.   Musculoskeletal: Negative.  Negative for arthralgias, back pain, joint pain, myalgias, neck pain and neck stiffness.   Skin:  Positive for itching and rash. Negative for color change, pallor and wound.   Allergic/Immunologic: Negative.  Negative for environmental allergies.   Neurological: Negative.  Negative for dizziness, facial asymmetry, light-headedness, numbness and headaches.   Hematological: Negative.  Negative for adenopathy.   Psychiatric/Behavioral: Negative.       Past Medical History   Past Medical History[1]  Past Surgical History[2]  Family History[3]  he reports that he quit smoking about 17 months ago. His smoking use included cigarettes. He started smoking about 43 years ago. He has a 42.1 pack-year smoking history. He has been exposed to tobacco smoke. He has never used smokeless tobacco. He reports that he does not currently use alcohol. He reports that he does not use drugs.  Current Outpatient Medications   Medication Instructions    albuterol (PROVENTIL HFA,VENTOLIN HFA) 90 mcg/act inhaler INHALE 2 PUFFS BY MOUTH EVERY 6 HOURS AS NEEDED FOR WHEEZE    ALPRAZolam (XANAX) 0.5 mg, Oral, Daily at bedtime PRN    apixaban (ELIQUIS) 5 mg, Oral, 2 times daily    Budeson-Glycopyrrol-Formoterol (Breztri Aerosphere) 160-9-4.8 MCG/ACT AERO 2 puffs, Inhalation, 2 times daily, Rinse mouth after use.    folic acid (FOLVITE) 1 mg, Oral, Daily    losartan (COZAAR) 50 mg, Oral, Daily    metoprolol tartrate (LOPRESSOR) 50 mg, Oral, 2 times daily    multivitamin (THERAGRAN) TABS 1 tablet, Daily    mupirocin (BACTROBAN) 2 % ointment Topical, 3 times daily  "   nicotine (NICODERM CQ) 21 mg/24 hr TD 24 hr patch 1 patch, Transdermal, Every 24 hours    nicotine polacrilex (COMMIT) 2 mg, Mouth/Throat, As needed    thiamine 100 mg, Oral, Daily    varenicline (CHANTIX) 0.5 mg tablet Take 1 tablet (0.5 mg total) by mouth 2 (two) times a day for 3 days, THEN 1 tablet (0.5 mg total) 2 (two) times a day for 4 days, THEN 2 tablets (1 mg total) 2 (two) times a day.   Allergies[4]     Objective   /82   Pulse 88   Temp 97.5 °F (36.4 °C)   Resp 16   Ht 5' 11\" (1.803 m)   Wt 104 kg (230 lb)   SpO2 97%   BMI 32.08 kg/m²      Physical Exam  Vitals and nursing note reviewed. Exam conducted with a chaperone present.   Constitutional:       General: He is not in acute distress.     Appearance: He is well-developed. He is not ill-appearing, toxic-appearing or diaphoretic.      Interventions: He is not intubated.  HENT:      Head: Normocephalic and atraumatic.     Eyes:      Conjunctiva/sclera: Conjunctivae normal.       Cardiovascular:      Rate and Rhythm: Normal rate and regular rhythm.      Heart sounds: Normal heart sounds, S1 normal and S2 normal. Heart sounds not distant. No murmur heard.  Pulmonary:      Effort: Pulmonary effort is normal. No tachypnea, bradypnea, accessory muscle usage, prolonged expiration, respiratory distress or retractions. He is not intubated.      Breath sounds: Normal breath sounds. No stridor, decreased air movement or transmitted upper airway sounds. No decreased breath sounds, wheezing, rhonchi or rales.   Abdominal:      Palpations: Abdomen is soft.      Tenderness: There is no abdominal tenderness.   Genitourinary:     Penis: Erythema present. No phimosis, paraphimosis, hypospadias, tenderness, discharge, swelling or lesions.         Comments: (+) Mild erythema with 1-2 small lesions at base  of glans. No drainage, not vesicular in appearance.     Musculoskeletal:         General: No swelling.      Cervical back: Neck supple.     Skin:     " "General: Skin is warm and dry.      Capillary Refill: Capillary refill takes less than 2 seconds.     Neurological:      Mental Status: He is alert.     Psychiatric:         Mood and Affect: Mood normal.         Portions of the record may have been created with voice recognition software.  Occasional wrong word or \"sound a like\" substitutions may have occurred due to the inherent limitations of voice recognition software.  Read the chart carefully and recognize, using context, where substitutions have occurred.         [1]   Past Medical History:  Diagnosis Date    Adhesive capsulitis of right shoulder 05/17/2022    Anxiety     Last assessed 12/20/2013    Contusion of right wrist 01/30/2018    COPD (chronic obstructive pulmonary disease) ([facility]) 08/16/2016    COPD (chronic obstructive pulmonary disease) ([facility]) 08/16/2016    Depression with anxiety     Diverticulitis 2016    Eczema 02/24/2016    Elevated ALT measurement 08/16/2016    History of atrial fibrillation     Hypertension     Obesity 09/25/2012    Perianal abscess 03/05/2019    Type 2 diabetes mellitus with hyperglycemia, without long-term current use of insulin ([facility])    [2]   Past Surgical History:  Procedure Laterality Date    CARDIAC LOOP RECORDER  2001    COLONOSCOPY      KNEE SURGERY      staph infection    MI SURGICAL ARTHROSCOPY PALMER W/CORACOACRM LIGM RLS Right 1/17/2024    Procedure: ARTHROSCOPIC SUBACROMIAL DECOMPRESSION;  Surgeon: Waldo Chambers MD;  Location: [facility];  Service: Orthopedics    MI SURGICAL ARTHROSCOPY SHOULDER BICEPS TENODESIS Right 1/17/2024    Procedure: ARTHROSCOPIC BICEPS TENODESIS;  Surgeon: Waldo Chambers MD;  Location: [facility];  Service: Orthopedics    MI SURGICAL ARTHROSCOPY SHOULDER W/ROTATOR CUFF RPR Right 1/17/2024    Procedure: REPAIR ROTATOR CUFF  ARTHROSCOPIC;  Surgeon: Waldo Chambers MD;  Location: [facility];  Service: Orthopedics   [3]   Family History  Problem Relation Name Age of Onset    Cancer Mother        "   Lung Cancer    Cancer Father      Diabetes Father      Diabetes Sister     [4]   Allergies  Allergen Reactions    Lisinopril Cough     cough    Spiriva Respimat [Tiotropium Bromide Monohydrate] Anxiety

## 2025-07-18 NOTE — PATIENT INSTRUCTIONS
HSV cultures pending.   Will treat empirically for folliculitis with Bactroban, as patient responded well earlier this year.   Please refrain from sexual intercourse while test results pending.   Follow up with PCP if no relief within one week.

## 2025-07-19 LAB
HSV1 DNA SPEC QL NAA+PROBE: NOT DETECTED
HSV1 DNA SPEC QL NAA+PROBE: NOT DETECTED

## (undated) DEVICE — 3M™ STERI-DRAPE™ U-DRAPE 1015: Brand: STERI-DRAPE™

## (undated) DEVICE — PACK PBDS SHOULDER ARTHROSCOPY RF

## (undated) DEVICE — 4-PORT MANIFOLD: Brand: NEPTUNE 2

## (undated) DEVICE — IMMOBILIZER SHOULDER QUICK FIT SLING W/PILLOW

## (undated) DEVICE — BLADE SHAVER DISSECTOR 5MM 13CM COOLCUT

## (undated) DEVICE — GAUZE SPONGES,16 PLY: Brand: CURITY

## (undated) DEVICE — ABDOMINAL PAD: Brand: DERMACEA

## (undated) DEVICE — SUTURETAPE 1.3MM W/CLOSED LOOP BLUE/WHITE AR-7535

## (undated) DEVICE — SCORPION FAST PASS INST

## (undated) DEVICE — TUBING ARTHROSCOPIC WAVE  MAIN PUMP

## (undated) DEVICE — DRESSING MEPILEX AG BORDER POST-OP 4 X 8 IN

## (undated) DEVICE — SCD SEQUENTIAL COMPRESSION COMFORT SLEEVE MEDIUM KNEE LENGTH: Brand: KENDALL SCD

## (undated) DEVICE — GLOVE INDICATOR PI UNDERGLOVE SZ 8 BLUE

## (undated) DEVICE — COBAN 6 IN STERILE

## (undated) DEVICE — CHLORAPREP HI-LITE 26ML ORANGE

## (undated) DEVICE — TUBING SUCTION 5MM X 12 FT

## (undated) DEVICE — T-MAX DISPOSABLE FACE MASK 8 PER BOX

## (undated) DEVICE — IMPERVIOUS STOCKINETTE: Brand: DEROYAL

## (undated) DEVICE — DISPOSABLE OR TOWEL: Brand: CARDINAL HEALTH

## (undated) DEVICE — INTENDED FOR TISSUE SEPARATION, AND OTHER PROCEDURES THAT REQUIRE A SHARP SURGICAL BLADE TO PUNCTURE OR CUT.: Brand: BARD-PARKER ® CARBON RIB-BACK BLADES

## (undated) DEVICE — GLOVE SRG BIOGEL 7.5

## (undated) DEVICE — NEEDLE SPINAL18G X 3.5 IN QUINCKE

## (undated) DEVICE — SURGICAL GOWN, XL SMARTSLEEVE: Brand: CONVERTORS

## (undated) DEVICE — OCCLUSIVE GAUZE STRIP,3% BISMUTH TRIBROMOPHENATE IN PETROLATUM BLEND: Brand: XEROFORM

## (undated) DEVICE — KERLIX BANDAGE ROLL: Brand: KERLIX

## (undated) DEVICE — THREADED CLEAR CANNULA WITH OBTURATOR 7MM X 75MM

## (undated) DEVICE — CANNULA 7 X70MM THRD SEAL SIDE PORT

## (undated) DEVICE — SUT ETHILON 3-0 PS-1 18 IN 1663G

## (undated) DEVICE — POSITIONER TRIMANO LIMB BEACH CHAIR

## (undated) DEVICE — PROBE ABLATION  APOLLO RF 90 DEG MULTI PORT

## (undated) DEVICE — NEEDLE SUT SCORPION MEGALOADER

## (undated) DEVICE — ARTHROSCOPY FLOOR MAT